# Patient Record
Sex: MALE | Race: WHITE | Employment: OTHER
[De-identification: names, ages, dates, MRNs, and addresses within clinical notes are randomized per-mention and may not be internally consistent; named-entity substitution may affect disease eponyms.]

---

## 2017-01-10 ENCOUNTER — INITIAL CONSULT (OUTPATIENT)
Dept: PAIN MANAGEMENT | Facility: CLINIC | Age: 67
End: 2017-01-10

## 2017-01-10 VITALS
HEART RATE: 61 BPM | HEIGHT: 72 IN | TEMPERATURE: 97.2 F | BODY MASS INDEX: 32.78 KG/M2 | SYSTOLIC BLOOD PRESSURE: 117 MMHG | RESPIRATION RATE: 16 BRPM | OXYGEN SATURATION: 99 % | WEIGHT: 242 LBS | DIASTOLIC BLOOD PRESSURE: 55 MMHG

## 2017-01-10 DIAGNOSIS — G89.29 CHRONIC MIDLINE LOW BACK PAIN WITH RIGHT-SIDED SCIATICA: Primary | ICD-10-CM

## 2017-01-10 DIAGNOSIS — R69 SEVERE COMORBID ILLNESS: ICD-10-CM

## 2017-01-10 DIAGNOSIS — M17.12 PRIMARY OSTEOARTHRITIS OF LEFT KNEE: ICD-10-CM

## 2017-01-10 DIAGNOSIS — M54.41 CHRONIC MIDLINE LOW BACK PAIN WITH RIGHT-SIDED SCIATICA: Primary | ICD-10-CM

## 2017-01-10 DIAGNOSIS — Z98.890 HISTORY OF LUMBAR SURGERY: ICD-10-CM

## 2017-01-10 PROCEDURE — 99244 OFF/OP CNSLTJ NEW/EST MOD 40: CPT | Performed by: ANESTHESIOLOGY

## 2017-01-10 ASSESSMENT — ENCOUNTER SYMPTOMS
ALLERGIC/IMMUNOLOGIC NEGATIVE: 1
RESPIRATORY NEGATIVE: 1
DIARRHEA: 1
EYES NEGATIVE: 1
BACK PAIN: 1

## 2017-01-16 ENCOUNTER — CARE COORDINATION (OUTPATIENT)
Dept: CARE COORDINATION | Facility: CLINIC | Age: 67
End: 2017-01-16

## 2017-01-16 DIAGNOSIS — C61 PROSTATE CANCER (HCC): Primary | ICD-10-CM

## 2017-01-23 DIAGNOSIS — F41.9 ANXIETY: ICD-10-CM

## 2017-01-23 RX ORDER — LORAZEPAM 1 MG/1
1 TABLET ORAL EVERY 8 HOURS PRN
Qty: 30 TABLET | Refills: 0 | Status: SHIPPED | OUTPATIENT
Start: 2017-01-23 | End: 2017-03-06 | Stop reason: SDUPTHER

## 2017-01-26 ENCOUNTER — TELEPHONE (OUTPATIENT)
Dept: PAIN MANAGEMENT | Facility: CLINIC | Age: 67
End: 2017-01-26

## 2017-01-26 ENCOUNTER — OFFICE VISIT (OUTPATIENT)
Dept: GASTROENTEROLOGY | Facility: CLINIC | Age: 67
End: 2017-01-26

## 2017-01-26 VITALS
HEIGHT: 72 IN | DIASTOLIC BLOOD PRESSURE: 69 MMHG | TEMPERATURE: 97.9 F | OXYGEN SATURATION: 95 % | BODY MASS INDEX: 33.35 KG/M2 | RESPIRATION RATE: 14 BRPM | WEIGHT: 246.2 LBS | SYSTOLIC BLOOD PRESSURE: 146 MMHG | HEART RATE: 53 BPM

## 2017-01-26 DIAGNOSIS — E11.8 CONTROLLED TYPE 2 DIABETES MELLITUS WITH COMPLICATION, WITHOUT LONG-TERM CURRENT USE OF INSULIN (HCC): ICD-10-CM

## 2017-01-26 DIAGNOSIS — K57.30 DIVERTICULOSIS OF LARGE INTESTINE WITHOUT HEMORRHAGE: ICD-10-CM

## 2017-01-26 DIAGNOSIS — R63.5 WEIGHT GAIN: ICD-10-CM

## 2017-01-26 DIAGNOSIS — Z87.898 HISTORY OF ALCOHOL USE: ICD-10-CM

## 2017-01-26 DIAGNOSIS — Z86.19 HISTORY OF HEPATITIS C: Primary | ICD-10-CM

## 2017-01-26 DIAGNOSIS — K63.5 COLON POLYPS: ICD-10-CM

## 2017-01-26 DIAGNOSIS — K70.31 ASCITES DUE TO ALCOHOLIC CIRRHOSIS (HCC): ICD-10-CM

## 2017-01-26 PROCEDURE — 99214 OFFICE O/P EST MOD 30 MIN: CPT | Performed by: INTERNAL MEDICINE

## 2017-01-26 ASSESSMENT — ENCOUNTER SYMPTOMS
VOMITING: 0
ANAL BLEEDING: 0
RESPIRATORY NEGATIVE: 1
ABDOMINAL PAIN: 0
BLOOD IN STOOL: 0
EYES NEGATIVE: 1
NAUSEA: 0
DIARRHEA: 1
CONSTIPATION: 0
ALLERGIC/IMMUNOLOGIC NEGATIVE: 1
RECTAL PAIN: 0
ABDOMINAL DISTENTION: 1

## 2017-02-03 ENCOUNTER — TELEPHONE (OUTPATIENT)
Dept: PAIN MANAGEMENT | Facility: CLINIC | Age: 67
End: 2017-02-03

## 2017-02-03 DIAGNOSIS — M96.1 POST LAMINECTOMY SYNDROME: Primary | ICD-10-CM

## 2017-02-03 DIAGNOSIS — M54.16 RIGHT LUMBAR RADICULITIS: ICD-10-CM

## 2017-02-16 RX ORDER — OMEPRAZOLE 20 MG/1
20 CAPSULE, DELAYED RELEASE ORAL DAILY
Qty: 90 CAPSULE | Refills: 2 | Status: SHIPPED | OUTPATIENT
Start: 2017-02-16 | End: 2017-06-27 | Stop reason: SDUPTHER

## 2017-02-16 RX ORDER — FUROSEMIDE 40 MG/1
40 TABLET ORAL 2 TIMES DAILY
Qty: 180 TABLET | Refills: 1 | Status: SHIPPED | OUTPATIENT
Start: 2017-02-16 | End: 2017-10-24 | Stop reason: SDUPTHER

## 2017-02-21 RX ORDER — FUROSEMIDE 40 MG/1
40 TABLET ORAL 2 TIMES DAILY
Qty: 60 TABLET | Refills: 1 | Status: SHIPPED | OUTPATIENT
Start: 2017-02-21 | End: 2017-04-07 | Stop reason: SDUPTHER

## 2017-02-27 ENCOUNTER — CARE COORDINATION (OUTPATIENT)
Dept: CARE COORDINATION | Facility: CLINIC | Age: 67
End: 2017-02-27

## 2017-03-06 DIAGNOSIS — F41.9 ANXIETY: ICD-10-CM

## 2017-03-07 RX ORDER — LORAZEPAM 1 MG/1
1 TABLET ORAL EVERY 8 HOURS PRN
Qty: 30 TABLET | Refills: 0 | Status: SHIPPED | OUTPATIENT
Start: 2017-03-07 | End: 2017-05-02 | Stop reason: SDUPTHER

## 2017-03-07 RX ORDER — SPIRONOLACTONE 100 MG/1
TABLET, FILM COATED ORAL
Qty: 30 TABLET | Refills: 5 | Status: SHIPPED | OUTPATIENT
Start: 2017-03-07 | End: 2017-04-06 | Stop reason: SDUPTHER

## 2017-03-15 DIAGNOSIS — B18.2 CHRONIC HEPATITIS C WITH CIRRHOSIS (HCC): ICD-10-CM

## 2017-03-15 DIAGNOSIS — K74.60 CHRONIC HEPATITIS C WITH CIRRHOSIS (HCC): ICD-10-CM

## 2017-03-16 RX ORDER — LACTULOSE 10 G/15ML
30 SOLUTION ORAL 3 TIMES DAILY
Qty: 5000 ML | Refills: 5 | Status: SHIPPED | OUTPATIENT
Start: 2017-03-16 | End: 2017-11-20 | Stop reason: SDUPTHER

## 2017-03-22 DIAGNOSIS — M51.26 DISC DISPLACEMENT, LUMBAR: ICD-10-CM

## 2017-03-22 DIAGNOSIS — M10.072 ACUTE IDIOPATHIC GOUT INVOLVING TOE OF LEFT FOOT: ICD-10-CM

## 2017-03-22 RX ORDER — ALLOPURINOL 100 MG/1
200 TABLET ORAL DAILY
Qty: 180 TABLET | Refills: 1 | Status: SHIPPED | OUTPATIENT
Start: 2017-03-22 | End: 2017-04-28 | Stop reason: DRUGHIGH

## 2017-03-23 ENCOUNTER — CARE COORDINATION (OUTPATIENT)
Dept: CARE COORDINATION | Age: 67
End: 2017-03-23

## 2017-03-23 RX ORDER — HYDROCODONE BITARTRATE AND ACETAMINOPHEN 5; 325 MG/1; MG/1
1 TABLET ORAL EVERY 4 HOURS PRN
Qty: 40 TABLET | Refills: 0 | Status: SHIPPED | OUTPATIENT
Start: 2017-03-23 | End: 2017-04-07 | Stop reason: SDUPTHER

## 2017-03-23 RX ORDER — METHYLPREDNISOLONE 4 MG/1
TABLET ORAL
Qty: 1 KIT | Refills: 0 | Status: SHIPPED | OUTPATIENT
Start: 2017-03-23 | End: 2017-04-28 | Stop reason: SDUPTHER

## 2017-04-03 ENCOUNTER — CARE COORDINATION (OUTPATIENT)
Dept: CARE COORDINATION | Age: 67
End: 2017-04-03

## 2017-04-04 LAB
ALBUMIN SERPL-MCNC: 4 G/DL
ALP BLD-CCNC: 70 U/L
ALT SERPL-CCNC: 24 U/L
AST SERPL-CCNC: 28 U/L
BILIRUB SERPL-MCNC: 0.3 MG/DL (ref 0.1–1.4)
BUN BLDV-MCNC: 22 MG/DL
CALCIUM SERPL-MCNC: 9.5 MG/DL
CHLORIDE BLD-SCNC: 99 MMOL/L
CO2: 26 MMOL/L
CREAT SERPL-MCNC: 1.53 MG/DL
GFR CALCULATED: 46
GLUCOSE BLD-MCNC: 96 MG/DL
POTASSIUM SERPL-SCNC: 4.9 MMOL/L
SODIUM BLD-SCNC: 138 MMOL/L
TOTAL PROTEIN: 7.4

## 2017-04-05 ENCOUNTER — OFFICE VISIT (OUTPATIENT)
Dept: PAIN MANAGEMENT | Age: 67
End: 2017-04-05
Payer: COMMERCIAL

## 2017-04-05 VITALS
BODY MASS INDEX: 33.62 KG/M2 | HEART RATE: 57 BPM | TEMPERATURE: 97.1 F | WEIGHT: 248.2 LBS | DIASTOLIC BLOOD PRESSURE: 51 MMHG | HEIGHT: 72 IN | OXYGEN SATURATION: 96 % | RESPIRATION RATE: 16 BRPM | SYSTOLIC BLOOD PRESSURE: 123 MMHG

## 2017-04-05 DIAGNOSIS — M54.16 RIGHT LUMBAR RADICULITIS: Primary | ICD-10-CM

## 2017-04-05 DIAGNOSIS — Z98.890 HISTORY OF LUMBAR LAMINECTOMY: ICD-10-CM

## 2017-04-05 DIAGNOSIS — M48.061 LUMBAR FORAMINAL STENOSIS: ICD-10-CM

## 2017-04-05 DIAGNOSIS — R69 SEVERE COMORBID ILLNESS: ICD-10-CM

## 2017-04-05 DIAGNOSIS — M17.0 PRIMARY OSTEOARTHRITIS OF BOTH KNEES: ICD-10-CM

## 2017-04-05 DIAGNOSIS — M54.16 CHRONIC LUMBAR RADICULOPATHY: ICD-10-CM

## 2017-04-05 PROCEDURE — 99214 OFFICE O/P EST MOD 30 MIN: CPT | Performed by: ANESTHESIOLOGY

## 2017-04-05 RX ORDER — ACETAMINOPHEN 500 MG
500 TABLET ORAL EVERY 6 HOURS PRN
COMMUNITY
Start: 2017-04-04 | End: 2019-10-30 | Stop reason: HOSPADM

## 2017-04-05 RX ORDER — GABAPENTIN 300 MG/1
400 CAPSULE ORAL DAILY
Qty: 30 CAPSULE | Refills: 0 | Status: SHIPPED | OUTPATIENT
Start: 2017-04-05 | End: 2017-05-05 | Stop reason: SDUPTHER

## 2017-04-05 ASSESSMENT — ENCOUNTER SYMPTOMS
BACK PAIN: 1
RESPIRATORY NEGATIVE: 1
GASTROINTESTINAL NEGATIVE: 1
EYES NEGATIVE: 1
ALLERGIC/IMMUNOLOGIC NEGATIVE: 1

## 2017-04-07 ENCOUNTER — OFFICE VISIT (OUTPATIENT)
Dept: FAMILY MEDICINE CLINIC | Age: 67
End: 2017-04-07
Payer: COMMERCIAL

## 2017-04-07 VITALS
DIASTOLIC BLOOD PRESSURE: 50 MMHG | RESPIRATION RATE: 20 BRPM | WEIGHT: 238 LBS | HEART RATE: 68 BPM | HEIGHT: 72 IN | SYSTOLIC BLOOD PRESSURE: 108 MMHG | BODY MASS INDEX: 32.23 KG/M2 | TEMPERATURE: 99 F

## 2017-04-07 DIAGNOSIS — M1A.09X0 IDIOPATHIC CHRONIC GOUT OF MULTIPLE SITES WITHOUT TOPHUS: Primary | ICD-10-CM

## 2017-04-07 DIAGNOSIS — T50.905A ADVERSE EFFECTS OF MEDICATION, INITIAL ENCOUNTER: ICD-10-CM

## 2017-04-07 DIAGNOSIS — M51.26 DISC DISPLACEMENT, LUMBAR: ICD-10-CM

## 2017-04-07 PROCEDURE — 99213 OFFICE O/P EST LOW 20 MIN: CPT | Performed by: PEDIATRICS

## 2017-04-07 RX ORDER — SPIRONOLACTONE 100 MG/1
100 TABLET, FILM COATED ORAL DAILY
Qty: 90 TABLET | Refills: 1 | Status: SHIPPED | OUTPATIENT
Start: 2017-04-07 | End: 2017-09-29 | Stop reason: SDUPTHER

## 2017-04-07 RX ORDER — HYDROCODONE BITARTRATE AND ACETAMINOPHEN 5; 325 MG/1; MG/1
1 TABLET ORAL EVERY 4 HOURS PRN
Qty: 40 TABLET | Refills: 0 | Status: SHIPPED | OUTPATIENT
Start: 2017-04-07 | End: 2017-04-14

## 2017-04-07 ASSESSMENT — ENCOUNTER SYMPTOMS
CONSTIPATION: 0
COUGH: 0
DIARRHEA: 0

## 2017-04-11 DIAGNOSIS — M51.26 DISC DISPLACEMENT, LUMBAR: ICD-10-CM

## 2017-04-11 RX ORDER — METHYLPREDNISOLONE 4 MG/1
TABLET ORAL
Qty: 1 KIT | Refills: 0 | Status: CANCELLED | OUTPATIENT
Start: 2017-04-11 | End: 2017-04-17

## 2017-04-12 ENCOUNTER — OFFICE VISIT (OUTPATIENT)
Dept: FAMILY MEDICINE CLINIC | Age: 67
End: 2017-04-12
Payer: COMMERCIAL

## 2017-04-12 VITALS
OXYGEN SATURATION: 98 % | DIASTOLIC BLOOD PRESSURE: 70 MMHG | TEMPERATURE: 98.2 F | SYSTOLIC BLOOD PRESSURE: 112 MMHG | BODY MASS INDEX: 32.25 KG/M2 | HEART RATE: 78 BPM | RESPIRATION RATE: 16 BRPM | WEIGHT: 238.1 LBS | HEIGHT: 72 IN

## 2017-04-12 DIAGNOSIS — J32.9 CHRONIC RECURRENT SINUSITIS: Primary | ICD-10-CM

## 2017-04-12 PROCEDURE — 99212 OFFICE O/P EST SF 10 MIN: CPT | Performed by: INTERNAL MEDICINE

## 2017-04-12 RX ORDER — AZITHROMYCIN 250 MG/1
TABLET, FILM COATED ORAL
Qty: 6 TABLET | Refills: 0 | Status: SHIPPED | OUTPATIENT
Start: 2017-04-12 | End: 2017-04-22

## 2017-04-12 RX ORDER — FEXOFENADINE HCL 180 MG/1
180 TABLET ORAL DAILY
Qty: 30 TABLET | Refills: 0 | Status: ON HOLD | OUTPATIENT
Start: 2017-04-12 | End: 2017-08-30 | Stop reason: ALTCHOICE

## 2017-04-12 ASSESSMENT — ENCOUNTER SYMPTOMS
COUGH: 1
NAUSEA: 0
VOICE CHANGE: 0
ABDOMINAL PAIN: 0
SHORTNESS OF BREATH: 0
TROUBLE SWALLOWING: 0
SORE THROAT: 0
FACIAL SWELLING: 0
DIARRHEA: 0
RHINORRHEA: 0
SINUS PRESSURE: 0

## 2017-04-24 ENCOUNTER — CARE COORDINATION (OUTPATIENT)
Dept: CARE COORDINATION | Age: 67
End: 2017-04-24

## 2017-04-24 DIAGNOSIS — J32.9 RECURRENT SINUSITIS: Primary | ICD-10-CM

## 2017-04-28 ENCOUNTER — HOSPITAL ENCOUNTER (OUTPATIENT)
Age: 67
Setting detail: SPECIMEN
Discharge: HOME | End: 2017-04-28
Payer: COMMERCIAL

## 2017-04-28 ENCOUNTER — CARE COORDINATION (OUTPATIENT)
Dept: CARE COORDINATION | Age: 67
End: 2017-04-28

## 2017-04-28 DIAGNOSIS — K63.5 COLON POLYPS: ICD-10-CM

## 2017-04-28 DIAGNOSIS — K70.31 ASCITES DUE TO ALCOHOLIC CIRRHOSIS (HCC): ICD-10-CM

## 2017-04-28 DIAGNOSIS — R63.5 WEIGHT GAIN: ICD-10-CM

## 2017-04-28 DIAGNOSIS — Z86.19 HISTORY OF HEPATITIS C: ICD-10-CM

## 2017-04-28 DIAGNOSIS — M1A.09X0 IDIOPATHIC CHRONIC GOUT OF MULTIPLE SITES WITHOUT TOPHUS: ICD-10-CM

## 2017-04-28 DIAGNOSIS — E11.8 CONTROLLED TYPE 2 DIABETES MELLITUS WITH COMPLICATION, WITHOUT LONG-TERM CURRENT USE OF INSULIN (HCC): ICD-10-CM

## 2017-04-28 DIAGNOSIS — K57.30 DIVERTICULOSIS OF LARGE INTESTINE WITHOUT HEMORRHAGE: ICD-10-CM

## 2017-04-28 DIAGNOSIS — Z87.898 HISTORY OF ALCOHOL USE: ICD-10-CM

## 2017-04-28 LAB
ALBUMIN SERPL-MCNC: 3.9 G/DL (ref 3.5–5.2)
ALBUMIN/GLOBULIN RATIO: 1.1 (ref 1–2.5)
ALP BLD-CCNC: 66 U/L (ref 40–129)
ALT SERPL-CCNC: 16 U/L (ref 5–41)
ANION GAP SERPL CALCULATED.3IONS-SCNC: 16 MMOL/L (ref 9–17)
AST SERPL-CCNC: 20 U/L
BILIRUB SERPL-MCNC: 0.38 MG/DL (ref 0.3–1.2)
BILIRUBIN DIRECT: 0.11 MG/DL
BILIRUBIN, INDIRECT: 0.27 MG/DL (ref 0–1)
BUN BLDV-MCNC: 15 MG/DL (ref 8–23)
CHLORIDE BLD-SCNC: 103 MMOL/L (ref 98–107)
CO2: 22 MMOL/L (ref 20–31)
CREAT SERPL-MCNC: 1.32 MG/DL (ref 0.7–1.2)
GFR AFRICAN AMERICAN: >60 ML/MIN
GFR NON-AFRICAN AMERICAN: 54 ML/MIN
GFR SERPL CREATININE-BSD FRML MDRD: ABNORMAL ML/MIN/{1.73_M2}
GFR SERPL CREATININE-BSD FRML MDRD: ABNORMAL ML/MIN/{1.73_M2}
GLOBULIN: NORMAL G/DL (ref 1.5–3.8)
POTASSIUM SERPL-SCNC: 4.1 MMOL/L (ref 3.7–5.3)
SODIUM BLD-SCNC: 141 MMOL/L (ref 135–144)
TOTAL PROTEIN: 7.6 G/DL (ref 6.4–8.3)
URIC ACID: 7.2 MG/DL (ref 3.4–7)

## 2017-04-28 RX ORDER — METHYLPREDNISOLONE 4 MG/1
TABLET ORAL
Qty: 1 KIT | Refills: 0 | Status: SHIPPED | OUTPATIENT
Start: 2017-04-28 | End: 2017-05-04

## 2017-04-28 RX ORDER — ALLOPURINOL 300 MG/1
300 TABLET ORAL DAILY
Qty: 90 TABLET | Refills: 1 | Status: SHIPPED | OUTPATIENT
Start: 2017-04-28 | End: 2017-11-22 | Stop reason: SDUPTHER

## 2017-04-29 ENCOUNTER — HOSPITAL ENCOUNTER (EMERGENCY)
Facility: CLINIC | Age: 67
Discharge: HOME | End: 2017-04-29
Attending: EMERGENCY MEDICINE
Payer: COMMERCIAL

## 2017-04-29 VITALS
TEMPERATURE: 98.2 F | RESPIRATION RATE: 12 BRPM | DIASTOLIC BLOOD PRESSURE: 55 MMHG | WEIGHT: 240 LBS | HEART RATE: 66 BPM | HEIGHT: 72 IN | OXYGEN SATURATION: 95 % | SYSTOLIC BLOOD PRESSURE: 140 MMHG | BODY MASS INDEX: 32.51 KG/M2

## 2017-04-29 DIAGNOSIS — M10.9 ACUTE GOUT INVOLVING TOE OF RIGHT FOOT, UNSPECIFIED CAUSE: Primary | ICD-10-CM

## 2017-04-29 PROCEDURE — 6370000000 HC RX 637 (ALT 250 FOR IP): Performed by: EMERGENCY MEDICINE

## 2017-04-29 PROCEDURE — 99283 EMERGENCY DEPT VISIT LOW MDM: CPT

## 2017-04-29 RX ORDER — OXYCODONE HYDROCHLORIDE AND ACETAMINOPHEN 5; 325 MG/1; MG/1
2 TABLET ORAL ONCE
Status: COMPLETED | OUTPATIENT
Start: 2017-04-29 | End: 2017-04-29

## 2017-04-29 RX ORDER — PREDNISONE 20 MG/1
60 TABLET ORAL ONCE
Status: COMPLETED | OUTPATIENT
Start: 2017-04-29 | End: 2017-04-29

## 2017-04-29 RX ORDER — PREDNISONE 50 MG/1
50 TABLET ORAL DAILY
Qty: 4 TABLET | Refills: 0 | Status: SHIPPED | OUTPATIENT
Start: 2017-04-29 | End: 2017-05-03

## 2017-04-29 RX ADMIN — OXYCODONE HYDROCHLORIDE AND ACETAMINOPHEN 2 TABLET: 5; 325 TABLET ORAL at 12:12

## 2017-04-29 RX ADMIN — PREDNISONE 60 MG: 20 TABLET ORAL at 12:12

## 2017-04-29 ASSESSMENT — PAIN DESCRIPTION - ONSET: ONSET: AWAKENED FROM SLEEP

## 2017-04-29 ASSESSMENT — PAIN DESCRIPTION - FREQUENCY: FREQUENCY: CONTINUOUS

## 2017-04-29 ASSESSMENT — PAIN SCALES - GENERAL
PAINLEVEL_OUTOF10: 6

## 2017-04-29 ASSESSMENT — PAIN DESCRIPTION - PAIN TYPE: TYPE: CHRONIC PAIN

## 2017-04-29 ASSESSMENT — PAIN DESCRIPTION - LOCATION: LOCATION: TOE (COMMENT WHICH ONE);FOOT

## 2017-04-29 ASSESSMENT — PAIN DESCRIPTION - ORIENTATION: ORIENTATION: RIGHT

## 2017-05-01 ENCOUNTER — CARE COORDINATION (OUTPATIENT)
Dept: CARE COORDINATION | Age: 67
End: 2017-05-01

## 2017-05-02 DIAGNOSIS — F41.9 ANXIETY: ICD-10-CM

## 2017-05-03 RX ORDER — LORAZEPAM 1 MG/1
1 TABLET ORAL EVERY 8 HOURS PRN
Qty: 30 TABLET | Refills: 0 | Status: SHIPPED | OUTPATIENT
Start: 2017-05-03 | End: 2017-06-15 | Stop reason: SDUPTHER

## 2017-05-05 RX ORDER — GABAPENTIN 300 MG/1
CAPSULE ORAL
Qty: 30 CAPSULE | Refills: 0 | Status: SHIPPED | OUTPATIENT
Start: 2017-05-05 | End: 2017-05-18 | Stop reason: SDUPTHER

## 2017-05-09 ENCOUNTER — TELEPHONE (OUTPATIENT)
Dept: FAMILY MEDICINE CLINIC | Age: 67
End: 2017-05-09

## 2017-05-09 ENCOUNTER — HOSPITAL ENCOUNTER (OUTPATIENT)
Dept: CT IMAGING | Age: 67
Discharge: HOME | End: 2017-05-09
Payer: COMMERCIAL

## 2017-05-09 DIAGNOSIS — J32.9 RECURRENT SINUSITIS: ICD-10-CM

## 2017-05-09 DIAGNOSIS — R93.0 ABNORMAL CT SCAN, SINUS: Primary | ICD-10-CM

## 2017-05-09 DIAGNOSIS — J32.9 RECURRENT SINUSITIS: Primary | ICD-10-CM

## 2017-05-09 PROCEDURE — 70486 CT MAXILLOFACIAL W/O DYE: CPT

## 2017-05-12 ENCOUNTER — CARE COORDINATION (OUTPATIENT)
Dept: CARE COORDINATION | Age: 67
End: 2017-05-12

## 2017-05-16 ENCOUNTER — TELEPHONE (OUTPATIENT)
Dept: PAIN MANAGEMENT | Age: 67
End: 2017-05-16

## 2017-05-16 RX ORDER — OMEPRAZOLE 20 MG/1
20 CAPSULE, DELAYED RELEASE ORAL DAILY
Qty: 90 CAPSULE | Refills: 2 | OUTPATIENT
Start: 2017-05-16 | End: 2018-05-16

## 2017-05-16 RX ORDER — FUROSEMIDE 40 MG/1
40 TABLET ORAL 2 TIMES DAILY
Qty: 180 TABLET | Refills: 0 | OUTPATIENT
Start: 2017-05-16 | End: 2018-05-16

## 2017-05-18 RX ORDER — GABAPENTIN 300 MG/1
CAPSULE ORAL
Qty: 30 CAPSULE | Refills: 0 | Status: CANCELLED | OUTPATIENT
Start: 2017-05-18

## 2017-05-18 RX ORDER — GABAPENTIN 300 MG/1
CAPSULE ORAL
Qty: 30 CAPSULE | Refills: 0 | Status: SHIPPED | OUTPATIENT
Start: 2017-05-18 | End: 2017-06-28 | Stop reason: SDUPTHER

## 2017-05-26 ENCOUNTER — CARE COORDINATION (OUTPATIENT)
Dept: CARE COORDINATION | Age: 67
End: 2017-05-26

## 2017-05-30 ENCOUNTER — OFFICE VISIT (OUTPATIENT)
Dept: FAMILY MEDICINE CLINIC | Age: 67
End: 2017-05-30
Payer: COMMERCIAL

## 2017-05-30 VITALS
WEIGHT: 244 LBS | HEART RATE: 70 BPM | SYSTOLIC BLOOD PRESSURE: 120 MMHG | DIASTOLIC BLOOD PRESSURE: 78 MMHG | RESPIRATION RATE: 18 BRPM | BODY MASS INDEX: 33.05 KG/M2 | TEMPERATURE: 97.9 F | HEIGHT: 72 IN

## 2017-05-30 DIAGNOSIS — L98.8 SKIN MACULE: Primary | ICD-10-CM

## 2017-05-30 PROCEDURE — 99212 OFFICE O/P EST SF 10 MIN: CPT | Performed by: INTERNAL MEDICINE

## 2017-05-30 RX ORDER — PREDNISONE 50 MG/1
TABLET ORAL
COMMUNITY
Start: 2017-04-29 | End: 2017-05-30 | Stop reason: ALTCHOICE

## 2017-05-30 RX ORDER — CEPHALEXIN 500 MG/1
CAPSULE ORAL
COMMUNITY
Start: 2017-05-25 | End: 2017-06-07 | Stop reason: ALTCHOICE

## 2017-05-30 RX ORDER — CHLORHEXIDINE GLUCONATE 0.12 MG/ML
RINSE ORAL
COMMUNITY
Start: 2017-05-25 | End: 2017-10-16

## 2017-05-30 RX ORDER — METHYLPREDNISOLONE 4 MG/1
TABLET ORAL
COMMUNITY
Start: 2017-05-01 | End: 2017-05-30 | Stop reason: ALTCHOICE

## 2017-05-30 RX ORDER — TRAMADOL HYDROCHLORIDE 50 MG/1
TABLET ORAL
COMMUNITY
Start: 2017-05-25 | End: 2017-06-06 | Stop reason: ALTCHOICE

## 2017-05-31 ENCOUNTER — TELEPHONE (OUTPATIENT)
Dept: FAMILY MEDICINE CLINIC | Age: 67
End: 2017-05-31

## 2017-06-01 ENCOUNTER — CARE COORDINATION (OUTPATIENT)
Dept: CARE COORDINATION | Age: 67
End: 2017-06-01

## 2017-06-05 ENCOUNTER — TELEPHONE (OUTPATIENT)
Dept: PAIN MANAGEMENT | Age: 67
End: 2017-06-05

## 2017-06-05 DIAGNOSIS — M48.061 LUMBAR STENOSIS: Primary | ICD-10-CM

## 2017-06-06 ENCOUNTER — OFFICE VISIT (OUTPATIENT)
Dept: GASTROENTEROLOGY | Age: 67
End: 2017-06-06
Payer: COMMERCIAL

## 2017-06-06 ENCOUNTER — CARE COORDINATION (OUTPATIENT)
Dept: CARE COORDINATION | Age: 67
End: 2017-06-06

## 2017-06-06 VITALS
WEIGHT: 246.1 LBS | BODY MASS INDEX: 33.33 KG/M2 | OXYGEN SATURATION: 98 % | HEIGHT: 72 IN | RESPIRATION RATE: 14 BRPM | TEMPERATURE: 98.1 F | SYSTOLIC BLOOD PRESSURE: 143 MMHG | DIASTOLIC BLOOD PRESSURE: 66 MMHG | HEART RATE: 61 BPM

## 2017-06-06 DIAGNOSIS — Z86.19 HISTORY OF HEPATITIS C: ICD-10-CM

## 2017-06-06 DIAGNOSIS — K70.31 ASCITES DUE TO ALCOHOLIC CIRRHOSIS (HCC): Primary | ICD-10-CM

## 2017-06-06 DIAGNOSIS — K63.5 COLON POLYPS: ICD-10-CM

## 2017-06-06 DIAGNOSIS — M10.9 ACUTE GOUT INVOLVING TOE OF RIGHT FOOT, UNSPECIFIED CAUSE: Primary | ICD-10-CM

## 2017-06-06 DIAGNOSIS — K76.89 HEPATIC CYST: ICD-10-CM

## 2017-06-06 PROCEDURE — 99214 OFFICE O/P EST MOD 30 MIN: CPT | Performed by: INTERNAL MEDICINE

## 2017-06-06 RX ORDER — PREDNISONE 20 MG/1
TABLET ORAL
Qty: 14 TABLET | Refills: 0 | Status: SHIPPED | OUTPATIENT
Start: 2017-06-06 | End: 2017-06-16

## 2017-06-06 RX ORDER — TRAMADOL HYDROCHLORIDE 50 MG/1
50 TABLET ORAL 2 TIMES DAILY PRN
Qty: 10 TABLET | Refills: 0 | Status: SHIPPED | OUTPATIENT
Start: 2017-06-06 | End: 2017-06-11

## 2017-06-06 RX ORDER — ALLOPURINOL 100 MG/1
300 TABLET ORAL EVERY EVENING
COMMUNITY
End: 2017-10-16

## 2017-06-06 ASSESSMENT — ENCOUNTER SYMPTOMS
CONSTIPATION: 0
ALLERGIC/IMMUNOLOGIC NEGATIVE: 1
VOMITING: 0
TROUBLE SWALLOWING: 0
ABDOMINAL DISTENTION: 0
NAUSEA: 0
RESPIRATORY NEGATIVE: 1
ANAL BLEEDING: 0
DIARRHEA: 1
BLOOD IN STOOL: 0
RECTAL PAIN: 0
ABDOMINAL PAIN: 0
EYES NEGATIVE: 1

## 2017-06-07 ENCOUNTER — OFFICE VISIT (OUTPATIENT)
Dept: FAMILY MEDICINE CLINIC | Age: 67
End: 2017-06-07
Payer: COMMERCIAL

## 2017-06-07 VITALS
SYSTOLIC BLOOD PRESSURE: 122 MMHG | DIASTOLIC BLOOD PRESSURE: 60 MMHG | HEART RATE: 78 BPM | HEIGHT: 72 IN | RESPIRATION RATE: 16 BRPM | TEMPERATURE: 97.8 F | BODY MASS INDEX: 33.44 KG/M2 | WEIGHT: 246.9 LBS

## 2017-06-07 DIAGNOSIS — M10.9 ACUTE GOUT INVOLVING TOE OF RIGHT FOOT, UNSPECIFIED CAUSE: Primary | ICD-10-CM

## 2017-06-07 PROCEDURE — 99212 OFFICE O/P EST SF 10 MIN: CPT | Performed by: INTERNAL MEDICINE

## 2017-06-08 ENCOUNTER — HOSPITAL ENCOUNTER (OUTPATIENT)
Dept: GENERAL RADIOLOGY | Facility: CLINIC | Age: 67
Discharge: HOME | End: 2017-06-08
Payer: COMMERCIAL

## 2017-06-08 DIAGNOSIS — M10.9 ACUTE GOUT INVOLVING TOE OF RIGHT FOOT, UNSPECIFIED CAUSE: ICD-10-CM

## 2017-06-08 PROCEDURE — 73630 X-RAY EXAM OF FOOT: CPT

## 2017-06-15 ENCOUNTER — CARE COORDINATION (OUTPATIENT)
Dept: CARE COORDINATION | Age: 67
End: 2017-06-15

## 2017-06-15 DIAGNOSIS — F41.9 ANXIETY: ICD-10-CM

## 2017-06-15 DIAGNOSIS — E79.0 HYPERURICEMIA: ICD-10-CM

## 2017-06-15 DIAGNOSIS — M10.9 ACUTE GOUT INVOLVING TOE OF RIGHT FOOT, UNSPECIFIED CAUSE: Primary | ICD-10-CM

## 2017-06-15 RX ORDER — LORAZEPAM 1 MG/1
1 TABLET ORAL EVERY 8 HOURS PRN
Qty: 30 TABLET | Refills: 0 | Status: SHIPPED | OUTPATIENT
Start: 2017-06-15 | End: 2017-07-31 | Stop reason: SDUPTHER

## 2017-06-17 ASSESSMENT — ENCOUNTER SYMPTOMS
NAUSEA: 0
BACK PAIN: 1

## 2017-06-19 ENCOUNTER — TELEPHONE (OUTPATIENT)
Dept: FAMILY MEDICINE CLINIC | Age: 67
End: 2017-06-19

## 2017-06-19 DIAGNOSIS — M1A.09X0 IDIOPATHIC CHRONIC GOUT OF MULTIPLE SITES WITHOUT TOPHUS: Primary | ICD-10-CM

## 2017-06-20 ENCOUNTER — CARE COORDINATION (OUTPATIENT)
Dept: CARE COORDINATION | Age: 67
End: 2017-06-20

## 2017-06-21 ENCOUNTER — HOSPITAL ENCOUNTER (OUTPATIENT)
Age: 67
Setting detail: SPECIMEN
Discharge: HOME | End: 2017-06-21
Payer: COMMERCIAL

## 2017-06-21 DIAGNOSIS — M1A.09X0 IDIOPATHIC CHRONIC GOUT OF MULTIPLE SITES WITHOUT TOPHUS: ICD-10-CM

## 2017-06-21 LAB — URIC ACID: 7.6 MG/DL (ref 3.4–7)

## 2017-06-23 ENCOUNTER — CARE COORDINATION (OUTPATIENT)
Dept: CARE COORDINATION | Age: 67
End: 2017-06-23

## 2017-06-23 DIAGNOSIS — M1A.09X0 IDIOPATHIC CHRONIC GOUT OF MULTIPLE SITES WITHOUT TOPHUS: Primary | ICD-10-CM

## 2017-06-26 ENCOUNTER — HOSPITAL ENCOUNTER (OUTPATIENT)
Age: 67
Setting detail: SPECIMEN
Discharge: HOME | End: 2017-06-26
Payer: COMMERCIAL

## 2017-06-26 DIAGNOSIS — M1A.09X0 IDIOPATHIC CHRONIC GOUT OF MULTIPLE SITES WITHOUT TOPHUS: ICD-10-CM

## 2017-06-26 LAB — URIC ACID, UR: <4 MG/DL

## 2017-06-27 DIAGNOSIS — K21.9 GASTROESOPHAGEAL REFLUX DISEASE WITHOUT ESOPHAGITIS: ICD-10-CM

## 2017-06-27 DIAGNOSIS — M1A.09X0 IDIOPATHIC CHRONIC GOUT OF MULTIPLE SITES WITHOUT TOPHUS: Primary | ICD-10-CM

## 2017-06-27 RX ORDER — OMEPRAZOLE 20 MG/1
20 CAPSULE, DELAYED RELEASE ORAL 2 TIMES DAILY
Qty: 180 CAPSULE | Refills: 2 | Status: SHIPPED | OUTPATIENT
Start: 2017-06-27 | End: 2017-07-05 | Stop reason: SDUPTHER

## 2017-06-27 RX ORDER — PROBENECID 500 MG/1
250 TABLET, FILM COATED ORAL 2 TIMES DAILY
Qty: 30 TABLET | Refills: 2 | Status: SHIPPED | OUTPATIENT
Start: 2017-06-27 | End: 2017-09-29 | Stop reason: SDUPTHER

## 2017-06-28 ENCOUNTER — OFFICE VISIT (OUTPATIENT)
Dept: PAIN MANAGEMENT | Age: 67
End: 2017-06-28
Payer: COMMERCIAL

## 2017-06-28 VITALS
BODY MASS INDEX: 33.43 KG/M2 | DIASTOLIC BLOOD PRESSURE: 54 MMHG | HEART RATE: 56 BPM | HEIGHT: 72 IN | RESPIRATION RATE: 16 BRPM | WEIGHT: 246.8 LBS | OXYGEN SATURATION: 93 % | SYSTOLIC BLOOD PRESSURE: 130 MMHG

## 2017-06-28 DIAGNOSIS — M96.1 POST LAMINECTOMY SYNDROME: Primary | ICD-10-CM

## 2017-06-28 DIAGNOSIS — Z22.322 MRSA CARRIER: ICD-10-CM

## 2017-06-28 DIAGNOSIS — M17.0 PRIMARY OSTEOARTHRITIS OF BOTH KNEES: Chronic | ICD-10-CM

## 2017-06-28 DIAGNOSIS — M54.16 CHRONIC LUMBAR RADICULOPATHY: ICD-10-CM

## 2017-06-28 DIAGNOSIS — R69 SEVERE COMORBID ILLNESS: ICD-10-CM

## 2017-06-28 PROCEDURE — 99213 OFFICE O/P EST LOW 20 MIN: CPT | Performed by: ANESTHESIOLOGY

## 2017-06-28 RX ORDER — LORAZEPAM 1 MG/1
1 TABLET ORAL
COMMUNITY
End: 2017-06-28 | Stop reason: SDUPTHER

## 2017-06-28 RX ORDER — GABAPENTIN 400 MG/1
CAPSULE ORAL
Qty: 90 CAPSULE | Refills: 1 | Status: SHIPPED | OUTPATIENT
Start: 2017-06-28 | End: 2017-06-30 | Stop reason: SDUPTHER

## 2017-06-28 ASSESSMENT — ENCOUNTER SYMPTOMS
CHOKING: 0
STRIDOR: 0
SHORTNESS OF BREATH: 0
COUGH: 0
WHEEZING: 0
APNEA: 0
BACK PAIN: 1
CHEST TIGHTNESS: 0

## 2017-06-30 ENCOUNTER — TELEPHONE (OUTPATIENT)
Dept: PAIN MANAGEMENT | Age: 67
End: 2017-06-30

## 2017-06-30 ENCOUNTER — CARE COORDINATION (OUTPATIENT)
Dept: CARE COORDINATION | Age: 67
End: 2017-06-30

## 2017-06-30 RX ORDER — GABAPENTIN 400 MG/1
400 CAPSULE ORAL 2 TIMES DAILY
Qty: 60 CAPSULE | Refills: 1 | Status: SHIPPED | OUTPATIENT
Start: 2017-06-30 | End: 2017-07-21 | Stop reason: SDUPTHER

## 2017-07-03 ENCOUNTER — TELEPHONE (OUTPATIENT)
Dept: PAIN MANAGEMENT | Age: 67
End: 2017-07-03

## 2017-07-05 ENCOUNTER — TELEPHONE (OUTPATIENT)
Dept: FAMILY MEDICINE CLINIC | Age: 67
End: 2017-07-05

## 2017-07-05 DIAGNOSIS — K21.9 GASTROESOPHAGEAL REFLUX DISEASE WITHOUT ESOPHAGITIS: ICD-10-CM

## 2017-07-05 RX ORDER — OMEPRAZOLE 20 MG/1
60 CAPSULE, DELAYED RELEASE ORAL DAILY
Qty: 270 CAPSULE | Refills: 2 | Status: SHIPPED | OUTPATIENT
Start: 2017-07-05 | End: 2017-09-29 | Stop reason: SDUPTHER

## 2017-07-06 RX ORDER — AMOXICILLIN 500 MG/1
CAPSULE ORAL
Refills: 0 | COMMUNITY
Start: 2017-06-29 | End: 2017-07-10

## 2017-07-18 ENCOUNTER — TELEPHONE (OUTPATIENT)
Dept: GASTROENTEROLOGY | Age: 67
End: 2017-07-18

## 2017-07-20 ENCOUNTER — HOSPITAL ENCOUNTER (OUTPATIENT)
Dept: CT IMAGING | Facility: CLINIC | Age: 67
Discharge: HOME | End: 2017-07-20
Payer: COMMERCIAL

## 2017-07-20 DIAGNOSIS — M96.1 POST LAMINECTOMY SYNDROME: ICD-10-CM

## 2017-07-20 PROCEDURE — 72128 CT CHEST SPINE W/O DYE: CPT

## 2017-07-21 ENCOUNTER — TELEPHONE (OUTPATIENT)
Dept: PAIN MANAGEMENT | Age: 67
End: 2017-07-21

## 2017-07-21 RX ORDER — GABAPENTIN 400 MG/1
400 CAPSULE ORAL 3 TIMES DAILY
Qty: 90 CAPSULE | Refills: 1 | Status: SHIPPED | OUTPATIENT
Start: 2017-07-21 | End: 2017-09-19 | Stop reason: SDUPTHER

## 2017-07-25 ENCOUNTER — OFFICE VISIT (OUTPATIENT)
Dept: ORTHOPEDIC SURGERY | Age: 67
End: 2017-07-25
Payer: COMMERCIAL

## 2017-07-25 VITALS — BODY MASS INDEX: 32.51 KG/M2 | WEIGHT: 240 LBS | HEIGHT: 72 IN

## 2017-07-25 DIAGNOSIS — M22.41 CHONDROMALACIA PATELLA, RIGHT: Primary | ICD-10-CM

## 2017-07-25 PROCEDURE — 99212 OFFICE O/P EST SF 10 MIN: CPT | Performed by: ORTHOPAEDIC SURGERY

## 2017-07-31 ENCOUNTER — CARE COORDINATION (OUTPATIENT)
Dept: CARE COORDINATION | Age: 67
End: 2017-07-31

## 2017-07-31 DIAGNOSIS — F41.9 ANXIETY: ICD-10-CM

## 2017-07-31 RX ORDER — LORAZEPAM 1 MG/1
1 TABLET ORAL EVERY 8 HOURS PRN
Qty: 30 TABLET | Refills: 0 | Status: SHIPPED | OUTPATIENT
Start: 2017-07-31 | End: 2017-09-15 | Stop reason: SDUPTHER

## 2017-08-10 ENCOUNTER — TELEPHONE (OUTPATIENT)
Dept: GASTROENTEROLOGY | Age: 67
End: 2017-08-10

## 2017-08-11 ENCOUNTER — HOSPITAL ENCOUNTER (OUTPATIENT)
Age: 67
Setting detail: SPECIMEN
Discharge: HOME | End: 2017-08-11
Payer: COMMERCIAL

## 2017-08-11 ENCOUNTER — TELEPHONE (OUTPATIENT)
Dept: FAMILY MEDICINE CLINIC | Age: 67
End: 2017-08-11

## 2017-08-11 DIAGNOSIS — K63.5 COLON POLYPS: ICD-10-CM

## 2017-08-11 DIAGNOSIS — M1A.09X0 IDIOPATHIC CHRONIC GOUT OF MULTIPLE SITES WITHOUT TOPHUS: Primary | ICD-10-CM

## 2017-08-11 DIAGNOSIS — Z86.19 HISTORY OF HEPATITIS C: ICD-10-CM

## 2017-08-11 DIAGNOSIS — K70.31 ASCITES DUE TO ALCOHOLIC CIRRHOSIS (HCC): ICD-10-CM

## 2017-08-11 DIAGNOSIS — K76.89 HEPATIC CYST: ICD-10-CM

## 2017-08-11 LAB
ABSOLUTE EOS #: 0 K/UL (ref 0–0.4)
ABSOLUTE LYMPH #: 1.8 K/UL (ref 1–4.8)
ABSOLUTE MONO #: 0.6 K/UL (ref 0.1–1.2)
AFP: 1.8 UG/L
ALBUMIN SERPL-MCNC: 4 G/DL (ref 3.5–5.2)
ALBUMIN/GLOBULIN RATIO: 1.1 (ref 1–2.5)
ALP BLD-CCNC: 67 U/L (ref 40–129)
ALT SERPL-CCNC: 21 U/L (ref 5–41)
ANION GAP SERPL CALCULATED.3IONS-SCNC: 15 MMOL/L (ref 9–17)
AST SERPL-CCNC: 20 U/L
BASOPHILS # BLD: 0 %
BASOPHILS ABSOLUTE: 0 K/UL (ref 0–0.2)
BILIRUB SERPL-MCNC: 0.18 MG/DL (ref 0.3–1.2)
BILIRUBIN DIRECT: <0.08 MG/DL
BILIRUBIN, INDIRECT: ABNORMAL MG/DL (ref 0–1)
BUN BLDV-MCNC: 37 MG/DL (ref 8–23)
CHLORIDE BLD-SCNC: 102 MMOL/L (ref 98–107)
CO2: 23 MMOL/L (ref 20–31)
CREAT SERPL-MCNC: 1.42 MG/DL (ref 0.7–1.2)
DIFFERENTIAL TYPE: ABNORMAL
EOSINOPHILS RELATIVE PERCENT: 0 %
GFR AFRICAN AMERICAN: >60 ML/MIN
GFR NON-AFRICAN AMERICAN: 50 ML/MIN
GFR SERPL CREATININE-BSD FRML MDRD: ABNORMAL ML/MIN/{1.73_M2}
GFR SERPL CREATININE-BSD FRML MDRD: ABNORMAL ML/MIN/{1.73_M2}
GLOBULIN: ABNORMAL G/DL (ref 1.5–3.8)
HCT VFR BLD CALC: 39.7 % (ref 41–53)
HEMOGLOBIN: 13.2 G/DL (ref 13.5–17.5)
LYMPHOCYTES # BLD: 17 %
MCH RBC QN AUTO: 33.3 PG (ref 26–34)
MCHC RBC AUTO-ENTMCNC: 33.4 G/DL (ref 31–37)
MCV RBC AUTO: 99.9 FL (ref 80–100)
MONOCYTES # BLD: 6 %
PDW BLD-RTO: 15.8 % (ref 12.5–15.4)
PLATELET # BLD: 194 K/UL (ref 140–450)
PLATELET ESTIMATE: ABNORMAL
PMV BLD AUTO: 9.6 FL (ref 6–12)
POTASSIUM SERPL-SCNC: 4.1 MMOL/L (ref 3.7–5.3)
RBC # BLD: 3.97 M/UL (ref 4.5–5.9)
RBC # BLD: ABNORMAL 10*6/UL
SEG NEUTROPHILS: 77 %
SEGMENTED NEUTROPHILS ABSOLUTE COUNT: 8.1 K/UL (ref 1.8–7.7)
SODIUM BLD-SCNC: 140 MMOL/L (ref 135–144)
TOTAL PROTEIN: 7.6 G/DL (ref 6.4–8.3)
WBC # BLD: 10.5 K/UL (ref 3.5–11)
WBC # BLD: ABNORMAL 10*3/UL

## 2017-08-14 LAB — URIC ACID: 6.6 MG/DL (ref 3.4–7)

## 2017-08-15 ENCOUNTER — OFFICE VISIT (OUTPATIENT)
Dept: GASTROENTEROLOGY | Age: 67
End: 2017-08-15
Payer: COMMERCIAL

## 2017-08-15 VITALS
SYSTOLIC BLOOD PRESSURE: 133 MMHG | DIASTOLIC BLOOD PRESSURE: 55 MMHG | BODY MASS INDEX: 34.34 KG/M2 | WEIGHT: 253.5 LBS | HEIGHT: 72 IN | TEMPERATURE: 98 F | HEART RATE: 59 BPM | RESPIRATION RATE: 14 BRPM | OXYGEN SATURATION: 99 %

## 2017-08-15 DIAGNOSIS — F10.11 H/O ETOH ABUSE: ICD-10-CM

## 2017-08-15 DIAGNOSIS — R13.19 OTHER DYSPHAGIA: ICD-10-CM

## 2017-08-15 DIAGNOSIS — K22.70 BARRETT'S ESOPHAGUS WITHOUT DYSPLASIA: Primary | ICD-10-CM

## 2017-08-15 DIAGNOSIS — B18.2 CHRONIC HEPATITIS C WITHOUT HEPATIC COMA (HCC): ICD-10-CM

## 2017-08-15 PROCEDURE — 99214 OFFICE O/P EST MOD 30 MIN: CPT | Performed by: INTERNAL MEDICINE

## 2017-08-15 ASSESSMENT — ENCOUNTER SYMPTOMS
TROUBLE SWALLOWING: 1
ABDOMINAL PAIN: 0
CONSTIPATION: 0
RECTAL PAIN: 0
ANAL BLEEDING: 0
RESPIRATORY NEGATIVE: 1
VOMITING: 0
ALLERGIC/IMMUNOLOGIC NEGATIVE: 1
EYES NEGATIVE: 1
NAUSEA: 0
DIARRHEA: 0
BLOOD IN STOOL: 0
ABDOMINAL DISTENTION: 0

## 2017-08-21 ENCOUNTER — TELEPHONE (OUTPATIENT)
Dept: FAMILY MEDICINE CLINIC | Age: 67
End: 2017-08-21

## 2017-08-21 DIAGNOSIS — N28.9 FUNCTION KIDNEY DECREASED: Primary | ICD-10-CM

## 2017-08-24 ENCOUNTER — TELEPHONE (OUTPATIENT)
Dept: FAMILY MEDICINE CLINIC | Age: 67
End: 2017-08-24

## 2017-08-24 DIAGNOSIS — M10.09 ACUTE IDIOPATHIC GOUT OF MULTIPLE SITES: Primary | ICD-10-CM

## 2017-08-24 RX ORDER — METHYLPREDNISOLONE 4 MG/1
TABLET ORAL
Qty: 1 KIT | Refills: 0 | Status: SHIPPED | OUTPATIENT
Start: 2017-08-24 | End: 2017-08-30

## 2017-08-28 ENCOUNTER — TELEPHONE (OUTPATIENT)
Dept: FAMILY MEDICINE CLINIC | Age: 67
End: 2017-08-28

## 2017-08-28 ENCOUNTER — CARE COORDINATION (OUTPATIENT)
Dept: CARE COORDINATION | Age: 67
End: 2017-08-28

## 2017-08-28 DIAGNOSIS — M1A.09X0 IDIOPATHIC CHRONIC GOUT OF MULTIPLE SITES WITHOUT TOPHUS: Primary | ICD-10-CM

## 2017-08-30 ENCOUNTER — HOSPITAL ENCOUNTER (OUTPATIENT)
Age: 67
Setting detail: OUTPATIENT SURGERY
Discharge: HOME | End: 2017-08-30
Attending: INTERNAL MEDICINE | Admitting: INTERNAL MEDICINE
Payer: COMMERCIAL

## 2017-08-30 VITALS
WEIGHT: 240.3 LBS | OXYGEN SATURATION: 94 % | HEART RATE: 45 BPM | HEIGHT: 72 IN | SYSTOLIC BLOOD PRESSURE: 120 MMHG | TEMPERATURE: 97.2 F | BODY MASS INDEX: 32.55 KG/M2 | DIASTOLIC BLOOD PRESSURE: 43 MMHG | RESPIRATION RATE: 15 BRPM

## 2017-08-30 DIAGNOSIS — M17.10 PRIMARY OSTEOARTHRITIS OF KNEE, UNSPECIFIED LATERALITY: ICD-10-CM

## 2017-08-30 PROCEDURE — 6360000002 HC RX W HCPCS: Performed by: INTERNAL MEDICINE

## 2017-08-30 PROCEDURE — 2580000003 HC RX 258: Performed by: INTERNAL MEDICINE

## 2017-08-30 PROCEDURE — 88112 CYTOPATH CELL ENHANCE TECH: CPT

## 2017-08-30 PROCEDURE — 7100000011 HC PHASE II RECOVERY - ADDTL 15 MIN: Performed by: INTERNAL MEDICINE

## 2017-08-30 PROCEDURE — 99152 MOD SED SAME PHYS/QHP 5/>YRS: CPT | Performed by: INTERNAL MEDICINE

## 2017-08-30 PROCEDURE — 88305 TISSUE EXAM BY PATHOLOGIST: CPT

## 2017-08-30 PROCEDURE — 3609012400 HC EGD TRANSORAL BIOPSY SINGLE/MULTIPLE: Performed by: INTERNAL MEDICINE

## 2017-08-30 PROCEDURE — 6370000000 HC RX 637 (ALT 250 FOR IP): Performed by: INTERNAL MEDICINE

## 2017-08-30 PROCEDURE — 7100000010 HC PHASE II RECOVERY - FIRST 15 MIN: Performed by: INTERNAL MEDICINE

## 2017-08-30 RX ORDER — LIDOCAINE 50 MG/G
1 PATCH TOPICAL DAILY
Qty: 30 PATCH | Refills: 0 | Status: SHIPPED | OUTPATIENT
Start: 2017-08-30 | End: 2017-10-16

## 2017-08-30 RX ORDER — FLUCONAZOLE 100 MG/1
100 TABLET ORAL DAILY
Qty: 14 TABLET | Refills: 0 | Status: SHIPPED | OUTPATIENT
Start: 2017-08-30 | End: 2017-08-30 | Stop reason: SDUPTHER

## 2017-08-30 RX ORDER — MIDAZOLAM HYDROCHLORIDE 1 MG/ML
INJECTION INTRAMUSCULAR; INTRAVENOUS PRN
Status: DISCONTINUED | OUTPATIENT
Start: 2017-08-30 | End: 2017-08-30 | Stop reason: HOSPADM

## 2017-08-30 RX ORDER — SODIUM CHLORIDE, SODIUM LACTATE, POTASSIUM CHLORIDE, CALCIUM CHLORIDE 600; 310; 30; 20 MG/100ML; MG/100ML; MG/100ML; MG/100ML
INJECTION, SOLUTION INTRAVENOUS CONTINUOUS
Status: DISCONTINUED | OUTPATIENT
Start: 2017-08-30 | End: 2017-08-30 | Stop reason: HOSPADM

## 2017-08-30 RX ORDER — FLUCONAZOLE 100 MG/1
100 TABLET ORAL DAILY
Qty: 14 TABLET | Refills: 0 | Status: SHIPPED | OUTPATIENT
Start: 2017-08-30 | End: 2017-09-13

## 2017-08-30 RX ORDER — MEPERIDINE HYDROCHLORIDE 50 MG/ML
INJECTION INTRAMUSCULAR; INTRAVENOUS; SUBCUTANEOUS PRN
Status: DISCONTINUED | OUTPATIENT
Start: 2017-08-30 | End: 2017-08-30 | Stop reason: HOSPADM

## 2017-08-30 RX ADMIN — SODIUM CHLORIDE, POTASSIUM CHLORIDE, SODIUM LACTATE AND CALCIUM CHLORIDE: 600; 310; 30; 20 INJECTION, SOLUTION INTRAVENOUS at 10:10

## 2017-08-30 ASSESSMENT — PAIN SCALES - GENERAL: PAINLEVEL_OUTOF10: 0

## 2017-08-30 ASSESSMENT — PAIN - FUNCTIONAL ASSESSMENT: PAIN_FUNCTIONAL_ASSESSMENT: 0-10

## 2017-08-31 LAB
SURGICAL PATHOLOGY REPORT: NORMAL
SURGICAL PATHOLOGY REPORT: NORMAL

## 2017-09-11 ENCOUNTER — CARE COORDINATION (OUTPATIENT)
Dept: CARE COORDINATION | Age: 67
End: 2017-09-11

## 2017-09-15 DIAGNOSIS — F41.9 ANXIETY: ICD-10-CM

## 2017-09-15 RX ORDER — LORAZEPAM 1 MG/1
1 TABLET ORAL 2 TIMES DAILY PRN
Qty: 30 TABLET | Refills: 0 | Status: SHIPPED | OUTPATIENT
Start: 2017-09-15 | End: 2017-11-02 | Stop reason: SDUPTHER

## 2017-09-19 RX ORDER — GABAPENTIN 400 MG/1
400 CAPSULE ORAL 3 TIMES DAILY
Qty: 90 CAPSULE | Refills: 1 | Status: SHIPPED | OUTPATIENT
Start: 2017-09-19 | End: 2018-05-25

## 2017-09-19 RX ORDER — GABAPENTIN 300 MG/1
CAPSULE ORAL
Qty: 30 CAPSULE | Refills: 0 | OUTPATIENT
Start: 2017-09-19

## 2017-09-21 ENCOUNTER — CARE COORDINATION (OUTPATIENT)
Dept: CARE COORDINATION | Age: 67
End: 2017-09-21

## 2017-09-21 DIAGNOSIS — M1A.09X0 IDIOPATHIC CHRONIC GOUT OF MULTIPLE SITES WITHOUT TOPHUS: Primary | ICD-10-CM

## 2017-09-21 RX ORDER — PREDNISONE 10 MG/1
TABLET ORAL
Qty: 30 TABLET | Refills: 0 | Status: SHIPPED | OUTPATIENT
Start: 2017-09-21 | End: 2017-10-16

## 2017-09-29 DIAGNOSIS — M1A.09X0 IDIOPATHIC CHRONIC GOUT OF MULTIPLE SITES WITHOUT TOPHUS: ICD-10-CM

## 2017-09-29 DIAGNOSIS — K21.9 GASTROESOPHAGEAL REFLUX DISEASE WITHOUT ESOPHAGITIS: ICD-10-CM

## 2017-09-29 NOTE — TELEPHONE ENCOUNTER
From: Kaur rCuz  To: Cole Sy MD  Sent: 9/29/2017 9:50 AM EDT  Subject: Medication Renewal Request    Original authorizing provider: MD Marlys Ovalle.  [facility] would like a refill of the following medications:  spironolactone (ALDACTONE) 100 MG tablet [Morgan Mata MD]  probenecid (BENEMID) 500 MG tablet Cole Sy MD]  omeprazole (PRILOSEC) 20 MG delayed release capsule Cole Sy MD]    Preferred pharmacy: [address] [place] [address] [address] [phone] - F [phone]    Comment:

## 2017-09-29 NOTE — TELEPHONE ENCOUNTER
Last OV 6/7, Last refill Aldactone 4/7, Last refill Benemid 6/27, Last refill Prilosec 7/5.  BP  Health Maintenance   Topic Date Due    Diabetic retinal exam  03/21/1962    HIV screen  03/21/1967    Diabetic microalbuminuria test  02/20/2014    Diabetic foot exam  06/26/2014    Lipid screen  07/27/2017    Flu vaccine (1) 09/01/2017    Diabetic hemoglobin A1C test  10/05/2017    Pneumococcal low/med risk (2 of 2 - PPSV23) 04/12/2018 (Originally 3/21/2017)    Colon cancer screen colonoscopy  11/21/2018    DTaP/Tdap/Td vaccine (2 - Td) 12/17/2025    Zostavax vaccine  Completed    AAA screen  Completed             (applicable per patient's age: Cancer Screenings, Depression Screening, Fall Risk Screening, Immunizations)    Hemoglobin A1C (%)   Date Value   10/05/2016 5.4   04/29/2015 5.3   04/23/2014 5.6     LDL Cholesterol (mg/dL)   Date Value   07/27/2016 136 (H)     LDL Calculated (mg/dL)   Date Value   04/23/2014 94     AST (U/L)   Date Value   08/11/2017 20     ALT (U/L)   Date Value   08/11/2017 21     BUN (mg/dL)   Date Value   08/11/2017 37 (H)      (goal A1C is < 7)   (goal LDL is <100) need 30-50% reduction from baseline     BP Readings from Last 3 Encounters:   08/30/17 (!) 120/43   08/15/17 (!) 133/55   06/28/17 (!) 130/54    (goal /80)      All Future Testing planned in [facility]:  Lab Frequency Next Occurrence       Next Visit Date:  Future Appointments  Date Time Provider Phuc Macias   10/16/2017 2:10 PM Robert Hendrickson MD [facility] None   11/8/2017 1:00 PM Wiliam Anguiano MD [facility]            Patient Active Problem List:     Hyperlipidemia     Disc displacement, lumbar     Chronic hepatitis C with cirrhosis ([address]     Dilated cardiomyopathy     Gout     Diabetes type 2, controlled     Prostate cancer ([address]     Aortic insufficiency     Incisional hernia     Aortic atherosclerosis ([address]     Cervicalgia     Sciatica     Diverticulosis of colon     Ascites due to alcoholic cirrhosis ([facility] [address]     Essential hypertension     History of alcohol use     Hypomagnesemia     Chronic hepatitis C without hepatic coma (HCC)     Acquired hypothyroidism     Chondromalacia patella, right     Awaiting organ transplant status     Varicose veins of left lower extremity     Anxiety     Colon polyps     History of hepatitis C     Right lumbar radiculitis     Post laminectomy syndrome     Hepatic cyst     Severe comorbid illness     Primary osteoarthritis of both knees     Chronic lumbar radiculopathy     Sol esophagus

## 2017-10-02 ENCOUNTER — CARE COORDINATION (OUTPATIENT)
Dept: CARE COORDINATION | Age: 67
End: 2017-10-02

## 2017-10-02 RX ORDER — PROBENECID 500 MG/1
250 TABLET, FILM COATED ORAL 2 TIMES DAILY
Qty: 90 TABLET | Refills: 1 | Status: SHIPPED | OUTPATIENT
Start: 2017-10-02 | End: 2018-02-26 | Stop reason: SDUPTHER

## 2017-10-02 RX ORDER — OMEPRAZOLE 20 MG/1
60 CAPSULE, DELAYED RELEASE ORAL DAILY
Qty: 270 CAPSULE | Refills: 1 | Status: SHIPPED | OUTPATIENT
Start: 2017-10-02 | End: 2017-12-04

## 2017-10-02 RX ORDER — SPIRONOLACTONE 100 MG/1
100 TABLET, FILM COATED ORAL DAILY
Qty: 90 TABLET | Refills: 1 | Status: SHIPPED | OUTPATIENT
Start: 2017-10-02 | End: 2018-05-02 | Stop reason: SDUPTHER

## 2017-10-02 NOTE — CARE COORDINATION
20 MG delayed release capsule Take 3 capsules by mouth Daily 10/2/17 10/2/18  Drew Vidales MD   predniSONE (DELTASONE) 10 MG tablet Take 4 by mouth daily for 3 days then 3 by mouth daily for 3 days then 2 by mouth daily for 3 days then 1 by mouth daily for 3 days 9/21/17   Edyta Mcmahon CNP   gabapentin (NEURONTIN) 400 MG capsule Take 1 capsule by mouth 3 times daily Take 1 capsule by mouth daily 9/19/17 10/19/17  Carolin Syed MD   LORazepam (ATIVAN) 1 MG tablet Take 1 tablet by mouth 2 times daily as needed for Anxiety 9/15/17 9/15/18  rDew Vidales MD   lidocaine (LIDODERM) 5 % Place 1 patch onto the skin daily 12 hours on, 12 hours off apply to affected painful knee 8/30/17   Regulo Hussein MD   allopurinol (ZYLOPRIM) 100 MG tablet Take 200 mg by mouth every evening     Historical Provider, MD   chlorhexidine (PERIDEX) 0.12 % solution  5/25/17   Historical Provider, MD   allopurinol (ZYLOPRIM) 300 MG tablet Take 1 tablet by mouth daily 4/28/17 4/28/18  Lee Napoles CNP   acetaminophen (TYLENOL) 500 MG tablet Take 500 mg by mouth every 6 hours as needed for Pain  4/4/17   Historical Provider, MD   lactulose [facility]) 10 GM/15ML solution Take 45 mLs by mouth 3 times daily 3/16/17 3/29/18  Drew Vidales MD   furosemide (LASIX) 40 MG tablet Take 1 tablet by mouth 2 times daily 2/16/17 2/16/18  Drew Vidales MD       Future Appointments  Date Time Provider Phuc Macias   10/16/2017 2:10 PM Corin Mancia MD [facility] None   11/8/2017 1:00 PM Regulo Hussein MD [facility]

## 2017-10-20 ENCOUNTER — HOSPITAL ENCOUNTER (OUTPATIENT)
Age: 67
Setting detail: SPECIMEN
Discharge: HOME | End: 2017-10-20
Payer: COMMERCIAL

## 2017-10-20 DIAGNOSIS — K70.31 ALCOHOLIC CIRRHOSIS OF LIVER WITH ASCITES (HCC): ICD-10-CM

## 2017-10-20 DIAGNOSIS — Z90.79 H/O PROSTATECTOMY: ICD-10-CM

## 2017-10-20 DIAGNOSIS — N18.30 CKD (CHRONIC KIDNEY DISEASE), STAGE III (HCC): ICD-10-CM

## 2017-10-20 DIAGNOSIS — M10.00 IDIOPATHIC GOUT, UNSPECIFIED CHRONICITY, UNSPECIFIED SITE: ICD-10-CM

## 2017-10-20 LAB
ANION GAP SERPL CALCULATED.3IONS-SCNC: 16 MMOL/L (ref 9–17)
BUN BLDV-MCNC: 32 MG/DL (ref 8–23)
BUN/CREAT BLD: ABNORMAL (ref 9–20)
CALCIUM SERPL-MCNC: 9.1 MG/DL (ref 8.6–10.4)
CHLORIDE BLD-SCNC: 101 MMOL/L (ref 98–107)
CO2: 18 MMOL/L (ref 20–31)
CREAT SERPL-MCNC: 1.47 MG/DL (ref 0.7–1.2)
CREATININE URINE: 150.7 MG/DL (ref 39–259)
FREE KAPPA/LAMBDA RATIO: 1.95 (ref 0.26–1.65)
GFR AFRICAN AMERICAN: 58 ML/MIN
GFR NON-AFRICAN AMERICAN: 48 ML/MIN
GFR SERPL CREATININE-BSD FRML MDRD: ABNORMAL ML/MIN/{1.73_M2}
GFR SERPL CREATININE-BSD FRML MDRD: ABNORMAL ML/MIN/{1.73_M2}
GLUCOSE BLD-MCNC: 152 MG/DL (ref 70–99)
KAPPA FREE LIGHT CHAINS QNT: 4.22 MG/DL (ref 0.37–1.94)
LAMBDA FREE LIGHT CHAINS QNT: 2.16 MG/DL (ref 0.57–2.63)
POTASSIUM SERPL-SCNC: 4.4 MMOL/L (ref 3.7–5.3)
PTH INTACT: 62.78 PG/ML (ref 15–65)
SODIUM BLD-SCNC: 135 MMOL/L (ref 135–144)
TOTAL PROTEIN, URINE: 13 MG/DL

## 2017-10-23 LAB
ALBUMIN (CALCULATED): 4.6 G/DL (ref 3.2–5.2)
ALBUMIN PERCENT: 64 % (ref 45–65)
ALPHA 1 PERCENT: 2 % (ref 3–6)
ALPHA 2 PERCENT: 12 % (ref 6–13)
ALPHA-1-GLOBULIN: 0.2 G/DL (ref 0.1–0.4)
ALPHA-2-GLOBULIN: 0.9 G/DL (ref 0.5–0.9)
ANTI DNA DOUBLE STRANDED: 6 IU/ML
BETA GLOBULIN: 0.8 G/DL (ref 0.5–1.1)
BETA PERCENT: 11 % (ref 11–19)
GAMMA GLOBULIN %: 11 % (ref 9–20)
GAMMA GLOBULIN: 0.8 G/DL (ref 0.5–1.5)
PATHOLOGIST: ABNORMAL
PROTEIN ELECTROPHORESIS, SERUM: ABNORMAL
TOTAL PROT. SUM,%: 100 % (ref 98–102)
TOTAL PROT. SUM: 7.3 G/DL (ref 6.3–8.2)
TOTAL PROTEIN: 7.2 G/DL (ref 6.4–8.3)

## 2017-10-24 ENCOUNTER — CARE COORDINATION (OUTPATIENT)
Dept: CARE COORDINATION | Age: 67
End: 2017-10-24

## 2017-10-24 LAB
DIRECT EXAM: NORMAL
Lab: NORMAL
SPECIMEN DESCRIPTION: NORMAL
STATUS: NORMAL

## 2017-10-24 NOTE — CARE COORDINATION
Medication Sig Start Date End Date Taking? Authorizing Provider   colchicine (COLCRYS) 0.6 MG tablet Take 0.6 mg by mouth daily    Historical Provider, MD   predniSONE (DELTASONE) 5 MG tablet Take 5 mg by mouth daily    Historical Provider, MD   HYDROcodone-acetaminophen (NORCO) 5-325 MG per tablet Take 1 tablet by mouth every 6 hours as needed for Pain .     Historical Provider, MD   spironolactone (ALDACTONE) 100 MG tablet Take 1 tablet by mouth daily 10/2/17 10/2/18  Finesse Selby MD   probenecid (BENEMID) 500 MG tablet Take 0.5 tablets by mouth 2 times daily 10/2/17 10/2/18  Finesse Selby MD   omeprazole (PRILOSEC) 20 MG delayed release capsule Take 3 capsules by mouth Daily 10/2/17 10/2/18  Finesse Selby MD   gabapentin (NEURONTIN) 400 MG capsule Take 1 capsule by mouth 3 times daily Take 1 capsule by mouth daily 9/19/17 10/19/17  Juan Fuentes MD   LORazepam (ATIVAN) 1 MG tablet Take 1 tablet by mouth 2 times daily as needed for Anxiety 9/15/17 9/15/18  Finesse Selby MD   allopurinol (ZYLOPRIM) 300 MG tablet Take 1 tablet by mouth daily  Patient taking differently: Take 300 mg by mouth 2 times daily  4/28/17 4/28/18  Tamara Flank, CNP   acetaminophen (TYLENOL) 500 MG tablet Take 500 mg by mouth every 6 hours as needed for Pain  4/4/17   Historical Provider, MD   lactulose (CHRONULAC) 10 GM/15ML solution Take 45 mLs by mouth 3 times daily 3/16/17 3/29/18  Finesse Selby MD   furosemide (LASIX) 40 MG tablet Take 1 tablet by mouth 2 times daily 2/16/17 2/16/18  Finesse Selby MD       Future Appointments  Date Time Provider Phuc Macias   10/31/2017 8:00 AM ST[facility] ULTRASOUND RM 2 STAZ US [facility]   11/8/2017 1:00 PM Milvia Avilez MD [facility] exc [facility]   11/27/2017 1:00 PM Cathryn Lang MD [facility] None

## 2017-10-24 NOTE — TELEPHONE ENCOUNTER
Aortic atherosclerosis (HCC)     Cervicalgia     Sciatica     Diverticulosis of colon     Ascites due to alcoholic cirrhosis (HCC)     Essential hypertension     History of alcohol use     Hypomagnesemia     Chronic hepatitis C without hepatic coma (HCC)     Acquired hypothyroidism     Chondromalacia patella, right     Awaiting organ transplant status     Varicose veins of left lower extremity     Anxiety     Colon polyps     History of hepatitis C     Right lumbar radiculitis     Post laminectomy syndrome     Hepatic cyst     Severe comorbid illness     Primary osteoarthritis of both knees     Chronic lumbar radiculopathy     Sol esophagus

## 2017-10-25 ENCOUNTER — CARE COORDINATION (OUTPATIENT)
Dept: CARE COORDINATION | Age: 67
End: 2017-10-25

## 2017-10-25 RX ORDER — FUROSEMIDE 40 MG/1
40 TABLET ORAL 2 TIMES DAILY
Qty: 60 TABLET | Refills: 0 | Status: SHIPPED | OUTPATIENT
Start: 2017-10-25 | End: 2017-10-25 | Stop reason: SDUPTHER

## 2017-10-25 RX ORDER — FUROSEMIDE 40 MG/1
40 TABLET ORAL 2 TIMES DAILY
Qty: 180 TABLET | Refills: 0 | Status: SHIPPED | OUTPATIENT
Start: 2017-10-25 | End: 2018-05-02 | Stop reason: SDUPTHER

## 2017-10-25 NOTE — CARE COORDINATION
Our records only indicated we have prescribed the 40 mg table. I do not have any GI records from outside of the [facility] GI gout to know what they have prescribed.    All the refills in the EMR show 40 mg daily or twice daily

## 2017-10-31 ENCOUNTER — HOSPITAL ENCOUNTER (OUTPATIENT)
Dept: ULTRASOUND IMAGING | Age: 67
Discharge: HOME | End: 2017-10-31
Payer: COMMERCIAL

## 2017-10-31 ENCOUNTER — HOSPITAL ENCOUNTER (OUTPATIENT)
Age: 67
Setting detail: SPECIMEN
Discharge: HOME | End: 2017-10-31
Payer: COMMERCIAL

## 2017-10-31 DIAGNOSIS — K70.31 ALCOHOLIC CIRRHOSIS OF LIVER WITH ASCITES (HCC): ICD-10-CM

## 2017-10-31 DIAGNOSIS — M10.00 IDIOPATHIC GOUT, UNSPECIFIED CHRONICITY, UNSPECIFIED SITE: ICD-10-CM

## 2017-10-31 DIAGNOSIS — Z90.79 H/O PROSTATECTOMY: ICD-10-CM

## 2017-10-31 DIAGNOSIS — N18.30 CKD (CHRONIC KIDNEY DISEASE), STAGE III (HCC): ICD-10-CM

## 2017-10-31 LAB
ALBUMIN SERPL-MCNC: 4.2 G/DL (ref 3.5–5.2)
ALBUMIN/GLOBULIN RATIO: 1.2 (ref 1–2.5)
ALP BLD-CCNC: 61 U/L (ref 40–129)
ALT SERPL-CCNC: 23 U/L (ref 5–41)
ANION GAP SERPL CALCULATED.3IONS-SCNC: 15 MMOL/L (ref 9–17)
AST SERPL-CCNC: 26 U/L
BILIRUB SERPL-MCNC: 0.34 MG/DL (ref 0.3–1.2)
BUN BLDV-MCNC: 21 MG/DL (ref 8–23)
BUN/CREAT BLD: ABNORMAL (ref 9–20)
CALCIUM SERPL-MCNC: 9.1 MG/DL (ref 8.6–10.4)
CHLORIDE BLD-SCNC: 99 MMOL/L (ref 98–107)
CO2: 22 MMOL/L (ref 20–31)
CREAT SERPL-MCNC: 1.37 MG/DL (ref 0.7–1.2)
GFR AFRICAN AMERICAN: >60 ML/MIN
GFR NON-AFRICAN AMERICAN: 52 ML/MIN
GFR SERPL CREATININE-BSD FRML MDRD: ABNORMAL ML/MIN/{1.73_M2}
GFR SERPL CREATININE-BSD FRML MDRD: ABNORMAL ML/MIN/{1.73_M2}
GLUCOSE BLD-MCNC: 142 MG/DL (ref 70–99)
POTASSIUM SERPL-SCNC: 4.7 MMOL/L (ref 3.7–5.3)
SODIUM BLD-SCNC: 136 MMOL/L (ref 135–144)
TOTAL PROTEIN: 7.7 G/DL (ref 6.4–8.3)
URIC ACID: 4.4 MG/DL (ref 3.4–7)

## 2017-10-31 PROCEDURE — 76770 US EXAM ABDO BACK WALL COMP: CPT

## 2017-11-02 DIAGNOSIS — F41.9 ANXIETY: ICD-10-CM

## 2017-11-02 RX ORDER — LORAZEPAM 1 MG/1
1 TABLET ORAL DAILY PRN
Qty: 30 TABLET | Refills: 0 | Status: SHIPPED | OUTPATIENT
Start: 2017-11-02 | End: 2017-12-14 | Stop reason: SDUPTHER

## 2017-11-20 DIAGNOSIS — K74.60 CHRONIC HEPATITIS C WITH CIRRHOSIS (HCC): ICD-10-CM

## 2017-11-20 DIAGNOSIS — B18.2 CHRONIC HEPATITIS C WITH CIRRHOSIS (HCC): ICD-10-CM

## 2017-11-20 RX ORDER — LACTULOSE 10 G/15ML
30 SOLUTION ORAL 3 TIMES DAILY
Qty: 5000 ML | Refills: 3 | Status: SHIPPED | OUTPATIENT
Start: 2017-11-20 | End: 2018-04-06 | Stop reason: SDUPTHER

## 2017-11-20 RX ORDER — ALLOPURINOL 300 MG/1
300 TABLET ORAL DAILY
Qty: 90 TABLET | Refills: 1 | Status: CANCELLED | OUTPATIENT
Start: 2017-11-20 | End: 2018-11-20

## 2017-11-20 RX ORDER — LACTULOSE 10 G/15ML
30 SOLUTION ORAL 3 TIMES DAILY
Qty: 5000 ML | Refills: 3 | Status: CANCELLED | OUTPATIENT
Start: 2017-11-20 | End: 2018-12-03

## 2017-11-22 RX ORDER — ALLOPURINOL 300 MG/1
300 TABLET ORAL 2 TIMES DAILY
Qty: 180 TABLET | Refills: 0 | Status: SHIPPED | OUTPATIENT
Start: 2017-11-22 | End: 2018-02-26 | Stop reason: SDUPTHER

## 2017-11-22 NOTE — TELEPHONE ENCOUNTER
LM for patient to return phone call to verify dosing
Please verify current dose frequency for allopurinol
Verified with patient he is taking Allopurinol BID. Patient states he is taking it BID and was advised to do this by a physician at [facility]. Patient just wants one doctor ordering his medication.
Gout     Diabetes type 2, controlled     Prostate cancer ([facility] [address]     Aortic insufficiency     Incisional hernia     Aortic atherosclerosis (HCC)     Cervicalgia     Sciatica     Diverticulosis of colon     Ascites due to alcoholic cirrhosis (HCC)     Essential hypertension     History of alcohol use     Hypomagnesemia     Chronic hepatitis C without hepatic coma (HCC)     Acquired hypothyroidism     Chondromalacia patella, right     Awaiting organ transplant status     Varicose veins of left lower extremity     Anxiety     Colon polyps     History of hepatitis C     Right lumbar radiculitis     Post laminectomy syndrome     Hepatic cyst     Severe comorbid illness     Primary osteoarthritis of both knees     Chronic lumbar radiculopathy     Sol esophagus

## 2017-11-28 ENCOUNTER — CARE COORDINATION (OUTPATIENT)
Dept: CARE COORDINATION | Age: 67
End: 2017-11-28

## 2017-11-28 NOTE — CARE COORDINATION
Ambulatory Care Coordination Note  11/28/2017  CM Risk Score: 3  Evelyn Mortality Risk Score: 6.55    ACC: Fanta Montiel RN    Summary Note: Talked with Sunshine Dela Cruz today he said went to see \"kidney\" MD, He said no changes and my kidneys were fine. He told me his plan for his dental work, stated he had many cavities \"caused by steroid use\" He looked up info on cheaper dental accesses and said [facility] had a program with their students he was going to make appt after Jan 1.  .ccplan  1. Ck to see about office visit reminder  2. How cavities are at present if he has pain  Diabetes Assessment    Medic Alert ID:  No  Meal Planning:  None   Do you have barriers with adherence to non-pharmacologic self-management interventions?  (Nutrition/Exercise/Self-Monitoring):  No   Have you ever had to go to the ED for symptoms of low blood sugar?:  No       No patient-reported symptoms   Do you have hyperglycemia symptoms?:  No   Do you have hypoglycemia symptoms?:  No   Blood Sugar Monitoring Regimen:  Not Testing                Care Coordination Interventions    Program Enrollment:  Complex Care  Referral from Primary Care Provider:  No  Suggested Interventions and Community Resources  Medi Set or Pill Pack:  Completed         Goals Addressed             Most Recent     Care Coordination Self Management   On track (11/28/2017)             CC Self Management Goal  Patient Goal (What steps will patient take to achieve goal?): pain under control   Patient is able to discuss self-management of condition(s): hepatitis and pain control   Pt demonstrates adherence to medications  Pt demonstrates understanding of self-monitoring  Patient is able to identify Red Flags:  Alert to potential adverse drug reactions(s) or side effects and actions to take should they arise  Discuss target symptoms and actions to take should they arise  Identify problems that require immediate PCP or specialist visit  Patient demonstrates understanding of access to PCP/Specialist:  Understands about scheduling routine Follow Up appointments   Understands about sick day appointment options for worsening of symptoms/progression (Same Day, E Visits)            Prior to Admission medications    Medication Sig Start Date End Date Taking? Authorizing Provider   allopurinol (ZYLOPRIM) 300 MG tablet Take 1 tablet by mouth 2 times daily 11/22/17 11/22/18  Coco Alvarado MD   lactulose [facility]) 10 GM/15ML solution Take 45 mLs by mouth 3 times daily 11/20/17 12/3/18  Coco Alvarado MD   LORazepam (ATIVAN) 1 MG tablet Take 1 tablet by mouth daily as needed for Anxiety 11/2/17 11/2/18  Coco Alvarado MD   furosemide (LASIX) 40 MG tablet Take 1 tablet by mouth 2 times daily 10/25/17 10/25/18  Coco Alvarado MD   colchicine (COLCRYS) 0.6 MG tablet Take 0.6 mg by mouth daily as needed     Historical Provider, MD   predniSONE (DELTASONE) 5 MG tablet Take 5 mg by mouth daily    Historical Provider, MD   HYDROcodone-acetaminophen (NORCO) 5-325 MG per tablet Take 1 tablet by mouth every 6 hours as needed for Pain .     Historical Provider, MD   spironolactone (ALDACTONE) 100 MG tablet Take 1 tablet by mouth daily 10/2/17 10/2/18  Coco Alvarado MD   probenecid (BENEMID) 500 MG tablet Take 0.5 tablets by mouth 2 times daily 10/2/17 10/2/18  Coco Alvarado MD   omeprazole (PRILOSEC) 20 MG delayed release capsule Take 3 capsules by mouth Daily 10/2/17 10/2/18  Coco Alvarado MD   gabapentin (NEURONTIN) 400 MG capsule Take 1 capsule by mouth 3 times daily Take 1 capsule by mouth daily  Patient taking differently: Take 400 mg by mouth 3 times daily  9/19/17 10/19/17  Lisandro Chairez MD   acetaminophen (TYLENOL) 500 MG tablet Take 500 mg by mouth every 6 hours as needed for Pain  4/4/17   Historical Provider, MD       Future Appointments  Date Time Provider Phuc Macias   12/4/2017 9:40 AM MD Dominick Gomez  [facility]   12/4/2017 12:45 PM MioMD LINH Pagan [facility]   5/28/2018 10:10

## 2017-12-01 ENCOUNTER — HOSPITAL ENCOUNTER (OUTPATIENT)
Age: 67
Setting detail: SPECIMEN
Discharge: HOME | End: 2017-12-01
Payer: COMMERCIAL

## 2017-12-01 LAB
ALBUMIN SERPL-MCNC: 4.1 G/DL (ref 3.5–5.2)
ALBUMIN/GLOBULIN RATIO: 1.1 (ref 1–2.5)
ALP BLD-CCNC: 59 U/L (ref 40–129)
ALT SERPL-CCNC: 14 U/L (ref 5–41)
ANION GAP SERPL CALCULATED.3IONS-SCNC: 15 MMOL/L (ref 9–17)
AST SERPL-CCNC: 20 U/L
BILIRUB SERPL-MCNC: 0.28 MG/DL (ref 0.3–1.2)
BUN BLDV-MCNC: 25 MG/DL (ref 8–23)
BUN/CREAT BLD: ABNORMAL (ref 9–20)
CALCIUM SERPL-MCNC: 9.2 MG/DL (ref 8.6–10.4)
CHLORIDE BLD-SCNC: 98 MMOL/L (ref 98–107)
CO2: 23 MMOL/L (ref 20–31)
CREAT SERPL-MCNC: 1.4 MG/DL (ref 0.7–1.2)
GFR AFRICAN AMERICAN: >60 ML/MIN
GFR NON-AFRICAN AMERICAN: 51 ML/MIN
GFR SERPL CREATININE-BSD FRML MDRD: ABNORMAL ML/MIN/{1.73_M2}
GFR SERPL CREATININE-BSD FRML MDRD: ABNORMAL ML/MIN/{1.73_M2}
GLUCOSE BLD-MCNC: 176 MG/DL (ref 70–99)
POTASSIUM SERPL-SCNC: 5.5 MMOL/L (ref 3.7–5.3)
SODIUM BLD-SCNC: 136 MMOL/L (ref 135–144)
TOTAL PROTEIN: 7.7 G/DL (ref 6.4–8.3)
URIC ACID: 4.8 MG/DL (ref 3.4–7)

## 2017-12-04 ENCOUNTER — OFFICE VISIT (OUTPATIENT)
Dept: GASTROENTEROLOGY | Age: 67
End: 2017-12-04
Payer: COMMERCIAL

## 2017-12-04 ENCOUNTER — OFFICE VISIT (OUTPATIENT)
Dept: FAMILY MEDICINE CLINIC | Age: 67
End: 2017-12-04
Payer: COMMERCIAL

## 2017-12-04 ENCOUNTER — HOSPITAL ENCOUNTER (OUTPATIENT)
Age: 67
Setting detail: SPECIMEN
Discharge: HOME | End: 2017-12-04
Payer: COMMERCIAL

## 2017-12-04 VITALS
WEIGHT: 246 LBS | DIASTOLIC BLOOD PRESSURE: 60 MMHG | TEMPERATURE: 98.7 F | HEART RATE: 70 BPM | RESPIRATION RATE: 16 BRPM | BODY MASS INDEX: 33.36 KG/M2 | SYSTOLIC BLOOD PRESSURE: 110 MMHG

## 2017-12-04 VITALS
HEIGHT: 72 IN | SYSTOLIC BLOOD PRESSURE: 141 MMHG | WEIGHT: 247.2 LBS | DIASTOLIC BLOOD PRESSURE: 55 MMHG | HEART RATE: 76 BPM | OXYGEN SATURATION: 99 % | RESPIRATION RATE: 14 BRPM | BODY MASS INDEX: 33.48 KG/M2

## 2017-12-04 DIAGNOSIS — Z23 NEEDS FLU SHOT: ICD-10-CM

## 2017-12-04 DIAGNOSIS — E11.8 CONTROLLED TYPE 2 DIABETES MELLITUS WITH COMPLICATION, WITHOUT LONG-TERM CURRENT USE OF INSULIN (HCC): ICD-10-CM

## 2017-12-04 DIAGNOSIS — M51.26 DISC DISPLACEMENT, LUMBAR: ICD-10-CM

## 2017-12-04 DIAGNOSIS — E83.42 HYPOMAGNESEMIA: ICD-10-CM

## 2017-12-04 DIAGNOSIS — E03.9 ACQUIRED HYPOTHYROIDISM: ICD-10-CM

## 2017-12-04 DIAGNOSIS — B18.2 CHRONIC HEPATITIS C WITH CIRRHOSIS (HCC): ICD-10-CM

## 2017-12-04 DIAGNOSIS — K22.70 BARRETT'S ESOPHAGUS WITHOUT DYSPLASIA: Primary | ICD-10-CM

## 2017-12-04 DIAGNOSIS — K74.60 CHRONIC HEPATITIS C WITH CIRRHOSIS (HCC): ICD-10-CM

## 2017-12-04 DIAGNOSIS — Z86.19 HISTORY OF HEPATITIS C: ICD-10-CM

## 2017-12-04 DIAGNOSIS — B37.81 CANDIDA ESOPHAGITIS (HCC): ICD-10-CM

## 2017-12-04 DIAGNOSIS — Z23 NEED FOR STREPTOCOCCUS PNEUMONIAE AND INFLUENZA VACCINATION: ICD-10-CM

## 2017-12-04 DIAGNOSIS — E78.2 MIXED HYPERLIPIDEMIA: Primary | ICD-10-CM

## 2017-12-04 LAB
CREATININE URINE: 100.3 MG/DL (ref 39–259)
MICROALBUMIN/CREAT 24H UR: 15 MG/L
MICROALBUMIN/CREAT UR-RTO: 15 MCG/MG CREAT

## 2017-12-04 PROCEDURE — 90732 PPSV23 VACC 2 YRS+ SUBQ/IM: CPT | Performed by: PEDIATRICS

## 2017-12-04 PROCEDURE — 99214 OFFICE O/P EST MOD 30 MIN: CPT | Performed by: INTERNAL MEDICINE

## 2017-12-04 PROCEDURE — 90688 IIV4 VACCINE SPLT 0.5 ML IM: CPT | Performed by: PEDIATRICS

## 2017-12-04 PROCEDURE — 99214 OFFICE O/P EST MOD 30 MIN: CPT | Performed by: PEDIATRICS

## 2017-12-04 PROCEDURE — 90471 IMMUNIZATION ADMIN: CPT | Performed by: PEDIATRICS

## 2017-12-04 PROCEDURE — 90472 IMMUNIZATION ADMIN EACH ADD: CPT | Performed by: PEDIATRICS

## 2017-12-04 RX ORDER — OMEPRAZOLE 20 MG/1
40 CAPSULE, DELAYED RELEASE ORAL DAILY
COMMUNITY
End: 2018-07-03 | Stop reason: SDUPTHER

## 2017-12-04 RX ORDER — HYDROCODONE BITARTRATE AND ACETAMINOPHEN 5; 325 MG/1; MG/1
1 TABLET ORAL EVERY 6 HOURS PRN
Status: CANCELLED | OUTPATIENT
Start: 2017-12-04

## 2017-12-04 ASSESSMENT — ENCOUNTER SYMPTOMS
EYES NEGATIVE: 1
VOMITING: 0
BLOOD IN STOOL: 0
SHORTNESS OF BREATH: 0
CONSTIPATION: 0
CONSTIPATION: 0
COUGH: 0
NAUSEA: 0
ABDOMINAL DISTENTION: 1
RECTAL PAIN: 0
ABDOMINAL PAIN: 1
ANAL BLEEDING: 0
DIARRHEA: 0
DIARRHEA: 0
BACK PAIN: 1
TROUBLE SWALLOWING: 1
RESPIRATORY NEGATIVE: 1
ALLERGIC/IMMUNOLOGIC NEGATIVE: 1

## 2017-12-04 ASSESSMENT — PATIENT HEALTH QUESTIONNAIRE - PHQ9
1. LITTLE INTEREST OR PLEASURE IN DOING THINGS: 0
SUM OF ALL RESPONSES TO PHQ QUESTIONS 1-9: 0
2. FEELING DOWN, DEPRESSED OR HOPELESS: 0
SUM OF ALL RESPONSES TO PHQ9 QUESTIONS 1 & 2: 0

## 2017-12-04 NOTE — PROGRESS NOTES
Subjective:      Patient ID: Sarah Ramirez is a 74 y.o. male. Visit Information    Have you changed or started any medications since your last visit including any over-the-counter medicines, vitamins, or herbal medicines? no   Have you stopped taking any of your medications? Is so, why? -  no  Are you having any side effects from any of your medications? - no    Have you seen any other physician or provider since your last visit? Yes, Maranda Ada, Pain, Arabella Early, GI, [facility],   Have you had any other diagnostic tests since your last visit? Yes, Lab  Have you been seen in the emergency room and/or had an admission in a hospital since we last saw you? Yes, [address]  Have you had your routine dental cleaning in the past 6 months?  yes      Do you have an active MyChart account? If no, what is the barrier?   Yes    Patient Care Team:  Darryl Santoyo MD as PCP - General (Internal Medicine/Pediatrics)  Josette Shah MD as PCP - S Attributed Provider  Bernice Serna MD as Consulting Physician (Gastroenterology)  Natalee Stacy MD as Surgeon (Vascular Surgery)  Mari Heredia as Certified Registered Nurse (Gastroenterology)  Vicenta Simon RN as Care Coordinator  Jose Strauss MD as Consulting Physician (Pain Management)    Medical History Review  Past Medical, Family, and Social History reviewed and does contribute to the patient presenting condition    Health Maintenance   Topic Date Due    Diabetic retinal exam  03/21/1962    HIV screen  03/21/1967    Diabetic microalbuminuria test  02/20/2014    Diabetic foot exam  06/26/2014    Smoker: low dose lung CT screening  10/10/2016    Lipid screen  07/27/2017    Flu vaccine (1) 09/01/2017    Diabetic hemoglobin A1C test  10/05/2017    Pneumococcal low/med risk (2 of 2 - PPSV23) 04/12/2018 (Originally 3/21/2017)    Colon cancer screen colonoscopy  11/21/2018    DTaP/Tdap/Td vaccine (2 - Td) 12/17/2025    Zostavax vaccine  Completed    AAA screen numbness. Psychiatric/Behavioral:        Nerves are ok. Still needs ativan at times to help with nerves. Stressors at home with kids. Objective:   Physical Exam   Constitutional: He is oriented to person, place, and time. He appears well-developed. No distress. Overweight. HENT:   Head: Normocephalic and atraumatic. Eyes: Conjunctivae are normal.   Cardiovascular: Normal rate, regular rhythm and normal heart sounds. No murmur heard. Pulses:       Radial pulses are 2+ on the right side, and 2+ on the left side. Pulmonary/Chest: Effort normal. No respiratory distress. He has decreased breath sounds (mild diffuse decrease). He has no wheezes. He has no rhonchi. Abdominal: Soft. He exhibits no distension. There is no tenderness. Protuberant    Musculoskeletal: He exhibits no edema. Diffuse DJD. No redness or swelling of joints. Lymphadenopathy:     He has no cervical adenopathy. Neurological: He is alert and oriented to person, place, and time. Skin: Skin is warm. Psychiatric: He has a normal mood and affect. His behavior is normal.       Assessment:      1. Mixed hyperlipidemia  Stable   Continue meds and diet. - Lipid Panel; Future  - Comprehensive Metabolic Panel; Future    2. Disc displacement, lumbar  Stable. As needed pain meds. Long discussion on minimizing use of opioid meds. Will rx if no concerns with drug screen. - DRUG SCREEN, PAIN; Future    3. Controlled type 2 diabetes mellitus with complication, without long-term current use of insulin (HCC)  Stable. Check labs. Continue diet. - Hemoglobin A1C; Future  -  DIABETES FOOT EXAM  - Microalbumin, Ur; Future    4. Chronic hepatitis C with cirrhosis (HCC)  Stable. Follow up with specialist.   - Comprehensive Metabolic Panel; Future    5. Hypomagnesemia  No symptoms. Stable   Check labs  - Magnesium; Future    6. Acquired hypothyroidism  Stable.   Check labs  Continue to monitor  - TSH without Reflex;

## 2017-12-04 NOTE — PATIENT INSTRUCTIONS
satisfaction survey either by [employer] or email. We would appreciate you taking the time to complete and return the survey. We value your opinion and will use your comments to help improve our care and  service to our patients    Health Maintenance  Please also note the list of Health maintenance items for you and their due dates. Help us continue to provide excellent health care by keeping these items up to date. We will be happy to assist you in getting this completed in a timely fashion. Health Maintenance Due   Topic Date Due    Diabetic retinal exam  03/21/1962    HIV screen  03/21/1967    Diabetic microalbuminuria test  02/20/2014    Diabetic foot exam  06/26/2014    Smoker: low dose lung CT screening  10/10/2016    Lipid screen  07/27/2017    Flu vaccine (1) 09/01/2017    Diabetic hemoglobin A1C test  10/05/2017         After-Hours Urgent [address] -  24 hours emergency services daily  [address]      Patient Education        Learning About Diabetes Food Guidelines  Your Care Instructions  Meal planning is important to manage diabetes. It helps keep your blood sugar at a target level (which you set with your doctor). You don't have to eat special foods. You can eat what your family eats, including sweets once in a while. But you do have to pay attention to how often you eat and how much you eat of certain foods. You may want to work with a dietitian or a certified diabetes educator (CDE) to help you plan meals and snacks. A dietitian or CDE can also help you lose weight if that is one of your goals. What should you know about eating carbs? Managing the amount of carbohydrate (carbs) you eat is an important part of healthy meals when you have diabetes. Carbohydrate is found in many foods. · Learn which foods have carbs. And learn the amounts of carbs in different foods.   ¨ Bread, cereal, pasta, and rice have about 15 grams of peanut butter, and ½ cup of tofu. How can you eat out and still eat healthy? · Learn to estimate the serving sizes of foods that have carbohydrate. If you measure food at home, it will be easier to estimate the amount in a serving of restaurant food. · If the meal you order has too much carbohydrate (such as potatoes, corn, or baked beans), ask to have a low-carbohydrate food instead. Ask for a salad or green vegetables. · If you use insulin, check your blood sugar before and after eating out to help you plan how much to eat in the future. · If you eat more carbohydrate at a meal than you had planned, take a walk or do other exercise. This will help lower your blood sugar. What else should you know? · Limit saturated fat, such as the fat from meat and dairy products. This is a healthy choice because people who have diabetes are at higher risk of heart disease. So choose lean cuts of meat and nonfat or low-fat dairy products. Use olive or canola oil instead of butter or shortening when cooking. · Don't skip meals. Your blood sugar may drop too low if you skip meals and take insulin or certain medicines for diabetes. · Check with your doctor before you drink alcohol. Alcohol can cause your blood sugar to drop too low. Alcohol can also cause a bad reaction if you take certain diabetes medicines. Follow-up care is a key part of your treatment and safety. Be sure to make and go to all appointments, and call your doctor if you are having problems. It's also a good idea to know your test results and keep a list of the medicines you take. Where can you learn more? Go to https://ko.AppMyDay. org and sign in to your [employer] account. Enter K559 in the [facility] box to learn more about \"Learning About Diabetes Food Guidelines. \"     If you do not have an account, please click on the \"Sign Up Now\" link.   Current as of: March 13, 2017  Content Version: 11.3  © 6679-0541 Healthwise,

## 2017-12-04 NOTE — PROGRESS NOTES
Subjective:      Patient ID: Sarah Ramirez is a 74 y.o. male. HPI   Dr. Darryl Santoyo MD our mutual patient Sarah Ramirez was seen  for   1. Carrero's esophagus without dysplasia    2. History of hepatitis C    3. Candida esophagitis ([facility] [address]     . The patient is here as a follow up of his recent GI procedure. The results have been sent to you separately   The findings were explained to the patient in detail and biopsies were also discussed   with him  Had candida esophagitis and was treated for that  He has been doing well now with his swallowing he says  He has carrero's  Has been on PPI  Has Cirrhosis and has been on diuretics and has been seen recently by the renal MD  Overall doing well  Has no bleeding  Has no melena    Past Medical, Family, and Social History reviewed and does contribute to the patient presenting condition. patient\"s PMH/PSH,SH,PSYCH hx, MEDs, ALLERGIES, and ROS was all reviewed and updated ion the appropriate sections      Review of Systems   Constitutional: Negative. Negative for fatigue and unexpected weight change (weight gain). HENT: Positive for trouble swallowing. Eyes: Negative. Respiratory: Negative. Cardiovascular: Negative. Gastrointestinal: Positive for abdominal distention and abdominal pain (off and on). Negative for anal bleeding, blood in stool, constipation, diarrhea, nausea, rectal pain and vomiting. Endocrine: Negative. Gout     Genitourinary: Negative. Musculoskeletal: Positive for arthralgias. Skin: Negative. Allergic/Immunologic: Negative. Neurological: Negative. Negative for dizziness, weakness and light-headedness. Hematological: Negative. Psychiatric/Behavioral: Negative for agitation and sleep disturbance. The patient is nervous/anxious. Reviewed and agree  Objective:   Physical Exam   Constitutional: He is oriented to person, place, and time. He appears well-developed and well-nourished.    Anxious HENT:   Head: Normocephalic and atraumatic. Eyes: Conjunctivae and EOM are normal. Pupils are equal, round, and reactive to light. Neck: Normal range of motion. Neck supple. Cardiovascular: Normal rate and regular rhythm. Pulmonary/Chest: Effort normal and breath sounds normal.   Abdominal: Soft. Bowel sounds are normal. He exhibits distension. There is tenderness. Genitourinary: Rectum normal.   Musculoskeletal: Normal range of motion. Neurological: He is alert and oriented to person, place, and time. He has normal reflexes. Skin: Skin is warm. Assessment:      As above      Plan:      Blood w/u     US    RTC in 3 months    Pt was discussed in detail about the possible side effects of proton pump inhibiter therapy. He was explained about the possibility of calcium and magnesium malabsorption and was advised to start taking calcium supplements with Vit D. Some over the counter regimens were explained to patient. Some dietary advices were also given. He has verbalized understanding and agreement to this. Low Na diet    Pt was advised in detail about some life style and dietary modifications. He was advised about avoidance of caffeine, nicotine and chocolate. Pt was also told to stay away from any kind of fast foods, soda pops. He was also advised to avoid lots of spices, grease and fried food etc.     Instructions were also given about trying to arrange the timing, quality and quantity of food. Instructions were given about using ample amount of fiber including dietary and supplemental fiber either metamucil, bennafiber or citrucell etc.  Pt was advised about drinking ample amount of water without any colors or chemicals. Stress was given about regular exercise. Pt has verbalized understanding and agreement to these modifications.       No Red meat

## 2017-12-09 LAB
6-ACETYLMORPHINE, UR: NOT DETECTED
7-AMINOCLONAZEPAM, URINE: NOT DETECTED
ALPHA-OH-ALPRAZ, URINE: NOT DETECTED
ALPRAZOLAM, URINE: NOT DETECTED
AMPHETAMINES, URINE: NOT DETECTED
BARBITURATES, URINE: NOT DETECTED
BENZOYLECGONINE, UR: NOT DETECTED
BUPRENORPHINE URINE: NOT DETECTED
CARISOPRODOL, UR: NOT DETECTED
CLONAZEPAM, URINE: NOT DETECTED
CODEINE, URINE: NOT DETECTED
CREATININE URINE: 94.1 MG/DL (ref 20–400)
DIAZEPAM, URINE: NOT DETECTED
DRUGS EXPECTED, UR: NORMAL
EER HI RES INTERP UR: NORMAL
ETHYL GLUCURONIDE UR: NOT DETECTED
FENTANYL URINE: NOT DETECTED
HYDROCODONE, URINE: NOT DETECTED
HYDROMORPHONE, URINE: NOT DETECTED
LORAZEPAM, URINE: NOT DETECTED
MARIJUANA METAB, UR: PRESENT
MDA, UR: NOT DETECTED
MDEA, EVE, UR: NOT DETECTED
MDMA URINE: NOT DETECTED
MEPERIDINE METAB, UR: NOT DETECTED
METHADONE, URINE: NOT DETECTED
METHAMPHETAMINE, URINE: NOT DETECTED
METHYLPHENIDATE: NOT DETECTED
MIDAZOLAM, URINE: NOT DETECTED
MORPHINE URINE: NOT DETECTED
NORBUPRENORPHINE, URINE: NOT DETECTED
NORDIAZEPAM, URINE: NOT DETECTED
NORFENTANYL, URINE: NOT DETECTED
NORHYDROCODONE, URINE: NOT DETECTED
NOROXYCODONE, URINE: NOT DETECTED
NOROXYMORPHONE, URINE: NOT DETECTED
OXAZEPAM, URINE: NOT DETECTED
OXYCODONE URINE: NOT DETECTED
OXYMORPHONE, URINE: NOT DETECTED
PAIN MANAGEMENT DRUG PANEL INTERP, URINE: NORMAL
PAIN MGT DRUG PANEL, HI RES, UR: NORMAL
PCP,URINE: NOT DETECTED
PHENTERMINE, UR: NOT DETECTED
PROPOXYPHENE, URINE: NOT DETECTED
TAPENTADOL, URINE: NOT DETECTED
TAPENTADOL-O-SULFATE, URINE: NOT DETECTED
TEMAZEPAM, URINE: NOT DETECTED
TRAMADOL, URINE: NOT DETECTED
ZOLPIDEM, URINE: NOT DETECTED

## 2017-12-11 RX ORDER — OXYCODONE AND ACETAMINOPHEN 10; 325 MG/1; MG/1
1 TABLET ORAL DAILY PRN
Qty: 30 TABLET | Refills: 0 | Status: SHIPPED | OUTPATIENT
Start: 2017-12-11 | End: 2018-05-25 | Stop reason: ALTCHOICE

## 2017-12-11 NOTE — TELEPHONE ENCOUNTER
03/06/2018    Liver Once 69/02/7184   Basic Metabolic Panel Every 12 Weeks 2/16/2018, 5/11/2018       Next Visit Date:  Future Appointments  Date Time Provider Phuc Macias   3/14/2018 2:00 PM MD denver [facility] exc [facility]   5/28/2018 10:10 AM Awilda Gonzalez MD [facility] None            Patient Active Problem List:     Hyperlipidemia     Disc displacement, lumbar     Chronic hepatitis C with cirrhosis ([address]     Dilated cardiomyopathy     Gout     Diabetes type 2, controlled     Prostate cancer ([address]     Aortic insufficiency     Incisional hernia     Aortic atherosclerosis ([address]     Cervicalgia     Sciatica     Diverticulosis of colon     Ascites due to alcoholic cirrhosis ([address]     Essential hypertension     History of alcohol use     Hypomagnesemia     Chronic hepatitis C without hepatic coma (HCC)     Acquired hypothyroidism     Chondromalacia patella, right     Awaiting organ transplant status     Varicose veins of left lower extremity     Anxiety     Colon polyps     History of hepatitis C     Right lumbar radiculitis     Post laminectomy syndrome     Hepatic cyst     Severe comorbid illness     Primary osteoarthritis of both knees     Chronic lumbar radiculopathy     Sol esophagus

## 2017-12-13 ENCOUNTER — HOSPITAL ENCOUNTER (OUTPATIENT)
Age: 67
Setting detail: SPECIMEN
Discharge: HOME | End: 2017-12-13
Payer: COMMERCIAL

## 2017-12-13 DIAGNOSIS — K74.60 CHRONIC HEPATITIS C WITH CIRRHOSIS (HCC): ICD-10-CM

## 2017-12-13 DIAGNOSIS — B18.2 CHRONIC HEPATITIS C WITH CIRRHOSIS (HCC): ICD-10-CM

## 2017-12-13 DIAGNOSIS — E83.42 HYPOMAGNESEMIA: ICD-10-CM

## 2017-12-13 DIAGNOSIS — E03.9 ACQUIRED HYPOTHYROIDISM: ICD-10-CM

## 2017-12-13 DIAGNOSIS — E11.8 CONTROLLED TYPE 2 DIABETES MELLITUS WITH COMPLICATION, WITHOUT LONG-TERM CURRENT USE OF INSULIN (HCC): ICD-10-CM

## 2017-12-13 DIAGNOSIS — E78.2 MIXED HYPERLIPIDEMIA: ICD-10-CM

## 2017-12-13 LAB
ALBUMIN SERPL-MCNC: 4.2 G/DL (ref 3.5–5.2)
ALBUMIN/GLOBULIN RATIO: 1.6 (ref 1–2.5)
ALP BLD-CCNC: 58 U/L (ref 40–129)
ALT SERPL-CCNC: 14 U/L (ref 5–41)
ANION GAP SERPL CALCULATED.3IONS-SCNC: 17 MMOL/L (ref 9–17)
AST SERPL-CCNC: 15 U/L
BILIRUB SERPL-MCNC: 0.26 MG/DL (ref 0.3–1.2)
BUN BLDV-MCNC: 25 MG/DL (ref 8–23)
BUN/CREAT BLD: ABNORMAL (ref 9–20)
CALCIUM SERPL-MCNC: 9 MG/DL (ref 8.6–10.4)
CHLORIDE BLD-SCNC: 97 MMOL/L (ref 98–107)
CHOLESTEROL/HDL RATIO: 5.6
CHOLESTEROL: 192 MG/DL
CO2: 25 MMOL/L (ref 20–31)
CREAT SERPL-MCNC: 1.33 MG/DL (ref 0.7–1.2)
ESTIMATED AVERAGE GLUCOSE: 123 MG/DL
GFR AFRICAN AMERICAN: >60 ML/MIN
GFR NON-AFRICAN AMERICAN: 54 ML/MIN
GFR SERPL CREATININE-BSD FRML MDRD: ABNORMAL ML/MIN/{1.73_M2}
GFR SERPL CREATININE-BSD FRML MDRD: ABNORMAL ML/MIN/{1.73_M2}
GLUCOSE BLD-MCNC: 118 MG/DL (ref 70–99)
HBA1C MFR BLD: 5.9 % (ref 4–6)
HDLC SERPL-MCNC: 34 MG/DL
LDL CHOLESTEROL: 128 MG/DL (ref 0–130)
MAGNESIUM: 2.1 MG/DL (ref 1.6–2.6)
POTASSIUM SERPL-SCNC: 4.8 MMOL/L (ref 3.7–5.3)
SODIUM BLD-SCNC: 139 MMOL/L (ref 135–144)
TOTAL PROTEIN: 6.9 G/DL (ref 6.4–8.3)
TRIGL SERPL-MCNC: 148 MG/DL
TSH SERPL DL<=0.05 MIU/L-ACNC: 4.5 MIU/L (ref 0.3–5)
VLDLC SERPL CALC-MCNC: ABNORMAL MG/DL (ref 1–30)

## 2017-12-14 DIAGNOSIS — F41.9 ANXIETY: ICD-10-CM

## 2017-12-14 DIAGNOSIS — E78.2 MIXED HYPERLIPIDEMIA: Primary | ICD-10-CM

## 2017-12-14 RX ORDER — LORAZEPAM 1 MG/1
1 TABLET ORAL DAILY PRN
Qty: 30 TABLET | Refills: 0 | Status: SHIPPED | OUTPATIENT
Start: 2017-12-14 | End: 2018-02-01 | Stop reason: SDUPTHER

## 2017-12-14 RX ORDER — ATORVASTATIN CALCIUM 20 MG/1
20 TABLET, FILM COATED ORAL DAILY
Qty: 30 TABLET | Refills: 3 | Status: SHIPPED | OUTPATIENT
Start: 2017-12-14 | End: 2018-02-19 | Stop reason: SDUPTHER

## 2017-12-14 NOTE — TELEPHONE ENCOUNTER
Per results notes, Atorvastatin and f/u labs pending provider review and approval. Patient is aware.

## 2017-12-14 NOTE — TELEPHONE ENCOUNTER
LOV: 12/04/17  RTO: 3 months  LR: 11/03/17  Last CSM: 12/11/17 by [address] Maintenance   Topic Date Due    Diabetic retinal exam  03/21/1962    HIV screen  03/21/1967    Smoker: low dose lung CT screening  10/10/2016    Colon cancer screen colonoscopy  11/21/2018    Diabetic foot exam  12/04/2018    Diabetic microalbuminuria test  12/04/2018    Diabetic hemoglobin A1C test  12/13/2018    Lipid screen  12/13/2018    DTaP/Tdap/Td vaccine (2 - Td) 12/17/2025    Zostavax vaccine  Completed    Flu vaccine  Completed    Pneumococcal low/med risk  Completed    AAA screen  Completed             (applicable per patient's age: Cancer Screenings, Depression Screening, Fall Risk Screening, Immunizations)    Hemoglobin A1C (%)   Date Value   12/13/2017 5.9   10/05/2016 5.4   04/29/2015 5.3     Microalb/Crt.  Ratio (mcg/mg creat)   Date Value   12/04/2017 15     LDL Cholesterol (mg/dL)   Date Value   12/13/2017 128     LDL Calculated (mg/dL)   Date Value   04/23/2014 94     AST (U/L)   Date Value   12/13/2017 15     ALT (U/L)   Date Value   12/13/2017 14     BUN (mg/dL)   Date Value   12/13/2017 25 (H)      (goal A1C is < 7)   (goal LDL is <100) need 30-50% reduction from baseline     BP Readings from Last 3 Encounters:   12/04/17 (!) 141/55   12/04/17 110/60   11/27/17 116/70    (goal /80)      All Future Testing planned in [facility]:  Lab Frequency Next Occurrence   Bladder scan Once 10/16/2017   CBC Once 05/27/2018   Protein, urine, random Once 05/27/2018   Creatinine, Random Urine Once 05/27/2018   CBC with Differential Once 12/04/2017   BUN & Creatinine Once 03/06/2018   Electrolyte Panel Once 03/06/2018   Hepatic Function Panel Once 03/06/2018   US Liver Once 89/29/8309   Basic Metabolic Panel Every 12 Weeks 2/16/2018, 5/11/2018       Next Visit Date:  Future Appointments  Date Time Provider Phuc Oneilli   3/14/2018 2:00 PM Hari Paul MD [facility]   5/28/2018 10:10 AM Chris Simpson MD [facility] None            Patient Active Problem List:     Hyperlipidemia     Disc displacement, lumbar     Chronic hepatitis C with cirrhosis ([address]     Dilated cardiomyopathy     Gout     Diabetes type 2, controlled     Prostate cancer ([address]     Aortic insufficiency     Incisional hernia     Aortic atherosclerosis ([facility] [address]     Cervicalgia     Sciatica     Diverticulosis of colon     Ascites due to alcoholic cirrhosis ([facility] [address]     Essential hypertension     History of alcohol use     Hypomagnesemia     Chronic hepatitis C without hepatic coma (HCC)     Acquired hypothyroidism     Chondromalacia patella, right     Awaiting organ transplant status     Varicose veins of left lower extremity     Anxiety     Colon polyps     History of hepatitis C     Right lumbar radiculitis     Post laminectomy syndrome     Hepatic cyst     Severe comorbid illness     Primary osteoarthritis of both knees     Chronic lumbar radiculopathy     Sol esophagus

## 2017-12-28 ENCOUNTER — CARE COORDINATION (OUTPATIENT)
Dept: CARE COORDINATION | Age: 67
End: 2017-12-28

## 2017-12-28 NOTE — CARE COORDINATION
Ambulatory Care Coordination Note  12/28/2017  CM Risk Score: 3  Evelyn Mortality Risk Score: 6.55    ACC: Tiki Blank RN    Summary Note: spoke with pt who said his gout symptoms are better. He feels this is in control. He will go back up to [facility] in a few months. He did follow up with GI earlier this month. He feels things are going ok at the moment and denies any needs at this time. Care Coordination Interventions    Program Enrollment:  Complex Care  Referral from Primary Care Provider:  No  Suggested Interventions and Community Resources  Medi Set or Pill Pack:  Completed         Goals Addressed             Most Recent     Care Coordination Self Management   On track (12/28/2017)             CC Self Management Goal  Patient Goal (What steps will patient take to achieve goal?): pain under control   Patient is able to discuss self-management of condition(s): hepatitis and pain control   Pt demonstrates adherence to medications  Pt demonstrates understanding of self-monitoring  Patient is able to identify Red Flags:  Alert to potential adverse drug reactions(s) or side effects and actions to take should they arise  Discuss target symptoms and actions to take should they arise  Identify problems that require immediate PCP or specialist visit  Patient demonstrates understanding of access to PCP/Specialist:  Understands about scheduling routine Follow Up appointments   Understands about sick day appointment options for worsening of symptoms/progression (Same Day, E Visits)            Prior to Admission medications    Medication Sig Start Date End Date Taking? Authorizing Provider   LORazepam (ATIVAN) 1 MG tablet Take 1 tablet by mouth daily as needed for Anxiety . 12/14/17 12/14/18  Mary Carballo MD   atorvastatin (LIPITOR) 20 MG tablet Take 1 tablet by mouth daily 12/14/17 12/14/18  Mary Carballo MD   oxyCODONE-acetaminophen (PERCOCET)  MG per tablet Take 1 tablet by mouth daily as needed for Pain . 12/11/17 12/11/18  Brandan Barker MD   omeprazole (PRILOSEC) 20 MG delayed release capsule Take 40 mg by mouth daily    Historical Provider, MD   allopurinol (ZYLOPRIM) 300 MG tablet Take 1 tablet by mouth 2 times daily 11/22/17 11/22/18  Brandan Barker MD   lactulose [facility]) 10 GM/15ML solution Take 45 mLs by mouth 3 times daily 11/20/17 12/3/18  Brandan Barker MD   furosemide (LASIX) 40 MG tablet Take 1 tablet by mouth 2 times daily 10/25/17 10/25/18  Brandan Barker MD   colchicine (COLCRYS) 0.6 MG tablet Take 0.6 mg by mouth daily as needed     Historical Provider, MD   predniSONE (DELTASONE) 5 MG tablet Take 5 mg by mouth daily    Historical Provider, MD   HYDROcodone-acetaminophen (NORCO) 5-325 MG per tablet Take 1 tablet by mouth every 6 hours as needed for Pain .     Historical Provider, MD   spironolactone (ALDACTONE) 100 MG tablet Take 1 tablet by mouth daily 10/2/17 10/2/18  Brandan Barker MD   probenecid (BENEMID) 500 MG tablet Take 0.5 tablets by mouth 2 times daily 10/2/17 10/2/18  Brandan Barker MD   gabapentin (NEURONTIN) 400 MG capsule Take 1 capsule by mouth 3 times daily Take 1 capsule by mouth daily  Patient taking differently: Take 400 mg by mouth 3 times daily  9/19/17 12/4/18  Brooklynn Zheng MD   acetaminophen (TYLENOL) 500 MG tablet Take 500 mg by mouth every 6 hours as needed for Pain  4/4/17   Historical Provider, MD       Future Appointments  Date Time Provider Phuc Macias   6/11/2018 10:50 AM Gisele Jones MD [facility]   6/18/2018 11:10 AM Keo Xiong MD [facility] None

## 2018-01-16 ENCOUNTER — HOSPITAL ENCOUNTER (OUTPATIENT)
Age: 68
Setting detail: SPECIMEN
Discharge: HOME | End: 2018-01-16
Payer: COMMERCIAL

## 2018-01-16 LAB
ALBUMIN SERPL-MCNC: 4.3 G/DL (ref 3.5–5.2)
ALBUMIN/GLOBULIN RATIO: 1.3 (ref 1–2.5)
ALP BLD-CCNC: 62 U/L (ref 40–129)
ALT SERPL-CCNC: 14 U/L (ref 5–41)
ANION GAP SERPL CALCULATED.3IONS-SCNC: 16 MMOL/L (ref 9–17)
AST SERPL-CCNC: 16 U/L
BILIRUB SERPL-MCNC: 0.33 MG/DL (ref 0.3–1.2)
BUN BLDV-MCNC: 18 MG/DL (ref 8–23)
BUN/CREAT BLD: ABNORMAL (ref 9–20)
CALCIUM SERPL-MCNC: 8.9 MG/DL (ref 8.6–10.4)
CHLORIDE BLD-SCNC: 98 MMOL/L (ref 98–107)
CO2: 24 MMOL/L (ref 20–31)
CREAT SERPL-MCNC: 1.41 MG/DL (ref 0.7–1.2)
GFR AFRICAN AMERICAN: >60 ML/MIN
GFR NON-AFRICAN AMERICAN: 50 ML/MIN
GFR SERPL CREATININE-BSD FRML MDRD: ABNORMAL ML/MIN/{1.73_M2}
GFR SERPL CREATININE-BSD FRML MDRD: ABNORMAL ML/MIN/{1.73_M2}
GLUCOSE BLD-MCNC: 125 MG/DL (ref 70–99)
POTASSIUM SERPL-SCNC: 3.9 MMOL/L (ref 3.7–5.3)
SODIUM BLD-SCNC: 138 MMOL/L (ref 135–144)
TOTAL PROTEIN: 7.5 G/DL (ref 6.4–8.3)
URIC ACID: 4.2 MG/DL (ref 3.4–7)

## 2018-01-18 ENCOUNTER — CARE COORDINATION (OUTPATIENT)
Dept: CARE COORDINATION | Age: 68
End: 2018-01-18

## 2018-02-01 DIAGNOSIS — F41.9 ANXIETY: ICD-10-CM

## 2018-02-02 NOTE — TELEPHONE ENCOUNTER
2/16/2018, 5/11/2018       Next Visit Date:  Future Appointments  Date Time Provider Phuc Macias   6/11/2018 10:50 AM MD [place] exc [facility]   6/18/2018 11:10 AM Mario Gottlieb MD [facility] None            Patient Active Problem List:     Hyperlipidemia     Disc displacement, lumbar     Chronic hepatitis C with cirrhosis ([address]     Dilated cardiomyopathy     Gout     Diabetes type 2, controlled     Prostate cancer ([address]     Aortic insufficiency     Incisional hernia     Aortic atherosclerosis ([address]     Cervicalgia     Sciatica     Diverticulosis of colon     Ascites due to alcoholic cirrhosis ([address]     Essential hypertension     History of alcohol use     Hypomagnesemia     Chronic hepatitis C without hepatic coma (HCC)     Acquired hypothyroidism     Chondromalacia patella, right     Awaiting organ transplant status     Varicose veins of left lower extremity     Anxiety     Colon polyps     History of hepatitis C     Right lumbar radiculitis     Post laminectomy syndrome     Hepatic cyst     Severe comorbid illness     Primary osteoarthritis of both knees     Chronic lumbar radiculopathy     Sol esophagus

## 2018-02-05 RX ORDER — LORAZEPAM 1 MG/1
1 TABLET ORAL DAILY PRN
Qty: 20 TABLET | Refills: 0 | Status: SHIPPED | OUTPATIENT
Start: 2018-02-05 | End: 2018-03-20 | Stop reason: SDUPTHER

## 2018-02-16 ENCOUNTER — CARE COORDINATION (OUTPATIENT)
Dept: CARE COORDINATION | Age: 68
End: 2018-02-16

## 2018-02-16 NOTE — CARE COORDINATION
Ambulatory Care Coordination Note  2/16/2018  CM Risk Score: 3  Evelyn Mortality Risk Score: 6.55    ACC: Noah Dietz, RN    Summary Note: spoke with pt who said he is doing ok at this time. He said he is following up with the gout clinic at [facility]. He denies any needs at this time. Did make him an apt to see his pcp in April. Diabetes Assessment    Medic Alert ID:  No  Meal Planning:  None   Do you have barriers with adherence to non-pharmacologic self-management interventions? (Nutrition/Exercise/Self-Monitoring):  No   Have you ever had to go to the ED for symptoms of low blood sugar?:  No       No patient-reported symptoms                Care Coordination Interventions    Program Enrollment:  Complex Care  Referral from Primary Care Provider:  No  Suggested Interventions and Community Resources  Medi Set or Pill Pack:  Completed         Goals Addressed             Most Recent     Care Coordination Self Management   On track (2/16/2018)             CC Self Management Goal  Patient Goal (What steps will patient take to achieve goal?): pain under control   Patient is able to discuss self-management of condition(s): hepatitis and pain control   Pt demonstrates adherence to medications  Pt demonstrates understanding of self-monitoring  Patient is able to identify Red Flags:  Alert to potential adverse drug reactions(s) or side effects and actions to take should they arise  Discuss target symptoms and actions to take should they arise  Identify problems that require immediate PCP or specialist visit  Patient demonstrates understanding of access to PCP/Specialist:  Understands about scheduling routine Follow Up appointments   Understands about sick day appointment options for worsening of symptoms/progression (Same Day, E Visits)            Prior to Admission medications    Medication Sig Start Date End Date Taking?  Authorizing Provider   LORazepam (ATIVAN) 1 MG tablet Take 1 tablet by mouth daily as needed for

## 2018-02-19 DIAGNOSIS — E78.2 MIXED HYPERLIPIDEMIA: ICD-10-CM

## 2018-02-19 RX ORDER — ATORVASTATIN CALCIUM 20 MG/1
20 TABLET, FILM COATED ORAL DAILY
Qty: 30 TABLET | Refills: 5 | Status: SHIPPED | OUTPATIENT
Start: 2018-02-19 | End: 2018-06-13

## 2018-02-19 NOTE — TELEPHONE ENCOUNTER
LOV was 12-4-17, LR was 12-14-17. cm  Next Visit Date:  Future Appointments  Date Time Provider Phuc Oneilli   4/6/2018 10:00 AM MD Dominick Gasca [facility] [place]   6/11/2018 10:50 AM Sherry Bonilla MD [facility]   6/18/2018 11:10 AM Abiola Kim MD [facility] None       Health Maintenance   Topic Date Due    Diabetic retinal exam  03/21/1962    HIV screen  03/21/1967    Smoker: low dose lung CT screening  10/10/2016    Colon cancer screen colonoscopy  11/21/2018    Diabetic foot exam  12/04/2018    Diabetic microalbuminuria test  12/04/2018    A1C test (Diabetic or Prediabetic)  12/13/2018    Lipid screen  12/13/2018    TSH testing  12/13/2018    Potassium monitoring  01/16/2019    Creatinine monitoring  01/16/2019    DTaP/Tdap/Td vaccine (2 - Td) 12/17/2025    Zostavax vaccine  Completed    Flu vaccine  Completed    Pneumococcal low/med risk  Completed    AAA screen  Completed       Hemoglobin A1C (%)   Date Value   12/13/2017 5.9   10/05/2016 5.4   04/29/2015 5.3             ( goal A1C is < 7)   Microalb/Crt.  Ratio (mcg/mg creat)   Date Value   12/04/2017 15     LDL Cholesterol (mg/dL)   Date Value   12/13/2017 128     LDL Calculated (mg/dL)   Date Value   04/23/2014 94       (goal LDL is <100)   AST (U/L)   Date Value   01/16/2018 16     ALT (U/L)   Date Value   01/16/2018 14     BUN (mg/dL)   Date Value   01/16/2018 18     BP Readings from Last 3 Encounters:   12/04/17 (!) 141/55   12/04/17 110/60   11/27/17 116/70          (goal 120/80)    All Future Testing planned in [facility]  Lab Frequency Next Occurrence   Bladder scan Once 10/16/2017   CBC Once 05/27/2018   Protein, urine, random Once 05/27/2018   Creatinine, Random Urine Once 05/27/2018   CBC with Differential Once 12/04/2017   BUN & Creatinine Once 03/06/2018   Electrolyte Panel Once 03/06/2018   Hepatic Function Panel Once 03/06/2018   US Liver Once 03/07/2018   Comprehensive Metabolic Panel Once 89/23/6635   Lipid Panel

## 2018-02-20 ENCOUNTER — HOSPITAL ENCOUNTER (OUTPATIENT)
Age: 68
Setting detail: SPECIMEN
Discharge: HOME | End: 2018-02-20
Payer: COMMERCIAL

## 2018-02-20 LAB
ALBUMIN SERPL-MCNC: 4.1 G/DL (ref 3.5–5.2)
ALBUMIN/GLOBULIN RATIO: 1.4 (ref 1–2.5)
ALP BLD-CCNC: 59 U/L (ref 40–129)
ALT SERPL-CCNC: 26 U/L (ref 5–41)
ANION GAP SERPL CALCULATED.3IONS-SCNC: 16 MMOL/L (ref 9–17)
AST SERPL-CCNC: 21 U/L
BILIRUB SERPL-MCNC: 0.16 MG/DL (ref 0.3–1.2)
BUN BLDV-MCNC: 24 MG/DL (ref 8–23)
BUN/CREAT BLD: ABNORMAL (ref 9–20)
CALCIUM SERPL-MCNC: 8.5 MG/DL (ref 8.6–10.4)
CHLORIDE BLD-SCNC: 97 MMOL/L (ref 98–107)
CO2: 23 MMOL/L (ref 20–31)
CREAT SERPL-MCNC: 1.39 MG/DL (ref 0.7–1.2)
GFR AFRICAN AMERICAN: >60 ML/MIN
GFR NON-AFRICAN AMERICAN: 51 ML/MIN
GFR SERPL CREATININE-BSD FRML MDRD: ABNORMAL ML/MIN/{1.73_M2}
GFR SERPL CREATININE-BSD FRML MDRD: ABNORMAL ML/MIN/{1.73_M2}
GLUCOSE BLD-MCNC: 171 MG/DL (ref 70–99)
POTASSIUM SERPL-SCNC: 4.3 MMOL/L (ref 3.7–5.3)
SODIUM BLD-SCNC: 136 MMOL/L (ref 135–144)
TOTAL PROTEIN: 7.1 G/DL (ref 6.4–8.3)
URIC ACID: 4.2 MG/DL (ref 3.4–7)

## 2018-02-26 DIAGNOSIS — M1A.09X0 IDIOPATHIC CHRONIC GOUT OF MULTIPLE SITES WITHOUT TOPHUS: ICD-10-CM

## 2018-02-26 RX ORDER — PROBENECID 500 MG/1
250 TABLET, FILM COATED ORAL 2 TIMES DAILY
Qty: 90 TABLET | Refills: 1 | Status: SHIPPED | OUTPATIENT
Start: 2018-02-26 | End: 2018-03-02 | Stop reason: SDUPTHER

## 2018-02-26 RX ORDER — ALLOPURINOL 300 MG/1
300 TABLET ORAL 2 TIMES DAILY
Qty: 180 TABLET | Refills: 1 | Status: SHIPPED | OUTPATIENT
Start: 2018-02-26 | End: 2019-03-27

## 2018-02-26 NOTE — TELEPHONE ENCOUNTER
From: Loy Ross  Sent: 2/26/2018 7:15 AM EST  Subject: Medication Renewal Request    Diane .  Christiano De La Cruz would like a refill of the following medications:     probenecid (BENEMID) 500 MG tablet Jb Voss MD]     allopurinol (ZYLOPRIM) 300 MG tablet Jb Voss MD]    Preferred pharmacy: [address] [place] [address] [address] [phone] - F [phone]    Comment:

## 2018-02-26 NOTE — TELEPHONE ENCOUNTER
LOV was 12-4-17, LR on the Zyloprim was 11-22-17, LR on the Benemid was 10-2-17. cm  Next Visit Date:  Future Appointments  Date Time Provider Phuc Macias   4/6/2018 10:00 AM Severiano Greening, MD [facility]   6/11/2018 10:50 AM Monae Caldera MD [facility]   6/18/2018 11:10 AM Azael Tolbert MD [facility] None       Health Maintenance   Topic Date Due    Diabetic retinal exam  03/21/1962    HIV screen  03/21/1967    Shingles Vaccine (1 of 2 - 2 Dose Series) 01/06/2010    Smoker: low dose lung CT screening  10/10/2016    Colon cancer screen colonoscopy  11/21/2018    Diabetic foot exam  12/04/2018    Diabetic microalbuminuria test  12/04/2018    A1C test (Diabetic or Prediabetic)  12/13/2018    Lipid screen  12/13/2018    TSH testing  12/13/2018    Potassium monitoring  02/20/2019    Creatinine monitoring  02/20/2019    DTaP/Tdap/Td vaccine (2 - Td) 12/17/2025    Flu vaccine  Completed    Pneumococcal low/med risk  Completed    AAA screen  Completed       Hemoglobin A1C (%)   Date Value   12/13/2017 5.9   10/05/2016 5.4   04/29/2015 5.3             ( goal A1C is < 7)   Microalb/Crt.  Ratio (mcg/mg creat)   Date Value   12/04/2017 15     LDL Cholesterol (mg/dL)   Date Value   12/13/2017 128     LDL Calculated (mg/dL)   Date Value   04/23/2014 94       (goal LDL is <100)   AST (U/L)   Date Value   02/20/2018 21     ALT (U/L)   Date Value   02/20/2018 26     BUN (mg/dL)   Date Value   02/20/2018 24 (H)     BP Readings from Last 3 Encounters:   12/04/17 (!) 141/55   12/04/17 110/60   11/27/17 116/70          (goal 120/80)    All Future Testing planned in [facility]  Lab Frequency Next Occurrence   Bladder scan Once 10/16/2017   CBC Once 05/27/2018   Protein, urine, random Once 05/27/2018   Creatinine, Random Urine Once 05/27/2018   CBC with Differential Once 12/04/2017   BUN & Creatinine Once 03/06/2018   Electrolyte Panel Once 03/06/2018   Hepatic Function Panel Once 03/06/2018   US Liver

## 2018-03-02 ENCOUNTER — TELEPHONE (OUTPATIENT)
Dept: FAMILY MEDICINE CLINIC | Age: 68
End: 2018-03-02

## 2018-03-02 DIAGNOSIS — M1A.09X0 IDIOPATHIC CHRONIC GOUT OF MULTIPLE SITES WITHOUT TOPHUS: ICD-10-CM

## 2018-03-02 RX ORDER — PROBENECID 500 MG/1
500 TABLET, FILM COATED ORAL 2 TIMES DAILY
Qty: 180 TABLET | Refills: 1 | Status: SHIPPED | OUTPATIENT
Start: 2018-03-02 | End: 2019-03-27 | Stop reason: SDUPTHER

## 2018-03-16 ENCOUNTER — CARE COORDINATION (OUTPATIENT)
Dept: CARE COORDINATION | Age: 68
End: 2018-03-16

## 2018-03-16 NOTE — CARE COORDINATION
Ambulatory Care Coordination Note  3/16/2018  CM Risk Score: 3  Evelyn Mortality Risk Score: 6.55    ACC: Castillo Rodney, RN    Summary Note: spoke with pt who said he is doing well. His sciatic nerve has been bothering him but not enough that he feels he needs to do anything about it. Reviewed his last CMP that shows his glucose was 171. Talked about carbs and watching portion control. He is going to have his labs repeated today as he has standing orders for blood with with his Rheumatologist. He denies any needs at this time and will follow up with his pcp on April 6     Diabetes Assessment    Medic Alert ID:  No  Meal Planning:  None   Do you have barriers with adherence to non-pharmacologic self-management interventions?  (Nutrition/Exercise/Self-Monitoring):  No   Have you ever had to go to the ED for symptoms of low blood sugar?:  No       No patient-reported symptoms                Care Coordination Interventions    Program Enrollment:  Complex Care  Referral from Primary Care Provider:  No  Suggested Interventions and Community Resources  Medi Set or Pill Pack:  Completed         Goals Addressed             Most Recent     Care Coordination Self Management   On track (3/16/2018)             CC Self Management Goal  Patient Goal (What steps will patient take to achieve goal?): pain under control   Patient is able to discuss self-management of condition(s): hepatitis and pain control   Pt demonstrates adherence to medications  Pt demonstrates understanding of self-monitoring  Patient is able to identify Red Flags:  Alert to potential adverse drug reactions(s) or side effects and actions to take should they arise  Discuss target symptoms and actions to take should they arise  Identify problems that require immediate PCP or specialist visit  Patient demonstrates understanding of access to PCP/Specialist:  Understands about scheduling routine Follow Up appointments   Understands about sick day appointment options for worsening of symptoms/progression (Same Day, E Visits)            Prior to Admission medications    Medication Sig Start Date End Date Taking? Authorizing Provider   probenecid (BENEMID) 500 MG tablet Take 1 tablet by mouth 2 times daily 3/2/18 3/2/19  Elizabeth Ellis MD   allopurinol (ZYLOPRIM) 300 MG tablet Take 1 tablet by mouth 2 times daily 2/26/18 2/26/19  Elizabeth Ellis MD   atorvastatin (LIPITOR) 20 MG tablet Take 1 tablet by mouth daily 2/19/18 2/19/19  Elizabeth Ellis MD   LORazepam (ATIVAN) 1 MG tablet Take 1 tablet by mouth daily as needed for Anxiety for up to 30 days. 2/5/18 3/7/18  Elizabeth Ellis MD   oxyCODONE-acetaminophen (PERCOCET)  MG per tablet Take 1 tablet by mouth daily as needed for Pain .  12/11/17 12/11/18  Elizabeth Ellis MD   omeprazole (PRILOSEC) 20 MG delayed release capsule Take 40 mg by mouth daily    Historical Provider, MD   lactulose (CHRONULAC) 10 GM/15ML solution Take 45 mLs by mouth 3 times daily 11/20/17 12/3/18  Elizabeth Ellis MD   furosemide (LASIX) 40 MG tablet Take 1 tablet by mouth 2 times daily 10/25/17 10/25/18  Elizabeth Ellis MD   colchicine (COLCRYS) 0.6 MG tablet Take 0.6 mg by mouth daily as needed     Historical Provider, MD   predniSONE (DELTASONE) 5 MG tablet Take 5 mg by mouth daily    Historical Provider, MD   spironolactone (ALDACTONE) 100 MG tablet Take 1 tablet by mouth daily 10/2/17 10/2/18  Elizabeth Ellis MD   gabapentin (NEURONTIN) 400 MG capsule Take 1 capsule by mouth 3 times daily Take 1 capsule by mouth daily  Patient taking differently: Take 400 mg by mouth 3 times daily  9/19/17 12/4/18  Ginny Ngo MD   acetaminophen (TYLENOL) 500 MG tablet Take 500 mg by mouth every 6 hours as needed for Pain  4/4/17   Historical Provider, MD       Future Appointments  Date Time Provider Phuc Macias   4/6/2018 10:00 AM MD Domiinck Leary  [facility]   6/11/2018 10:50 AM Treva Heimlich, MD [facility]   6/18/2018 11:10 AM Mary Reyes MD [facility]

## 2018-03-20 ENCOUNTER — HOSPITAL ENCOUNTER (OUTPATIENT)
Age: 68
Setting detail: SPECIMEN
Discharge: HOME | End: 2018-03-20
Payer: COMMERCIAL

## 2018-03-20 DIAGNOSIS — E78.2 MIXED HYPERLIPIDEMIA: ICD-10-CM

## 2018-03-20 DIAGNOSIS — F41.9 ANXIETY: ICD-10-CM

## 2018-03-20 LAB
ALBUMIN SERPL-MCNC: 3.9 G/DL (ref 3.5–5.2)
ALBUMIN/GLOBULIN RATIO: 1.2 (ref 1–2.5)
ALP BLD-CCNC: 70 U/L (ref 40–129)
ALT SERPL-CCNC: 17 U/L (ref 5–41)
ANION GAP SERPL CALCULATED.3IONS-SCNC: 14 MMOL/L (ref 9–17)
AST SERPL-CCNC: 19 U/L
BILIRUB SERPL-MCNC: 0.43 MG/DL (ref 0.3–1.2)
BUN BLDV-MCNC: 18 MG/DL (ref 8–23)
BUN/CREAT BLD: ABNORMAL (ref 9–20)
CALCIUM SERPL-MCNC: 9.1 MG/DL (ref 8.6–10.4)
CHLORIDE BLD-SCNC: 103 MMOL/L (ref 98–107)
CHOLESTEROL/HDL RATIO: 4.1
CHOLESTEROL: 110 MG/DL
CO2: 22 MMOL/L (ref 20–31)
CREAT SERPL-MCNC: 1.33 MG/DL (ref 0.7–1.2)
GFR AFRICAN AMERICAN: >60 ML/MIN
GFR NON-AFRICAN AMERICAN: 54 ML/MIN
GFR SERPL CREATININE-BSD FRML MDRD: ABNORMAL ML/MIN/{1.73_M2}
GFR SERPL CREATININE-BSD FRML MDRD: ABNORMAL ML/MIN/{1.73_M2}
GLUCOSE BLD-MCNC: 121 MG/DL (ref 70–99)
HDLC SERPL-MCNC: 27 MG/DL
LDL CHOLESTEROL: 64 MG/DL (ref 0–130)
POTASSIUM SERPL-SCNC: 4 MMOL/L (ref 3.7–5.3)
SODIUM BLD-SCNC: 139 MMOL/L (ref 135–144)
TOTAL PROTEIN: 7.2 G/DL (ref 6.4–8.3)
TRIGL SERPL-MCNC: 93 MG/DL
URIC ACID: 5.7 MG/DL (ref 3.4–7)
VLDLC SERPL CALC-MCNC: ABNORMAL MG/DL (ref 1–30)

## 2018-03-20 NOTE — TELEPHONE ENCOUNTER
From: Eric Mackenzie  Sent: 3/20/2018 1:23 PM EDT  Subject: Medication Renewal Request    Inder Singer.  [facility]:     LORazepam (ATIVAN) 1 MG tablet Elma Sauer MD]    Preferred pharmacy: [address] [place] [address] [address] [phone] - F [phone]    Comment:
Liver Once 62/53/9553   Basic Metabolic Panel Every 12 Weeks 5/11/2018               Patient Active Problem List:     Hyperlipidemia     Disc displacement, lumbar     Chronic hepatitis C with cirrhosis ([address]     Dilated cardiomyopathy     Gout     Diabetes type 2, controlled     Prostate cancer ([facility] [address]     Aortic insufficiency     Incisional hernia     Aortic atherosclerosis ([facility] [address]     Cervicalgia     Sciatica     Diverticulosis of colon     Ascites due to alcoholic cirrhosis ([facility] [address]     Essential hypertension     History of alcohol use     Hypomagnesemia     Chronic hepatitis C without hepatic coma (HCC)     Acquired hypothyroidism     Chondromalacia patella, right     Awaiting organ transplant status     Varicose veins of left lower extremity     Anxiety     Colon polyps     History of hepatitis C     Right lumbar radiculitis     Post laminectomy syndrome     Hepatic cyst     Severe comorbid illness     Primary osteoarthritis of both knees     Chronic lumbar radiculopathy     Sol esophagus

## 2018-03-21 RX ORDER — LORAZEPAM 1 MG/1
1 TABLET ORAL DAILY PRN
Qty: 20 TABLET | Refills: 0 | Status: SHIPPED | OUTPATIENT
Start: 2018-03-21 | End: 2018-05-02 | Stop reason: SDUPTHER

## 2018-04-06 DIAGNOSIS — K74.60 CHRONIC HEPATITIS C WITH CIRRHOSIS (HCC): ICD-10-CM

## 2018-04-06 DIAGNOSIS — B18.2 CHRONIC HEPATITIS C WITH CIRRHOSIS (HCC): ICD-10-CM

## 2018-04-06 RX ORDER — LACTULOSE 10 G/15ML
10 SOLUTION ORAL; RECTAL 3 TIMES DAILY
Qty: 5000 ML | Refills: 5 | Status: SHIPPED | OUTPATIENT
Start: 2018-04-06 | End: 2018-09-10 | Stop reason: SDUPTHER

## 2018-04-13 ENCOUNTER — CARE COORDINATION (OUTPATIENT)
Dept: CARE COORDINATION | Age: 68
End: 2018-04-13

## 2018-04-24 ENCOUNTER — CARE COORDINATION (OUTPATIENT)
Dept: CARE COORDINATION | Age: 68
End: 2018-04-24

## 2018-04-26 ENCOUNTER — HOSPITAL ENCOUNTER (OUTPATIENT)
Age: 68
Setting detail: SPECIMEN
Discharge: HOME | End: 2018-04-26
Payer: COMMERCIAL

## 2018-04-26 LAB
ALBUMIN SERPL-MCNC: 4.7 G/DL (ref 3.5–5.2)
ALBUMIN/GLOBULIN RATIO: 1.6 (ref 1–2.5)
ALP BLD-CCNC: 87 U/L (ref 40–129)
ALT SERPL-CCNC: 22 U/L (ref 5–41)
ANION GAP SERPL CALCULATED.3IONS-SCNC: 14 MMOL/L (ref 9–17)
AST SERPL-CCNC: 18 U/L
BILIRUB SERPL-MCNC: 0.29 MG/DL (ref 0.3–1.2)
BUN BLDV-MCNC: 10 MG/DL (ref 8–23)
BUN/CREAT BLD: ABNORMAL (ref 9–20)
CALCIUM SERPL-MCNC: 9.6 MG/DL (ref 8.6–10.4)
CHLORIDE BLD-SCNC: 103 MMOL/L (ref 98–107)
CO2: 27 MMOL/L (ref 20–31)
CREAT SERPL-MCNC: 0.65 MG/DL (ref 0.7–1.2)
GFR AFRICAN AMERICAN: >60 ML/MIN
GFR NON-AFRICAN AMERICAN: >60 ML/MIN
GFR SERPL CREATININE-BSD FRML MDRD: ABNORMAL ML/MIN/{1.73_M2}
GFR SERPL CREATININE-BSD FRML MDRD: ABNORMAL ML/MIN/{1.73_M2}
GLUCOSE BLD-MCNC: 81 MG/DL (ref 70–99)
POTASSIUM SERPL-SCNC: 5.2 MMOL/L (ref 3.7–5.3)
SODIUM BLD-SCNC: 144 MMOL/L (ref 135–144)
TOTAL PROTEIN: 7.7 G/DL (ref 6.4–8.3)
URIC ACID: 5.7 MG/DL (ref 3.4–7)

## 2018-05-02 DIAGNOSIS — F41.9 ANXIETY: ICD-10-CM

## 2018-05-02 RX ORDER — SPIRONOLACTONE 100 MG/1
100 TABLET, FILM COATED ORAL DAILY
Qty: 90 TABLET | Refills: 1 | Status: SHIPPED | OUTPATIENT
Start: 2018-05-02 | End: 2018-11-07 | Stop reason: SDUPTHER

## 2018-05-02 RX ORDER — FUROSEMIDE 40 MG/1
40 TABLET ORAL 2 TIMES DAILY
Qty: 180 TABLET | Refills: 1 | Status: SHIPPED | OUTPATIENT
Start: 2018-05-02 | End: 2018-11-05 | Stop reason: SDUPTHER

## 2018-05-02 RX ORDER — LORAZEPAM 1 MG/1
1 TABLET ORAL DAILY PRN
Qty: 20 TABLET | Refills: 0 | Status: SHIPPED | OUTPATIENT
Start: 2018-05-02 | End: 2018-06-08 | Stop reason: SDUPTHER

## 2018-05-21 ENCOUNTER — HOSPITAL ENCOUNTER (OUTPATIENT)
Age: 68
Setting detail: SPECIMEN
Discharge: HOME | End: 2018-05-21
Payer: COMMERCIAL

## 2018-05-21 LAB
ALBUMIN SERPL-MCNC: 4.3 G/DL (ref 3.5–5.2)
ALBUMIN/GLOBULIN RATIO: 1.2 (ref 1–2.5)
ALP BLD-CCNC: 57 U/L (ref 40–129)
ALT SERPL-CCNC: 19 U/L (ref 5–41)
ANION GAP SERPL CALCULATED.3IONS-SCNC: 14 MMOL/L (ref 9–17)
AST SERPL-CCNC: 19 U/L
BILIRUB SERPL-MCNC: 0.39 MG/DL (ref 0.3–1.2)
BUN BLDV-MCNC: 28 MG/DL (ref 8–23)
BUN/CREAT BLD: ABNORMAL (ref 9–20)
CALCIUM SERPL-MCNC: 9.4 MG/DL (ref 8.6–10.4)
CHLORIDE BLD-SCNC: 99 MMOL/L (ref 98–107)
CO2: 25 MMOL/L (ref 20–31)
CREAT SERPL-MCNC: 1.26 MG/DL (ref 0.7–1.2)
GFR AFRICAN AMERICAN: >60 ML/MIN
GFR NON-AFRICAN AMERICAN: 57 ML/MIN
GFR SERPL CREATININE-BSD FRML MDRD: ABNORMAL ML/MIN/{1.73_M2}
GFR SERPL CREATININE-BSD FRML MDRD: ABNORMAL ML/MIN/{1.73_M2}
GLUCOSE BLD-MCNC: 125 MG/DL (ref 70–99)
POTASSIUM SERPL-SCNC: 4.8 MMOL/L (ref 3.7–5.3)
SODIUM BLD-SCNC: 138 MMOL/L (ref 135–144)
TOTAL PROTEIN: 7.9 G/DL (ref 6.4–8.3)
URIC ACID: 4.6 MG/DL (ref 3.4–7)

## 2018-05-24 ENCOUNTER — CARE COORDINATION (OUTPATIENT)
Dept: CARE COORDINATION | Age: 68
End: 2018-05-24

## 2018-05-24 DIAGNOSIS — M51.26 DISC DISPLACEMENT, LUMBAR: ICD-10-CM

## 2018-05-25 DIAGNOSIS — M51.26 DISC DISPLACEMENT, LUMBAR: Primary | ICD-10-CM

## 2018-05-25 RX ORDER — HYDROCODONE BITARTRATE AND ACETAMINOPHEN 5; 325 MG/1; MG/1
1 TABLET ORAL 2 TIMES DAILY PRN
Qty: 14 TABLET | Refills: 0 | Status: SHIPPED | OUTPATIENT
Start: 2018-05-25 | End: 2018-09-12 | Stop reason: SDUPTHER

## 2018-05-31 ENCOUNTER — CARE COORDINATION (OUTPATIENT)
Dept: CARE COORDINATION | Age: 68
End: 2018-05-31

## 2018-06-01 ENCOUNTER — HOSPITAL ENCOUNTER (OUTPATIENT)
Dept: ULTRASOUND IMAGING | Age: 68
Discharge: HOME | End: 2018-06-03
Payer: COMMERCIAL

## 2018-06-01 DIAGNOSIS — B37.81 CANDIDA ESOPHAGITIS (HCC): ICD-10-CM

## 2018-06-01 DIAGNOSIS — Z86.19 HISTORY OF HEPATITIS C: ICD-10-CM

## 2018-06-01 DIAGNOSIS — K22.70 BARRETT'S ESOPHAGUS WITHOUT DYSPLASIA: ICD-10-CM

## 2018-06-01 PROCEDURE — 76705 ECHO EXAM OF ABDOMEN: CPT

## 2018-06-07 ENCOUNTER — HOSPITAL ENCOUNTER (OUTPATIENT)
Age: 68
Setting detail: SPECIMEN
Discharge: HOME | End: 2018-06-07
Payer: COMMERCIAL

## 2018-06-07 DIAGNOSIS — B37.81 CANDIDA ESOPHAGITIS (HCC): ICD-10-CM

## 2018-06-07 DIAGNOSIS — K22.70 BARRETT'S ESOPHAGUS WITHOUT DYSPLASIA: ICD-10-CM

## 2018-06-07 DIAGNOSIS — Z86.19 HISTORY OF HEPATITIS C: ICD-10-CM

## 2018-06-07 LAB
ABSOLUTE EOS #: 0.05 K/UL (ref 0–0.44)
ABSOLUTE IMMATURE GRANULOCYTE: 0.09 K/UL (ref 0–0.3)
ABSOLUTE LYMPH #: 1.8 K/UL (ref 1.1–3.7)
ABSOLUTE MONO #: 0.59 K/UL (ref 0.1–1.2)
ALBUMIN SERPL-MCNC: 4.1 G/DL (ref 3.5–5.2)
ALBUMIN/GLOBULIN RATIO: 1.1 (ref 1–2.5)
ALP BLD-CCNC: 61 U/L (ref 40–129)
ALT SERPL-CCNC: 18 U/L (ref 5–41)
ANION GAP SERPL CALCULATED.3IONS-SCNC: 16 MMOL/L (ref 9–17)
AST SERPL-CCNC: 19 U/L
BASOPHILS # BLD: 0 % (ref 0–2)
BASOPHILS ABSOLUTE: 0.05 K/UL (ref 0–0.2)
BILIRUB SERPL-MCNC: 0.2 MG/DL (ref 0.3–1.2)
BILIRUBIN DIRECT: 0.08 MG/DL
BILIRUBIN, INDIRECT: 0.12 MG/DL (ref 0–1)
BUN BLDV-MCNC: 31 MG/DL (ref 8–23)
CHLORIDE BLD-SCNC: 104 MMOL/L (ref 98–107)
CO2: 19 MMOL/L (ref 20–31)
CREAT SERPL-MCNC: 1.47 MG/DL (ref 0.7–1.2)
DIFFERENTIAL TYPE: ABNORMAL
EOSINOPHILS RELATIVE PERCENT: 0 % (ref 1–4)
GFR AFRICAN AMERICAN: 58 ML/MIN
GFR NON-AFRICAN AMERICAN: 48 ML/MIN
GFR SERPL CREATININE-BSD FRML MDRD: ABNORMAL ML/MIN/{1.73_M2}
GFR SERPL CREATININE-BSD FRML MDRD: ABNORMAL ML/MIN/{1.73_M2}
GLOBULIN: ABNORMAL G/DL (ref 1.5–3.8)
HCT VFR BLD CALC: 43.5 % (ref 40.7–50.3)
HEMOGLOBIN: 13.8 G/DL (ref 13–17)
IMMATURE GRANULOCYTES: 1 %
LYMPHOCYTES # BLD: 15 % (ref 24–43)
MCH RBC QN AUTO: 32.9 PG (ref 25.2–33.5)
MCHC RBC AUTO-ENTMCNC: 31.7 G/DL (ref 28.4–34.8)
MCV RBC AUTO: 103.8 FL (ref 82.6–102.9)
MONOCYTES # BLD: 5 % (ref 3–12)
NRBC AUTOMATED: 0 PER 100 WBC
PDW BLD-RTO: 14 % (ref 11.8–14.4)
PLATELET # BLD: 208 K/UL (ref 138–453)
PLATELET ESTIMATE: ABNORMAL
PMV BLD AUTO: 11.4 FL (ref 8.1–13.5)
POTASSIUM SERPL-SCNC: 5.1 MMOL/L (ref 3.7–5.3)
RBC # BLD: 4.19 M/UL (ref 4.21–5.77)
RBC # BLD: ABNORMAL 10*6/UL
SEG NEUTROPHILS: 79 % (ref 36–65)
SEGMENTED NEUTROPHILS ABSOLUTE COUNT: 9.26 K/UL (ref 1.5–8.1)
SODIUM BLD-SCNC: 139 MMOL/L (ref 135–144)
TOTAL PROTEIN: 7.7 G/DL (ref 6.4–8.3)
WBC # BLD: 11.8 K/UL (ref 3.5–11.3)
WBC # BLD: ABNORMAL 10*3/UL

## 2018-06-08 DIAGNOSIS — F41.9 ANXIETY: ICD-10-CM

## 2018-06-10 RX ORDER — LORAZEPAM 1 MG/1
1 TABLET ORAL DAILY PRN
Qty: 20 TABLET | Refills: 0 | Status: SHIPPED | OUTPATIENT
Start: 2018-06-10 | End: 2018-07-09 | Stop reason: SDUPTHER

## 2018-06-13 ENCOUNTER — OFFICE VISIT (OUTPATIENT)
Dept: GASTROENTEROLOGY | Age: 68
End: 2018-06-13
Payer: COMMERCIAL

## 2018-06-13 VITALS — DIASTOLIC BLOOD PRESSURE: 60 MMHG | WEIGHT: 244.8 LBS | SYSTOLIC BLOOD PRESSURE: 116 MMHG | BODY MASS INDEX: 33.2 KG/M2

## 2018-06-13 DIAGNOSIS — Z86.19 HISTORY OF HEPATITIS C: ICD-10-CM

## 2018-06-13 DIAGNOSIS — R53.83 OTHER FATIGUE: ICD-10-CM

## 2018-06-13 DIAGNOSIS — K22.70 BARRETT'S ESOPHAGUS WITHOUT DYSPLASIA: Primary | ICD-10-CM

## 2018-06-13 PROCEDURE — 99214 OFFICE O/P EST MOD 30 MIN: CPT | Performed by: INTERNAL MEDICINE

## 2018-06-13 ASSESSMENT — ENCOUNTER SYMPTOMS
ABDOMINAL PAIN: 1
ANAL BLEEDING: 0
EYES NEGATIVE: 1
CONSTIPATION: 0
ABDOMINAL DISTENTION: 1
DIARRHEA: 1
TROUBLE SWALLOWING: 0
RESPIRATORY NEGATIVE: 1
NAUSEA: 0
VOMITING: 0
BLOOD IN STOOL: 0
RECTAL PAIN: 0
ALLERGIC/IMMUNOLOGIC NEGATIVE: 1

## 2018-06-25 ENCOUNTER — CARE COORDINATION (OUTPATIENT)
Dept: CARE COORDINATION | Age: 68
End: 2018-06-25

## 2018-07-03 ENCOUNTER — OFFICE VISIT (OUTPATIENT)
Dept: FAMILY MEDICINE CLINIC | Age: 68
End: 2018-07-03
Payer: COMMERCIAL

## 2018-07-03 VITALS
TEMPERATURE: 96.9 F | HEART RATE: 68 BPM | SYSTOLIC BLOOD PRESSURE: 122 MMHG | WEIGHT: 245.8 LBS | DIASTOLIC BLOOD PRESSURE: 60 MMHG | RESPIRATION RATE: 16 BRPM | BODY MASS INDEX: 33.34 KG/M2

## 2018-07-03 DIAGNOSIS — M51.26 DISC DISPLACEMENT, LUMBAR: ICD-10-CM

## 2018-07-03 DIAGNOSIS — L98.9 SKIN LESION: ICD-10-CM

## 2018-07-03 DIAGNOSIS — E03.9 ACQUIRED HYPOTHYROIDISM: ICD-10-CM

## 2018-07-03 DIAGNOSIS — E11.8 CONTROLLED TYPE 2 DIABETES MELLITUS WITH COMPLICATION, WITHOUT LONG-TERM CURRENT USE OF INSULIN (HCC): Primary | ICD-10-CM

## 2018-07-03 DIAGNOSIS — E78.2 MIXED HYPERLIPIDEMIA: ICD-10-CM

## 2018-07-03 DIAGNOSIS — C61 PROSTATE CANCER (HCC): ICD-10-CM

## 2018-07-03 DIAGNOSIS — K74.60 CHRONIC HEPATITIS C WITH CIRRHOSIS (HCC): ICD-10-CM

## 2018-07-03 DIAGNOSIS — M54.31 SCIATICA OF RIGHT SIDE: ICD-10-CM

## 2018-07-03 DIAGNOSIS — B18.2 CHRONIC HEPATITIS C WITH CIRRHOSIS (HCC): ICD-10-CM

## 2018-07-03 DIAGNOSIS — I10 ESSENTIAL HYPERTENSION: ICD-10-CM

## 2018-07-03 PROCEDURE — 99214 OFFICE O/P EST MOD 30 MIN: CPT | Performed by: NURSE PRACTITIONER

## 2018-07-03 RX ORDER — OMEPRAZOLE 20 MG/1
40 CAPSULE, DELAYED RELEASE ORAL DAILY
Qty: 60 CAPSULE | Refills: 5 | Status: SHIPPED | OUTPATIENT
Start: 2018-07-03 | End: 2019-01-08 | Stop reason: SDUPTHER

## 2018-07-03 ASSESSMENT — ENCOUNTER SYMPTOMS
EYE DISCHARGE: 0
COUGH: 0
VOMITING: 0
RHINORRHEA: 0
EYE PAIN: 0
BACK PAIN: 1
NAUSEA: 0
DIARRHEA: 0
WHEEZING: 0
ABDOMINAL PAIN: 0
SORE THROAT: 0
SHORTNESS OF BREATH: 0
CONSTIPATION: 0
TROUBLE SWALLOWING: 0

## 2018-07-03 NOTE — PROGRESS NOTES
 AAA screen  Completed       Current Outpatient Prescriptions   Medication Sig Dispense Refill    omeprazole (PRILOSEC) 20 MG delayed release capsule Take 2 capsules by mouth daily 60 capsule 5    LORazepam (ATIVAN) 1 MG tablet Take 1 tablet by mouth daily as needed for Anxiety. . 20 tablet 0    furosemide (LASIX) 40 MG tablet TAKE 1 TABLET BY MOUTH 2 TIMES DAILY 180 tablet 1    spironolactone (ALDACTONE) 100 MG tablet Take 1 tablet by mouth daily 90 tablet 1    lactulose encephalopathy 10 GM/15ML SOLN solution Take 15 mLs by mouth 3 times daily 5000 mL 5    probenecid (BENEMID) 500 MG tablet Take 1 tablet by mouth 2 times daily 180 tablet 1    allopurinol (ZYLOPRIM) 300 MG tablet Take 1 tablet by mouth 2 times daily 180 tablet 1    colchicine (COLCRYS) 0.6 MG tablet Take 0.6 mg by mouth daily as needed       acetaminophen (TYLENOL) 500 MG tablet Take 500 mg by mouth every 6 hours as needed for Pain        No current facility-administered medications for this visit. Patient ID: Alley Martinez is a 79 y.o. male. Patient presents in office today for routine follow up on chronic conditions. Doing just fine. Taking medications as prescribed. Lasix makes him urinate a lot. Hard to release at times. Does not monitor his blood sugars. No gout flare ups. Sees Rheumatologist at  of  for gout. Continues allopurinol and probenacid twice daily. Just saw Dr. José Miguel Brothers. Had lesion on right thigh. Disappeared but now coming back. Also has spot on his left abdomen. Does not hurt. Hypertension   This is a chronic problem. The current episode started more than 1 year ago. Pertinent negatives include no chest pain or shortness of breath. Hyperlipidemia   This is a chronic problem. The current episode started more than 1 year ago. Pertinent negatives include no chest pain or shortness of breath. Diabetes   He presents for his follow-up diabetic visit. He has type 2 diabetes mellitus.  Hypoglycemia symptoms include nervousness/anxiousness (Uses Ativan as needed.  [facility] cut back to #20 couple months ago and wants to go back to #30). Pertinent negatives for hypoglycemia include no dizziness. Pertinent negatives for diabetes include no chest pain. Review of Systems   Constitutional: Negative for activity change, appetite change and fever. HENT: Negative for congestion, ear pain, rhinorrhea, sore throat and trouble swallowing. Eyes: Negative for pain and discharge. Respiratory: Negative for cough, shortness of breath and wheezing. Cardiovascular: Negative for chest pain. Gastrointestinal: Negative for abdominal pain, constipation, diarrhea, nausea and vomiting. Genitourinary: Negative for dysuria. Musculoskeletal: Positive for back pain (chronic). Negative for gait problem and joint swelling. No gout attacks. Skin: Negative for rash. Lesion on right thigh and abdomen   Neurological: Negative for dizziness and light-headedness. Psychiatric/Behavioral: Positive for sleep disturbance. The patient is nervous/anxious (Uses Ativan as needed.  [facility] cut back to #20 couple months ago and wants to go back to #30). Will use Melatonin about 3 times per week which helps. Has anxiety attacks- at least once per week. PHQ Scores 12/4/2017 8/22/2016   PHQ2 Score 0 0   PHQ9 Score 0 0     Interpretation of Total Score Depression Severity: 1-4 = Minimal depression, 5-9 = Mild depression, 10-14 = Moderate depression, 15-19 = Moderately severe depression, 20-27 = Severe depression      Objective:     /60 (Site: Right Arm, Position: Sitting, Cuff Size: Large Adult)   Pulse 68   Temp 96.9 °F (36.1 °C) (Tympanic)   Resp 16   Wt 245 lb 12.8 oz (111.5 kg)   BMI 33.34 kg/m²      Physical Exam   Constitutional: He is oriented to person, place, and time. He appears well-developed and well-nourished. No distress. HENT:   Head: Normocephalic and atraumatic.    Right Ear:

## 2018-07-09 ENCOUNTER — TELEPHONE (OUTPATIENT)
Dept: FAMILY MEDICINE CLINIC | Age: 68
End: 2018-07-09

## 2018-07-09 DIAGNOSIS — F41.9 ANXIETY: ICD-10-CM

## 2018-07-09 RX ORDER — LORAZEPAM 1 MG/1
1 TABLET ORAL DAILY PRN
Qty: 20 TABLET | Refills: 0 | Status: SHIPPED | OUTPATIENT
Start: 2018-07-09 | End: 2018-08-13 | Stop reason: SDUPTHER

## 2018-07-09 NOTE — TELEPHONE ENCOUNTER
Patient was seen in office last week for routine follow up by myself. His PCP is  [facility]. Patient had asked about increasing his Ativan back to #30 tabs per month.  [facility] had lowered dose to #20 per month. I told patient I would need to discuss with  [facility] as that is who he typically sees. I spoke with  [facility] and he does not want to increase his Ativan use. It is a controlled medication and should only be used as needed. He recommended either trying a longer acting medication like Buspirone or Effexor. He also said if patient feels like he needs Ativan daily, he could also be referred to psychiatrist. Would he like referral or to try medication?

## 2018-07-27 ENCOUNTER — TELEPHONE (OUTPATIENT)
Dept: PAIN MANAGEMENT | Age: 68
End: 2018-07-27

## 2018-07-27 ENCOUNTER — CARE COORDINATION (OUTPATIENT)
Dept: CARE COORDINATION | Age: 68
End: 2018-07-27

## 2018-07-27 DIAGNOSIS — M54.5 ACUTE LOW BACK PAIN, UNSPECIFIED BACK PAIN LATERALITY, WITH SCIATICA PRESENCE UNSPECIFIED: Primary | ICD-10-CM

## 2018-07-27 DIAGNOSIS — M54.31 SCIATICA OF RIGHT SIDE: Primary | ICD-10-CM

## 2018-07-27 RX ORDER — METHYLPREDNISOLONE 4 MG/1
TABLET ORAL
Qty: 1 KIT | Refills: 0 | Status: SHIPPED | OUTPATIENT
Start: 2018-07-27 | End: 2018-08-02

## 2018-07-27 NOTE — CARE COORDINATION
He would [facility] his [facility] is going to [facility] will put in referral if you are ok with this

## 2018-08-02 ENCOUNTER — HOSPITAL ENCOUNTER (OUTPATIENT)
Age: 68
Setting detail: SPECIMEN
Discharge: HOME | End: 2018-08-02
Payer: COMMERCIAL

## 2018-08-02 DIAGNOSIS — E11.8 CONTROLLED TYPE 2 DIABETES MELLITUS WITH COMPLICATION, WITHOUT LONG-TERM CURRENT USE OF INSULIN (HCC): ICD-10-CM

## 2018-08-02 DIAGNOSIS — C61 PROSTATE CANCER (HCC): ICD-10-CM

## 2018-08-02 LAB
ALBUMIN SERPL-MCNC: 4 G/DL (ref 3.5–5.2)
ALBUMIN SERPL-MCNC: 4.1 G/DL (ref 3.5–5.2)
ALBUMIN/GLOBULIN RATIO: 1.3 (ref 1–2.5)
ALBUMIN/GLOBULIN RATIO: 1.3 (ref 1–2.5)
ALP BLD-CCNC: 55 U/L (ref 40–129)
ALP BLD-CCNC: 57 U/L (ref 40–129)
ALT SERPL-CCNC: 15 U/L (ref 5–41)
ALT SERPL-CCNC: 15 U/L (ref 5–41)
ANION GAP SERPL CALCULATED.3IONS-SCNC: 14 MMOL/L (ref 9–17)
ANION GAP SERPL CALCULATED.3IONS-SCNC: 14 MMOL/L (ref 9–17)
AST SERPL-CCNC: 15 U/L
AST SERPL-CCNC: 15 U/L
BILIRUB SERPL-MCNC: 0.25 MG/DL (ref 0.3–1.2)
BILIRUB SERPL-MCNC: 0.25 MG/DL (ref 0.3–1.2)
BUN BLDV-MCNC: 28 MG/DL (ref 8–23)
BUN BLDV-MCNC: 28 MG/DL (ref 8–23)
BUN/CREAT BLD: ABNORMAL (ref 9–20)
BUN/CREAT BLD: ABNORMAL (ref 9–20)
CALCIUM SERPL-MCNC: 9.3 MG/DL (ref 8.6–10.4)
CALCIUM SERPL-MCNC: 9.3 MG/DL (ref 8.6–10.4)
CHLORIDE BLD-SCNC: 104 MMOL/L (ref 98–107)
CHLORIDE BLD-SCNC: 105 MMOL/L (ref 98–107)
CHOLESTEROL/HDL RATIO: 6.3
CHOLESTEROL: 201 MG/DL
CO2: 22 MMOL/L (ref 20–31)
CO2: 23 MMOL/L (ref 20–31)
CREAT SERPL-MCNC: 1.27 MG/DL (ref 0.7–1.2)
CREAT SERPL-MCNC: 1.31 MG/DL (ref 0.7–1.2)
GFR AFRICAN AMERICAN: >60 ML/MIN
GFR AFRICAN AMERICAN: >60 ML/MIN
GFR NON-AFRICAN AMERICAN: 55 ML/MIN
GFR NON-AFRICAN AMERICAN: 57 ML/MIN
GFR SERPL CREATININE-BSD FRML MDRD: ABNORMAL ML/MIN/{1.73_M2}
GLUCOSE BLD-MCNC: 114 MG/DL (ref 70–99)
GLUCOSE BLD-MCNC: 114 MG/DL (ref 70–99)
HCT VFR BLD CALC: 43 % (ref 40.7–50.3)
HDLC SERPL-MCNC: 32 MG/DL
HEMOGLOBIN: 14 G/DL (ref 13–17)
LDL CHOLESTEROL: 133 MG/DL (ref 0–130)
MCH RBC QN AUTO: 33.5 PG (ref 25.2–33.5)
MCHC RBC AUTO-ENTMCNC: 32.6 G/DL (ref 28.4–34.8)
MCV RBC AUTO: 102.9 FL (ref 82.6–102.9)
NRBC AUTOMATED: 0 PER 100 WBC
PDW BLD-RTO: 13.5 % (ref 11.8–14.4)
PLATELET # BLD: 210 K/UL (ref 138–453)
PMV BLD AUTO: 11.4 FL (ref 8.1–13.5)
POTASSIUM SERPL-SCNC: 4.4 MMOL/L (ref 3.7–5.3)
POTASSIUM SERPL-SCNC: 4.4 MMOL/L (ref 3.7–5.3)
PROSTATE SPECIFIC ANTIGEN: <0.01 UG/L
RBC # BLD: 4.18 M/UL (ref 4.21–5.77)
SODIUM BLD-SCNC: 141 MMOL/L (ref 135–144)
SODIUM BLD-SCNC: 141 MMOL/L (ref 135–144)
TOTAL PROTEIN: 7.2 G/DL (ref 6.4–8.3)
TOTAL PROTEIN: 7.2 G/DL (ref 6.4–8.3)
TRIGL SERPL-MCNC: 178 MG/DL
URIC ACID: 4.1 MG/DL (ref 3.4–7)
VLDLC SERPL CALC-MCNC: ABNORMAL MG/DL (ref 1–30)
WBC # BLD: 11.4 K/UL (ref 3.5–11.3)

## 2018-08-03 PROBLEM — K74.60 CIRRHOSIS OF LIVER WITH ASCITES (HCC): Status: ACTIVE | Noted: 2017-09-21

## 2018-08-03 PROBLEM — M1A.9XX1 CHRONIC TOPHACEOUS GOUT: Status: ACTIVE | Noted: 2017-09-27

## 2018-08-03 PROBLEM — N18.30 CKD (CHRONIC KIDNEY DISEASE) STAGE 3, GFR 30-59 ML/MIN (HCC): Status: ACTIVE | Noted: 2018-08-03

## 2018-08-03 PROBLEM — R18.8 CIRRHOSIS OF LIVER WITH ASCITES (HCC): Status: ACTIVE | Noted: 2017-09-21

## 2018-08-03 LAB
ESTIMATED AVERAGE GLUCOSE: 131 MG/DL
HBA1C MFR BLD: 6.2 % (ref 4–6)

## 2018-08-13 DIAGNOSIS — F41.9 ANXIETY: ICD-10-CM

## 2018-08-13 RX ORDER — LORAZEPAM 1 MG/1
1 TABLET ORAL DAILY PRN
Qty: 20 TABLET | Refills: 0 | Status: SHIPPED | OUTPATIENT
Start: 2018-08-13 | End: 2018-09-10 | Stop reason: SDUPTHER

## 2018-08-14 ENCOUNTER — TELEPHONE (OUTPATIENT)
Dept: GASTROENTEROLOGY | Age: 68
End: 2018-08-14

## 2018-08-16 ENCOUNTER — CARE COORDINATION (OUTPATIENT)
Dept: CARE COORDINATION | Age: 68
End: 2018-08-16

## 2018-08-16 DIAGNOSIS — E78.2 MIXED HYPERLIPIDEMIA: Primary | ICD-10-CM

## 2018-08-16 NOTE — CARE COORDINATION
For some reason the computer is not allowing me to E-scribe Lipitor. Please call in prescription to pharmacy.

## 2018-08-17 RX ORDER — ATORVASTATIN CALCIUM 20 MG/1
20 TABLET, FILM COATED ORAL DAILY
Qty: 30 TABLET | Refills: 3 | OUTPATIENT
Start: 2018-08-17 | End: 2018-11-13 | Stop reason: SDUPTHER

## 2018-09-10 DIAGNOSIS — B18.2 CHRONIC HEPATITIS C WITH CIRRHOSIS (HCC): ICD-10-CM

## 2018-09-10 DIAGNOSIS — K74.60 CHRONIC HEPATITIS C WITH CIRRHOSIS (HCC): ICD-10-CM

## 2018-09-10 DIAGNOSIS — F41.9 ANXIETY: ICD-10-CM

## 2018-09-10 NOTE — TELEPHONE ENCOUNTER
LOV 7/3/18  LRF 4/6/18  RTO 6 months    Health Maintenance   Topic Date Due    Diabetic retinal exam  03/21/1960    Shingles Vaccine (1 of 2 - 2 Dose Series) 02/17/2016    Low dose CT lung screening  10/10/2016    Flu vaccine (1) 09/01/2018    Colon cancer screen colonoscopy  11/21/2018    Diabetic foot exam  12/04/2018    Diabetic microalbuminuria test  12/04/2018    TSH testing  12/13/2018    A1C test (Diabetic or Prediabetic)  08/02/2019    Lipid screen  08/02/2019    Potassium monitoring  08/02/2019    Creatinine monitoring  08/02/2019    DTaP/Tdap/Td vaccine (2 - Td) 12/17/2025    Pneumococcal low/med risk  Completed    AAA screen  Completed             (applicable per patient's age: Cancer Screenings, Depression Screening, Fall Risk Screening, Immunizations)    Hemoglobin A1C (%)   Date Value   08/02/2018 6.2 (H)   12/13/2017 5.9   10/05/2016 5.4     Microalb/Crt.  Ratio (mcg/mg creat)   Date Value   12/04/2017 15     LDL Cholesterol (mg/dL)   Date Value   08/02/2018 133 (H)     LDL Calculated (mg/dL)   Date Value   04/23/2014 94     AST (U/L)   Date Value   08/02/2018 15   08/02/2018 15     ALT (U/L)   Date Value   08/02/2018 15   08/02/2018 15     BUN (mg/dL)   Date Value   08/02/2018 28 (H)   08/02/2018 28 (H)      (goal A1C is < 7)   (goal LDL is <100) need 30-50% reduction from baseline     BP Readings from Last 3 Encounters:   07/03/18 122/60   06/18/18 (!) 109/52   06/13/18 116/60    (goal /80)      All Future Testing planned in [facility]:  Lab Frequency Next Occurrence   Bladder scan Once 10/16/2017   CBC Once 05/27/2018   Protein, urine, random Once 05/27/2018   Creatinine, Random Urine Once 05/27/2018   BUN & Creatinine Once 09/13/2018   Electrolyte Panel Once 09/13/2018   Hepatic Function Panel Once 09/13/2018   CBC Auto Differential Once 12/18/2018   Protein / Creatinine Ratio, Urine Once 70/31/7756   Basic Metabolic Panel     Basic Metabolic Panel Every 12 Weeks 11/30/2018,

## 2018-09-10 NOTE — TELEPHONE ENCOUNTER
LOV 7/3/18  LRF 8/13/18  RTO 6 months    Health Maintenance   Topic Date Due    Diabetic retinal exam  03[birth year]21/1960    Shingles Vaccine (1 of 2 - 2 Dose Series) 02/17/2016    Low dose CT lung screening  10/10/2016    Flu vaccine (1) 09/01/2018    Colon cancer screen colonoscopy  11/21/2018    Diabetic foot exam  12/04/2018    Diabetic microalbuminuria test  12/04/2018    TSH testing  12/13/2018    A1C test (Diabetic or Prediabetic)  08/02/2019    Lipid screen  08/02/2019    Potassium monitoring  08/02/2019    Creatinine monitoring  08/02/2019    DTaP/Tdap/Td vaccine (2 - Td) 12/17/2025    Pneumococcal low/med risk  Completed    AAA screen  Completed             (applicable per patient's age: Cancer Screenings, Depression Screening, Fall Risk Screening, Immunizations)    Hemoglobin A1C (%)   Date Value   08/02/2018 6.2 (H)   12/13/2017 5.9   10/05/2016 5.4     Microalb/Crt.  Ratio (mcg/mg creat)   Date Value   12/04/2017 15     LDL Cholesterol (mg/dL)   Date Value   08/02/2018 133 (H)     LDL Calculated (mg/dL)   Date Value   04/23/2014 94     AST (U/L)   Date Value   08/02/2018 15   08/02/2018 15     ALT (U/L)   Date Value   08/02/2018 15   08/02/2018 15     BUN (mg/dL)   Date Value   08/02/2018 28 (H)   08/02/2018 28 (H)      (goal A1C is < 7)   (goal LDL is <100) need 30-50% reduction from baseline     BP Readings from Last 3 Encounters:   07/03/18 122/60   06/18/18 (!) 109/52   06/13/18 116/60    (goal /80)      All Future Testing planned in [facility]:  Lab Frequency Next Occurrence   Bladder scan Once 10/16/2017   CBC Once 05/27/2018   Protein, urine, random Once 05/27/2018   Creatinine, Random Urine Once 05/27/2018   BUN & Creatinine Once 09/13/2018   Electrolyte Panel Once 09/13/2018   Hepatic Function Panel Once 09/13/2018   CBC Auto Differential Once 12/18/2018   Protein / Creatinine Ratio, Urine Once 11/80/3606   Basic Metabolic Panel     Basic Metabolic Panel Every 12 Weeks 11/30/2018,

## 2018-09-11 ENCOUNTER — HOSPITAL ENCOUNTER (OUTPATIENT)
Age: 68
Discharge: HOME | End: 2018-09-13
Payer: COMMERCIAL

## 2018-09-11 ENCOUNTER — HOSPITAL ENCOUNTER (OUTPATIENT)
Dept: GENERAL RADIOLOGY | Age: 68
Discharge: HOME | End: 2018-09-13
Payer: COMMERCIAL

## 2018-09-11 ENCOUNTER — OFFICE VISIT (OUTPATIENT)
Dept: ORTHOPEDIC SURGERY | Age: 68
End: 2018-09-11
Payer: COMMERCIAL

## 2018-09-11 ENCOUNTER — TELEPHONE (OUTPATIENT)
Dept: ORTHOPEDIC SURGERY | Age: 68
End: 2018-09-11

## 2018-09-11 ENCOUNTER — CARE COORDINATION (OUTPATIENT)
Dept: CARE COORDINATION | Age: 68
End: 2018-09-11

## 2018-09-11 VITALS — BODY MASS INDEX: 32.51 KG/M2 | HEIGHT: 72 IN | WEIGHT: 240 LBS

## 2018-09-11 DIAGNOSIS — M16.11 PRIMARY OSTEOARTHRITIS OF RIGHT HIP: Primary | ICD-10-CM

## 2018-09-11 DIAGNOSIS — M96.1 POST LAMINECTOMY SYNDROME: ICD-10-CM

## 2018-09-11 DIAGNOSIS — M16.11 PRIMARY OSTEOARTHRITIS OF RIGHT HIP: ICD-10-CM

## 2018-09-11 DIAGNOSIS — M22.41 CHONDROMALACIA OF RIGHT PATELLA: ICD-10-CM

## 2018-09-11 DIAGNOSIS — M51.26 DISC DISPLACEMENT, LUMBAR: ICD-10-CM

## 2018-09-11 PROCEDURE — 72170 X-RAY EXAM OF PELVIS: CPT

## 2018-09-11 PROCEDURE — 99213 OFFICE O/P EST LOW 20 MIN: CPT | Performed by: ORTHOPAEDIC SURGERY

## 2018-09-11 PROCEDURE — 20610 DRAIN/INJ JOINT/BURSA W/O US: CPT | Performed by: ORTHOPAEDIC SURGERY

## 2018-09-11 PROCEDURE — 73502 X-RAY EXAM HIP UNI 2-3 VIEWS: CPT

## 2018-09-11 PROCEDURE — 72100 X-RAY EXAM L-S SPINE 2/3 VWS: CPT

## 2018-09-11 RX ORDER — METHYLPREDNISOLONE ACETATE 40 MG/ML
40 INJECTION, SUSPENSION INTRA-ARTICULAR; INTRALESIONAL; INTRAMUSCULAR; SOFT TISSUE ONCE
Status: COMPLETED | OUTPATIENT
Start: 2018-09-11 | End: 2018-09-11

## 2018-09-11 RX ORDER — LORAZEPAM 1 MG/1
1 TABLET ORAL DAILY PRN
Qty: 20 TABLET | Refills: 0 | Status: SHIPPED | OUTPATIENT
Start: 2018-09-11 | End: 2018-10-11 | Stop reason: SDUPTHER

## 2018-09-11 RX ORDER — LACTULOSE 10 G/15ML
45 SOLUTION ORAL; RECTAL 3 TIMES DAILY
Qty: 5000 ML | Refills: 5 | Status: SHIPPED | OUTPATIENT
Start: 2018-09-11 | End: 2019-07-23 | Stop reason: SDUPTHER

## 2018-09-11 RX ADMIN — METHYLPREDNISOLONE ACETATE 40 MG: 40 INJECTION, SUSPENSION INTRA-ARTICULAR; INTRALESIONAL; INTRAMUSCULAR; SOFT TISSUE at 06:47

## 2018-09-11 NOTE — CARE COORDINATION
Pt called to say he would like a rx for Norco. He is having hip pain. [facility] had xrays done of his hips today for a work up. He is going [place] and would like to have pain meds to take with him. His last norco rx was in May 25 2018.  rx needs to go to [facility]

## 2018-09-12 DIAGNOSIS — M51.26 DISC DISPLACEMENT, LUMBAR: ICD-10-CM

## 2018-09-12 RX ORDER — HYDROCODONE BITARTRATE AND ACETAMINOPHEN 5; 325 MG/1; MG/1
1 TABLET ORAL 2 TIMES DAILY PRN
Qty: 14 TABLET | Refills: 0 | Status: SHIPPED | OUTPATIENT
Start: 2018-09-12 | End: 2018-09-19

## 2018-09-13 DIAGNOSIS — M16.11 PRIMARY OSTEOARTHRITIS OF RIGHT HIP: Primary | ICD-10-CM

## 2018-09-13 NOTE — TELEPHONE ENCOUNTER
Patient would like injection to be at [facility] Wednesday, September 26. Message sent to Elaine John in [facility]. Order submitted.  Done

## 2018-09-26 ENCOUNTER — HOSPITAL ENCOUNTER (OUTPATIENT)
Dept: GENERAL RADIOLOGY | Age: 68
Discharge: HOME | End: 2018-09-28
Payer: COMMERCIAL

## 2018-09-26 ENCOUNTER — TELEPHONE (OUTPATIENT)
Dept: ORTHOPEDIC SURGERY | Age: 68
End: 2018-09-26

## 2018-09-26 DIAGNOSIS — M16.11 PRIMARY OSTEOARTHRITIS OF RIGHT HIP: ICD-10-CM

## 2018-09-26 PROCEDURE — 27093 INJECTION FOR HIP X-RAY: CPT

## 2018-09-27 NOTE — TELEPHONE ENCOUNTER
Patient called back this afternoon he is feeling much better. He thinks the injection has worked for now.

## 2018-10-01 ENCOUNTER — HOSPITAL ENCOUNTER (OUTPATIENT)
Age: 68
Setting detail: SPECIMEN
Discharge: HOME | End: 2018-10-01
Payer: COMMERCIAL

## 2018-10-01 DIAGNOSIS — Z86.19 HISTORY OF HEPATITIS C: ICD-10-CM

## 2018-10-01 DIAGNOSIS — R53.83 OTHER FATIGUE: ICD-10-CM

## 2018-10-01 DIAGNOSIS — K22.70 BARRETT'S ESOPHAGUS WITHOUT DYSPLASIA: ICD-10-CM

## 2018-10-01 LAB
ALBUMIN SERPL-MCNC: 3.9 G/DL (ref 3.5–5.2)
ALBUMIN/GLOBULIN RATIO: 1.3 (ref 1–2.5)
ALP BLD-CCNC: 57 U/L (ref 40–129)
ALT SERPL-CCNC: 21 U/L (ref 5–41)
ANION GAP SERPL CALCULATED.3IONS-SCNC: 14 MMOL/L (ref 9–17)
AST SERPL-CCNC: 19 U/L
BILIRUB SERPL-MCNC: 0.24 MG/DL (ref 0.3–1.2)
BILIRUBIN DIRECT: 0.09 MG/DL
BILIRUBIN, INDIRECT: 0.15 MG/DL (ref 0–1)
BUN BLDV-MCNC: 18 MG/DL (ref 8–23)
CHLORIDE BLD-SCNC: 101 MMOL/L (ref 98–107)
CO2: 25 MMOL/L (ref 20–31)
CREAT SERPL-MCNC: 1.23 MG/DL (ref 0.7–1.2)
GFR AFRICAN AMERICAN: >60 ML/MIN
GFR NON-AFRICAN AMERICAN: 59 ML/MIN
GFR SERPL CREATININE-BSD FRML MDRD: ABNORMAL ML/MIN/{1.73_M2}
GFR SERPL CREATININE-BSD FRML MDRD: ABNORMAL ML/MIN/{1.73_M2}
GLOBULIN: ABNORMAL G/DL (ref 1.5–3.8)
POTASSIUM SERPL-SCNC: 4.1 MMOL/L (ref 3.7–5.3)
SODIUM BLD-SCNC: 140 MMOL/L (ref 135–144)
TOTAL PROTEIN: 6.9 G/DL (ref 6.4–8.3)

## 2018-10-01 NOTE — PROCEDURES
[id number] [facility]                 [address] [place] [address]                                  PROCEDURE NOTE    PATIENT NAME: Alfredo Fernández                  :        1950  MED REC NO:   [id number]                             ROOM:  ACCOUNT NO:   [de-identified]                           ADMIT DATE: 2018  PROVIDER:     Sergo Diaz    DATE OF PROCEDURE:  2018    NONOPERATING ROOM PROCEDURE    PROCEDURES PERFORMED:  1. Injection of right hip. 2.  Fluoroscopy of the right hip. PROCEDURE:  The patient was placed on the x-ray table. The femoral neck  was then identified over the skin using the fluoroscopy, and then this area  was prepped with Betadine and alcohol. The skin was infiltrated with 1%  plain lidocaine using a 27-gauge needle. This was changed to 21-gauge  needle and further injection of lidocaine was carried out subcutaneously,  intramuscularly, and up to the capsule. Then, the needle was switched over to gauge 20 spinal needle and was  introduced into the hip joint. Fluoroscopy was carried out again to  ascertain its position right over the femoral neck. Then about 7 mL of 1% plain lidocaine and 40 mg of Depo-Medrol was injected  into the hip joint. The needle was kept in place for about 2 minutes and  then withdrawn, and manual pressure was again maintained for another 2  minutes, and the patient was asked to exercise the hip joint while supine. A pressure dressing was applied. The patient was then stood up and then  asked to walk in the x-ray room and he was completely asymptomatic. The patient was asked to call the office within an hour after stressing his  hip with normal regular activities and he was discharged without having any  complication.         Marcus Brewster    D: 10/01/2018 7:30:04       T: 10/01/2018 8:24:03     [email or web address]  Job#: [id number]     Doc#: [id number]    CC:

## 2018-10-03 ENCOUNTER — OFFICE VISIT (OUTPATIENT)
Dept: GASTROENTEROLOGY | Age: 68
End: 2018-10-03
Payer: COMMERCIAL

## 2018-10-03 VITALS
DIASTOLIC BLOOD PRESSURE: 57 MMHG | WEIGHT: 242.8 LBS | BODY MASS INDEX: 32.93 KG/M2 | SYSTOLIC BLOOD PRESSURE: 128 MMHG | HEART RATE: 61 BPM

## 2018-10-03 DIAGNOSIS — B18.2 CHRONIC HEPATITIS C WITH CIRRHOSIS (HCC): Primary | ICD-10-CM

## 2018-10-03 DIAGNOSIS — K74.60 CHRONIC HEPATITIS C WITH CIRRHOSIS (HCC): Primary | ICD-10-CM

## 2018-10-03 DIAGNOSIS — R10.11 RIGHT UPPER QUADRANT ABDOMINAL PAIN: ICD-10-CM

## 2018-10-03 DIAGNOSIS — K74.69 OTHER CIRRHOSIS OF LIVER (HCC): ICD-10-CM

## 2018-10-03 PROBLEM — R10.9 ABDOMINAL PAIN: Status: ACTIVE | Noted: 2018-10-03

## 2018-10-03 PROCEDURE — 99214 OFFICE O/P EST MOD 30 MIN: CPT | Performed by: INTERNAL MEDICINE

## 2018-10-03 ASSESSMENT — ENCOUNTER SYMPTOMS
ALLERGIC/IMMUNOLOGIC NEGATIVE: 1
RESPIRATORY NEGATIVE: 1
BLOOD IN STOOL: 0
ABDOMINAL PAIN: 1
DIARRHEA: 1
CONSTIPATION: 0
TROUBLE SWALLOWING: 0
EYES NEGATIVE: 1
ANAL BLEEDING: 0
RECTAL PAIN: 0
NAUSEA: 0
ABDOMINAL DISTENTION: 1
VOMITING: 0

## 2018-10-03 NOTE — PROGRESS NOTES
Abdominal: Soft. Bowel sounds are normal.   Mild distended  Non tender   Genitourinary: Rectum normal.   Musculoskeletal: Normal range of motion. He exhibits edema. Neurological: He is alert and oriented to person, place, and time. He has normal reflexes. Skin: Skin is warm. Assessment:      As above      Plan:      Cont current Rx    Blood tests and RTC in 3-4 months    Low Na diet    No red meat    US of liver    Pt was advised in detail about some life style and dietary modifications. He was advised about avoidance of caffeine, nicotine and chocolate. Pt was also told to stay away from any kind of fast foods, soda pops. He was also advised to avoid lots of spices, grease and fried food etc.     Instructions were also given about trying to arrange the timing, quality and quantity of food. Instructions were given about using ample amount of fiber including dietary and supplemental fiber either metamucil, bennafiber or citrucell etc.  Pt was advised about drinking ample amount of water without any colors or chemicals. Stress was given about regular exercise. Pt has verbalized understanding and agreement to these modifications.

## 2018-10-11 DIAGNOSIS — F41.9 ANXIETY: ICD-10-CM

## 2018-10-12 RX ORDER — LORAZEPAM 1 MG/1
1 TABLET ORAL DAILY PRN
Qty: 20 TABLET | Refills: 0 | Status: SHIPPED | OUTPATIENT
Start: 2018-10-12 | End: 2018-11-13 | Stop reason: SDUPTHER

## 2018-11-06 RX ORDER — FUROSEMIDE 40 MG/1
40 TABLET ORAL 2 TIMES DAILY
Qty: 180 TABLET | Refills: 1 | Status: SHIPPED | OUTPATIENT
Start: 2018-11-06 | End: 2019-05-15 | Stop reason: SDUPTHER

## 2018-11-07 DIAGNOSIS — B18.2 CHRONIC HEPATITIS C WITH CIRRHOSIS (HCC): Primary | ICD-10-CM

## 2018-11-07 DIAGNOSIS — K74.60 CHRONIC HEPATITIS C WITH CIRRHOSIS (HCC): Primary | ICD-10-CM

## 2018-11-09 RX ORDER — SPIRONOLACTONE 100 MG/1
100 TABLET, FILM COATED ORAL DAILY
Qty: 90 TABLET | Refills: 1 | Status: SHIPPED | OUTPATIENT
Start: 2018-11-09 | End: 2019-03-27 | Stop reason: SDUPTHER

## 2018-11-09 NOTE — TELEPHONE ENCOUNTER
LOV 7-3-18  LR 5-2-18  RTO  Health Maintenance   Topic Date Due    Diabetic retinal exam  03/21/1960    Shingles Vaccine (1 of 2 - 2 Dose Series) 02/17/2016    Low dose CT lung screening  10/10/2016    Flu vaccine (1) 09/01/2018    Colon cancer screen colonoscopy  11/21/2018    Diabetic foot exam  12/04/2018    Diabetic microalbuminuria test  12/04/2018    TSH testing  12/13/2018    A1C test (Diabetic or Prediabetic)  08/02/2019    Lipid screen  08/02/2019    Potassium monitoring  10/01/2019    Creatinine monitoring  10/01/2019    DTaP/Tdap/Td vaccine (2 - Td) 12/17/2025    Pneumococcal low/med risk  Completed    AAA screen  Completed             (applicable per patient's age: Cancer Screenings, Depression Screening, Fall Risk Screening, Immunizations)    Hemoglobin A1C (%)   Date Value   08/02/2018 6.2 (H)   12/13/2017 5.9   10/05/2016 5.4     Microalb/Crt.  Ratio (mcg/mg creat)   Date Value   12/04/2017 15     LDL Cholesterol (mg/dL)   Date Value   08/02/2018 133 (H)     LDL Calculated (mg/dL)   Date Value   04/23/2014 94     AST (U/L)   Date Value   10/01/2018 19     ALT (U/L)   Date Value   10/01/2018 21     BUN (mg/dL)   Date Value   10/01/2018 18      (goal A1C is < 7)   (goal LDL is <100) need 30-50% reduction from baseline     BP Readings from Last 3 Encounters:   10/03/18 (!) 128/57   07/03/18 122/60   06/18/18 (!) 109/52    (goal /80)      All Future Testing planned in [facility]:  Lab Frequency Next Occurrence   CBC Once 05/27/2018   Protein, urine, random Once 05/27/2018   Creatinine, Random Urine Once 05/27/2018   CBC Auto Differential Once 12/18/2018   Protein / Creatinine Ratio, Urine Once 12/18/2018   CBC with Differential Once 10/03/2018   US Liver Once 01/04/2019   BUN & Creatinine Once 01/03/2019   Electrolyte Panel Once 01/03/2019   Hepatic Function Panel Once 86/79/0816   Basic Metabolic Panel     Basic Metabolic Panel Every 12 Weeks 11/30/2018, 2/22/2019       Next Visit

## 2018-11-13 DIAGNOSIS — F41.9 ANXIETY: ICD-10-CM

## 2018-11-13 DIAGNOSIS — E78.2 MIXED HYPERLIPIDEMIA: ICD-10-CM

## 2018-11-13 RX ORDER — LORAZEPAM 1 MG/1
1 TABLET ORAL DAILY PRN
Qty: 20 TABLET | Refills: 0 | Status: SHIPPED | OUTPATIENT
Start: 2018-11-13 | End: 2018-12-10 | Stop reason: SDUPTHER

## 2018-11-13 RX ORDER — ATORVASTATIN CALCIUM 20 MG/1
20 TABLET, FILM COATED ORAL DAILY
Qty: 30 TABLET | Refills: 5 | Status: SHIPPED | OUTPATIENT
Start: 2018-11-13 | End: 2019-03-27 | Stop reason: SDUPTHER

## 2018-11-13 NOTE — TELEPHONE ENCOUNTER
LOV was 7-3-18, LR was 8-17-18 on the Lipitor and 10-12-18 on the [address] was 9-12-18. RTO: 1-3-19. cm    Next Visit Date:  Future Appointments  Date Time Provider Phuc Macias   12/17/2018 10:30 AM Aroldo Rojas MD [facility] None   3/13/2019 1:30 PM Tomasz Moraes MD [facility] [address] Maintenance   Topic Date Due    Diabetic retinal exam  03[birth year]21/1962    Shingles Vaccine (1 of 2 - 2 Dose Series) 02/17/2016    Low dose CT lung screening  10/10/2016    Flu vaccine (1) 09/01/2018    Colon cancer screen colonoscopy  11/21/2018    Diabetic foot exam  12/04/2018    Diabetic microalbuminuria test  12/04/2018    TSH testing  12/13/2018    A1C test (Diabetic or Prediabetic)  08/02/2019    Lipid screen  08/02/2019    Potassium monitoring  10/01/2019    Creatinine monitoring  10/01/2019    DTaP/Tdap/Td vaccine (2 - Td) 12/17/2025    Pneumococcal low/med risk  Completed    AAA screen  Completed       Hemoglobin A1C (%)   Date Value   08/02/2018 6.2 (H)   12/13/2017 5.9   10/05/2016 5.4             ( goal A1C is < 7)   Microalb/Crt.  Ratio (mcg/mg creat)   Date Value   12/04/2017 15     LDL Cholesterol (mg/dL)   Date Value   08/02/2018 133 (H)   03/20/2018 64     LDL Calculated (mg/dL)   Date Value   04/23/2014 94   08/01/2013 73       (goal LDL is <100)   AST (U/L)   Date Value   10/01/2018 19     ALT (U/L)   Date Value   10/01/2018 21     BUN (mg/dL)   Date Value   10/01/2018 18     BP Readings from Last 3 Encounters:   10/03/18 (!) 128/57   07/03/18 122/60   06/18/18 (!) 109/52          (goal 120/80)    All Future Testing planned in [facility]  Lab Frequency Next Occurrence   CBC Once 05/27/2018   Protein, urine, random Once 05/27/2018   Creatinine, Random Urine Once 05/27/2018   CBC Auto Differential Once 12/18/2018   Protein / Creatinine Ratio, Urine Once 12/18/2018   CBC with Differential Once 10/03/2018   US Liver Once 01/04/2019   BUN & Creatinine Once 01/03/2019   Electrolyte Panel Once 01/03/2019   Hepatic Function Panel Once 65/41/2519   Basic Metabolic Panel     Basic Metabolic Panel Every 12 Weeks 11/30/2018, 2/22/2019               Patient Active Problem List:     Hyperlipidemia     Disc displacement, lumbar     Chronic hepatitis C with cirrhosis ([facility]     Dilated cardiomyopathy     Gout     Diabetes type 2, controlled     Prostate cancer ([facility] [address]     Aortic insufficiency     Incisional hernia     Aortic atherosclerosis ([facility] [address]     Cervicalgia     Sciatica     Diverticulosis of colon     Ascites due to alcoholic cirrhosis (HCC)     Essential hypertension     History of alcohol use     Hypomagnesemia     Chronic hepatitis C virus infection ([facility] [address]     Acquired hypothyroidism     Chondromalacia patellae     Awaiting organ transplant status     Varicose veins of left lower extremity     Anxiety     Colon polyps     History of hepatitis C     Right lumbar radiculitis     Post laminectomy syndrome     Hepatic cyst     Severe comorbid illness     Primary osteoarthritis of both knees     Chronic lumbar radiculopathy     Sol esophagus     CKD (chronic kidney disease) stage 3, GFR 30-59 ml/min (HCC)     Chronic tophaceous gout     Cirrhosis of liver with ascites (HCC)     Abdominal pain

## 2018-12-10 DIAGNOSIS — F41.9 ANXIETY: ICD-10-CM

## 2018-12-11 RX ORDER — LORAZEPAM 1 MG/1
1 TABLET ORAL DAILY
Qty: 20 TABLET | Refills: 0 | Status: SHIPPED | OUTPATIENT
Start: 2018-12-11 | End: 2019-01-16 | Stop reason: SDUPTHER

## 2018-12-11 NOTE — TELEPHONE ENCOUNTER
LRF 11-13-18 # 20 RF 0  LOV 7-3-18  RTO 12-17-18    Health Maintenance   Topic Date Due    Diabetic retinal exam  03/21/1962    Shingles Vaccine (1 of 2 - 2 Dose Series) 02/17/2016    Low dose CT lung screening  10/10/2016    Flu vaccine (1) 09/01/2018    Diabetic foot exam  12/04/2018    Diabetic microalbuminuria test  12/04/2018    Colon cancer screen colonoscopy  11/21/2018    TSH testing  12/13/2018    A1C test (Diabetic or Prediabetic)  08/02/2019    Lipid screen  08/02/2019    Potassium monitoring  10/01/2019    Creatinine monitoring  10/01/2019    DTaP/Tdap/Td vaccine (2 - Td) 12/17/2025    Pneumococcal low/med risk  Completed    AAA screen  Completed             (applicable per patient's age: Cancer Screenings, Depression Screening, Fall Risk Screening, Immunizations)    Hemoglobin A1C (%)   Date Value   08/02/2018 6.2 (H)   12/13/2017 5.9   10/05/2016 5.4     Microalb/Crt.  Ratio (mcg/mg creat)   Date Value   12/04/2017 15     LDL Cholesterol (mg/dL)   Date Value   08/02/2018 133 (H)     LDL Calculated (mg/dL)   Date Value   04/23/2014 94     AST (U/L)   Date Value   10/01/2018 19     ALT (U/L)   Date Value   10/01/2018 21     BUN (mg/dL)   Date Value   10/01/2018 18      (goal A1C is < 7)   (goal LDL is <100) need 30-50% reduction from baseline     BP Readings from Last 3 Encounters:   10/03/18 (!) 128/57   07/03/18 122/60   06/18/18 (!) 109/52    (goal /80)      All Future Testing planned in [facility]:  Lab Frequency Next Occurrence   CBC Auto Differential Once 12/18/2018   Protein / Creatinine Ratio, Urine Once 12/18/2018   CBC with Differential Once 10/03/2018   US Liver Once 01/04/2019   BUN & Creatinine Once 01/03/2019   Electrolyte Panel Once 01/03/2019   Hepatic Function Panel Once 46/02/3704   Basic Metabolic Panel Every 12 Weeks 2/22/2019       Next Visit Date:  Future Appointments  Date Time Provider Phuc Macias   12/17/2018 10:30 AM Eduard Pinto MD [facility] None

## 2018-12-21 ENCOUNTER — HOSPITAL ENCOUNTER (OUTPATIENT)
Age: 68
Setting detail: SPECIMEN
Discharge: HOME | End: 2018-12-21
Payer: COMMERCIAL

## 2018-12-21 DIAGNOSIS — N18.30 CKD (CHRONIC KIDNEY DISEASE), STAGE III (HCC): ICD-10-CM

## 2018-12-21 LAB
ANION GAP SERPL CALCULATED.3IONS-SCNC: 13 MMOL/L (ref 9–17)
BUN BLDV-MCNC: 20 MG/DL (ref 8–23)
BUN/CREAT BLD: ABNORMAL (ref 9–20)
CALCIUM SERPL-MCNC: 9.3 MG/DL (ref 8.6–10.4)
CHLORIDE BLD-SCNC: 106 MMOL/L (ref 98–107)
CO2: 24 MMOL/L (ref 20–31)
CREAT SERPL-MCNC: 1.59 MG/DL (ref 0.7–1.2)
GFR AFRICAN AMERICAN: 53 ML/MIN
GFR NON-AFRICAN AMERICAN: 44 ML/MIN
GFR SERPL CREATININE-BSD FRML MDRD: ABNORMAL ML/MIN/{1.73_M2}
GFR SERPL CREATININE-BSD FRML MDRD: ABNORMAL ML/MIN/{1.73_M2}
GLUCOSE BLD-MCNC: 102 MG/DL (ref 70–99)
POTASSIUM SERPL-SCNC: 4.6 MMOL/L (ref 3.7–5.3)
SODIUM BLD-SCNC: 143 MMOL/L (ref 135–144)

## 2018-12-27 ENCOUNTER — OFFICE VISIT (OUTPATIENT)
Dept: ORTHOPEDIC SURGERY | Age: 68
End: 2018-12-27
Payer: COMMERCIAL

## 2018-12-27 VITALS — BODY MASS INDEX: 33.86 KG/M2 | HEIGHT: 72 IN | WEIGHT: 250 LBS

## 2018-12-27 DIAGNOSIS — M17.11 PRIMARY OSTEOARTHRITIS OF RIGHT KNEE: Primary | ICD-10-CM

## 2018-12-27 DIAGNOSIS — S73.191A TEAR OF RIGHT ACETABULAR LABRUM, INITIAL ENCOUNTER: ICD-10-CM

## 2018-12-27 DIAGNOSIS — M65.351 TRIGGER LITTLE FINGER OF RIGHT HAND: ICD-10-CM

## 2018-12-27 PROCEDURE — 99214 OFFICE O/P EST MOD 30 MIN: CPT | Performed by: ORTHOPAEDIC SURGERY

## 2018-12-27 PROCEDURE — 20550 NJX 1 TENDON SHEATH/LIGAMENT: CPT | Performed by: ORTHOPAEDIC SURGERY

## 2018-12-27 PROCEDURE — 20610 DRAIN/INJ JOINT/BURSA W/O US: CPT | Performed by: ORTHOPAEDIC SURGERY

## 2018-12-27 RX ORDER — METHYLPREDNISOLONE ACETATE 40 MG/ML
40 INJECTION, SUSPENSION INTRA-ARTICULAR; INTRALESIONAL; INTRAMUSCULAR; SOFT TISSUE ONCE
Status: COMPLETED | OUTPATIENT
Start: 2018-12-27 | End: 2018-12-27

## 2018-12-27 RX ADMIN — METHYLPREDNISOLONE ACETATE 40 MG: 40 INJECTION, SUSPENSION INTRA-ARTICULAR; INTRALESIONAL; INTRAMUSCULAR; SOFT TISSUE at 13:57

## 2018-12-27 RX ADMIN — METHYLPREDNISOLONE ACETATE 40 MG: 40 INJECTION, SUSPENSION INTRA-ARTICULAR; INTRALESIONAL; INTRAMUSCULAR; SOFT TISSUE at 13:59

## 2019-01-04 DIAGNOSIS — E78.2 MIXED HYPERLIPIDEMIA: ICD-10-CM

## 2019-01-04 DIAGNOSIS — K21.9 GASTROESOPHAGEAL REFLUX DISEASE WITHOUT ESOPHAGITIS: Primary | ICD-10-CM

## 2019-01-08 RX ORDER — ATORVASTATIN CALCIUM 20 MG/1
TABLET, FILM COATED ORAL
Qty: 30 TABLET | Refills: 5 | OUTPATIENT
Start: 2019-01-08

## 2019-01-08 RX ORDER — OMEPRAZOLE 20 MG/1
CAPSULE, DELAYED RELEASE ORAL
Qty: 60 CAPSULE | Refills: 5 | OUTPATIENT
Start: 2019-01-08

## 2019-01-08 RX ORDER — OMEPRAZOLE 20 MG/1
40 CAPSULE, DELAYED RELEASE ORAL DAILY
Qty: 60 CAPSULE | Refills: 5 | Status: SHIPPED | OUTPATIENT
Start: 2019-01-08 | End: 2019-07-16 | Stop reason: SDUPTHER

## 2019-01-16 DIAGNOSIS — F41.9 ANXIETY: ICD-10-CM

## 2019-01-17 ENCOUNTER — HOSPITAL ENCOUNTER (OUTPATIENT)
Age: 69
Setting detail: SPECIMEN
Discharge: HOME | End: 2019-01-17
Payer: COMMERCIAL

## 2019-01-17 LAB
ALBUMIN SERPL-MCNC: 4.4 G/DL (ref 3.5–5.2)
ALBUMIN/GLOBULIN RATIO: 1.5 (ref 1–2.5)
ALP BLD-CCNC: 56 U/L (ref 40–129)
ALT SERPL-CCNC: 17 U/L (ref 5–41)
ANION GAP SERPL CALCULATED.3IONS-SCNC: 15 MMOL/L (ref 9–17)
AST SERPL-CCNC: 16 U/L
BILIRUB SERPL-MCNC: 0.26 MG/DL (ref 0.3–1.2)
BUN BLDV-MCNC: 25 MG/DL (ref 8–23)
BUN/CREAT BLD: ABNORMAL (ref 9–20)
CALCIUM SERPL-MCNC: 9.7 MG/DL (ref 8.6–10.4)
CHLORIDE BLD-SCNC: 104 MMOL/L (ref 98–107)
CO2: 23 MMOL/L (ref 20–31)
CREAT SERPL-MCNC: 1.56 MG/DL (ref 0.7–1.2)
GFR AFRICAN AMERICAN: 54 ML/MIN
GFR NON-AFRICAN AMERICAN: 45 ML/MIN
GFR SERPL CREATININE-BSD FRML MDRD: ABNORMAL ML/MIN/{1.73_M2}
GFR SERPL CREATININE-BSD FRML MDRD: ABNORMAL ML/MIN/{1.73_M2}
GLUCOSE BLD-MCNC: 108 MG/DL (ref 70–99)
POTASSIUM SERPL-SCNC: 5.1 MMOL/L (ref 3.7–5.3)
SODIUM BLD-SCNC: 142 MMOL/L (ref 135–144)
TOTAL PROTEIN: 7.4 G/DL (ref 6.4–8.3)
URIC ACID: 3.6 MG/DL (ref 3.4–7)

## 2019-01-23 DIAGNOSIS — S73.191A TEAR OF RIGHT ACETABULAR LABRUM, INITIAL ENCOUNTER: Primary | ICD-10-CM

## 2019-01-25 ENCOUNTER — HOSPITAL ENCOUNTER (OUTPATIENT)
Dept: MRI IMAGING | Age: 69
Discharge: HOME | End: 2019-01-27
Payer: COMMERCIAL

## 2019-01-25 ENCOUNTER — HOSPITAL ENCOUNTER (OUTPATIENT)
Dept: INTERVENTIONAL RADIOLOGY/VASCULAR | Age: 69
Discharge: HOME | End: 2019-01-27
Payer: COMMERCIAL

## 2019-01-25 VITALS — HEART RATE: 51 BPM | RESPIRATION RATE: 15 BRPM

## 2019-01-25 DIAGNOSIS — S73.191A TEAR OF RIGHT ACETABULAR LABRUM, INITIAL ENCOUNTER: ICD-10-CM

## 2019-01-25 PROCEDURE — 2500000003 HC RX 250 WO HCPCS: Performed by: RADIOLOGY

## 2019-01-25 PROCEDURE — 73722 MRI JOINT OF LWR EXTR W/DYE: CPT

## 2019-01-25 PROCEDURE — 77002 NEEDLE LOCALIZATION BY XRAY: CPT

## 2019-01-25 PROCEDURE — 6360000004 HC RX CONTRAST MEDICATION: Performed by: RADIOLOGY

## 2019-01-25 PROCEDURE — A9579 GAD-BASE MR CONTRAST NOS,1ML: HCPCS | Performed by: RADIOLOGY

## 2019-01-25 PROCEDURE — 27093 INJECTION FOR HIP X-RAY: CPT

## 2019-01-25 RX ORDER — LIDOCAINE HYDROCHLORIDE 10 MG/ML
INJECTION, SOLUTION EPIDURAL; INFILTRATION; INTRACAUDAL; PERINEURAL
Status: COMPLETED | OUTPATIENT
Start: 2019-01-25 | End: 2019-01-25

## 2019-01-25 RX ADMIN — GADOTERIDOL 0.2 ML: 279.3 INJECTION, SOLUTION INTRAVENOUS at 08:56

## 2019-01-25 RX ADMIN — IOPAMIDOL 12 ML: 612 INJECTION, SOLUTION INTRAVENOUS at 08:56

## 2019-01-25 RX ADMIN — LIDOCAINE HYDROCHLORIDE 5 ML: 10 INJECTION, SOLUTION EPIDURAL; INFILTRATION; INTRACAUDAL; PERINEURAL at 08:56

## 2019-01-28 RX ORDER — LORAZEPAM 1 MG/1
1 TABLET ORAL DAILY
Qty: 20 TABLET | Refills: 0 | Status: SHIPPED | OUTPATIENT
Start: 2019-01-28 | End: 2019-02-14 | Stop reason: ALTCHOICE

## 2019-02-07 DIAGNOSIS — F41.9 ANXIETY: ICD-10-CM

## 2019-02-12 RX ORDER — LORAZEPAM 1 MG/1
1 TABLET ORAL DAILY
Qty: 20 TABLET | Refills: 0 | OUTPATIENT
Start: 2019-02-12 | End: 2019-03-12

## 2019-02-14 ENCOUNTER — OFFICE VISIT (OUTPATIENT)
Dept: ORTHOPEDIC SURGERY | Age: 69
End: 2019-02-14
Payer: COMMERCIAL

## 2019-02-14 VITALS — BODY MASS INDEX: 35 KG/M2 | WEIGHT: 250 LBS | HEIGHT: 71 IN

## 2019-02-14 DIAGNOSIS — S73.191D TEAR OF RIGHT ACETABULAR LABRUM, SUBSEQUENT ENCOUNTER: Primary | ICD-10-CM

## 2019-02-14 PROBLEM — S73.191A TEAR OF RIGHT ACETABULAR LABRUM: Status: ACTIVE | Noted: 2019-02-14

## 2019-02-14 PROCEDURE — 99213 OFFICE O/P EST LOW 20 MIN: CPT | Performed by: ORTHOPAEDIC SURGERY

## 2019-02-15 DIAGNOSIS — S73.191A TEAR OF RIGHT ACETABULAR LABRUM, INITIAL ENCOUNTER: ICD-10-CM

## 2019-02-15 DIAGNOSIS — M51.26 DISC DISPLACEMENT, LUMBAR: ICD-10-CM

## 2019-02-15 DIAGNOSIS — M17.0 PRIMARY OSTEOARTHRITIS OF BOTH KNEES: Primary | Chronic | ICD-10-CM

## 2019-02-18 ENCOUNTER — TELEPHONE (OUTPATIENT)
Dept: ORTHOPEDIC SURGERY | Age: 69
End: 2019-02-18

## 2019-02-21 ENCOUNTER — OFFICE VISIT (OUTPATIENT)
Dept: NEUROLOGY | Age: 69
End: 2019-02-21
Payer: COMMERCIAL

## 2019-02-21 VITALS
HEIGHT: 72 IN | HEART RATE: 53 BPM | DIASTOLIC BLOOD PRESSURE: 59 MMHG | WEIGHT: 252 LBS | BODY MASS INDEX: 34.13 KG/M2 | SYSTOLIC BLOOD PRESSURE: 129 MMHG

## 2019-02-21 DIAGNOSIS — R20.2 TINGLING: Primary | ICD-10-CM

## 2019-02-21 PROCEDURE — 99204 OFFICE O/P NEW MOD 45 MIN: CPT | Performed by: PSYCHIATRY & NEUROLOGY

## 2019-02-21 RX ORDER — GABAPENTIN 300 MG/1
CAPSULE ORAL
Qty: 90 CAPSULE | Refills: 3 | Status: SHIPPED | OUTPATIENT
Start: 2019-02-21 | End: 2019-03-27 | Stop reason: ALTCHOICE

## 2019-02-21 RX ORDER — LIDOCAINE 50 MG/G
OINTMENT TOPICAL
Qty: 1 TUBE | Refills: 2 | Status: SHIPPED | OUTPATIENT
Start: 2019-02-21 | End: 2019-03-27

## 2019-02-25 ENCOUNTER — HOSPITAL ENCOUNTER (OUTPATIENT)
Age: 69
Setting detail: SPECIMEN
Discharge: HOME | End: 2019-02-25
Payer: COMMERCIAL

## 2019-02-25 DIAGNOSIS — N18.30 CKD (CHRONIC KIDNEY DISEASE), STAGE III (HCC): ICD-10-CM

## 2019-02-25 DIAGNOSIS — K74.69 OTHER CIRRHOSIS OF LIVER (HCC): ICD-10-CM

## 2019-02-25 DIAGNOSIS — R10.11 RIGHT UPPER QUADRANT ABDOMINAL PAIN: ICD-10-CM

## 2019-02-25 DIAGNOSIS — R20.2 TINGLING: ICD-10-CM

## 2019-02-25 DIAGNOSIS — K74.60 CHRONIC HEPATITIS C WITH CIRRHOSIS (HCC): ICD-10-CM

## 2019-02-25 DIAGNOSIS — B18.2 CHRONIC HEPATITIS C WITH CIRRHOSIS (HCC): ICD-10-CM

## 2019-02-25 LAB
ABSOLUTE EOS #: 0.06 K/UL (ref 0–0.44)
ABSOLUTE EOS #: 0.11 K/UL (ref 0–0.4)
ABSOLUTE IMMATURE GRANULOCYTE: 0 K/UL (ref 0–0.3)
ABSOLUTE IMMATURE GRANULOCYTE: 0.06 K/UL (ref 0–0.3)
ABSOLUTE LYMPH #: 2.01 K/UL (ref 1.1–3.7)
ABSOLUTE LYMPH #: 2.09 K/UL (ref 1–4.8)
ABSOLUTE MONO #: 0.66 K/UL (ref 0.1–0.8)
ABSOLUTE MONO #: 0.78 K/UL (ref 0.1–1.2)
ALBUMIN SERPL-MCNC: 4.3 G/DL (ref 3.5–5.2)
ALBUMIN/GLOBULIN RATIO: 1.5 (ref 1–2.5)
ALP BLD-CCNC: 54 U/L (ref 40–129)
ALT SERPL-CCNC: 22 U/L (ref 5–41)
ANION GAP SERPL CALCULATED.3IONS-SCNC: 13 MMOL/L (ref 9–17)
ANION GAP SERPL CALCULATED.3IONS-SCNC: 13 MMOL/L (ref 9–17)
AST SERPL-CCNC: 22 U/L
BASOPHILS # BLD: 1 % (ref 0–2)
BASOPHILS # BLD: 1 % (ref 0–2)
BASOPHILS ABSOLUTE: 0.06 K/UL (ref 0–0.2)
BASOPHILS ABSOLUTE: 0.11 K/UL (ref 0–0.2)
BILIRUB SERPL-MCNC: 0.3 MG/DL (ref 0.3–1.2)
BILIRUBIN DIRECT: <0.08 MG/DL
BILIRUBIN, INDIRECT: NORMAL MG/DL (ref 0–1)
BUN BLDV-MCNC: 25 MG/DL (ref 8–23)
BUN BLDV-MCNC: 25 MG/DL (ref 8–23)
BUN/CREAT BLD: ABNORMAL (ref 9–20)
CALCIUM SERPL-MCNC: 9.7 MG/DL (ref 8.6–10.4)
CHLORIDE BLD-SCNC: 101 MMOL/L (ref 98–107)
CHLORIDE BLD-SCNC: 99 MMOL/L (ref 98–107)
CO2: 24 MMOL/L (ref 20–31)
CO2: 24 MMOL/L (ref 20–31)
CREAT SERPL-MCNC: 1.36 MG/DL (ref 0.7–1.2)
CREAT SERPL-MCNC: 1.37 MG/DL (ref 0.7–1.2)
DIFFERENTIAL TYPE: ABNORMAL
DIFFERENTIAL TYPE: ABNORMAL
EOSINOPHILS RELATIVE PERCENT: 1 % (ref 1–4)
EOSINOPHILS RELATIVE PERCENT: 1 % (ref 1–4)
GFR AFRICAN AMERICAN: >60 ML/MIN
GFR AFRICAN AMERICAN: >60 ML/MIN
GFR NON-AFRICAN AMERICAN: 52 ML/MIN
GFR NON-AFRICAN AMERICAN: 52 ML/MIN
GFR SERPL CREATININE-BSD FRML MDRD: ABNORMAL ML/MIN/{1.73_M2}
GLOBULIN: NORMAL G/DL (ref 1.5–3.8)
GLUCOSE BLD-MCNC: 129 MG/DL (ref 70–99)
HCT VFR BLD CALC: 45 % (ref 40.7–50.3)
HCT VFR BLD CALC: 45.1 % (ref 40.7–50.3)
HEMOGLOBIN: 14.8 G/DL (ref 13–17)
HEMOGLOBIN: 14.9 G/DL (ref 13–17)
IGA: 210 MG/DL (ref 70–400)
IGG: 934 MG/DL (ref 700–1600)
IGM: 168 MG/DL (ref 40–230)
IMMATURE GRANULOCYTES: 0 %
IMMATURE GRANULOCYTES: 1 %
LYMPHOCYTES # BLD: 18 % (ref 24–43)
LYMPHOCYTES # BLD: 19 % (ref 24–44)
MCH RBC QN AUTO: 34.3 PG (ref 25.2–33.5)
MCH RBC QN AUTO: 34.7 PG (ref 25.2–33.5)
MCHC RBC AUTO-ENTMCNC: 32.8 G/DL (ref 28.4–34.8)
MCHC RBC AUTO-ENTMCNC: 33.1 G/DL (ref 28.4–34.8)
MCV RBC AUTO: 104.6 FL (ref 82.6–102.9)
MCV RBC AUTO: 104.7 FL (ref 82.6–102.9)
MONOCYTES # BLD: 6 % (ref 1–7)
MONOCYTES # BLD: 7 % (ref 3–12)
MORPHOLOGY: ABNORMAL
NRBC AUTOMATED: 0 PER 100 WBC
NRBC AUTOMATED: 0 PER 100 WBC
PDW BLD-RTO: 13.4 % (ref 11.8–14.4)
PDW BLD-RTO: 13.6 % (ref 11.8–14.4)
PLATELET # BLD: 169 K/UL (ref 138–453)
PLATELET # BLD: 169 K/UL (ref 138–453)
PLATELET ESTIMATE: ABNORMAL
PLATELET ESTIMATE: ABNORMAL
PMV BLD AUTO: 11.6 FL (ref 8.1–13.5)
PMV BLD AUTO: 11.8 FL (ref 8.1–13.5)
POTASSIUM SERPL-SCNC: 4.6 MMOL/L (ref 3.7–5.3)
POTASSIUM SERPL-SCNC: 4.7 MMOL/L (ref 3.7–5.3)
RBC # BLD: 4.3 M/UL (ref 4.21–5.77)
RBC # BLD: 4.31 M/UL (ref 4.21–5.77)
RBC # BLD: ABNORMAL 10*6/UL
RBC # BLD: ABNORMAL 10*6/UL
SEG NEUTROPHILS: 72 % (ref 36–65)
SEG NEUTROPHILS: 73 % (ref 36–66)
SEGMENTED NEUTROPHILS ABSOLUTE COUNT: 8.03 K/UL (ref 1.5–8.1)
SEGMENTED NEUTROPHILS ABSOLUTE COUNT: 8.03 K/UL (ref 1.8–7.7)
SODIUM BLD-SCNC: 136 MMOL/L (ref 135–144)
SODIUM BLD-SCNC: 138 MMOL/L (ref 135–144)
TOTAL PROTEIN: 7.2 G/DL (ref 6.4–8.3)
WBC # BLD: 11 K/UL (ref 3.5–11.3)
WBC # BLD: 11 K/UL (ref 3.5–11.3)
WBC # BLD: ABNORMAL 10*3/UL
WBC # BLD: ABNORMAL 10*3/UL

## 2019-02-26 LAB
ALBUMIN (CALCULATED): 4.5 G/DL (ref 3.2–5.2)
ALBUMIN PERCENT: 64 % (ref 45–65)
ALPHA 1 PERCENT: 2 % (ref 3–6)
ALPHA 2 PERCENT: 12 % (ref 6–13)
ALPHA-1-GLOBULIN: 0.2 G/DL (ref 0.1–0.4)
ALPHA-2-GLOBULIN: 0.9 G/DL (ref 0.5–0.9)
ANION GAP SERPL CALCULATED.3IONS-SCNC: 17 MMOL/L (ref 9–17)
ANTI-NUCLEAR ANTIBODY (ANA): NEGATIVE
BETA GLOBULIN: 0.7 G/DL (ref 0.5–1.1)
BETA PERCENT: 9 % (ref 11–19)
BUN BLDV-MCNC: 26 MG/DL (ref 8–23)
BUN/CREAT BLD: ABNORMAL (ref 9–20)
CALCIUM SERPL-MCNC: 9.7 MG/DL (ref 8.6–10.4)
CHLORIDE BLD-SCNC: 102 MMOL/L (ref 98–107)
CO2: 20 MMOL/L (ref 20–31)
CREAT SERPL-MCNC: 1.36 MG/DL (ref 0.7–1.2)
CREATININE URINE: 30.9 MG/DL (ref 39–259)
ESTIMATED AVERAGE GLUCOSE: 114 MG/DL
FOLATE: 6.1 NG/ML
GAMMA GLOBULIN %: 12 % (ref 9–20)
GAMMA GLOBULIN: 0.8 G/DL (ref 0.5–1.5)
GFR AFRICAN AMERICAN: >60 ML/MIN
GFR NON-AFRICAN AMERICAN: 52 ML/MIN
GFR SERPL CREATININE-BSD FRML MDRD: ABNORMAL ML/MIN/{1.73_M2}
GFR SERPL CREATININE-BSD FRML MDRD: ABNORMAL ML/MIN/{1.73_M2}
GLUCOSE BLD-MCNC: 126 MG/DL (ref 70–99)
HBA1C MFR BLD: 5.6 % (ref 4–6)
PATHOLOGIST: ABNORMAL
PATHOLOGIST: NORMAL
POTASSIUM SERPL-SCNC: 4.6 MMOL/L (ref 3.7–5.3)
PROTEIN ELECTROPHORESIS, SERUM: ABNORMAL
SERUM IFX INTERP: NORMAL
SODIUM BLD-SCNC: 139 MMOL/L (ref 135–144)
TOTAL PROT. SUM,%: 99 % (ref 98–102)
TOTAL PROT. SUM: 7.1 G/DL (ref 6.3–8.2)
TOTAL PROTEIN, URINE: 5 MG/DL
TOTAL PROTEIN: 7 G/DL (ref 6.4–8.3)
URINE TOTAL PROTEIN CREATININE RATIO: 0.16 (ref 0–0.2)
VITAMIN B-12: 432 PG/ML (ref 232–1245)
VITAMIN D 25-HYDROXY: 26.5 NG/ML (ref 30–100)

## 2019-02-27 LAB
P E INTERPRETATION, U: NORMAL
PATHOLOGIST: NORMAL
SPECIMEN TYPE: NORMAL
URINE TOTAL PROTEIN: 5 MG/DL

## 2019-03-12 DIAGNOSIS — F41.9 ANXIETY: ICD-10-CM

## 2019-03-14 RX ORDER — LORAZEPAM 1 MG/1
1 TABLET ORAL DAILY PRN
Qty: 10 TABLET | Refills: 0 | Status: SHIPPED | OUTPATIENT
Start: 2019-03-14 | End: 2019-03-27

## 2019-03-18 ENCOUNTER — HOSPITAL ENCOUNTER (OUTPATIENT)
Dept: ULTRASOUND IMAGING | Age: 69
Discharge: HOME | End: 2019-03-20
Payer: COMMERCIAL

## 2019-03-18 DIAGNOSIS — B18.2 CHRONIC HEPATITIS C WITH CIRRHOSIS (HCC): ICD-10-CM

## 2019-03-18 DIAGNOSIS — K74.60 CHRONIC HEPATITIS C WITH CIRRHOSIS (HCC): ICD-10-CM

## 2019-03-18 DIAGNOSIS — R10.11 RIGHT UPPER QUADRANT ABDOMINAL PAIN: ICD-10-CM

## 2019-03-18 DIAGNOSIS — K74.69 OTHER CIRRHOSIS OF LIVER (HCC): ICD-10-CM

## 2019-03-18 PROCEDURE — 76705 ECHO EXAM OF ABDOMEN: CPT

## 2019-03-27 ENCOUNTER — OFFICE VISIT (OUTPATIENT)
Dept: GASTROENTEROLOGY | Age: 69
End: 2019-03-27
Payer: COMMERCIAL

## 2019-03-27 ENCOUNTER — OFFICE VISIT (OUTPATIENT)
Dept: FAMILY MEDICINE CLINIC | Age: 69
End: 2019-03-27
Payer: COMMERCIAL

## 2019-03-27 ENCOUNTER — HOSPITAL ENCOUNTER (OUTPATIENT)
Age: 69
Setting detail: SPECIMEN
Discharge: HOME | End: 2019-03-27
Payer: COMMERCIAL

## 2019-03-27 VITALS — BODY MASS INDEX: 34.37 KG/M2 | DIASTOLIC BLOOD PRESSURE: 69 MMHG | SYSTOLIC BLOOD PRESSURE: 133 MMHG | WEIGHT: 253.4 LBS

## 2019-03-27 VITALS
BODY MASS INDEX: 34.48 KG/M2 | TEMPERATURE: 97.1 F | DIASTOLIC BLOOD PRESSURE: 56 MMHG | HEART RATE: 60 BPM | SYSTOLIC BLOOD PRESSURE: 120 MMHG | WEIGHT: 254.2 LBS

## 2019-03-27 DIAGNOSIS — Z23 NEED FOR HEPATITIS A AND B VACCINATION: ICD-10-CM

## 2019-03-27 DIAGNOSIS — M1A.09X0 IDIOPATHIC CHRONIC GOUT OF MULTIPLE SITES WITHOUT TOPHUS: ICD-10-CM

## 2019-03-27 DIAGNOSIS — K21.9 GASTROESOPHAGEAL REFLUX DISEASE WITHOUT ESOPHAGITIS: ICD-10-CM

## 2019-03-27 DIAGNOSIS — E11.8 CONTROLLED TYPE 2 DIABETES MELLITUS WITH COMPLICATION, WITHOUT LONG-TERM CURRENT USE OF INSULIN (HCC): ICD-10-CM

## 2019-03-27 DIAGNOSIS — B18.2 CHRONIC HEPATITIS C WITH CIRRHOSIS (HCC): ICD-10-CM

## 2019-03-27 DIAGNOSIS — E11.8 CONTROLLED TYPE 2 DIABETES MELLITUS WITH COMPLICATION, WITHOUT LONG-TERM CURRENT USE OF INSULIN (HCC): Primary | ICD-10-CM

## 2019-03-27 DIAGNOSIS — K74.60 CIRRHOSIS OF LIVER WITHOUT ASCITES, UNSPECIFIED HEPATIC CIRRHOSIS TYPE (HCC): Primary | ICD-10-CM

## 2019-03-27 DIAGNOSIS — Z87.891 PERSONAL HISTORY OF TOBACCO USE: ICD-10-CM

## 2019-03-27 DIAGNOSIS — K74.60 CHRONIC HEPATITIS C WITH CIRRHOSIS (HCC): ICD-10-CM

## 2019-03-27 DIAGNOSIS — G47.09 OTHER INSOMNIA: ICD-10-CM

## 2019-03-27 DIAGNOSIS — M54.31 SCIATICA OF RIGHT SIDE: ICD-10-CM

## 2019-03-27 DIAGNOSIS — E78.2 MIXED HYPERLIPIDEMIA: ICD-10-CM

## 2019-03-27 DIAGNOSIS — I10 ESSENTIAL HYPERTENSION: ICD-10-CM

## 2019-03-27 DIAGNOSIS — H93.13 TINNITUS OF BOTH EARS: ICD-10-CM

## 2019-03-27 DIAGNOSIS — Z86.010 HISTORY OF COLON POLYPS: ICD-10-CM

## 2019-03-27 DIAGNOSIS — F41.9 ANXIETY: ICD-10-CM

## 2019-03-27 DIAGNOSIS — Z13.31 POSITIVE DEPRESSION SCREENING: ICD-10-CM

## 2019-03-27 DIAGNOSIS — E03.9 ACQUIRED HYPOTHYROIDISM: ICD-10-CM

## 2019-03-27 PROBLEM — H93.19 TINNITUS: Status: ACTIVE | Noted: 2019-03-27

## 2019-03-27 LAB
CREATININE URINE: 64.6 MG/DL (ref 39–259)
MICROALBUMIN/CREAT 24H UR: 37 MG/L
MICROALBUMIN/CREAT UR-RTO: 57 MCG/MG CREAT

## 2019-03-27 PROCEDURE — 99214 OFFICE O/P EST MOD 30 MIN: CPT | Performed by: NURSE PRACTITIONER

## 2019-03-27 PROCEDURE — G8431 POS CLIN DEPRES SCRN F/U DOC: HCPCS | Performed by: NURSE PRACTITIONER

## 2019-03-27 PROCEDURE — G0296 VISIT TO DETERM LDCT ELIG: HCPCS | Performed by: NURSE PRACTITIONER

## 2019-03-27 PROCEDURE — G0444 DEPRESSION SCREEN ANNUAL: HCPCS | Performed by: NURSE PRACTITIONER

## 2019-03-27 PROCEDURE — 99214 OFFICE O/P EST MOD 30 MIN: CPT | Performed by: INTERNAL MEDICINE

## 2019-03-27 RX ORDER — PROBENECID 500 MG/1
500 TABLET, FILM COATED ORAL 2 TIMES DAILY
Qty: 180 TABLET | Refills: 1 | Status: SHIPPED | OUTPATIENT
Start: 2019-03-27 | End: 2019-08-28

## 2019-03-27 RX ORDER — ALLOPURINOL 100 MG/1
100 TABLET ORAL 2 TIMES DAILY
Qty: 180 TABLET | Refills: 1 | Status: SHIPPED | COMMUNITY
Start: 2019-03-27 | End: 2019-03-27

## 2019-03-27 RX ORDER — SPIRONOLACTONE 100 MG/1
100 TABLET, FILM COATED ORAL DAILY
Qty: 90 TABLET | Refills: 1 | Status: SHIPPED | OUTPATIENT
Start: 2019-03-27 | End: 2019-11-20 | Stop reason: SDUPTHER

## 2019-03-27 RX ORDER — ATORVASTATIN CALCIUM 20 MG/1
20 TABLET, FILM COATED ORAL DAILY
Qty: 30 TABLET | Refills: 5 | Status: SHIPPED | OUTPATIENT
Start: 2019-03-27 | End: 2019-06-19 | Stop reason: ALTCHOICE

## 2019-03-27 RX ORDER — ALLOPURINOL 100 MG/1
100 TABLET ORAL NIGHTLY
Qty: 90 TABLET | Refills: 1 | Status: SHIPPED | COMMUNITY
Start: 2019-03-27 | End: 2019-03-27 | Stop reason: DRUGHIGH

## 2019-03-27 RX ORDER — ALLOPURINOL 300 MG/1
600 TABLET ORAL DAILY
Qty: 90 TABLET | Refills: 1 | COMMUNITY
Start: 2019-03-27

## 2019-03-27 RX ORDER — ALLOPURINOL 100 MG/1
100 TABLET ORAL DAILY
COMMUNITY
Start: 2017-04-04 | End: 2019-03-27 | Stop reason: DRUGHIGH

## 2019-03-27 RX ORDER — BUSPIRONE HYDROCHLORIDE 15 MG/1
15 TABLET ORAL 2 TIMES DAILY PRN
Qty: 60 TABLET | Refills: 0 | Status: SHIPPED | OUTPATIENT
Start: 2019-03-27 | End: 2019-04-26

## 2019-03-27 ASSESSMENT — ENCOUNTER SYMPTOMS
ABDOMINAL PAIN: 0
RECTAL PAIN: 0
COUGH: 0
DIARRHEA: 1
TROUBLE SWALLOWING: 0
SORE THROAT: 0
VOMITING: 0
NAUSEA: 0
EYE ITCHING: 0
ABDOMINAL PAIN: 0
SHORTNESS OF BREATH: 0
BURN: 1
NAUSEA: 0
RHINORRHEA: 0
RESPIRATORY NEGATIVE: 1
ANAL BLEEDING: 0
BLOOD IN STOOL: 0
EYES NEGATIVE: 1
EYE DISCHARGE: 0
ALLERGIC/IMMUNOLOGIC NEGATIVE: 1
ABDOMINAL DISTENTION: 0
EYE REDNESS: 0
DIARRHEA: 0
CONSTIPATION: 0
CONSTIPATION: 0

## 2019-03-27 ASSESSMENT — PATIENT HEALTH QUESTIONNAIRE - PHQ9
2. FEELING DOWN, DEPRESSED OR HOPELESS: 3
9. THOUGHTS THAT YOU WOULD BE BETTER OFF DEAD, OR OF HURTING YOURSELF: 0
SUM OF ALL RESPONSES TO PHQ QUESTIONS 1-9: 12
5. POOR APPETITE OR OVEREATING: 0
7. TROUBLE CONCENTRATING ON THINGS, SUCH AS READING THE NEWSPAPER OR WATCHING TELEVISION: 3
8. MOVING OR SPEAKING SO SLOWLY THAT OTHER PEOPLE COULD HAVE NOTICED. OR THE OPPOSITE, BEING SO FIGETY OR RESTLESS THAT YOU HAVE BEEN MOVING AROUND A LOT MORE THAN USUAL: 0
6. FEELING BAD ABOUT YOURSELF - OR THAT YOU ARE A FAILURE OR HAVE LET YOURSELF OR YOUR FAMILY DOWN: 0
3. TROUBLE FALLING OR STAYING ASLEEP: 3
1. LITTLE INTEREST OR PLEASURE IN DOING THINGS: 3
SUM OF ALL RESPONSES TO PHQ QUESTIONS 1-9: 12
4. FEELING TIRED OR HAVING LITTLE ENERGY: 0
10. IF YOU CHECKED OFF ANY PROBLEMS, HOW DIFFICULT HAVE THESE PROBLEMS MADE IT FOR YOU TO DO YOUR WORK, TAKE CARE OF THINGS AT HOME, OR GET ALONG WITH OTHER PEOPLE: 0
SUM OF ALL RESPONSES TO PHQ9 QUESTIONS 1 & 2: 6

## 2019-03-28 DIAGNOSIS — E11.8 CONTROLLED TYPE 2 DIABETES MELLITUS WITH COMPLICATION, WITHOUT LONG-TERM CURRENT USE OF INSULIN (HCC): Primary | ICD-10-CM

## 2019-04-04 DIAGNOSIS — F41.9 ANXIETY: Primary | ICD-10-CM

## 2019-04-04 DIAGNOSIS — H93.13 TINNITUS OF BOTH EARS: ICD-10-CM

## 2019-04-04 RX ORDER — LORAZEPAM 0.5 MG/1
0.5 TABLET ORAL NIGHTLY
Qty: 30 TABLET | Refills: 0 | Status: SHIPPED | OUTPATIENT
Start: 2019-04-04 | End: 2019-05-02 | Stop reason: SDUPTHER

## 2019-05-14 DIAGNOSIS — K74.60 CHRONIC HEPATITIS C WITH CIRRHOSIS (HCC): ICD-10-CM

## 2019-05-14 DIAGNOSIS — B18.2 CHRONIC HEPATITIS C WITH CIRRHOSIS (HCC): ICD-10-CM

## 2019-05-15 RX ORDER — FUROSEMIDE 40 MG/1
40 TABLET ORAL 2 TIMES DAILY
Qty: 180 TABLET | Refills: 1 | Status: SHIPPED | OUTPATIENT
Start: 2019-05-15 | End: 2019-08-20 | Stop reason: SDUPTHER

## 2019-05-15 RX ORDER — LACTULOSE 10 G/15ML
15 SOLUTION ORAL 3 TIMES DAILY
Qty: 5000 ML | Refills: 5 | Status: SHIPPED | OUTPATIENT
Start: 2019-05-15 | End: 2020-05-14

## 2019-05-15 NOTE — TELEPHONE ENCOUNTER
LRF # 5000 ml RF 5  LOV 3-27-19  RTO 6 mo    Health Maintenance   Topic Date Due    Diabetic retinal exam  03/21/1962    Hepatitis A vaccine (4 of 4 - Hep A Twinrix risk 4-dose series) 12/09/2010    Hepatitis B Vaccine (4 of 4 - Hep B Twinrix risk 4-dose series) 12/09/2010    Shingles Vaccine (2 of 3) 02/11/2016    Low dose CT lung screening  10/10/2016    Colon cancer screen colonoscopy  11/21/2018    TSH testing  12/13/2018    Lipid screen  08/02/2019    A1C test (Diabetic or Prediabetic)  02/25/2020    Potassium monitoring  02/25/2020    Creatinine monitoring  02/25/2020    Diabetic foot exam  03/27/2020    Diabetic microalbuminuria test  03/27/2020    DTaP/Tdap/Td vaccine (2 - Td) 12/17/2025    Flu vaccine  Completed    Pneumococcal 65+ years Vaccine  Completed    AAA screen  Completed             (applicable per patient's age: Cancer Screenings, Depression Screening, Fall Risk Screening, Immunizations)    Hemoglobin A1C (%)   Date Value   02/25/2019 5.6   08/02/2018 6.2 (H)   12/13/2017 5.9     Microalb/Crt.  Ratio (mcg/mg creat)   Date Value   03/27/2019 57 (H)     LDL Cholesterol (mg/dL)   Date Value   08/02/2018 133 (H)     LDL Calculated (mg/dL)   Date Value   04/23/2014 94     AST (U/L)   Date Value   02/25/2019 22     ALT (U/L)   Date Value   02/25/2019 22     BUN (mg/dL)   Date Value   02/25/2019 25 (H)   02/25/2019 25 (H)   02/25/2019 26 (H)      (goal A1C is < 7)   (goal LDL is <100) need 30-50% reduction from baseline     BP Readings from Last 3 Encounters:   03/27/19 133/69   03/27/19 (!) 120/56   02/21/19 (!) 129/59    (goal /80)      All Future Testing planned in [facility]:  Lab Frequency Next Occurrence   Protein / Creatinine Ratio, Urine Once 12/18/2018   EMG Once 04/07/2019   TSH With Reflex Ft4 Once 03/27/2019   CT Lung Screening (Annual) Once 03/27/2019   CBC with Differential Once 03/27/2019   AFP Tumor Marker Once 06/27/2019   BUN & Creatinine Once 06/27/2019 Electrolyte Panel Once 06/27/2019   Hepatic Function Panel Once 06/27/2019   US ABDOMEN COMPLETE Once 03/27/2019   Microalbumin, Ur Once 31/07/5856   Basic Metabolic Panel     Basic Metabolic Panel Every 12 Weeks 5/31/2019, 8/23/2019       Next Visit Date:  Future Appointments   Date Time Provider Phuc Macias   6/13/2019  9:00 AM STC EMG  STCZ EMG None   6/13/2019  9:00 AM Janeth Vee MD [facility]   6/13/2019 10:30 AM STC CT RM 2 FAST SCANNER STCZ CT SCAN STC Radiolog   6/17/2019 10:30 AM Roshan Roldan MD [facility] Fred None   7/29/2019  9:00 AM STA ULTRASOUND RM 3 STAZ US STA Radiolog   8/28/2019  1:30 PM Stefan Contreras MD grtlk exc [facility]            Patient Active Problem List:     Hyperlipidemia     Disc displacement, lumbar     Chronic hepatitis C with cirrhosis ([address]     Dilated cardiomyopathy     Gout     Diabetes type 2, controlled     Prostate cancer ([address]     Aortic insufficiency     Incisional hernia     Aortic atherosclerosis ([address]     Cervicalgia     Sciatica     Diverticulosis of colon     Ascites due to alcoholic cirrhosis (HCC)     Essential hypertension     History of alcohol use     Hypomagnesemia     Chronic hepatitis C virus infection (HCC)     Acquired hypothyroidism     Chondromalacia patellae     Awaiting organ transplant status     Varicose veins of left lower extremity     Anxiety     Colon polyps     History of hepatitis C     Right lumbar radiculitis     Post laminectomy syndrome     Hepatic cyst     Severe comorbid illness     Primary osteoarthritis of both knees     Chronic lumbar radiculopathy     Sol esophagus     CKD (chronic kidney disease) stage 3, GFR 30-59 ml/min (HCC)     Chronic tophaceous gout     Cirrhosis of liver with ascites (HCC)     Abdominal pain     Tear of right acetabular labrum     Hepatic cirrhosis (HCC)     Tinnitus

## 2019-05-15 NOTE — TELEPHONE ENCOUNTER
LOV 3-27-19  LRF 11-6-19  RTO in 6 mos     Health Maintenance   Topic Date Due    Diabetic retinal exam  03/21/1962    Hepatitis A vaccine (4 of 4 - Hep A Twinrix risk 4-dose series) 12/09/2010    Hepatitis B Vaccine (4 of 4 - Hep B Twinrix risk 4-dose series) 12/09/2010    Shingles Vaccine (2 of 3) 02/11/2016    Low dose CT lung screening  10/10/2016    Colon cancer screen colonoscopy  11/21/2018    TSH testing  12/13/2018    Lipid screen  08/02/2019    A1C test (Diabetic or Prediabetic)  02/25/2020    Potassium monitoring  02/25/2020    Creatinine monitoring  02/25/2020    Diabetic foot exam  03/27/2020    Diabetic microalbuminuria test  03/27/2020    DTaP/Tdap/Td vaccine (2 - Td) 12/17/2025    Flu vaccine  Completed    Pneumococcal 65+ years Vaccine  Completed    AAA screen  Completed             (applicable per patient's age: Cancer Screenings, Depression Screening, Fall Risk Screening, Immunizations)    Hemoglobin A1C (%)   Date Value   02/25/2019 5.6   08/02/2018 6.2 (H)   12/13/2017 5.9     Microalb/Crt.  Ratio (mcg/mg creat)   Date Value   03/27/2019 57 (H)     LDL Cholesterol (mg/dL)   Date Value   08/02/2018 133 (H)     LDL Calculated (mg/dL)   Date Value   04/23/2014 94     AST (U/L)   Date Value   02/25/2019 22     ALT (U/L)   Date Value   02/25/2019 22     BUN (mg/dL)   Date Value   02/25/2019 25 (H)   02/25/2019 25 (H)   02/25/2019 26 (H)      (goal A1C is < 7)   (goal LDL is <100) need 30-50% reduction from baseline     BP Readings from Last 3 Encounters:   03/27/19 133/69   03/27/19 (!) 120/56   02/21/19 (!) 129/59    (goal /80)      All Future Testing planned in [facility]:  Lab Frequency Next Occurrence   Protein / Creatinine Ratio, Urine Once 12/18/2018   EMG Once 04/07/2019   TSH With Reflex Ft4 Once 03/27/2019   CT Lung Screening (Annual) Once 03/27/2019   CBC with Differential Once 03/27/2019   AFP Tumor Marker Once 06/27/2019   BUN & Creatinine Once 06/27/2019   Electrolyte Panel Once 06/27/2019   Hepatic Function Panel Once 06/27/2019   US ABDOMEN COMPLETE Once 03/27/2019   Microalbumin, Ur Once 85/61/7139   Basic Metabolic Panel     Basic Metabolic Panel Every 12 Weeks 5/31/2019, 8/23/2019       Next Visit Date:  Future Appointments   Date Time Provider Phuc Macias   6/13/2019  9:00 AM STC EMG  STCZ EMG None   6/13/2019  9:00 AM Connie Jackson MD [facility]   6/13/2019 10:30 AM STC CT RM 2 FAST SCANNER STCZ CT SCAN STC Radiolog   6/17/2019 10:30 AM Cinthya Crowe MD [facility] Fred None   7/29/2019  9:00 AM STA ULTRASOUND RM 3 STAZ US STA Radiolog   8/28/2019  1:30 PM Maritza Pantoja MD grtlk exc [facility]            Patient Active Problem List:     Hyperlipidemia     Disc displacement, lumbar     Chronic hepatitis C with cirrhosis ([address]     Dilated cardiomyopathy     Gout     Diabetes type 2, controlled     Prostate cancer ([address]     Aortic insufficiency     Incisional hernia     Aortic atherosclerosis ([address]     Cervicalgia     Sciatica     Diverticulosis of colon     Ascites due to alcoholic cirrhosis (HCC)     Essential hypertension     History of alcohol use     Hypomagnesemia     Chronic hepatitis C virus infection (HCC)     Acquired hypothyroidism     Chondromalacia patellae     Awaiting organ transplant status     Varicose veins of left lower extremity     Anxiety     Colon polyps     History of hepatitis C     Right lumbar radiculitis     Post laminectomy syndrome     Hepatic cyst     Severe comorbid illness     Primary osteoarthritis of both knees     Chronic lumbar radiculopathy     Sol esophagus     CKD (chronic kidney disease) stage 3, GFR 30-59 ml/min (HCC)     Chronic tophaceous gout     Cirrhosis of liver with ascites (HCC)     Abdominal pain     Tear of right acetabular labrum     Hepatic cirrhosis (HCC)     Tinnitus

## 2019-06-06 ENCOUNTER — TELEPHONE (OUTPATIENT)
Dept: ONCOLOGY | Age: 69
End: 2019-06-06

## 2019-06-13 ENCOUNTER — HOSPITAL ENCOUNTER (OUTPATIENT)
Dept: NEUROLOGY | Age: 69
Discharge: HOME | End: 2019-06-13
Payer: COMMERCIAL

## 2019-06-13 ENCOUNTER — HOSPITAL ENCOUNTER (OUTPATIENT)
Dept: CT IMAGING | Age: 69
Discharge: HOME | End: 2019-06-15
Payer: COMMERCIAL

## 2019-06-13 DIAGNOSIS — Z87.891 PERSONAL HISTORY OF TOBACCO USE: ICD-10-CM

## 2019-06-13 DIAGNOSIS — R20.2 TINGLING: ICD-10-CM

## 2019-06-13 PROCEDURE — 95886 MUSC TEST DONE W/N TEST COMP: CPT

## 2019-06-13 PROCEDURE — 95910 NRV CNDJ TEST 7-8 STUDIES: CPT

## 2019-06-13 PROCEDURE — G0297 LDCT FOR LUNG CA SCREEN: HCPCS

## 2019-06-13 NOTE — PROCEDURES
[address] [facility]                 [address] [facility], [address]                             ELECTROMYOGRAM REPORT    PATIENT NAME: Sherly Newman                  :        1950  MED REC NO:   [id number]                              ROOM:  ACCOUNT NO:   [de-identified]                           ADMIT DATE: 2019  PROVIDER:     Chaparro Donato    DATE OF EM2019    The patient of Dr. Estelita Aguiar for study of bilateral lower extremities  and nerve conduction studies. Right peroneal motor at the ankle, amplitude 3.3, distal latency 6.2,  distance 90. Right peroneal motor below the knee, right TMBK, amplitude  2.1, latency 13.2, distance at 284, conduction velocity at 40 meters per  second. Right TMAK, below peroneal motor above the knee, amplitude 1.8,  distal latency 14.4, distance of 102. Conduction velocity 46 meters per  second. Right TFR is 55.2. Right TMA is 8. Tibial motor at the ankle,  amplitude 8.5, latency 4.8, distance of 80. Right tibial motor above  the knee, TMAK, amplitude is 6.9, latency 15.2, distance of 424. Right  TFR is 60.2. Right SFA, sural sensory, amplitude 10.6, peak 4.4, onset  3.6, distance at 140. Right MPSF, medial plantar sensory of the foot  has no response. Left tibial motor at the ankle, amplitude 9.4, latency  of 4.9, distance of 80. Left tibial motor above the knee, amplitude  6.0, latency of 15.0, distance of 412. Conduction velocity of 41 meters  per second. Left TFR 61.2. Left sural sensory is amplitude 12.9, peak  4.2, onset 3.7, distance of 140. Left MPSF has no response. Right  tibial peroneal motor to the tibialis anterior below the knee with  amplitude 4.2, latency 2.8, distance of 102. Right peroneal motor to  the tibialis anterior above the knee with amplitude 3.6, latency of 5.2,  distance of 108. Conduction velocity of 44 meters per second.     EMG study of the right FDI of the foot was normal.  Right tibialis  anterior show 1+ decrease in improvement, 1+ increase in amplitude in  duration and polys. Right medialis gastroc and vastus medialis are  normal.  Right gluteus medius shows 1+ improvement, 1+ increase in  duration and polys, right gluteus derek is normal.  Left FDI of the  foot, tibialis anterior, medialis gastroc, vastus medialis, gluteus  medius, and gluteus derek were normal.  Bilateral lumbosacral  paraspinous were normal.    HISTORY:  The patient is a 72-year-old male with history of liver  cirrhosis, chronic kidney disease, and gout, also notes history of low  back surgery in 1997 at L4-L5. He is not on any blood thinners. Please  see list.  He notes symptom in his feet beginning in his right and then  now on his left of burning. This began two years ago with numbness,  tingling distally in the foot, ankle, heel, and ball of the foot. It is  worse at nighttime. He notes in the same areas where he had his gout. Symptoms have been progressing with the right more affected on the left. He does note history of low back pain since high school. As noted in  1997, he had a back surgery at L4-5, at which time, he had symptoms in  his right foot and thigh and symptoms have improved. PHYSICAL EXAMINATION:  Reveals sensation is intact to light touch and  pinprick includes the soles of the feet. Deep tendon reflexes are  equal, but diminished. Strength appeared to be in normal limits. Straight leg raising test was negative. Toes, heels, and squat without  difficulty. He ambulate with a normal gait patter. Objective  evaluation of bilateral lower extremities were possible with neuropathic  process. SUMMARY:  Nerve conduction studies revealed that the bilateral medial,  plantar, and sensory nerves were unobtainable, however, this is most  likely intact due to the patient's trauma.   The right peroneal motor did  reveal a moderate decreased amplitude, but no significant decrease in  amplitude or conduction velocity across the fibular head and the  peroneal motor to the tibialis anterior was within normal limits with  normal amplitude and conduction velocity. F responses were borderline  to mildly prolonged. EMG studies of the sample muscle revealed chronic denervation changes  right tibialis anterior and gluteus medius muscle. Otherwise, all of  the muscles tested including the lumbosacral paraspinous appeared within  normal limits. ASSESSMENT:  Abnormal study. The electrophysiologic testing appears  more suggestive of:  1. A chronic right L4-5 radiculopathy. 2.  As noted the F responses were borderline to prolonged of  questionable etiology, possibly related to subclinical neuropathy. The  medial plantar sensory nerves were unobtainable, most likely technical  in nature, please see above for further details. 3.  The evidence will tend to argue against significant myopathy,  plexopathy, or left lower extremity radiculopathy. RECOMMENDATIONS:  Clinical correlation is recommended. If symptoms were  continue or progress, would recommend repeat test in the future.         Griselda Barboza    D: 06/13/2019 9:44:47       T: 06/13/2019 10:25:09     [email or web address]  Job#: [id number]     Doc#: [id number]    CC:

## 2019-06-19 ENCOUNTER — OFFICE VISIT (OUTPATIENT)
Dept: NEUROLOGY | Age: 69
End: 2019-06-19
Payer: COMMERCIAL

## 2019-06-19 VITALS
BODY MASS INDEX: 34.58 KG/M2 | HEART RATE: 52 BPM | DIASTOLIC BLOOD PRESSURE: 63 MMHG | SYSTOLIC BLOOD PRESSURE: 138 MMHG | HEIGHT: 72 IN | WEIGHT: 255.3 LBS

## 2019-06-19 DIAGNOSIS — G62.9 NEUROPATHY: Primary | ICD-10-CM

## 2019-06-19 PROCEDURE — 99214 OFFICE O/P EST MOD 30 MIN: CPT | Performed by: PSYCHIATRY & NEUROLOGY

## 2019-06-19 RX ORDER — HYDROCODONE BITARTRATE AND ACETAMINOPHEN 5; 325 MG/1; MG/1
1 TABLET ORAL DAILY PRN
Qty: 30 TABLET | Refills: 0 | Status: SHIPPED | OUTPATIENT
Start: 2019-06-19 | End: 2019-07-19

## 2019-06-19 NOTE — PROGRESS NOTES
[facility], [address], [address] [facility]  Ph: [phone] or [phone]  FAX: [phone]            Dear Dr. Pamela Badillo MD      I had the pleasure of seeing your patient today in neurology consultation for his symptoms. As you would recall, Ilia Smith is a 81 y.o.  male.      Neurological work up:    [employer] Value Date    LDLCALC 94 04/23/2014    LDLCHOLESTEROL 133 (H) 08/02/2018     No components found for: CHLPL  Lab Results   Component Value Date    TRIG 178 (H) 08/02/2018    TRIG 93 03/20/2018    TRIG 148 12/13/2017     Lab Results   Component Value Date    HDL 32 (L) 08/02/2018    HDL 27 (L) 03/20/2018    HDL 34 (L) 12/13/2017     Lab Results   Component Value Date    LDLCALC 94 04/23/2014    LDLCALC 73 08/01/2013    LDLCALC 108 02/20/2013     No results found for: LABVLDL  Lab Results   Component Value Date    LABA1C 5.6 02/25/2019     Lab Results   Component Value Date     02/25/2019     Lab Results   Component Value Date    [id number] 820 02/25/2019      Neurological work up:  CT head  CTA head and neck  MRI brain   2 D echo                                REVIEW OF SYSTEMS    Constitutional Weight: absent, Appetite: absent, Fatigue: absent   HEENT Ears: diminished, tinitus Visual disturbance: absent   Reespiratory Shortness of breath: absent, Cough: absent   Cardivascular Chest pain: absent, Leg swelling :absent   GI Constipation: absent, Diarrhea: absent, Swallowing change: absent    Urinary frequency: absent, Urinary urgency: absent,    Musculoskeletal Neck pain: present, Back pain: present, Stiffness: present, Muscle pain: absent, Joint pain: present   Dermatological Hair loss: absent, Skin changes: absent   Neurological Memory loss: absent, Confusion: absent, Seizures: absent, Trouble walking or imbalance: present, Dizziness: absent, Weakness: absent, Numbness: present Tremor: absent, Spasm: absent, Speech difficulty: absent, Headache: absent, Light sensitivity: absent   Psychiatric Anxiety: absent, Hallucination: absent, Mood disorder: Depression, absent   Hematologic Abnormal bleeding: absent, Anemia: absent, Clotting disorder: absent, Lymph gland changes: absent                                  Assessment and recommendations:        Norah Coyne MD , I would like to thank you for the consult. Please feel free to contact me if there remains any further question about the patient care. Kaur Taylor M.D. Diplomate, American Board of Psychiatry and Neurology  Diplomate, American Board of Clinical Neurophysiology  Diplomate, American Board of Epilepsy           This note is created with the assistance of a speech-recognition program. While intending to generate a document that actually reflects the content of the visit, the document can still have some errors including those of syntax and sound a- like substitutions which may escape proofreading. In such instances, actual meaning can be extrapolated by contextual derivation.

## 2019-06-19 NOTE — PROGRESS NOTES
[facility], [address], [address] [facility]  Ph: [phone] or [phone]  FAX: [phone]            Dear Dr. Andrew Isabel MD   Comes in today as a follow-up visit. He has a past medical history of possible small fiber neuropathy in the lower extremities with intractable dysesthesia pain numbness and tingling in lower extremities. On the previous visit, he was started on gabapentin which he stopped due to lack of improvement and side effects of having flulike symptoms. Previously patient's EMG nerve conduction study was negative. He continues to have intractable pain. The symptoms are worse kind of the day. Previous EMG was consistent with possible chronic L4-L5 right radiculopathy but no evidence of peripheral neuropathy. Other comorbid conditions include history of hepatitis C status post therapy and history of gout for which he has been taking allopurinol.     Neurological work up:    [employer] Value Date    LDLCALC 94 04/23/2014    LDLCHOLESTEROL 133 (H) 08/02/2018     No components found for: CHLPL  Lab Results   Component Value Date    TRIG 178 (H) 08/02/2018    TRIG 93 03/20/2018    TRIG 148 12/13/2017     Lab Results   Component Value Date    HDL 32 (L) 08/02/2018    HDL 27 (L) 03/20/2018    HDL 34 (L) 12/13/2017     Lab Results   Component Value Date    LDLCALC 94 04/23/2014    LDLCALC 73 08/01/2013    LDLCALC 108 02/20/2013     No results found for: LABVLDL  Lab Results   Component Value Date    LABA1C 5.6 02/25/2019     Lab Results   Component Value Date     02/25/2019     Lab Results   Component Value Date    [id number] 228 02/25/2019      Neurological work up:  CT head  CTA head and neck  MRI brain   2 D echo     MRI   EEG  CT head  CTA head Neck      Neurological examination:    Mental status   Alert and oriented; intact memory with no confusion, speech or language problems; no hallucinations or delusions Cranial nerves   II - visual fields intact to confrontation                                                III, IV, VI - extra-ocular muscles full: no pupillary defect; no DEAN, no nystagmus, no ptosis   V - normal facial sensation                                                               VII - normal facial symmetry                                                             VIII - intact hearing                                                                             IX, X - symmetrical palate                                                                  XI - symmetrical shoulder shrug                                                       XII - midline tongue without atrophy or fasciculation     Motor function  Normal muscle bulk and tone; normal power 5/5, including fine motor movements     Sensory function Dysesthesia in both feet. Decreased sensation in distal one third of both lower extremities     Cerebellar Intact fine motor movement. No involuntary movements or tremors     Reflex function Intact 2+ DTR and symmetric. 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 Risk factor for neuropathy include medication-induced (possibly allopurinol), hepatitis C, borderline diabetes. Patient's lab work-up for reversible causes for neuropathy are negative including negative SPEP, UPEP, serum and fixation, normal vitamin B12 level and ESR. His EMG nerve conduction study shows chronic right L4 radicular apathy but no evidence of peripheral neuropathy. I would obtain skin punch biopsy to rule out small fiber neuropathy. I would also contact patient's rheumatologist at [address] [place] [address],[address] to discuss if his neuropathy is not secondary to allopurinol and if there is any option of medication change    The patient is severely impaired due to his ongoing pain. He has failed conservative treatment. I have given him a limited prescription of Norco to be taken as needed for next 2 weeks. The patient understands that long-term, I intend to treat his symptoms without opioids. Pregabalin/Lyrica remains an option for neuropathic pain. I also start prior authorization process for neuropathic compound cream.  Patient previously has failed lidocaine    He will follow-up with me next 6 to 8 weeks. Laurence Esparza MD , I would like to thank you for the consult. Please feel free to contact me if there remains any further question about the patient care. Kaur Avalos M.D. Diplomate, American Board of Psychiatry and Neurology      Diplomate, American Board of Clinical Neurophysiology      Diplomate, American Board of Epilepsy           This note is created with the assistance of a speech-recognition program. While intending to generate a document that actually reflects the content of the visit, the document can still have some errors including those of syntax and sound a- like substitutions which may escape proofreading. In such instances, actual meaning can be extrapolated by contextual derivation.

## 2019-07-02 ENCOUNTER — TELEPHONE (OUTPATIENT)
Dept: NEUROLOGY | Age: 69
End: 2019-07-02

## 2019-07-08 ENCOUNTER — TELEPHONE (OUTPATIENT)
Dept: FAMILY MEDICINE CLINIC | Age: 69
End: 2019-07-08

## 2019-07-08 DIAGNOSIS — M79.673 PAIN OF FOOT, UNSPECIFIED LATERALITY: Primary | ICD-10-CM

## 2019-07-08 NOTE — TELEPHONE ENCOUNTER
Patient requesting a referral to Dr. [facility] for foot pain. Can we do this referral.  Health Maintenance   Topic Date Due    Diabetic retinal exam  03/21/1962    Hepatitis A vaccine (4 of 4 - Hep A Twinrix risk 4-dose series) 12/09/2010    Hepatitis B Vaccine (4 of 4 - Hep B Twinrix risk 4-dose series) 12/09/2010    Annual Wellness Visit (AWV)  03/21/2015    Shingles Vaccine (2 of 3) 02/11/2016    Colon cancer screen colonoscopy  11/21/2018    TSH testing  12/13/2018    Lipid screen  08/02/2019    Flu vaccine (1) 09/01/2019    A1C test (Diabetic or Prediabetic)  02/25/2020    Potassium monitoring  02/25/2020    Creatinine monitoring  02/25/2020    Diabetic foot exam  03/27/2020    Diabetic microalbuminuria test  03/27/2020    DTaP/Tdap/Td vaccine (2 - Td) 12/17/2025    Pneumococcal 65+ years Vaccine  Completed    AAA screen  Completed             (applicable per patient's age: Cancer Screenings, Depression Screening, Fall Risk Screening, Immunizations)    Hemoglobin A1C (%)   Date Value   02/25/2019 5.6   08/02/2018 6.2 (H)   12/13/2017 5.9     Microalb/Crt.  Ratio (mcg/mg creat)   Date Value   03/27/2019 57 (H)     LDL Cholesterol (mg/dL)   Date Value   08/02/2018 133 (H)     LDL Calculated (mg/dL)   Date Value   04/23/2014 94     AST (U/L)   Date Value   02/25/2019 22     ALT (U/L)   Date Value   02/25/2019 22     BUN (mg/dL)   Date Value   02/25/2019 25 (H)   02/25/2019 25 (H)   02/25/2019 26 (H)      (goal A1C is < 7)   (goal LDL is <100) need 30-50% reduction from baseline     BP Readings from Last 3 Encounters:   06/19/19 138/63   03/27/19 133/69   03/27/19 (!) 120/56    (goal /80)      All Future Testing planned in [facility]:  Lab Frequency Next Occurrence   TSH With Reflex Ft4 Once 03/27/2019   CBC with Differential Once 03/27/2019   AFP Tumor Marker Once 06/27/2019   BUN & Creatinine Once 06/27/2019   Electrolyte Panel Once 06/27/2019   Hepatic Function Panel Once

## 2019-07-10 DIAGNOSIS — M79.673 PAIN OF FOOT, UNSPECIFIED LATERALITY: Primary | ICD-10-CM

## 2019-07-10 DIAGNOSIS — G62.9 NEUROPATHY: Primary | ICD-10-CM

## 2019-07-10 RX ORDER — PREGABALIN 75 MG/1
75 CAPSULE ORAL 2 TIMES DAILY
Qty: 60 CAPSULE | Refills: 0 | Status: SHIPPED | OUTPATIENT
Start: 2019-07-10 | End: 2019-08-07 | Stop reason: SDUPTHER

## 2019-07-10 NOTE — TELEPHONE ENCOUNTER
I spoke with Ernesto Newman this morning. He would like to try the Lyrica.  Dr. Mora Pedroza is on vacation so I sent the prescription to [facility] for approval.

## 2019-07-12 NOTE — TELEPHONE ENCOUNTER
Last visit: 3/27/19  Last Med refill: 4/11/19  Does patient have enough medication for 72 hours: Yes    Next Visit Date:  Future Appointments   Date Time Provider Phuc Macias   7/20/2019 10:00 AM KELSY Reina - CNP [place] [facility]   7/29/2019  9:00 AM STA ULTRASOUND RM 3 STAZ US [facility] Radiolog   8/28/2019  1:30 PM Corine Anguiano MD grtlk exc [facility]   8/29/2019 11:20 AM Agustin Castro MD Neuro Spec [facility]   9/11/2019 10:30 AM Dandy Smith MD [facility] Neph Fred None       Health Maintenance   Topic Date Due    Diabetic retinal exam  03/21/1960    Hepatitis A vaccine (4 of 4 - Hep A Twinrix risk 4-dose series) 12/09/2010    Hepatitis B Vaccine (4 of 4 - Hep B Twinrix risk 4-dose series) 12/09/2010    Annual Wellness Visit (AWV)  03/21/2015    Shingles Vaccine (2 of 3) 02/11/2016    Colon cancer screen colonoscopy  11/21/2018    TSH testing  12/13/2018    Lipid screen  08/02/2019    Flu vaccine (1) 09/01/2019    A1C test (Diabetic or Prediabetic)  02/25/2020    Potassium monitoring  02/25/2020    Creatinine monitoring  02/25/2020    Diabetic foot exam  03/27/2020    Diabetic microalbuminuria test  03/27/2020    DTaP/Tdap/Td vaccine (2 - Td) 12/17/2025    Pneumococcal 65+ years Vaccine  Completed    AAA screen  Completed       Hemoglobin A1C (%)   Date Value   02/25/2019 5.6   08/02/2018 6.2 (H)   12/13/2017 5.9             ( goal A1C is < 7)   Microalb/Crt.  Ratio (mcg/mg creat)   Date Value   03/27/2019 57 (H)     LDL Cholesterol (mg/dL)   Date Value   08/02/2018 133 (H)   03/20/2018 64     LDL Calculated (mg/dL)   Date Value   04/23/2014 94   08/01/2013 73       (goal LDL is <100)   AST (U/L)   Date Value   02/25/2019 22     ALT (U/L)   Date Value   02/25/2019 22     BUN (mg/dL)   Date Value   02/25/2019 25 (H)   02/25/2019 25 (H)   02/25/2019 26 (H)     BP Readings from Last 3 Encounters:   06/19/19 138/63   03/27/19 133/69   03/27/19 (!) 120/56          (goal 120/80)    All Future Testing

## 2019-07-15 ENCOUNTER — HOSPITAL ENCOUNTER (OUTPATIENT)
Age: 69
Setting detail: SPECIMEN
Discharge: HOME | End: 2019-07-15
Payer: MEDICARE

## 2019-07-15 DIAGNOSIS — K74.60 CIRRHOSIS OF LIVER WITHOUT ASCITES, UNSPECIFIED HEPATIC CIRRHOSIS TYPE (HCC): ICD-10-CM

## 2019-07-15 DIAGNOSIS — Z86.010 HISTORY OF COLON POLYPS: ICD-10-CM

## 2019-07-15 LAB
ABSOLUTE EOS #: 0.15 K/UL (ref 0–0.44)
ABSOLUTE IMMATURE GRANULOCYTE: 0.03 K/UL (ref 0–0.3)
ABSOLUTE LYMPH #: 2.45 K/UL (ref 1.1–3.7)
ABSOLUTE MONO #: 0.74 K/UL (ref 0.1–1.2)
AFP: 1.4 UG/L
ALBUMIN SERPL-MCNC: 4 G/DL (ref 3.5–5.2)
ALBUMIN/GLOBULIN RATIO: 1.3 (ref 1–2.5)
ALP BLD-CCNC: 56 U/L (ref 40–129)
ALT SERPL-CCNC: 15 U/L (ref 5–41)
ANION GAP SERPL CALCULATED.3IONS-SCNC: 14 MMOL/L (ref 9–17)
AST SERPL-CCNC: 21 U/L
BASOPHILS # BLD: 1 % (ref 0–2)
BASOPHILS ABSOLUTE: 0.05 K/UL (ref 0–0.2)
BILIRUB SERPL-MCNC: 0.16 MG/DL (ref 0.3–1.2)
BILIRUBIN DIRECT: <0.08 MG/DL
BILIRUBIN, INDIRECT: ABNORMAL MG/DL (ref 0–1)
BUN BLDV-MCNC: 31 MG/DL (ref 8–23)
CHLORIDE BLD-SCNC: 104 MMOL/L (ref 98–107)
CO2: 22 MMOL/L (ref 20–31)
CREAT SERPL-MCNC: 1.49 MG/DL (ref 0.7–1.2)
DIFFERENTIAL TYPE: ABNORMAL
EOSINOPHILS RELATIVE PERCENT: 2 % (ref 1–4)
GFR AFRICAN AMERICAN: 57 ML/MIN
GFR NON-AFRICAN AMERICAN: 47 ML/MIN
GFR SERPL CREATININE-BSD FRML MDRD: ABNORMAL ML/MIN/{1.73_M2}
GFR SERPL CREATININE-BSD FRML MDRD: ABNORMAL ML/MIN/{1.73_M2}
GLOBULIN: ABNORMAL G/DL (ref 1.5–3.8)
HCT VFR BLD CALC: 42.4 % (ref 40.7–50.3)
HEMOGLOBIN: 13.7 G/DL (ref 13–17)
IMMATURE GRANULOCYTES: 0 %
LYMPHOCYTES # BLD: 28 % (ref 24–43)
MCH RBC QN AUTO: 33.1 PG (ref 25.2–33.5)
MCHC RBC AUTO-ENTMCNC: 32.3 G/DL (ref 28.4–34.8)
MCV RBC AUTO: 102.4 FL (ref 82.6–102.9)
MONOCYTES # BLD: 9 % (ref 3–12)
NRBC AUTOMATED: 0 PER 100 WBC
PDW BLD-RTO: 13.6 % (ref 11.8–14.4)
PLATELET # BLD: 211 K/UL (ref 138–453)
PLATELET ESTIMATE: ABNORMAL
PMV BLD AUTO: 11.4 FL (ref 8.1–13.5)
POTASSIUM SERPL-SCNC: 4.7 MMOL/L (ref 3.7–5.3)
RBC # BLD: 4.14 M/UL (ref 4.21–5.77)
RBC # BLD: ABNORMAL 10*6/UL
SEG NEUTROPHILS: 60 % (ref 36–65)
SEGMENTED NEUTROPHILS ABSOLUTE COUNT: 5.2 K/UL (ref 1.5–8.1)
SODIUM BLD-SCNC: 140 MMOL/L (ref 135–144)
TOTAL PROTEIN: 7.1 G/DL (ref 6.4–8.3)
WBC # BLD: 8.6 K/UL (ref 3.5–11.3)
WBC # BLD: ABNORMAL 10*3/UL

## 2019-07-16 RX ORDER — OMEPRAZOLE 20 MG/1
40 CAPSULE, DELAYED RELEASE ORAL DAILY
Qty: 60 CAPSULE | Refills: 5 | Status: SHIPPED | OUTPATIENT
Start: 2019-07-16 | End: 2020-02-24 | Stop reason: SDUPTHER

## 2019-07-19 ENCOUNTER — HOSPITAL ENCOUNTER (OUTPATIENT)
Age: 69
Setting detail: SPECIMEN
Discharge: HOME | End: 2019-07-19
Payer: MEDICARE

## 2019-07-19 LAB
ALBUMIN SERPL-MCNC: 4 G/DL (ref 3.5–5.2)
ALBUMIN/GLOBULIN RATIO: 1.2 (ref 1–2.5)
ALP BLD-CCNC: 63 U/L (ref 40–129)
ALT SERPL-CCNC: 16 U/L (ref 5–41)
ANION GAP SERPL CALCULATED.3IONS-SCNC: 15 MMOL/L (ref 9–17)
AST SERPL-CCNC: 26 U/L
BILIRUB SERPL-MCNC: 0.22 MG/DL (ref 0.3–1.2)
BUN BLDV-MCNC: 21 MG/DL (ref 8–23)
BUN/CREAT BLD: ABNORMAL (ref 9–20)
CALCIUM SERPL-MCNC: 9.3 MG/DL (ref 8.6–10.4)
CHLORIDE BLD-SCNC: 103 MMOL/L (ref 98–107)
CO2: 19 MMOL/L (ref 20–31)
CREAT SERPL-MCNC: 1.8 MG/DL (ref 0.7–1.2)
GFR AFRICAN AMERICAN: 46 ML/MIN
GFR NON-AFRICAN AMERICAN: 38 ML/MIN
GFR SERPL CREATININE-BSD FRML MDRD: ABNORMAL ML/MIN/{1.73_M2}
GFR SERPL CREATININE-BSD FRML MDRD: ABNORMAL ML/MIN/{1.73_M2}
GLUCOSE BLD-MCNC: 125 MG/DL (ref 70–99)
POTASSIUM SERPL-SCNC: 4.5 MMOL/L (ref 3.7–5.3)
SODIUM BLD-SCNC: 137 MMOL/L (ref 135–144)
TOTAL PROTEIN: 7.3 G/DL (ref 6.4–8.3)
URIC ACID: 6.7 MG/DL (ref 3.4–7)

## 2019-07-23 ENCOUNTER — OFFICE VISIT (OUTPATIENT)
Dept: FAMILY MEDICINE CLINIC | Age: 69
End: 2019-07-23
Payer: COMMERCIAL

## 2019-07-23 VITALS
WEIGHT: 255.2 LBS | SYSTOLIC BLOOD PRESSURE: 133 MMHG | HEART RATE: 48 BPM | OXYGEN SATURATION: 94 % | HEIGHT: 71 IN | DIASTOLIC BLOOD PRESSURE: 61 MMHG | BODY MASS INDEX: 35.73 KG/M2

## 2019-07-23 DIAGNOSIS — I70.0 AORTIC ATHEROSCLEROSIS (HCC): ICD-10-CM

## 2019-07-23 DIAGNOSIS — Z13.1 SCREENING FOR DIABETES MELLITUS: ICD-10-CM

## 2019-07-23 DIAGNOSIS — Z90.79 HISTORY OF PROSTATECTOMY: ICD-10-CM

## 2019-07-23 DIAGNOSIS — E03.9 ACQUIRED HYPOTHYROIDISM: ICD-10-CM

## 2019-07-23 DIAGNOSIS — M54.16 CHRONIC LUMBAR RADICULOPATHY: ICD-10-CM

## 2019-07-23 DIAGNOSIS — I35.1 AORTIC VALVE INSUFFICIENCY, ETIOLOGY OF CARDIAC VALVE DISEASE UNSPECIFIED: ICD-10-CM

## 2019-07-23 DIAGNOSIS — E55.9 VITAMIN D DEFICIENCY: ICD-10-CM

## 2019-07-23 DIAGNOSIS — Z87.898 HISTORY OF ALCOHOL USE: ICD-10-CM

## 2019-07-23 DIAGNOSIS — Z12.11 SCREEN FOR COLON CANCER: Primary | ICD-10-CM

## 2019-07-23 DIAGNOSIS — N28.9 NEPHROPATHY: ICD-10-CM

## 2019-07-23 DIAGNOSIS — Z86.19 HISTORY OF HEPATITIS C: ICD-10-CM

## 2019-07-23 DIAGNOSIS — K74.60 CIRRHOSIS OF LIVER WITH ASCITES, UNSPECIFIED HEPATIC CIRRHOSIS TYPE (HCC): ICD-10-CM

## 2019-07-23 DIAGNOSIS — N18.30 CHRONIC RENAL FAILURE SYNDROME, STAGE 3 (MODERATE) (HCC): ICD-10-CM

## 2019-07-23 DIAGNOSIS — E78.5 DYSLIPIDEMIA: ICD-10-CM

## 2019-07-23 DIAGNOSIS — E78.2 MIXED HYPERLIPIDEMIA: ICD-10-CM

## 2019-07-23 DIAGNOSIS — Z86.010 HISTORY OF COLONIC POLYPS: ICD-10-CM

## 2019-07-23 DIAGNOSIS — E53.9 VITAMIN B DEFICIENCY: ICD-10-CM

## 2019-07-23 DIAGNOSIS — I42.0 DILATED CARDIOMYOPATHY (HCC): ICD-10-CM

## 2019-07-23 DIAGNOSIS — Z13.29 SCREENING FOR THYROID DISORDER: ICD-10-CM

## 2019-07-23 DIAGNOSIS — R18.8 CIRRHOSIS OF LIVER WITH ASCITES, UNSPECIFIED HEPATIC CIRRHOSIS TYPE (HCC): ICD-10-CM

## 2019-07-23 DIAGNOSIS — B18.2 CHRONIC HEPATITIS C WITHOUT HEPATIC COMA (HCC): ICD-10-CM

## 2019-07-23 DIAGNOSIS — M1A.09X0 IDIOPATHIC CHRONIC GOUT OF MULTIPLE SITES WITHOUT TOPHUS: ICD-10-CM

## 2019-07-23 PROCEDURE — 99204 OFFICE O/P NEW MOD 45 MIN: CPT | Performed by: FAMILY MEDICINE

## 2019-07-23 RX ORDER — NIFEDIPINE 30 MG/1
TABLET, EXTENDED RELEASE ORAL
COMMUNITY
Start: 2019-07-18 | End: 2019-08-28

## 2019-07-23 RX ORDER — MULTIVIT-MIN/IRON/FOLIC ACID/K 18-600-40
1 CAPSULE ORAL DAILY
Qty: 90 CAPSULE | Refills: 5 | Status: SHIPPED | OUTPATIENT
Start: 2019-07-23 | End: 2020-02-24 | Stop reason: SDUPTHER

## 2019-07-23 ASSESSMENT — PATIENT HEALTH QUESTIONNAIRE - PHQ9
SUM OF ALL RESPONSES TO PHQ9 QUESTIONS 1 & 2: 0
SUM OF ALL RESPONSES TO PHQ QUESTIONS 1-9: 0
SUM OF ALL RESPONSES TO PHQ9 QUESTIONS 1 & 2: 0
SUM OF ALL RESPONSES TO PHQ QUESTIONS 1-9: 0
1. LITTLE INTEREST OR PLEASURE IN DOING THINGS: 0
1. LITTLE INTEREST OR PLEASURE IN DOING THINGS: 0
2. FEELING DOWN, DEPRESSED OR HOPELESS: 0
2. FEELING DOWN, DEPRESSED OR HOPELESS: 0

## 2019-07-23 ASSESSMENT — ENCOUNTER SYMPTOMS
VOICE CHANGE: 0
COLOR CHANGE: 0
SINUS PRESSURE: 0
SORE THROAT: 0
EYE PAIN: 0
CONSTIPATION: 0
RHINORRHEA: 0
ABDOMINAL DISTENTION: 0
CHEST TIGHTNESS: 0
NAUSEA: 0
DIARRHEA: 0
EYE DISCHARGE: 0
SHORTNESS OF BREATH: 0
ABDOMINAL PAIN: 0
BACK PAIN: 0
VOMITING: 0

## 2019-07-23 NOTE — PROGRESS NOTES
Negative for adenopathy. Does not bruise/bleed easily. Psychiatric/Behavioral: Negative for agitation, behavioral problems and sleep disturbance. The patient is not nervous/anxious. Objective:   Physical Exam   Constitutional: He is oriented to person, place, and time. No distress. HENT:   Head: Normocephalic and atraumatic. Right Ear: External ear normal.   Left Ear: External ear normal.   Eyes: Pupils are equal, round, and reactive to light. Conjunctivae and EOM are normal.   Neck: Normal range of motion. No tracheal deviation present. No thyromegaly present. Cardiovascular: Normal rate, regular rhythm and intact distal pulses. Exam reveals no gallop and no friction rub. No murmur heard. Pulmonary/Chest: No stridor. No respiratory distress. He has no wheezes. He has no rales. He exhibits no tenderness. Abdominal: Soft. Bowel sounds are normal. He exhibits no distension. There is no tenderness. There is no rebound. Musculoskeletal: Normal range of motion. Lymphadenopathy:     He has no cervical adenopathy. Neurological: He is alert and oriented to person, place, and time. He displays normal reflexes. No cranial nerve deficit. He exhibits normal muscle tone. Skin: Skin is warm. No rash noted. No erythema. No pallor. Assessment:       Diagnosis Orders   1. Screen for colon cancer     2. Chronic renal failure syndrome, stage 3 (moderate) (HCC)     3. Nephropathy     4. Cirrhosis of liver with ascites, unspecified hepatic cirrhosis type ([facility] [address]     5. History of hepatitis C     6. Chronic lumbar radiculopathy     7. Acquired hypothyroidism     8. Chronic hepatitis C without hepatic coma (HCC)     9. History of alcohol use     10. Aortic valve insufficiency, etiology of cardiac valve disease unspecified     11. Idiopathic chronic gout of multiple sites without tophus     12. Dilated cardiomyopathy     13. Mixed hyperlipidemia     14. History of colonic polyps     15.  Screening for thyroid

## 2019-07-24 ENCOUNTER — HOSPITAL ENCOUNTER (OUTPATIENT)
Age: 69
Setting detail: SPECIMEN
Discharge: HOME | End: 2019-07-24
Payer: MEDICARE

## 2019-07-24 DIAGNOSIS — Z90.79 HISTORY OF PROSTATECTOMY: ICD-10-CM

## 2019-07-24 DIAGNOSIS — E53.9 VITAMIN B DEFICIENCY: ICD-10-CM

## 2019-07-24 DIAGNOSIS — Z13.29 SCREENING FOR THYROID DISORDER: ICD-10-CM

## 2019-07-24 DIAGNOSIS — E78.5 DYSLIPIDEMIA: ICD-10-CM

## 2019-07-24 DIAGNOSIS — Z13.1 SCREENING FOR DIABETES MELLITUS: ICD-10-CM

## 2019-07-24 LAB
CHOLESTEROL/HDL RATIO: 7.6
CHOLESTEROL: 212 MG/DL
ESTIMATED AVERAGE GLUCOSE: 131 MG/DL
HBA1C MFR BLD: 6.2 % (ref 4–6)
HDLC SERPL-MCNC: 28 MG/DL
LDL CHOLESTEROL: 146 MG/DL (ref 0–130)
TRIGL SERPL-MCNC: 189 MG/DL
TSH SERPL DL<=0.05 MIU/L-ACNC: 4.9 MIU/L (ref 0.3–5)
VLDLC SERPL CALC-MCNC: ABNORMAL MG/DL (ref 1–30)

## 2019-07-25 LAB
FOLATE: 6.1 NG/ML
PROSTATE SPECIFIC ANTIGEN: <0.01 UG/L
VITAMIN B-12: 360 PG/ML (ref 232–1245)

## 2019-08-06 ENCOUNTER — TELEPHONE (OUTPATIENT)
Dept: GASTROENTEROLOGY | Age: 69
End: 2019-08-06

## 2019-08-06 DIAGNOSIS — K74.60 CIRRHOSIS OF LIVER WITHOUT ASCITES, UNSPECIFIED HEPATIC CIRRHOSIS TYPE (HCC): ICD-10-CM

## 2019-08-06 DIAGNOSIS — R10.11 RIGHT UPPER QUADRANT ABDOMINAL PAIN: Primary | ICD-10-CM

## 2019-08-07 DIAGNOSIS — G62.9 NEUROPATHY: ICD-10-CM

## 2019-08-13 ENCOUNTER — TELEPHONE (OUTPATIENT)
Dept: GASTROENTEROLOGY | Age: 69
End: 2019-08-13

## 2019-08-19 ENCOUNTER — TELEPHONE (OUTPATIENT)
Dept: GASTROENTEROLOGY | Age: 69
End: 2019-08-19

## 2019-08-22 ENCOUNTER — HOSPITAL ENCOUNTER (OUTPATIENT)
Dept: ULTRASOUND IMAGING | Age: 69
Discharge: HOME | End: 2019-08-24
Payer: COMMERCIAL

## 2019-08-22 DIAGNOSIS — K74.60 CIRRHOSIS OF LIVER WITHOUT ASCITES, UNSPECIFIED HEPATIC CIRRHOSIS TYPE (HCC): ICD-10-CM

## 2019-08-22 PROCEDURE — 76705 ECHO EXAM OF ABDOMEN: CPT

## 2019-08-28 ENCOUNTER — OFFICE VISIT (OUTPATIENT)
Dept: GASTROENTEROLOGY | Age: 69
End: 2019-08-28
Payer: COMMERCIAL

## 2019-08-28 VITALS
DIASTOLIC BLOOD PRESSURE: 60 MMHG | BODY MASS INDEX: 35.48 KG/M2 | WEIGHT: 254.4 LBS | SYSTOLIC BLOOD PRESSURE: 126 MMHG | HEART RATE: 59 BPM

## 2019-08-28 DIAGNOSIS — D12.2 ADENOMATOUS POLYP OF ASCENDING COLON: ICD-10-CM

## 2019-08-28 DIAGNOSIS — K74.60 CIRRHOSIS OF LIVER WITH ASCITES, UNSPECIFIED HEPATIC CIRRHOSIS TYPE (HCC): ICD-10-CM

## 2019-08-28 DIAGNOSIS — K74.60 CHRONIC HEPATITIS C WITH CIRRHOSIS (HCC): Primary | ICD-10-CM

## 2019-08-28 DIAGNOSIS — R18.8 CIRRHOSIS OF LIVER WITH ASCITES, UNSPECIFIED HEPATIC CIRRHOSIS TYPE (HCC): ICD-10-CM

## 2019-08-28 DIAGNOSIS — B18.2 CHRONIC HEPATITIS C WITH CIRRHOSIS (HCC): Primary | ICD-10-CM

## 2019-08-28 PROCEDURE — 99214 OFFICE O/P EST MOD 30 MIN: CPT | Performed by: INTERNAL MEDICINE

## 2019-08-28 RX ORDER — NIFEDIPINE 60 MG/1
60 TABLET, EXTENDED RELEASE ORAL DAILY
COMMUNITY
Start: 2019-06-12

## 2019-08-28 RX ORDER — PREDNISONE 1 MG/1
10 TABLET ORAL DAILY
COMMUNITY
Start: 2019-08-28 | End: 2020-07-01

## 2019-08-28 ASSESSMENT — ENCOUNTER SYMPTOMS
ABDOMINAL PAIN: 1
ANAL BLEEDING: 0
EYES NEGATIVE: 1
VOMITING: 0
TROUBLE SWALLOWING: 0
RECTAL PAIN: 0
RESPIRATORY NEGATIVE: 1
ABDOMINAL DISTENTION: 1
CONSTIPATION: 0
NAUSEA: 0
BLOOD IN STOOL: 0
ALLERGIC/IMMUNOLOGIC NEGATIVE: 1
DIARRHEA: 0

## 2019-08-29 ENCOUNTER — OFFICE VISIT (OUTPATIENT)
Dept: NEUROLOGY | Age: 69
End: 2019-08-29
Payer: COMMERCIAL

## 2019-08-29 VITALS
DIASTOLIC BLOOD PRESSURE: 53 MMHG | HEART RATE: 59 BPM | SYSTOLIC BLOOD PRESSURE: 136 MMHG | WEIGHT: 255.2 LBS | HEIGHT: 72 IN | BODY MASS INDEX: 34.57 KG/M2

## 2019-08-29 DIAGNOSIS — G62.9 NEUROPATHY: ICD-10-CM

## 2019-08-29 PROCEDURE — 99214 OFFICE O/P EST MOD 30 MIN: CPT | Performed by: PSYCHIATRY & NEUROLOGY

## 2019-08-29 RX ORDER — PREGABALIN 100 MG/1
100 CAPSULE ORAL 2 TIMES DAILY
Qty: 180 CAPSULE | Refills: 0 | Status: SHIPPED | OUTPATIENT
Start: 2019-08-29 | End: 2019-10-30

## 2019-08-29 NOTE — PROGRESS NOTES
[facility]  [facility], [address], [address]  Ph: [phone] or [phone]  FAX: [phone]            Dear Dr. Destiny Sepulveda MD     The patient comes in today as a follow-up visit. He has a past medical history of possible small fiber neuropathy in the lower extremities and history of chronic low back pain. On the last visit, he was continued on Lyrica. The patient reports only minimal improvement of the dysesthesia in the feet but reports that back pain has improved. He reports being compliant with medications and otherwise does not report having any side effects. In the meantime, he has been seen by his rheumatologist at [facility]. He has a previous history of gout and is currently on allopurinol. Since the last visit, there are no falls. No numbness or weakness in the upper extremities. No difficulty with the bowel or bladder. No neck pain. Previous EMG was consistent with possible chronic L4-L5 right radiculopathy but no evidence of peripheral neuropathy. Other comorbid conditions include history of hepatitis C status post therapy and history of gout for which he has been taking allopurinol.     Neurological work up:    [employer] Value Date    LDLCALC 94 04/23/2014    LDLCHOLESTEROL 146 (H) 07/24/2019     No components found for: CHLPL  Lab Results   Component Value Date    TRIG 189 (H) 07/24/2019    TRIG 178 (H) 08/02/2018    TRIG 93 03/20/2018     Lab Results   Component Value Date    HDL 28 (L) 07/24/2019    HDL 32 (L) 08/02/2018    HDL 27 (L) 03/20/2018     Lab Results   Component Value Date    LDLCALC 94 04/23/2014    LDLCALC 73 08/01/2013    LDLCALC 108 02/20/2013     No results found for: LABVLDL  Lab Results   Component Value Date    LABA1C 6.2 (H) 07/24/2019     Lab Results   Component Value Date     07/24/2019     Lab Results   Component Value Date    GJOHVUBQ22 360 07/24/2019      Neurological work

## 2019-09-09 ENCOUNTER — HOSPITAL ENCOUNTER (OUTPATIENT)
Age: 69
Setting detail: SPECIMEN
Discharge: HOME | End: 2019-09-09
Payer: COMMERCIAL

## 2019-09-09 LAB
ALBUMIN SERPL-MCNC: 4 G/DL (ref 3.5–5.2)
ALBUMIN/GLOBULIN RATIO: 1.3 (ref 1–2.5)
ALP BLD-CCNC: 51 U/L (ref 40–129)
ALT SERPL-CCNC: 23 U/L (ref 5–41)
ANION GAP SERPL CALCULATED.3IONS-SCNC: 12 MMOL/L (ref 9–17)
AST SERPL-CCNC: 25 U/L
BILIRUB SERPL-MCNC: 0.22 MG/DL (ref 0.3–1.2)
BUN BLDV-MCNC: 21 MG/DL (ref 8–23)
BUN/CREAT BLD: ABNORMAL (ref 9–20)
CALCIUM SERPL-MCNC: 9.1 MG/DL (ref 8.6–10.4)
CHLORIDE BLD-SCNC: 105 MMOL/L (ref 98–107)
CO2: 22 MMOL/L (ref 20–31)
CREAT SERPL-MCNC: 1.13 MG/DL (ref 0.7–1.2)
GFR AFRICAN AMERICAN: >60 ML/MIN
GFR NON-AFRICAN AMERICAN: >60 ML/MIN
GFR SERPL CREATININE-BSD FRML MDRD: ABNORMAL ML/MIN/{1.73_M2}
GFR SERPL CREATININE-BSD FRML MDRD: ABNORMAL ML/MIN/{1.73_M2}
GLUCOSE BLD-MCNC: 100 MG/DL (ref 70–99)
POTASSIUM SERPL-SCNC: 4.1 MMOL/L (ref 3.7–5.3)
SODIUM BLD-SCNC: 139 MMOL/L (ref 135–144)
TOTAL PROTEIN: 7.2 G/DL (ref 6.4–8.3)
URIC ACID: 3.7 MG/DL (ref 3.4–7)

## 2019-09-27 ENCOUNTER — TELEPHONE (OUTPATIENT)
Dept: FAMILY MEDICINE CLINIC | Age: 69
End: 2019-09-27

## 2019-09-30 ENCOUNTER — HOSPITAL ENCOUNTER (OUTPATIENT)
Age: 69
Setting detail: SPECIMEN
Discharge: HOME | End: 2019-09-30
Payer: COMMERCIAL

## 2019-09-30 ENCOUNTER — OFFICE VISIT (OUTPATIENT)
Dept: FAMILY MEDICINE CLINIC | Age: 69
End: 2019-09-30
Payer: COMMERCIAL

## 2019-09-30 VITALS
HEART RATE: 64 BPM | BODY MASS INDEX: 34.57 KG/M2 | SYSTOLIC BLOOD PRESSURE: 138 MMHG | WEIGHT: 255.2 LBS | OXYGEN SATURATION: 93 % | DIASTOLIC BLOOD PRESSURE: 62 MMHG | HEIGHT: 72 IN

## 2019-09-30 DIAGNOSIS — I42.0 DILATED CARDIOMYOPATHY (HCC): ICD-10-CM

## 2019-09-30 DIAGNOSIS — M1A.9XX0 CHRONIC GOUT WITHOUT TOPHUS, UNSPECIFIED CAUSE, UNSPECIFIED SITE: ICD-10-CM

## 2019-09-30 DIAGNOSIS — B18.2 CHRONIC HEPATITIS C WITHOUT HEPATIC COMA (HCC): ICD-10-CM

## 2019-09-30 DIAGNOSIS — M1A.9XX1 CHRONIC TOPHACEOUS GOUT: ICD-10-CM

## 2019-09-30 DIAGNOSIS — R01.1 CARDIAC MURMUR, UNSPECIFIED: ICD-10-CM

## 2019-09-30 DIAGNOSIS — E11.8 CONTROLLED TYPE 2 DIABETES MELLITUS WITH COMPLICATION, WITHOUT LONG-TERM CURRENT USE OF INSULIN (HCC): ICD-10-CM

## 2019-09-30 DIAGNOSIS — F41.1 GAD (GENERALIZED ANXIETY DISORDER): ICD-10-CM

## 2019-09-30 DIAGNOSIS — N18.2 CKD (CHRONIC KIDNEY DISEASE), STAGE II: ICD-10-CM

## 2019-09-30 DIAGNOSIS — E11.8 TYPE 2 DIABETES MELLITUS WITH COMPLICATION, WITHOUT LONG-TERM CURRENT USE OF INSULIN (HCC): ICD-10-CM

## 2019-09-30 DIAGNOSIS — N28.9 NEPHROPATHY: ICD-10-CM

## 2019-09-30 DIAGNOSIS — N18.30 CKD (CHRONIC KIDNEY DISEASE) STAGE 3, GFR 30-59 ML/MIN (HCC): ICD-10-CM

## 2019-09-30 DIAGNOSIS — G89.4 CHRONIC PAIN SYNDROME: ICD-10-CM

## 2019-09-30 DIAGNOSIS — I10 ESSENTIAL HYPERTENSION: ICD-10-CM

## 2019-09-30 DIAGNOSIS — Z23 NEED FOR INFLUENZA VACCINATION: Primary | ICD-10-CM

## 2019-09-30 DIAGNOSIS — Z87.19 HISTORY OF LIVER DISEASE: ICD-10-CM

## 2019-09-30 DIAGNOSIS — K70.31 ASCITES DUE TO ALCOHOLIC CIRRHOSIS (HCC): ICD-10-CM

## 2019-09-30 DIAGNOSIS — E03.9 ACQUIRED HYPOTHYROIDISM: ICD-10-CM

## 2019-09-30 PROBLEM — E11.9 TYPE 2 DIABETES MELLITUS, WITHOUT LONG-TERM CURRENT USE OF INSULIN (HCC): Status: ACTIVE | Noted: 2019-09-30

## 2019-09-30 LAB
HCT VFR BLD CALC: 44.2 % (ref 40.7–50.3)
HEMOGLOBIN: 14.7 G/DL (ref 13–17)
MCH RBC QN AUTO: 34.2 PG (ref 25.2–33.5)
MCHC RBC AUTO-ENTMCNC: 33.3 G/DL (ref 28.4–34.8)
MCV RBC AUTO: 102.8 FL (ref 82.6–102.9)
NRBC AUTOMATED: 0 PER 100 WBC
PDW BLD-RTO: 13.8 % (ref 11.8–14.4)
PLATELET # BLD: 229 K/UL (ref 138–453)
PMV BLD AUTO: 10.5 FL (ref 8.1–13.5)
RBC # BLD: 4.3 M/UL (ref 4.21–5.77)
WBC # BLD: 13.8 K/UL (ref 3.5–11.3)

## 2019-09-30 PROCEDURE — 90472 IMMUNIZATION ADMIN EACH ADD: CPT | Performed by: FAMILY MEDICINE

## 2019-09-30 PROCEDURE — 99214 OFFICE O/P EST MOD 30 MIN: CPT | Performed by: FAMILY MEDICINE

## 2019-09-30 PROCEDURE — 90471 IMMUNIZATION ADMIN: CPT | Performed by: FAMILY MEDICINE

## 2019-09-30 PROCEDURE — 90653 IIV ADJUVANT VACCINE IM: CPT | Performed by: FAMILY MEDICINE

## 2019-09-30 RX ORDER — DULOXETIN HYDROCHLORIDE 20 MG/1
20 CAPSULE, DELAYED RELEASE ORAL DAILY
Qty: 30 CAPSULE | Refills: 0 | Status: SHIPPED | OUTPATIENT
Start: 2019-09-30 | End: 2019-10-30

## 2019-09-30 ASSESSMENT — ENCOUNTER SYMPTOMS
RHINORRHEA: 0
COLOR CHANGE: 0
ABDOMINAL PAIN: 0
EYE PAIN: 0
CONSTIPATION: 0
SINUS PRESSURE: 0
BACK PAIN: 0
VOICE CHANGE: 0
NAUSEA: 0
DIARRHEA: 0
SHORTNESS OF BREATH: 0
CHEST TIGHTNESS: 0
VOMITING: 0
EYE DISCHARGE: 0
ABDOMINAL DISTENTION: 0
SORE THROAT: 0

## 2019-09-30 ASSESSMENT — PATIENT HEALTH QUESTIONNAIRE - PHQ9
SUM OF ALL RESPONSES TO PHQ QUESTIONS 1-9: 0
SUM OF ALL RESPONSES TO PHQ9 QUESTIONS 1 & 2: 0
SUM OF ALL RESPONSES TO PHQ9 QUESTIONS 1 & 2: 0
2. FEELING DOWN, DEPRESSED OR HOPELESS: 0
SUM OF ALL RESPONSES TO PHQ QUESTIONS 1-9: 0
1. LITTLE INTEREST OR PLEASURE IN DOING THINGS: 0
2. FEELING DOWN, DEPRESSED OR HOPELESS: 0
1. LITTLE INTEREST OR PLEASURE IN DOING THINGS: 0
SUM OF ALL RESPONSES TO PHQ QUESTIONS 1-9: 0
SUM OF ALL RESPONSES TO PHQ QUESTIONS 1-9: 0

## 2019-10-02 ENCOUNTER — TELEPHONE (OUTPATIENT)
Dept: GASTROENTEROLOGY | Age: 69
End: 2019-10-02

## 2019-10-07 ENCOUNTER — ANESTHESIA EVENT (OUTPATIENT)
Dept: OPERATING ROOM | Age: 69
End: 2019-10-07
Payer: COMMERCIAL

## 2019-10-07 ENCOUNTER — HOSPITAL ENCOUNTER (OUTPATIENT)
Age: 69
Setting detail: OUTPATIENT SURGERY
Discharge: HOME | End: 2019-10-07
Attending: INTERNAL MEDICINE | Admitting: INTERNAL MEDICINE
Payer: COMMERCIAL

## 2019-10-07 ENCOUNTER — ANESTHESIA (OUTPATIENT)
Dept: OPERATING ROOM | Age: 69
End: 2019-10-07
Payer: COMMERCIAL

## 2019-10-07 VITALS
SYSTOLIC BLOOD PRESSURE: 130 MMHG | OXYGEN SATURATION: 94 % | RESPIRATION RATE: 13 BRPM | DIASTOLIC BLOOD PRESSURE: 64 MMHG

## 2019-10-07 VITALS
DIASTOLIC BLOOD PRESSURE: 89 MMHG | WEIGHT: 250.6 LBS | BODY MASS INDEX: 33.94 KG/M2 | TEMPERATURE: 97.3 F | OXYGEN SATURATION: 98 % | HEIGHT: 72 IN | HEART RATE: 52 BPM | SYSTOLIC BLOOD PRESSURE: 126 MMHG | RESPIRATION RATE: 12 BRPM

## 2019-10-07 LAB
GLUCOSE BLD-MCNC: 96 MG/DL (ref 75–110)
GLUCOSE BLD-MCNC: 97 MG/DL (ref 75–110)

## 2019-10-07 PROCEDURE — 3700000000 HC ANESTHESIA ATTENDED CARE: Performed by: INTERNAL MEDICINE

## 2019-10-07 PROCEDURE — 7100000011 HC PHASE II RECOVERY - ADDTL 15 MIN: Performed by: INTERNAL MEDICINE

## 2019-10-07 PROCEDURE — 2580000003 HC RX 258: Performed by: ANESTHESIOLOGY

## 2019-10-07 PROCEDURE — 3609010600 HC COLONOSCOPY POLYPECTOMY SNARE/COLD BIOPSY: Performed by: INTERNAL MEDICINE

## 2019-10-07 PROCEDURE — 45380 COLONOSCOPY AND BIOPSY: CPT | Performed by: INTERNAL MEDICINE

## 2019-10-07 PROCEDURE — 2709999900 HC NON-CHARGEABLE SUPPLY: Performed by: INTERNAL MEDICINE

## 2019-10-07 PROCEDURE — 82947 ASSAY GLUCOSE BLOOD QUANT: CPT

## 2019-10-07 PROCEDURE — 7100000010 HC PHASE II RECOVERY - FIRST 15 MIN: Performed by: INTERNAL MEDICINE

## 2019-10-07 PROCEDURE — 2580000003 HC RX 258: Performed by: NURSE ANESTHETIST, CERTIFIED REGISTERED

## 2019-10-07 PROCEDURE — 6360000002 HC RX W HCPCS: Performed by: NURSE ANESTHETIST, CERTIFIED REGISTERED

## 2019-10-07 PROCEDURE — 88305 TISSUE EXAM BY PATHOLOGIST: CPT

## 2019-10-07 PROCEDURE — 2500000003 HC RX 250 WO HCPCS: Performed by: NURSE ANESTHETIST, CERTIFIED REGISTERED

## 2019-10-07 PROCEDURE — 3700000001 HC ADD 15 MINUTES (ANESTHESIA): Performed by: INTERNAL MEDICINE

## 2019-10-07 RX ORDER — SODIUM CHLORIDE, SODIUM LACTATE, POTASSIUM CHLORIDE, CALCIUM CHLORIDE 600; 310; 30; 20 MG/100ML; MG/100ML; MG/100ML; MG/100ML
INJECTION, SOLUTION INTRAVENOUS CONTINUOUS PRN
Status: DISCONTINUED | OUTPATIENT
Start: 2019-10-07 | End: 2019-10-07 | Stop reason: SDUPTHER

## 2019-10-07 RX ORDER — SODIUM CHLORIDE, SODIUM LACTATE, POTASSIUM CHLORIDE, CALCIUM CHLORIDE 600; 310; 30; 20 MG/100ML; MG/100ML; MG/100ML; MG/100ML
INJECTION, SOLUTION INTRAVENOUS CONTINUOUS
Status: DISCONTINUED | OUTPATIENT
Start: 2019-10-07 | End: 2019-10-07 | Stop reason: HOSPADM

## 2019-10-07 RX ORDER — LIDOCAINE HYDROCHLORIDE 20 MG/ML
INJECTION, SOLUTION INFILTRATION; PERINEURAL PRN
Status: DISCONTINUED | OUTPATIENT
Start: 2019-10-07 | End: 2019-10-07 | Stop reason: SDUPTHER

## 2019-10-07 RX ORDER — PROPOFOL 10 MG/ML
INJECTION, EMULSION INTRAVENOUS PRN
Status: DISCONTINUED | OUTPATIENT
Start: 2019-10-07 | End: 2019-10-07 | Stop reason: SDUPTHER

## 2019-10-07 RX ADMIN — PROPOFOL 50 MG: 10 INJECTION, EMULSION INTRAVENOUS at 11:30

## 2019-10-07 RX ADMIN — PROPOFOL 50 MG: 10 INJECTION, EMULSION INTRAVENOUS at 11:24

## 2019-10-07 RX ADMIN — PROPOFOL 50 MG: 10 INJECTION, EMULSION INTRAVENOUS at 11:18

## 2019-10-07 RX ADMIN — LIDOCAINE HYDROCHLORIDE 80 MG: 20 INJECTION, SOLUTION INFILTRATION; PERINEURAL at 11:16

## 2019-10-07 RX ADMIN — SODIUM CHLORIDE, POTASSIUM CHLORIDE, SODIUM LACTATE AND CALCIUM CHLORIDE: 600; 310; 30; 20 INJECTION, SOLUTION INTRAVENOUS at 11:12

## 2019-10-07 RX ADMIN — PROPOFOL 50 MG: 10 INJECTION, EMULSION INTRAVENOUS at 11:26

## 2019-10-07 RX ADMIN — SODIUM CHLORIDE, POTASSIUM CHLORIDE, SODIUM LACTATE AND CALCIUM CHLORIDE: 600; 310; 30; 20 INJECTION, SOLUTION INTRAVENOUS at 10:19

## 2019-10-07 RX ADMIN — PROPOFOL 50 MG: 10 INJECTION, EMULSION INTRAVENOUS at 11:21

## 2019-10-07 RX ADMIN — PROPOFOL 70 MG: 10 INJECTION, EMULSION INTRAVENOUS at 11:16

## 2019-10-07 ASSESSMENT — PAIN SCALES - GENERAL
PAINLEVEL_OUTOF10: 0

## 2019-10-07 ASSESSMENT — PULMONARY FUNCTION TESTS
PIF_VALUE: 1

## 2019-10-07 ASSESSMENT — PAIN - FUNCTIONAL ASSESSMENT: PAIN_FUNCTIONAL_ASSESSMENT: 0-10

## 2019-10-08 LAB — SURGICAL PATHOLOGY REPORT: NORMAL

## 2019-10-15 ENCOUNTER — HOSPITAL ENCOUNTER (OUTPATIENT)
Dept: CT IMAGING | Age: 69
Discharge: HOME | End: 2019-10-17
Payer: COMMERCIAL

## 2019-10-15 DIAGNOSIS — M25.851 OTHER SPECIFIED JOINT DISORDERS, RIGHT HIP: ICD-10-CM

## 2019-10-15 PROCEDURE — 73700 CT LOWER EXTREMITY W/O DYE: CPT

## 2019-10-15 PROCEDURE — 76376 3D RENDER W/INTRP POSTPROCES: CPT

## 2019-10-21 ENCOUNTER — HOSPITAL ENCOUNTER (OUTPATIENT)
Dept: VASCULAR LAB | Age: 69
Discharge: HOME | End: 2019-10-21
Payer: COMMERCIAL

## 2019-10-21 ENCOUNTER — HOSPITAL ENCOUNTER (OUTPATIENT)
Age: 69
Discharge: HOME | End: 2019-10-21
Payer: COMMERCIAL

## 2019-10-21 DIAGNOSIS — B18.2 CHRONIC HEPATITIS C WITH CIRRHOSIS (HCC): ICD-10-CM

## 2019-10-21 DIAGNOSIS — D12.2 ADENOMATOUS POLYP OF ASCENDING COLON: ICD-10-CM

## 2019-10-21 DIAGNOSIS — K74.60 CHRONIC HEPATITIS C WITH CIRRHOSIS (HCC): ICD-10-CM

## 2019-10-21 DIAGNOSIS — R18.8 CIRRHOSIS OF LIVER WITH ASCITES, UNSPECIFIED HEPATIC CIRRHOSIS TYPE (HCC): ICD-10-CM

## 2019-10-21 DIAGNOSIS — K74.60 CIRRHOSIS OF LIVER WITH ASCITES, UNSPECIFIED HEPATIC CIRRHOSIS TYPE (HCC): ICD-10-CM

## 2019-10-21 LAB — AMMONIA: 39 UMOL/L (ref 16–60)

## 2019-10-21 PROCEDURE — 36415 COLL VENOUS BLD VENIPUNCTURE: CPT

## 2019-10-21 PROCEDURE — 93971 EXTREMITY STUDY: CPT

## 2019-10-21 PROCEDURE — 82140 ASSAY OF AMMONIA: CPT

## 2019-10-28 ENCOUNTER — OFFICE VISIT (OUTPATIENT)
Dept: FAMILY MEDICINE CLINIC | Age: 69
End: 2019-10-28
Payer: COMMERCIAL

## 2019-10-28 VITALS
DIASTOLIC BLOOD PRESSURE: 63 MMHG | HEIGHT: 72 IN | OXYGEN SATURATION: 95 % | BODY MASS INDEX: 34.59 KG/M2 | SYSTOLIC BLOOD PRESSURE: 135 MMHG | WEIGHT: 255.4 LBS | HEART RATE: 57 BPM

## 2019-10-28 DIAGNOSIS — E11.69 TYPE 2 DIABETES MELLITUS WITH OTHER SPECIFIED COMPLICATION, WITHOUT LONG-TERM CURRENT USE OF INSULIN (HCC): ICD-10-CM

## 2019-10-28 DIAGNOSIS — I10 ESSENTIAL HYPERTENSION: ICD-10-CM

## 2019-10-28 DIAGNOSIS — B18.2 CHRONIC HEPATITIS C WITH CIRRHOSIS (HCC): ICD-10-CM

## 2019-10-28 DIAGNOSIS — M1A.09X0 IDIOPATHIC CHRONIC GOUT OF MULTIPLE SITES WITHOUT TOPHUS: ICD-10-CM

## 2019-10-28 DIAGNOSIS — M22.41 CHONDROMALACIA OF RIGHT PATELLA: ICD-10-CM

## 2019-10-28 DIAGNOSIS — I42.0 DILATED CARDIOMYOPATHY (HCC): ICD-10-CM

## 2019-10-28 DIAGNOSIS — K74.69 OTHER CIRRHOSIS OF LIVER (HCC): ICD-10-CM

## 2019-10-28 DIAGNOSIS — E78.2 MIXED HYPERLIPIDEMIA: ICD-10-CM

## 2019-10-28 DIAGNOSIS — E78.5 DYSLIPIDEMIA: Primary | ICD-10-CM

## 2019-10-28 DIAGNOSIS — I35.1 AORTIC VALVE INSUFFICIENCY, ETIOLOGY OF CARDIAC VALVE DISEASE UNSPECIFIED: ICD-10-CM

## 2019-10-28 DIAGNOSIS — E03.9 ACQUIRED HYPOTHYROIDISM: ICD-10-CM

## 2019-10-28 DIAGNOSIS — K74.60 CHRONIC HEPATITIS C WITH CIRRHOSIS (HCC): ICD-10-CM

## 2019-10-28 PROCEDURE — 99214 OFFICE O/P EST MOD 30 MIN: CPT | Performed by: FAMILY MEDICINE

## 2019-10-28 RX ORDER — ATORVASTATIN CALCIUM 10 MG/1
10 TABLET, FILM COATED ORAL DAILY
Qty: 90 TABLET | Refills: 0 | Status: SHIPPED | OUTPATIENT
Start: 2019-10-28 | End: 2020-07-01

## 2019-10-28 ASSESSMENT — ENCOUNTER SYMPTOMS
CHEST TIGHTNESS: 0
ABDOMINAL DISTENTION: 0
RHINORRHEA: 0
EYE PAIN: 0
SINUS PRESSURE: 0
SHORTNESS OF BREATH: 0
BACK PAIN: 0
SORE THROAT: 0
ABDOMINAL PAIN: 0
NAUSEA: 0
COLOR CHANGE: 0
CONSTIPATION: 0
VOICE CHANGE: 0
EYE DISCHARGE: 0
DIARRHEA: 0
VOMITING: 0

## 2019-10-28 ASSESSMENT — PATIENT HEALTH QUESTIONNAIRE - PHQ9
SUM OF ALL RESPONSES TO PHQ9 QUESTIONS 1 & 2: 0
1. LITTLE INTEREST OR PLEASURE IN DOING THINGS: 0
SUM OF ALL RESPONSES TO PHQ QUESTIONS 1-9: 0
SUM OF ALL RESPONSES TO PHQ QUESTIONS 1-9: 0
2. FEELING DOWN, DEPRESSED OR HOPELESS: 0

## 2019-10-29 ENCOUNTER — TELEPHONE (OUTPATIENT)
Dept: GASTROENTEROLOGY | Age: 69
End: 2019-10-29

## 2019-10-30 ENCOUNTER — HOSPITAL ENCOUNTER (OUTPATIENT)
Dept: CARDIAC CATH/INVASIVE PROCEDURES | Age: 69
Discharge: HOME | End: 2019-10-30
Payer: COMMERCIAL

## 2019-10-30 VITALS
HEIGHT: 72 IN | RESPIRATION RATE: 10 BRPM | WEIGHT: 244 LBS | OXYGEN SATURATION: 93 % | HEART RATE: 58 BPM | TEMPERATURE: 97.4 F | SYSTOLIC BLOOD PRESSURE: 136 MMHG | DIASTOLIC BLOOD PRESSURE: 57 MMHG | BODY MASS INDEX: 33.05 KG/M2

## 2019-10-30 LAB
GFR NON-AFRICAN AMERICAN: 49 ML/MIN
GFR SERPL CREATININE-BSD FRML MDRD: 59 ML/MIN
GFR SERPL CREATININE-BSD FRML MDRD: ABNORMAL ML/MIN/{1.73_M2}
GLUCOSE BLD-MCNC: 131 MG/DL (ref 74–100)
LV EF: 55 %
LVEF MODALITY: NORMAL
PLATELET # BLD: 220 K/UL (ref 138–453)
POC CHLORIDE: 106 MMOL/L (ref 98–107)
POC CREATININE: 1.44 MG/DL (ref 0.51–1.19)
POC HEMATOCRIT: 48 % (ref 41–53)
POC HEMOGLOBIN: 16.3 G/DL (ref 13.5–17.5)
POC POTASSIUM: 4.8 MMOL/L (ref 3.5–4.5)
POC SODIUM: 142 MMOL/L (ref 138–146)

## 2019-10-30 PROCEDURE — 84132 ASSAY OF SERUM POTASSIUM: CPT

## 2019-10-30 PROCEDURE — 93456 R HRT CORONARY ARTERY ANGIO: CPT | Performed by: INTERNAL MEDICINE

## 2019-10-30 PROCEDURE — 93325 DOPPLER ECHO COLOR FLOW MAPG: CPT | Performed by: INTERNAL MEDICINE

## 2019-10-30 PROCEDURE — 82947 ASSAY GLUCOSE BLOOD QUANT: CPT

## 2019-10-30 PROCEDURE — 7100000001 HC PACU RECOVERY - ADDTL 15 MIN

## 2019-10-30 PROCEDURE — C1894 INTRO/SHEATH, NON-LASER: HCPCS

## 2019-10-30 PROCEDURE — 93325 DOPPLER ECHO COLOR FLOW MAPG: CPT

## 2019-10-30 PROCEDURE — 2709999900 HC NON-CHARGEABLE SUPPLY

## 2019-10-30 PROCEDURE — 82435 ASSAY OF BLOOD CHLORIDE: CPT

## 2019-10-30 PROCEDURE — 84295 ASSAY OF SERUM SODIUM: CPT

## 2019-10-30 PROCEDURE — 7100000000 HC PACU RECOVERY - FIRST 15 MIN

## 2019-10-30 PROCEDURE — 93320 DOPPLER ECHO COMPLETE: CPT | Performed by: INTERNAL MEDICINE

## 2019-10-30 PROCEDURE — 82565 ASSAY OF CREATININE: CPT

## 2019-10-30 PROCEDURE — C1769 GUIDE WIRE: HCPCS

## 2019-10-30 PROCEDURE — 93312 ECHO TRANSESOPHAGEAL: CPT | Performed by: INTERNAL MEDICINE

## 2019-10-30 PROCEDURE — C1751 CATH, INF, PER/CENT/MIDLINE: HCPCS

## 2019-10-30 PROCEDURE — 93312 ECHO TRANSESOPHAGEAL: CPT

## 2019-10-30 PROCEDURE — 85049 AUTOMATED PLATELET COUNT: CPT

## 2019-10-30 PROCEDURE — 6360000002 HC RX W HCPCS

## 2019-10-30 PROCEDURE — 85014 HEMATOCRIT: CPT

## 2019-10-30 RX ORDER — SODIUM CHLORIDE 0.9 % (FLUSH) 0.9 %
10 SYRINGE (ML) INJECTION PRN
Status: CANCELLED | OUTPATIENT
Start: 2019-10-30

## 2019-10-30 RX ORDER — ACETAMINOPHEN 325 MG/1
650 TABLET ORAL EVERY 4 HOURS PRN
Status: CANCELLED | OUTPATIENT
Start: 2019-10-30

## 2019-10-30 RX ORDER — ONDANSETRON 2 MG/ML
4 INJECTION INTRAMUSCULAR; INTRAVENOUS EVERY 6 HOURS PRN
Status: CANCELLED | OUTPATIENT
Start: 2019-10-30

## 2019-10-30 RX ORDER — NICOTINE 21 MG/24HR
1 PATCH, TRANSDERMAL 24 HOURS TRANSDERMAL EVERY 24 HOURS
COMMUNITY
End: 2020-02-12

## 2019-10-30 RX ORDER — SODIUM CHLORIDE 9 MG/ML
INJECTION, SOLUTION INTRAVENOUS CONTINUOUS
Status: DISCONTINUED | OUTPATIENT
Start: 2019-10-30 | End: 2019-10-31 | Stop reason: HOSPADM

## 2019-10-30 RX ORDER — SODIUM CHLORIDE 0.9 % (FLUSH) 0.9 %
10 SYRINGE (ML) INJECTION EVERY 12 HOURS SCHEDULED
Status: CANCELLED | OUTPATIENT
Start: 2019-10-30

## 2019-10-30 RX ADMIN — SODIUM CHLORIDE: 9 INJECTION, SOLUTION INTRAVENOUS at 12:56

## 2019-10-30 ASSESSMENT — PAIN DESCRIPTION - DESCRIPTORS: DESCRIPTORS: BURNING

## 2019-10-30 ASSESSMENT — PAIN DESCRIPTION - ORIENTATION: ORIENTATION: RIGHT;LEFT

## 2019-10-30 ASSESSMENT — PAIN DESCRIPTION - ONSET: ONSET: ON-GOING

## 2019-10-30 ASSESSMENT — PAIN DESCRIPTION - LOCATION: LOCATION: FOOT

## 2019-10-30 ASSESSMENT — PAIN DESCRIPTION - FREQUENCY: FREQUENCY: INTERMITTENT

## 2019-10-30 ASSESSMENT — PAIN DESCRIPTION - PROGRESSION: CLINICAL_PROGRESSION: NOT CHANGED

## 2019-10-30 ASSESSMENT — PAIN SCALES - GENERAL: PAINLEVEL_OUTOF10: 5

## 2019-10-31 ENCOUNTER — TELEPHONE (OUTPATIENT)
Dept: CARDIOTHORACIC SURGERY | Age: 69
End: 2019-10-31

## 2019-11-05 DIAGNOSIS — B18.2 CHRONIC HEPATITIS C WITH CIRRHOSIS (HCC): ICD-10-CM

## 2019-11-05 DIAGNOSIS — K74.60 CHRONIC HEPATITIS C WITH CIRRHOSIS (HCC): ICD-10-CM

## 2019-11-05 RX ORDER — SPIRONOLACTONE 100 MG/1
TABLET, FILM COATED ORAL
Qty: 90 TABLET | Refills: 1 | OUTPATIENT
Start: 2019-11-05

## 2019-11-06 ENCOUNTER — INITIAL CONSULT (OUTPATIENT)
Dept: CARDIOTHORACIC SURGERY | Age: 69
End: 2019-11-06
Payer: COMMERCIAL

## 2019-11-06 VITALS
OXYGEN SATURATION: 95 % | HEIGHT: 72 IN | HEART RATE: 57 BPM | SYSTOLIC BLOOD PRESSURE: 138 MMHG | TEMPERATURE: 98 F | DIASTOLIC BLOOD PRESSURE: 62 MMHG | WEIGHT: 250 LBS | BODY MASS INDEX: 33.86 KG/M2

## 2019-11-06 DIAGNOSIS — I35.1 AORTIC VALVE INSUFFICIENCY, ETIOLOGY OF CARDIAC VALVE DISEASE UNSPECIFIED: ICD-10-CM

## 2019-11-06 DIAGNOSIS — B18.2 CHRONIC HEPATITIS C WITH CIRRHOSIS (HCC): Primary | ICD-10-CM

## 2019-11-06 DIAGNOSIS — K74.60 CHRONIC HEPATITIS C WITH CIRRHOSIS (HCC): Primary | ICD-10-CM

## 2019-11-06 DIAGNOSIS — I70.0 AORTIC ATHEROSCLEROSIS (HCC): ICD-10-CM

## 2019-11-06 PROCEDURE — 99203 OFFICE O/P NEW LOW 30 MIN: CPT | Performed by: THORACIC SURGERY (CARDIOTHORACIC VASCULAR SURGERY)

## 2019-11-14 DIAGNOSIS — K74.60 CHRONIC HEPATITIS C WITH CIRRHOSIS (HCC): ICD-10-CM

## 2019-11-14 DIAGNOSIS — B18.2 CHRONIC HEPATITIS C WITH CIRRHOSIS (HCC): ICD-10-CM

## 2019-11-14 RX ORDER — SPIRONOLACTONE 100 MG/1
TABLET, FILM COATED ORAL
Qty: 90 TABLET | Refills: 1 | OUTPATIENT
Start: 2019-11-14

## 2019-11-20 ENCOUNTER — TELEPHONE (OUTPATIENT)
Dept: GASTROENTEROLOGY | Age: 69
End: 2019-11-20

## 2019-11-20 DIAGNOSIS — B18.2 CHRONIC HEPATITIS C WITH CIRRHOSIS (HCC): ICD-10-CM

## 2019-11-20 DIAGNOSIS — K74.60 CHRONIC HEPATITIS C WITH CIRRHOSIS (HCC): ICD-10-CM

## 2019-11-20 RX ORDER — SPIRONOLACTONE 100 MG/1
TABLET, FILM COATED ORAL
Qty: 90 TABLET | Refills: 1 | OUTPATIENT
Start: 2019-11-20

## 2019-12-04 DIAGNOSIS — G62.9 NEUROPATHY: ICD-10-CM

## 2019-12-05 ENCOUNTER — HOSPITAL ENCOUNTER (OUTPATIENT)
Age: 69
Setting detail: SPECIMEN
Discharge: HOME | End: 2019-12-05
Payer: COMMERCIAL

## 2019-12-05 DIAGNOSIS — N18.30 CKD (CHRONIC KIDNEY DISEASE), STAGE III (HCC): ICD-10-CM

## 2019-12-05 LAB
ANION GAP SERPL CALCULATED.3IONS-SCNC: 17 MMOL/L (ref 9–17)
BUN BLDV-MCNC: 21 MG/DL (ref 8–23)
BUN/CREAT BLD: ABNORMAL (ref 9–20)
CALCIUM SERPL-MCNC: 9.2 MG/DL (ref 8.6–10.4)
CHLORIDE BLD-SCNC: 105 MMOL/L (ref 98–107)
CO2: 21 MMOL/L (ref 20–31)
CREAT SERPL-MCNC: 1.19 MG/DL (ref 0.7–1.2)
GFR AFRICAN AMERICAN: >60 ML/MIN
GFR NON-AFRICAN AMERICAN: >60 ML/MIN
GFR SERPL CREATININE-BSD FRML MDRD: ABNORMAL ML/MIN/{1.73_M2}
GFR SERPL CREATININE-BSD FRML MDRD: ABNORMAL ML/MIN/{1.73_M2}
GLUCOSE BLD-MCNC: 142 MG/DL (ref 70–99)
POTASSIUM SERPL-SCNC: 4.7 MMOL/L (ref 3.7–5.3)
SODIUM BLD-SCNC: 143 MMOL/L (ref 135–144)

## 2019-12-05 RX ORDER — PREGABALIN 100 MG/1
CAPSULE ORAL
Qty: 180 CAPSULE | Refills: 1 | Status: SHIPPED | OUTPATIENT
Start: 2019-12-05 | End: 2020-07-01 | Stop reason: SDUPTHER

## 2019-12-11 RX ORDER — SPIRONOLACTONE 50 MG/1
50 TABLET, FILM COATED ORAL DAILY
Qty: 90 TABLET | Refills: 0 | Status: SHIPPED | OUTPATIENT
Start: 2019-12-11 | End: 2020-03-27

## 2019-12-15 ENCOUNTER — APPOINTMENT (OUTPATIENT)
Dept: GENERAL RADIOLOGY | Facility: CLINIC | Age: 69
End: 2019-12-15
Payer: COMMERCIAL

## 2019-12-15 ENCOUNTER — HOSPITAL ENCOUNTER (EMERGENCY)
Facility: CLINIC | Age: 69
Discharge: HOME | End: 2019-12-15
Attending: EMERGENCY MEDICINE
Payer: COMMERCIAL

## 2019-12-15 VITALS
WEIGHT: 250 LBS | BODY MASS INDEX: 33.86 KG/M2 | TEMPERATURE: 98.3 F | DIASTOLIC BLOOD PRESSURE: 59 MMHG | SYSTOLIC BLOOD PRESSURE: 158 MMHG | OXYGEN SATURATION: 96 % | HEART RATE: 66 BPM | HEIGHT: 72 IN | RESPIRATION RATE: 18 BRPM

## 2019-12-15 DIAGNOSIS — J40 BRONCHITIS: Primary | ICD-10-CM

## 2019-12-15 LAB
DIRECT EXAM: NORMAL
Lab: NORMAL
SPECIMEN DESCRIPTION: NORMAL

## 2019-12-15 PROCEDURE — 71046 X-RAY EXAM CHEST 2 VIEWS: CPT

## 2019-12-15 PROCEDURE — 87804 INFLUENZA ASSAY W/OPTIC: CPT

## 2019-12-15 PROCEDURE — 99283 EMERGENCY DEPT VISIT LOW MDM: CPT

## 2019-12-15 RX ORDER — ALBUTEROL SULFATE 90 UG/1
2 AEROSOL, METERED RESPIRATORY (INHALATION) EVERY 6 HOURS PRN
Qty: 1 INHALER | Refills: 3 | Status: SHIPPED | OUTPATIENT
Start: 2019-12-15 | End: 2020-02-12

## 2019-12-15 RX ORDER — PREDNISONE 20 MG/1
20 TABLET ORAL DAILY
Qty: 10 TABLET | Refills: 0 | Status: SHIPPED | OUTPATIENT
Start: 2019-12-15 | End: 2019-12-25

## 2019-12-15 RX ORDER — AMOXICILLIN 500 MG/1
500 CAPSULE ORAL 3 TIMES DAILY
Qty: 30 CAPSULE | Refills: 0 | Status: SHIPPED | OUTPATIENT
Start: 2019-12-15 | End: 2020-08-26 | Stop reason: SDUPTHER

## 2019-12-15 RX ORDER — BENZONATATE 100 MG/1
100 CAPSULE ORAL 3 TIMES DAILY PRN
Qty: 30 CAPSULE | Refills: 0 | Status: SHIPPED | OUTPATIENT
Start: 2019-12-15 | End: 2020-08-26 | Stop reason: SDUPTHER

## 2019-12-15 ASSESSMENT — ENCOUNTER SYMPTOMS
SHORTNESS OF BREATH: 0
COUGH: 1
DIARRHEA: 0
SORE THROAT: 0
VOMITING: 0

## 2019-12-16 ENCOUNTER — CARE COORDINATION (OUTPATIENT)
Dept: OTHER | Facility: CLINIC | Age: 69
End: 2019-12-16

## 2019-12-23 ENCOUNTER — CARE COORDINATION (OUTPATIENT)
Dept: OTHER | Facility: CLINIC | Age: 69
End: 2019-12-23

## 2020-01-02 ENCOUNTER — OFFICE VISIT (OUTPATIENT)
Dept: NEUROLOGY | Age: 70
End: 2020-01-02
Payer: COMMERCIAL

## 2020-01-02 VITALS
DIASTOLIC BLOOD PRESSURE: 71 MMHG | BODY MASS INDEX: 34.27 KG/M2 | WEIGHT: 253 LBS | SYSTOLIC BLOOD PRESSURE: 149 MMHG | HEIGHT: 72 IN | HEART RATE: 58 BPM

## 2020-01-02 PROCEDURE — 99214 OFFICE O/P EST MOD 30 MIN: CPT | Performed by: PSYCHIATRY & NEUROLOGY

## 2020-01-02 NOTE — PROGRESS NOTES
Seeing Dr. Argelia Patton MD at [facility] for Liver Transplant    History of alcohol use 9/18/2015    Hyperlipidemia     Hypertension     Lumbago     Malignant neoplasm of prostate ([address]     Otalgia of right ear     radiates down the jaw in the distribution of the third branch of the trigemminal nerve.     Sciatica      Past Surgical History:   Procedure Laterality Date    BACK SURGERY      L4-5    CARDIAC CATHETERIZATION  10.30/2019    Dr. Chiara Burt COLONOSCOPY  12/05/2016    [facility], states due for another in 2  years    COLONOSCOPY N/A 10/7/2019    tubular adenoma x2; hyperplastic polyp    HERNIA REPAIR      KNEE ARTHROSCOPY Right     OTHER SURGICAL HISTORY Right 01/25/2016    10 liters removed peracentesis    PARACENTESIS  11-4-15    PARACENTESIS  12/28/15    [place] EGD TRANSORAL BIOPSY SINGLE/MULTIPLE N/A 8/30/2017    EGD BIOPSY performed by Gena Gallo MD at [facility]  6/2011    TONSILLECTOMY      UPPER GASTROINTESTINAL ENDOSCOPY  10/24/2013    small segment barretts    UPPER GASTROINTESTINAL ENDOSCOPY  08/30/2017    VEIN SURGERY Left 12/07/2016    leg vein stripping    VENTRAL HERNIA REPAIR  05/26/15     Social History     Socioeconomic History    Marital status:      Spouse name: Not on file    Number of children: Not on file    Years of education: Not on file    Highest education level: Not on file   Occupational History    Not on file   Social Needs    Financial resource strain: Not on file    Food insecurity:     Worry: Not on file     Inability: Not on file    Transportation needs:     Medical: Not on file     Non-medical: Not on file   Tobacco Use    Smoking status: Former Smoker     Packs/day: 0.25     Years: 49.00     Pack years: 12.25     Types: Cigarettes     Start date: 8/18/1966    Smokeless tobacco: Never Used    Tobacco comment: maybe 1 cigarette per day   Substance and Sexual Activity    Alcohol use: No     Alcohol/week: 0.0 echo     MRI   EEG  CT head  CTA head Neck      Neurological examination:    Mental status   Alert and oriented; intact memory with no confusion, speech or language problems; no hallucinations or delusions     Cranial nerves   II - visual fields intact to confrontation                                                III, IV, VI - extra-ocular muscles full: no pupillary defect; no DEAN, no nystagmus, no ptosis   V - normal facial sensation                                                               VII - normal facial symmetry                                                             VIII - intact hearing                                                                             IX, X - symmetrical palate                                                                  XI - symmetrical shoulder shrug                                                       XII - midline tongue without atrophy or fasciculation     Motor function  Normal muscle bulk and tone; normal power 5/5, including fine motor movements     Sensory function Dysesthesia in both feet. Decreased sensation in distal one third of both lower extremities     Cerebellar Intact fine motor movement. No involuntary movements or tremors     Reflex function Intact 2+ DTR and symmetric. 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 Risk factor for neuropathy include medication-induced (possibly allopurinol), hepatitis C, borderline diabetes. Overall the patient reports his symptoms to be unchanged. He reports Lyrica to be helping mildly. At this time, I recommend continue Lyrica 100 mg twice daily. Down the road, the dose of Lyrica can be increased to 150 mg twice daily. Medication compliance, side effects, fall precautions were discussed and questions were answered.   At some stage, if symptoms persist, I would obtain a repeat EMG nerve conduction study  Patient to follow-up with me next 4 to 5 months. Jonathan David MD , I would like to thank you for the consult. Please feel free to contact me if there remains any further question about the patient care. Kaur Darling M.D. Diplomate, American Board of Psychiatry and Neurology  Diplomate, American Board of Clinical Neurophysiology  Diplomate, American Board of Epilepsy           This note is created with the assistance of a speech-recognition program. While intending to generate a document that actually reflects the content of the visit, the document can still have some errors including those of syntax and sound a- like substitutions which may escape proofreading. In such instances, actual meaning can be extrapolated by contextual derivation.

## 2020-01-02 NOTE — PROGRESS NOTES
REVIEW OF SYSTEMS    Constitutional Weight: absent, Appetite: absent, Fatigue: present   HEENT Visual disturbance: absent, Ears: ringing   Respiratory Shortness of breath: absent, Cough: present   Cardiovascular Chest pain: absent, Leg swelling :absent   GI Constipation: absent, Diarrhea: absent, Swallowing change: absent    Urinary frequency: absent, Urinary urgency: absent,    Musculoskeletal Neck pain: present, Back pain: present, Stiffness: absent, Muscle pain: absent, Joint pain: present   Dermatological Hair loss: absent, Skin changes: absent   Neurological Memory loss: absent, Confusion: absent, Seizures: absent, Trouble walking or imbalance: absent, Dizziness: absent, Weakness: absent, Numbness: absent Tremor: absent, Spasm: absent, Speech difficulty: absent, Headache: absent, Light sensitivity: absent   Psychiatric Anxiety: present, Hallucination: absent, Depression, absent   Hematologic Abnormal bleeding/Bruising: absent, Anemia: absent

## 2020-01-14 RX ORDER — OMEPRAZOLE 20 MG/1
40 CAPSULE, DELAYED RELEASE ORAL DAILY
Qty: 60 CAPSULE | Refills: 5 | OUTPATIENT
Start: 2020-01-14

## 2020-01-17 ENCOUNTER — HOSPITAL ENCOUNTER (OUTPATIENT)
Age: 70
Setting detail: SPECIMEN
Discharge: HOME | End: 2020-01-17
Payer: COMMERCIAL

## 2020-01-17 LAB
ALBUMIN SERPL-MCNC: 4.2 G/DL (ref 3.5–5.2)
ALBUMIN/GLOBULIN RATIO: 1.2 (ref 1–2.5)
ALP BLD-CCNC: 63 U/L (ref 40–129)
ALT SERPL-CCNC: 18 U/L (ref 5–41)
ANION GAP SERPL CALCULATED.3IONS-SCNC: 15 MMOL/L (ref 9–17)
AST SERPL-CCNC: 22 U/L
BILIRUB SERPL-MCNC: 0.27 MG/DL (ref 0.3–1.2)
BUN BLDV-MCNC: 23 MG/DL (ref 8–23)
BUN/CREAT BLD: ABNORMAL (ref 9–20)
CALCIUM SERPL-MCNC: 9.9 MG/DL (ref 8.6–10.4)
CHLORIDE BLD-SCNC: 100 MMOL/L (ref 98–107)
CO2: 23 MMOL/L (ref 20–31)
CREAT SERPL-MCNC: 1.14 MG/DL (ref 0.7–1.2)
GFR AFRICAN AMERICAN: >60 ML/MIN
GFR NON-AFRICAN AMERICAN: >60 ML/MIN
GFR SERPL CREATININE-BSD FRML MDRD: ABNORMAL ML/MIN/{1.73_M2}
GFR SERPL CREATININE-BSD FRML MDRD: ABNORMAL ML/MIN/{1.73_M2}
GLUCOSE BLD-MCNC: 138 MG/DL (ref 70–99)
POTASSIUM SERPL-SCNC: 4.6 MMOL/L (ref 3.7–5.3)
SODIUM BLD-SCNC: 138 MMOL/L (ref 135–144)
TOTAL PROTEIN: 7.6 G/DL (ref 6.4–8.3)
URIC ACID: 4.1 MG/DL (ref 3.4–7)

## 2020-02-12 ENCOUNTER — OFFICE VISIT (OUTPATIENT)
Dept: GASTROENTEROLOGY | Age: 70
End: 2020-02-12
Payer: COMMERCIAL

## 2020-02-12 VITALS
HEART RATE: 71 BPM | SYSTOLIC BLOOD PRESSURE: 139 MMHG | DIASTOLIC BLOOD PRESSURE: 61 MMHG | BODY MASS INDEX: 35.67 KG/M2 | WEIGHT: 263 LBS

## 2020-02-12 PROCEDURE — 99214 OFFICE O/P EST MOD 30 MIN: CPT | Performed by: INTERNAL MEDICINE

## 2020-02-12 ASSESSMENT — ENCOUNTER SYMPTOMS
VOMITING: 0
SHORTNESS OF BREATH: 1
TROUBLE SWALLOWING: 0
DIARRHEA: 0
ANAL BLEEDING: 0
BACK PAIN: 1
ALLERGIC/IMMUNOLOGIC NEGATIVE: 1
EYES NEGATIVE: 1
ABDOMINAL DISTENTION: 1
NAUSEA: 0
ABDOMINAL PAIN: 0
BLOOD IN STOOL: 0
CONSTIPATION: 0
RECTAL PAIN: 0

## 2020-02-12 NOTE — PROGRESS NOTES
Subjective:      Patient ID: Gustavo Sloan is a 81 y.o. male. HPI    Dr. Isa Trujillo, DO our mutual patient Gustavo Sloan was seen  for   1. Cirrhosis of liver without ascites, unspecified hepatic cirrhosis type ([address]    2. Other ascites    3. Leg edema    4. Adenomatous polyps     . The patient is here as a follow up of his recent GI procedure. The results have been sent to you separately   The findings were explained to the patient in detail and biopsies were also discussed   with him    He had colonoscopy and multiple polyps were removed showing adenomas  He has been having some bilateral edema and has hx of gout  He has been on diuretics and is followed by Nephrology  Has no overt bleeding  LFTs are grossly OK  Has normal HGB  Has been seen by neurology and has been diagnosed with DM as well  Has no overt bleeding  Has some abdominal bloating and gas  Has some cardiac issues  No sig dysphagia   No nausea or any vomiting  No current ETOH     Past Medical, Family, and Social History reviewed and does contribute to the patient presenting condition. patient\"s PMH/PSH,SH,PSYCH hx, MEDs, ALLERGIES, and ROS was all reviewed and updated ion the appropriate sections    Review of Systems   Constitutional: Positive for fatigue. Negative for unexpected weight change (weight gain). HENT: Negative for trouble swallowing. Eyes: Negative. Respiratory: Positive for shortness of breath. Cardiovascular: Positive for leg swelling (severe). Gastrointestinal: Positive for abdominal distention (some fluid). Negative for abdominal pain (off and on), anal bleeding, blood in stool, constipation, diarrhea (off and on), nausea, rectal pain and vomiting. Endocrine: Negative. Gout     Genitourinary: Negative. Musculoskeletal: Positive for arthralgias and back pain. Skin: Negative. Allergic/Immunologic: Negative. Neurological: Negative.   Negative for dizziness, weakness and light-headedness. Hematological: Negative. Psychiatric/Behavioral: Positive for sleep disturbance. Negative for agitation. The patient is nervous/anxious. Reviewed and agree  Objective:   Physical Exam  Nursing note reviewed. Constitutional:       Appearance: He is well-developed. Comments: Anxious  Mod obesity    HENT:      Head: Normocephalic and atraumatic. Eyes:      Conjunctiva/sclera: Conjunctivae normal.      Pupils: Pupils are equal, round, and reactive to light. Neck:      Musculoskeletal: Normal range of motion and neck supple. Cardiovascular:      Rate and Rhythm: Normal rate and regular rhythm. Pulmonary:      Effort: Pulmonary effort is normal.      Breath sounds: Rales present. Abdominal:      General: Bowel sounds are normal. There is distension. Palpations: Abdomen is soft. Tenderness: There is abdominal tenderness. Genitourinary:     Rectum: Normal.   Musculoskeletal: Normal range of motion. Right lower leg: Edema present. Left lower leg: Edema present. Skin:     General: Skin is warm. Findings: Bruising present. Neurological:      Mental Status: He is alert and oriented to person, place, and time. Deep Tendon Reflexes: Reflexes are normal and symmetric.          Assessment:      Patient Active Problem List   Diagnosis    Hyperlipidemia    Disc displacement, lumbar    Chronic hepatitis C with cirrhosis ([address]    Dilated cardiomyopathy    Gout    Diabetes type 2, controlled    Prostate cancer ([address]    Severe aortic insufficiency    Incisional hernia    Aortic atherosclerosis ([address]    Cervicalgia    Sciatica    Diverticulosis of colon    Ascites due to alcoholic cirrhosis ([address]    Essential hypertension    History of alcohol use    Hypomagnesemia    Chronic hepatitis C virus infection ([address]    Acquired hypothyroidism    Chondromalacia patellae    Awaiting organ transplant status    Varicose veins of left lower extremity  Anxiety    Colon polyps    History of hepatitis C    Right lumbar radiculitis    Post laminectomy syndrome    Hepatic cyst    Severe comorbid illness    Primary osteoarthritis of both knees    Chronic lumbar radiculopathy    Sol esophagus    CKD (chronic kidney disease) stage 3, GFR 30-59 ml/min (HCC)    Chronic tophaceous gout    Abdominal pain    Tear of right acetabular labrum    Hepatic cirrhosis (HCC)    Tinnitus    Chronic renal failure syndrome, stage 3 (moderate) (HCC)    Nephropathy    Type 2 diabetes mellitus, without long-term current use of insulin (HCC)    Dyslipidemia           Plan:      Cont Diuretic RX    Watch Renal function and is followed by Nephrology    Low Na diet    No Red meat    F/u LFTs    Will get Liver US before next visit    Need to be treated for his gout and seems to be causing some issues in his feet    Pt was advised in detail about some life style and dietary modifications. He was advised about avoidance of caffeine, nicotine and chocolate. Pt was also told to stay away from any kind of fast foods, soda pops. He was also advised to avoid lots of spices, grease and fried food etc.     Instructions were also given about trying to arrange the timing, quality and quantity of food. Instructions were given about using ample amount of fiber including dietary and supplemental fiber either metamucil, bennafiber or citrucell etc.  Pt was advised about drinking ample amount of water without any colors or chemicals. Stress was given about regular exercise. Pt has verbalized understanding and agreement to these modifications.

## 2020-02-24 ENCOUNTER — OFFICE VISIT (OUTPATIENT)
Dept: FAMILY MEDICINE CLINIC | Age: 70
End: 2020-02-24
Payer: COMMERCIAL

## 2020-02-24 VITALS
OXYGEN SATURATION: 91 % | TEMPERATURE: 97.8 F | RESPIRATION RATE: 16 BRPM | DIASTOLIC BLOOD PRESSURE: 72 MMHG | HEART RATE: 61 BPM | SYSTOLIC BLOOD PRESSURE: 134 MMHG | BODY MASS INDEX: 36.57 KG/M2 | HEIGHT: 72 IN | WEIGHT: 270 LBS

## 2020-02-24 PROCEDURE — 99215 OFFICE O/P EST HI 40 MIN: CPT | Performed by: INTERNAL MEDICINE

## 2020-02-24 RX ORDER — OMEPRAZOLE 20 MG/1
40 CAPSULE, DELAYED RELEASE ORAL DAILY
Qty: 180 CAPSULE | Refills: 5 | Status: SHIPPED | OUTPATIENT
Start: 2020-02-24 | End: 2021-06-03

## 2020-02-24 RX ORDER — MULTIVIT-MIN/IRON/FOLIC ACID/K 18-600-40
1 CAPSULE ORAL DAILY
Qty: 90 CAPSULE | Refills: 5 | Status: ON HOLD | OUTPATIENT
Start: 2020-02-24 | End: 2022-04-23

## 2020-02-24 ASSESSMENT — PATIENT HEALTH QUESTIONNAIRE - PHQ9
SUM OF ALL RESPONSES TO PHQ QUESTIONS 1-9: 2
1. LITTLE INTEREST OR PLEASURE IN DOING THINGS: 1
SUM OF ALL RESPONSES TO PHQ9 QUESTIONS 1 & 2: 2
2. FEELING DOWN, DEPRESSED OR HOPELESS: 1
SUM OF ALL RESPONSES TO PHQ QUESTIONS 1-9: 2

## 2020-02-25 ENCOUNTER — HOSPITAL ENCOUNTER (OUTPATIENT)
Age: 70
Setting detail: SPECIMEN
Discharge: HOME | End: 2020-02-25
Payer: COMMERCIAL

## 2020-02-25 LAB
ALBUMIN SERPL-MCNC: 4 G/DL (ref 3.5–5.2)
ALBUMIN/GLOBULIN RATIO: 1.3 (ref 1–2.5)
ALP BLD-CCNC: 58 U/L (ref 40–129)
ALT SERPL-CCNC: 23 U/L (ref 5–41)
ANION GAP SERPL CALCULATED.3IONS-SCNC: 14 MMOL/L (ref 9–17)
AST SERPL-CCNC: 18 U/L
BILIRUB SERPL-MCNC: 0.24 MG/DL (ref 0.3–1.2)
BUN BLDV-MCNC: 32 MG/DL (ref 8–23)
BUN/CREAT BLD: ABNORMAL (ref 9–20)
C-PEPTIDE: 7.3 NG/ML (ref 1.1–4.4)
CALCIUM SERPL-MCNC: 9.5 MG/DL (ref 8.6–10.4)
CHLORIDE BLD-SCNC: 106 MMOL/L (ref 98–107)
CHOLESTEROL/HDL RATIO: 6
CHOLESTEROL: 239 MG/DL
CO2: 25 MMOL/L (ref 20–31)
CREAT SERPL-MCNC: 1.25 MG/DL (ref 0.7–1.2)
CREATININE URINE: 111.2 MG/DL (ref 39–259)
ESTIMATED AVERAGE GLUCOSE: 146 MG/DL
GFR AFRICAN AMERICAN: >60 ML/MIN
GFR NON-AFRICAN AMERICAN: 58 ML/MIN
GFR SERPL CREATININE-BSD FRML MDRD: ABNORMAL ML/MIN/{1.73_M2}
GFR SERPL CREATININE-BSD FRML MDRD: ABNORMAL ML/MIN/{1.73_M2}
GLUCOSE BLD-MCNC: 105 MG/DL (ref 70–99)
HBA1C MFR BLD: 6.7 % (ref 4–6)
HDLC SERPL-MCNC: 40 MG/DL
LDL CHOLESTEROL: 161 MG/DL (ref 0–130)
MICROALBUMIN/CREAT 24H UR: 201 MG/L
MICROALBUMIN/CREAT UR-RTO: 181 MCG/MG CREAT
POTASSIUM SERPL-SCNC: 4.6 MMOL/L (ref 3.7–5.3)
SODIUM BLD-SCNC: 145 MMOL/L (ref 135–144)
TOTAL PROTEIN: 7.1 G/DL (ref 6.4–8.3)
TRIGL SERPL-MCNC: 188 MG/DL
VLDLC SERPL CALC-MCNC: ABNORMAL MG/DL (ref 1–30)

## 2020-03-03 ENCOUNTER — TELEPHONE (OUTPATIENT)
Dept: FAMILY MEDICINE CLINIC | Age: 70
End: 2020-03-03

## 2020-03-03 NOTE — TELEPHONE ENCOUNTER
Pt is stating that he needs an order for [facility]'s  lymphedema clinic for problems he is having with both feet.  Fax # [phone]

## 2020-03-08 ASSESSMENT — ENCOUNTER SYMPTOMS
DIARRHEA: 0
RECTAL PAIN: 0
SHORTNESS OF BREATH: 0
WHEEZING: 0
CHEST TIGHTNESS: 0
CONSTIPATION: 0
NAUSEA: 0
COUGH: 0
ABDOMINAL PAIN: 1
ABDOMINAL DISTENTION: 1
CHOKING: 0
STRIDOR: 0

## 2020-03-08 NOTE — PROGRESS NOTES
[address] [facility]  [address] [address]  Dept: [phone]  Dept Fax: [phone]    Marshall Gandhi a 81 y.o. male who presents today for his medical conditions/complaints as notedbelcelsa Octavio Fay is c/o of   Chief Complaint   Patient presents with    New Patient    Leg Swelling     lower leg swelling     Medication Refill     pt needs a refill of vitamin d, omeprazole. sitagliptin          HPI:     Here stablish care  Multiple chronic medical conditions  Patient sees many specialists   Has lists he brought in for us to keep   Coming from dr Chung Tijerina as primary before he switched jobs     Diabetes   He presents for his follow-up diabetic visit. He has type 2 diabetes mellitus. No MedicAlert identification noted. His disease course has been worsening. There are no hypoglycemic associated symptoms. Pertinent negatives for hypoglycemia include no dizziness or headaches. Associated symptoms include fatigue and foot paresthesias. Pertinent negatives for diabetes include no chest pain. There are no hypoglycemic complications. Symptoms are stable. There are no diabetic complications. Risk factors for coronary artery disease include diabetes mellitus, sedentary lifestyle, dyslipidemia, family history, obesity, male sex and hypertension. Current diabetic treatment includes oral agent (monotherapy). He is compliant with treatment most of the time. His weight is stable. He is following a generally healthy diet. Meal planning includes avoidance of concentrated sweets. He rarely participates in exercise. He does not see a podiatrist.  Leg Pain    The incident occurred more than 1 week ago. The incident occurred at home. There was no injury mechanism. The pain is present in the left leg and right leg. The quality of the pain is described as aching, cramping and stabbing. The pain is at a severity of 8/10. The pain is moderate.  The pain has been worsening since onset. Associated symptoms include a loss of motion, a loss of sensation and muscle weakness. Pertinent negatives include no inability to bear weight, numbness or tingling. He reports no foreign bodies present. The symptoms are aggravated by movement, palpation and weight bearing. He has tried elevation and rest for the symptoms. The treatment provided mild relief. Hemoglobin A1C (%)   Date Value   02/25/2020 6.7 (H)   07/24/2019 6.2 (H)   02/25/2019 5.6         ( goal A1C is < 7)   Microalb/Crt. Ratio (mcg/mg creat)   Date Value   02/25/2020 181 (H)     LDL Cholesterol (mg/dL)   Date Value   02/25/2020 161 (H)   07/24/2019 146 (H)   08/02/2018 133 (H)     LDL Calculated (mg/dL)   Date Value   04/23/2014 94   08/01/2013 73   02/20/2013 108       (goal LDL is <100)   AST (U/L)   Date Value   02/25/2020 18     ALT (U/L)   Date Value   02/25/2020 23     BUN (mg/dL)   Date Value   02/25/2020 32 (H)     BP Readings from Last 3 Encounters:   02/24/20 134/72   02/17/20 130/60   02/12/20 139/61          (goal 120/80)    Past Medical History:   Diagnosis Date    Anxiety state NEC     Arthritis     DJD    Sol esophagus 10/24/2013    small segment    Chronic kidney disease     Cirrhosis ([address]     had paracentesis Sept 2015    Colon polyps 10/07/2019    tubular adenoma x2; hyperplastic polyp    Diverticulosis of colon     Enlarged heart 12/28/2015    HISTORY OF, is resolved at this time    Gout 12/28/2015    is resolved at this time    Hepatic cirrhosis ([address]     Hepatic cyst     Hepatitis C, chronic ([address]     Seeing [facility]. Rocky Ortiz [facility] at [facility]    History of alcohol use 9/18/2015    Hyperlipidemia     Hypertension     Lumbago     Lymphedema     Malignant neoplasm of prostate ([address]     Otalgia of right ear     radiates down the jaw in the distribution of the third branch of the trigemminal nerve.     Sciatica       Past Surgical History: mouth daily (Patient taking differently: Take 40 mg by mouth 2 times daily ) 60 tablet 3    predniSONE (DELTASONE) 5 MG tablet 10 mg daily       NIFEdipine (PROCARDIA XL) 30 MG extended release tablet Take 30 mg by mouth daily       lactulose (CHRONULAC) 10 GM/15ML solution Take 15 mLs by mouth 3 times daily 5000 mL 5    allopurinol (ZYLOPRIM) 300 MG tablet Take 600 mg by mouth daily  90 tablet 1     No current facility-administered medications for this visit. Allergies   Allergen Reactions    Nsaids      Because of Kidney issues           Health Maintenance   Topic Date Due    Shingles Vaccine (2 of 3) 07/23/2020 (Originally 2/11/2016)    Diabetic retinal exam  07/31/2020 (Originally 3/21/1960)    Hepatitis A vaccine (4 of 4 - Hep A Twinrix risk 4-dose series) 07/31/2020 (Originally 12/9/2010)    Hepatitis B vaccine (4 of 4 - Hep B Twinrix risk 4-dose series) 07/31/2020 (Originally 12/9/2010)    Diabetic foot exam  03/27/2020    TSH testing  07/24/2020    PSA counseling  07/24/2020    A1C test (Diabetic or Prediabetic)  02/25/2021    Diabetic microalbuminuria test  02/25/2021    Lipid screen  02/25/2021    Potassium monitoring  02/25/2021    Creatinine monitoring  02/25/2021    Colon cancer screen colonoscopy  10/07/2021    DTaP/Tdap/Td vaccine (2 - Td) 12/17/2025    Flu vaccine  Completed    Pneumococcal 65+ years Vaccine  Completed    AAA screen  Completed    Hib vaccine  Aged Out    Meningococcal (ACWY) vaccine  Aged Out       Subjective:     Review of Systems   Constitutional: Positive for activity change and fatigue. Negative for fever and unexpected weight change. Eyes: Negative for visual disturbance. Respiratory: Negative for cough, choking, chest tightness, shortness of breath, wheezing and stridor. Cardiovascular: Positive for leg swelling. Negative for chest pain and palpitations. Gastrointestinal: Positive for abdominal distention and abdominal pain.  Negative for Findings: No rash. Neurological:      General: No focal deficit present. Mental Status: He is alert and oriented to person, place, and time. Cranial Nerves: Cranial nerves are intact. No cranial nerve deficit. Sensory: Sensation is intact. Deep Tendon Reflexes: Reflexes are normal and symmetric. Psychiatric:         Attention and Perception: Attention normal.         Speech: Speech is rapid and pressured. Cognition and Memory: Cognition and memory normal.       /72 (Site: Right Upper Arm, Position: Sitting, Cuff Size: Medium Adult)   Pulse 61   Temp 97.8 °F (36.6 °C) (Tympanic)   Resp 16   Ht 6' (1.829 m)   Wt 270 lb (122.5 kg)   SpO2 91%   BMI 36.62 kg/m²     Assessment:       Diagnosis Orders   1. Aortic valve insufficiency, etiology of cardiac valve disease unspecified     2. Chronic hepatitis C with cirrhosis ([facility]     3. CKD (chronic kidney disease) stage 3, GFR 30-59 ml/min ([facility])     4. Lymphedema     5. Chronic pain syndrome     6. Type 2 diabetes mellitus with complication, without long-term current use of insulin ([facility])  SITagliptin (JANUVIA) 25 MG tablet    Hemoglobin A1C    Lipid Panel    Microalbumin, Ur    Comprehensive Metabolic Panel    C-Peptide   7. Vitamin D deficiency  Cholecalciferol (VITAMIN D) 50 MCG (2000 UT) CAPS capsule             Plan:       Return in about 4 months (around 6/24/2020), or if symptoms worsen or fail to improve, for results, routine DM.     Orders Placed This Encounter   Procedures    Hemoglobin A1C     Standing Status:   Future     Number of Occurrences:   1     Standing Expiration Date:   2/24/2021    Lipid Panel     Standing Status:   Future     Number of Occurrences:   1     Standing Expiration Date:   2/24/2021     Order Specific Question:   Is Patient Fasting?/# of Hours     Answer:   yes    Microalbumin, Ur     Standing Status:   Future     Number of Occurrences:   1     Standing Expiration Date:   2/24/2021   Freddie Jensen Comprehensive Metabolic Panel     Standing Status:   Future     Number of Occurrences:   1     Standing Expiration Date:   2/24/2021    C-Peptide     Standing Status:   Future     Number of Occurrences:   1     Standing Expiration Date:   2/24/2021     Orders Placed This Encounter   Medications    SITagliptin (JANUVIA) 25 MG tablet     Sig: Take 1 tablet by mouth daily     Dispense:  90 tablet     Refill:  5    Cholecalciferol (VITAMIN D) 50 MCG (2000 UT) CAPS capsule     Sig: Take 1 capsule by mouth daily     Dispense:  90 capsule     Refill:  5    omeprazole (PRILOSEC) 20 MG delayed release capsule     Sig: Take 2 capsules by mouth daily     Dispense:  180 capsule     Refill:  5    continue dm medication   If you need to be on prednisone daily  Need to continue this     Refilled requested medications  Follow up with specilalists   Will refer to lymphedema clinic r legs     Check labs/up dlabs ie a1c to see where this is at   Follow up in 3 months      Patientgiven educational materials - see patient instructions. Discussed use, benefit,and side effects of prescribed medications. All patient questions answered. Ptvoiced understanding. Reviewed health maintenance. Instructed to continue currentmedications, diet and exercise. Patient agreed with treatment plan. Follow up asdirected.      Electronically signed by Manuelito Peña DO on 3/8/2020 at 12:36 PM

## 2020-03-17 ENCOUNTER — TELEPHONE (OUTPATIENT)
Dept: CARDIOTHORACIC SURGERY | Age: 70
End: 2020-03-17

## 2020-03-17 NOTE — TELEPHONE ENCOUNTER
Referral started in [place] for [facility] - Dr. Tahira Ray (NPI [id number])  Continued through the referral and it would not accept it. [facility] spoke with Chris Beal he walked through the referral with me and continued to get the same error message - missing or invalid CPT code. He started a case so I could call and speak with [facility]    Case # [id number] and I was to call [phone] (O number he gave me to facilitate referral)     I attempted to call that number x3 unable to speak with a live representative or a prompt to get me to a live representative. Called and spoke with [address] [facility] nurse [phone] explained to her what was going on and she researched it and called me back - told me that patient health comes first. Send [facility] to [facility]  for what is needed and we can worry about referral down the line. She documented the call and told me if I have any issues to contact her.

## 2020-03-17 NOTE — TELEPHONE ENCOUNTER
Effective 1/1/20  [employer] requires out of network referrals be entered through [employer].  of    Cardiothoracic Surgery. Seen by Dr. Johan Patel  In 11/2019 and he indicated  Reason for out of network referral    Nacho Beauchamp  Seen by Dr. Roberto Aguilar at [facility] in March and has include fluid retention and SOB indicating  Procedure needs to be done ASAP. Submission of  \"Urgent Out of Network Referral \"    Once  Referral entered please list in this  email the reference number once entered.

## 2020-03-20 NOTE — FLOWSHEET NOTE
[x] [facility]) [facility] &  Therapy  [address]    [phone]  F: [phone]   [] [address] [facility]  [facility] [address]   [address]  P: [phone]  F: [phone]  [] [place]  [address] [facility]  P: [phone]  F: [phone]  [] [address]  [address] [facility] [address]   [address]   [place]: [phone]  F: [phone]   [] [place] [address] [facility] [address]  [place]: [phone]   F: [phone]     Physical Therapy Cancel/No Show note    Date: 3/20/2020  Patient: Yulia Aguirre  : 1950  MRN: [id number]    Cancels/No Shows to date: [facility] CX - COVID 23    For today's appointment patient:    []  Cancelled    [] Rescheduled appointment    [] No-show     Reason given by patient:    []  Patient ill    []  Conflicting appointment    [] No transportation      [] Conflict with work    [] No reason given    [] Weather related    [x] Other:      Comments:  Sp[gin to Pt. Regarding 2020 CX appt. Gave Covid details.         [] Next appointment was confirmed    Electronically signed by: Nicholas Francisco

## 2020-03-23 ENCOUNTER — HOSPITAL ENCOUNTER (OUTPATIENT)
Dept: OCCUPATIONAL THERAPY | Age: 70
Setting detail: THERAPIES SERIES
Discharge: HOME | End: 2020-03-23
Payer: COMMERCIAL

## 2020-03-25 PROBLEM — E78.5 HYPERLIPIDEMIA: Status: RESOLVED | Noted: 2020-03-25 | Resolved: 2020-03-24

## 2020-03-27 RX ORDER — SPIRONOLACTONE 50 MG/1
TABLET, FILM COATED ORAL
Qty: 90 TABLET | Refills: 0 | Status: SHIPPED | OUTPATIENT
Start: 2020-03-27 | End: 2020-06-22 | Stop reason: SDUPTHER

## 2020-03-27 NOTE — TELEPHONE ENCOUNTER
Hoang Del Cid is calling to request a refill on the following medication(s):    Last Visit Date (If Applicable):  25/89/9133    Next Visit Date:    Visit date not found  Last filled 12/11/19  Medication Request:  Requested Prescriptions     Pending Prescriptions Disp Refills    spironolactone (ALDACTONE) 50 MG tablet [Pharmacy Med Name: SPIRONOLACTONE 50MG TABS] 90 tablet 0     Sig: TAKE 1 TABLET BY MOUTH ONE TIME A DAY

## 2020-04-03 ENCOUNTER — TELEPHONE (OUTPATIENT)
Dept: NEUROLOGY | Age: 70
End: 2020-04-03

## 2020-04-07 NOTE — TELEPHONE ENCOUNTER
Jack Edwards called to ask if Dr. Ny Walker had responded to his request made on 4/3/2020 for an increase in Lyrica. He expressed frustration at the delay. Writer reiterated that  was out of the office and that he had been told that on 4/3/20. He asked that we text or call to which I answered we cannot. I explained that once we have Dr. Tino Quinones reply we will call him to notify.

## 2020-05-20 ENCOUNTER — HOSPITAL ENCOUNTER (OUTPATIENT)
Dept: OCCUPATIONAL THERAPY | Age: 70
Setting detail: THERAPIES SERIES
Discharge: HOME | End: 2020-05-20
Payer: COMMERCIAL

## 2020-05-20 PROCEDURE — 97167 OT EVAL HIGH COMPLEX 60 MIN: CPT

## 2020-05-20 PROCEDURE — 97535 SELF CARE MNGMENT TRAINING: CPT

## 2020-05-20 NOTE — CONSULTS
 Hypertension      Lumbago      Lymphedema      Malignant neoplasm of prostate (HCC)      Otalgia of right ear       radiates down the jaw in the distribution of the third branch of the trigemminal nerve.  Sciatica         Patient lives with: Wife -    In what type of home []  One story   [x] Two story   [] Split level   Number of stairs to enter 6   With handrail on the [x]  Right to enter   [x] Left to enter   Bathroom has a []  Tub only  [x] Tub/shower combo   [x] Walk in shower    []  Grab bars   Washing machine is on []  Main level   [] Second level   [x] Basement   Employer    Job Status []  Normal duty   [] Light duty   [] Off due to condition    [x]  Retired   [] Not employed   [] Disability  [] Other:  []  Return to work:    Work activities/duties      ADL/IADL Previous level of function Current level of function Who currently assists the patient with task   Bathing  [x] Independent  [] Assist [x] Independent  [] Assist    Dress/grooming [x] Independent  [] Assist [x] Independent  [] Assist    Transfer/mobility [x] Independent  [] Assist [x] Independent  [] Assist    Feeding [x] Independent  [] Assist [x] Independent  [] Assist    Toileting [x] Independent  [] Assist [x] Independent  [] Assist    Driving [x] Independent  [] Assist [x] Independent  [] Assist    Housekeeping [x] Independent  [] Assist [x] Independent  [] Assist    Grocery shop/meal prep [x] Independent  [] Assist [x] Independent  [] Assist      Gait Prior level of function Current level of function    [x] Independent  [] Assist [x] Independent  [] Assist   Device: [] Independent [] Independent    [] Straight Cane [] Quad cane [] Straight Cane [] Quad cane    [] Standard walker [] Rolling walker   [] 4 wheeled walker [] Standard walker [] Rolling walker   [] 4 wheeled walker    [] Wheelchair [] Wheelchair       Restrictions/Precautions:   [] None              [] No blood pressure/blood draw in: [] LUE [] RUE     Fall Risk   [x] No of last treatment/ Rounds:  Radiation:  []Yes  [x]No  []Pending []In progress [] Completed   Date of last treatment/ Rounds:       Came to Clinic  [] Bandaged   R   L   [x]Unbandaged   R   L    [] With Stocking    R   L     [] With Sleeve    R   L    [] Unna Boot   R   L    []With alternative compression:  [] Kinesiotaped:    [] R Hand [] R Arm   [] R Leg  [] R Breast  [] R Upper back      [] L Hand        [] L Arm   [] L Leg   [] L Breast   [] L Upper back     [] Skin Sensitivity      Skin Integrity/Appearance: location   [] Normal [x] Dry   []Moist/weeping/blisters     [] Warmth/mild inflammation  [] Congestive Dermatitis  [] Brawny/hardened   [] Owsley hump-dorsum   [] Rash    [] Calor, Warm, Red, hot  [] Chronic erythema  [] Crusted/bumpy       [] Fatty lobules            [] Loose, lobular          []  Soft, doughy  [] Hyperkeratosis        [] Hyperplasia             [] Hyperpigmentation/hemosiderin staining/gaitor distribution    [] Skin breakdown with lymphorrhea             [] Recent cellulitis     [] Cording (BILLIE)  [] Fibrosis        [] Papillomatosis        [] Elephantiasis           [] Peau d' orange skin change   [] Firm edema  [x] Pitting edema (1 min + to refill)  [] Non-pitting edema (refills < 1 min)  Other Symptoms:  [] Symptom Onset:    [x] Slow            [] Rapid     [] Acute  [x] Truncal/abdominal swelling [] Chest/axillary swelling   [] Genital swelling  [] Fungal rash in skin folds [] Scapular swelling     [x] Impaired ROM       [x] Impaired mobility           [] Other:  [] Stemmers sign:     [] Absent         [x] Positive  [] Scars:                    [] Thick/rigid   [] Raised    [] Hypertrophic/contracted    [x] Flattened     [x] Softened  [] Mobility of scar:     [] Poor            [] Fair        [x] Good           [] Normal         Wounds Location:   [x] None  [] Being addressed by Enrique Briggs  [] Shallow, painful venous   [] denudement (top layer removed by moisture) [] Erythema      [] Maceration (soft from wetness but skin not broken)   [] Exudate (fluids expelled)         [] Purulent   [] Radiation burns/ulcers     [] Superficial abrasions  [] Excoriation (compulsive picking) [] Eschar (hard crust/scab)                Color: location  [x] Normal [] Mottled [] Flushed/erythema   [] Other    Pitting Edema:   Edema Location: [x] R LE  [x]  L LE   [] R UE    [] L UE  [] 1+ Edema [x] 2+ Edema  [] 3+ Edema [] 4+ Edema  Edema Location:   [] R LE  []  L LE   [] R UE    [] L UE  [] 1+ Edema [] 2+ Edema  [] 3+ Edema [] 4+ Edema       When did swelling start? Aggravating factors:  [x] Activity  [] Stress  [] Sleep Position [] Travel [x] Hot Temperatures [] Diet   []  Other   Relieving factors:       [] Activity  [] Compression  [x] Rest  [] Cold Temperatures [] Diet   []  Other   Response to elevation: [] Persists   [x] Reduces   [] Unaffected    Education Included:  [x] General Lymphedema Information  [] Dos and Donts [] Self MLD [] Self Bandaging  [] Kinesiotaping      Pt received education on the following healthy behaviors:   [] Nutrition   [] Home Exercises   [] Hygiene  [x] Skin/nail care   [] NLN Risk Reduction Practice handout    Learner: [x] Patient [] Spouse  []Other [] Family  Method: [x] Verbal [x] Demonstration [] Handouts [] Exercise booklet  Response:  [x] Verbalized understanding [x] Demonstrated understanding      [] Needs assist            [] No understanding    Physical Status:  Range of motion: Deficits [x] Yes [] No       Comment: Range becomes limited when edema increases  Strength:       Deficits [] Yes [x] No        Comment:  Sensation:       Deficits [x] Yes [] No        Comment: Numbness and tingling in the R foot. Transfer:       Deficits [] Yes [x] No        Comment:  Ambulation:       Deficits [x] Yes [] No        Comment: Difficulty walking long distances when the edema gets bad.    Functional Status:  Self Care:   [x] Independent [] Needs Assist [] Dependent   Home Maintenance:    [x] Independent [] Needs Assist [] Dependent  Fatigue:   [x] None identified [] Minimum  [] Moderate [] Significant     Suggested Professional Referral:  [] Dietician      [] Nutritionist     [] Physical Therapy   [] Counseling   [] Wound Care   [] Endovenous Surgeon  [] Podiatrist    Measurements Lower Extremity  Measurements in 10 cm increments at areas noted.    They are circumferential.   Measurements in Centimeters Right Left   MTS          Dorsum   12  27.0 26.0   Inframalleolar 18      37.0 36.3   Supramalleolar  24   28.3 25.5   10   32 32.5 27.0   20   40 42.0 35.0   30  49 44.0 40.5   Knee  60  42.0 39.0   10 cm above knee  78 48.0 48.0   Thigh-widest     Hip-widest     Total 300.8 277.3       Assessment  Knowledge of home program:  [x] Good [] Fair  [] Poor  Family can assist with programming: [x] Yes  [] No  Instructions for patient:   [x] Verbal [x] Demonstration [x] Written    [] Education reviewed   [] Risk Reduction Practices I-VI handout issued    Compression Garment recommendation for medical management:    Lower Extremity Garments:       [x] Hanna Pacini Custom - Compression Stockings ( Knee High)  [] Circ-aid Juxtafit Essentials-leggings and compression anklets, 6 month warrenty  [] Med[employer] DuoMed Advantage thigh high stockings with silicone band   [x] [employer] B LE - foot and leg pieces   [x] CompreFlex Lite with sock lines ( foot/ ankle compression)   [x] CompreBoot  [x] Compression Shorts Pants  [] [employer]   [] Custom made Flat Knit Lizet Pants     Regular:   [x] 20/30 mmHg-UEs  [] 30/40 mmHg-Stage II -starting point for LEs, no skin changes [] 40/50 mmHg-Stage III, skin changes  [] 50/60 mmHg    Custom:   [] 18-21mmHg         [x]23-32mmhg            Lymphedema Stage: Per ISL Guidelines  [] 0 - Subclinical with no evidence on physical exam  [] 1 - Early onset, swelling/heaviness, pitting edema, subsides with limb elevation  [x] 2 - More advanced, fibrosis resulting in non-pitting edema, does not respond to elevation, thickening of the tissues  [] 3 - Elephantiasis, pitting absent, huge limbs with dry/scaly, papillomatosis, hyperkeratosis, fluid may be leaking with recurrent cellulitis. Acanthosis (deep body folds). Elevation &   diuretics ineffective. Patient would benefit from skilled physical therapy services in order to: Reduce the amount of edema in the LE and improve overall quality of life     Rehabilitation Potential/ Commitment: [x] Good [] Fair     [] Poor  Post-treatment symptom assessment for swelling, heaviness, tightness to the affected limb, chest or truncal area: Same as on arrival    Patient's Goal: To reduce the edema to help reduce the pain associated when he stops walking     Therapy Goals:                 LOWER EXTREMITY GOALS:     1. Pt will demonstrate compliance of maintaining lymphedema precautions to reduce the risks of infection and further exacerbations by the 3rd visit. 2. (Pt/Family) will demonstrate proper technique with bandaging and understand the principles and theory behind bandaging technique in order to assist with decreasing swelling up to ( 5+ ) cm.     3. Pt will demonstrate independence with decongestive exercise program in order to expedite fluid rerouting. 4. Pt will demonstrate competence with (traditional) lymphatic drainage massage in order to reroute lymphatic pathways for decreased swelling. 5. Pt will demonstrate competence with kinesiotape technique to affected area(s) to assist with decreasing swelling. 6. Pt will demonstrate independence donning the device with safe and effective use of pneumatic pump in order for possible home use. 7. Pt will demonstrate safe and effective use of compression garment to maintain decreased size upon discharge.     8. Pt will demonstrate safe and effective use of alternative bandaging/ velcro devices to simplify and reduce size upon

## 2020-06-01 ENCOUNTER — TELEPHONE (OUTPATIENT)
Dept: GASTROENTEROLOGY | Age: 70
End: 2020-06-01

## 2020-06-01 NOTE — TELEPHONE ENCOUNTER
LVM for wife checking to see if asha wanted to keep his appt on 06/10 or reschedule as he is scheduled to have heart surgery on 06/03

## 2020-06-02 ENCOUNTER — TELEPHONE (OUTPATIENT)
Dept: CARDIOTHORACIC SURGERY | Age: 70
End: 2020-06-02

## 2020-06-02 NOTE — TELEPHONE ENCOUNTER
Patient's wife called saying the surgery was denied and that a physician needs to do a peer to peer. I told her I would try to figure it out and call her back.

## 2020-06-02 NOTE — TELEPHONE ENCOUNTER
Spoke to [facility] yesterday. They were calling to clarify why the patient was referred to an out of network provider by our office. I gave her the information and she verbalized understanding.

## 2020-06-23 RX ORDER — SPIRONOLACTONE 50 MG/1
TABLET, FILM COATED ORAL
Qty: 90 TABLET | Refills: 0 | Status: SHIPPED | OUTPATIENT
Start: 2020-06-23 | End: 2020-06-23

## 2020-06-23 RX ORDER — SPIRONOLACTONE 50 MG/1
TABLET, FILM COATED ORAL
Qty: 90 TABLET | Refills: 0 | Status: SHIPPED | OUTPATIENT
Start: 2020-06-23 | End: 2021-01-25 | Stop reason: SDUPTHER

## 2020-06-25 RX ORDER — FUROSEMIDE 20 MG/1
40 TABLET ORAL 2 TIMES DAILY
Qty: 180 TABLET | Refills: 0 | Status: SHIPPED | OUTPATIENT
Start: 2020-06-25 | End: 2020-11-13 | Stop reason: SDUPTHER

## 2020-06-25 RX ORDER — LACTULOSE 10 G/15ML
SOLUTION ORAL
Refills: 1 | OUTPATIENT
Start: 2020-06-25

## 2020-06-26 ENCOUNTER — TELEPHONE (OUTPATIENT)
Dept: FAMILY MEDICINE CLINIC | Age: 70
End: 2020-06-26

## 2020-06-26 NOTE — TELEPHONE ENCOUNTER
Patient calling asking if dr. Vamsi Fermin would be willing to take over his lyrica? He cancelled his appt with neuro appt with Dr. Ravin Ko. He doesn't think he needs a neuro anymore but his lyrica is helping and wants to continue taking this    If so could you send a refill over for him to [facility]?

## 2020-06-30 RX ORDER — LACTULOSE 10 G/15ML
SOLUTION ORAL
Qty: 1 BOTTLE | Refills: 5 | Status: SHIPPED | OUTPATIENT
Start: 2020-06-30 | End: 2020-08-10

## 2020-07-01 ENCOUNTER — TELEMEDICINE (OUTPATIENT)
Dept: FAMILY MEDICINE CLINIC | Age: 70
End: 2020-07-01
Payer: COMMERCIAL

## 2020-07-01 ENCOUNTER — TELEPHONE (OUTPATIENT)
Dept: ONCOLOGY | Age: 70
End: 2020-07-01

## 2020-07-01 PROCEDURE — 99213 OFFICE O/P EST LOW 20 MIN: CPT | Performed by: INTERNAL MEDICINE

## 2020-07-01 RX ORDER — PREGABALIN 100 MG/1
CAPSULE ORAL
Qty: 180 CAPSULE | Refills: 5 | Status: SHIPPED | OUTPATIENT
Start: 2020-07-01 | End: 2021-08-17 | Stop reason: SDUPTHER

## 2020-07-01 NOTE — PROGRESS NOTES
Visit Information    Have you changed or started any medications since your last visit including any over-the-counter medicines, vitamins, or herbal medicines? no   Are you having any side effects from any of your medications? -  no  Have you stopped taking any of your medications? Is so, why? -  no    Have you seen any other physician or provider since your last visit? No  Have you had any other diagnostic tests since your last visit? No  Have you been seen in the emergency room and/or had an admission to a hospital since we last saw you? No  Have you had your routine dental cleaning in the past 6 months? no    Have you activated your [employer] account? If not, what are your barriers?  Yes     Patient Care Team:  Veto Agent, DO as PCP - General (Family Medicine)  Veto Agent, DO as PCP - [facility] Provider  Abdiel Villanueva MD as Consulting Physician (Gastroenterology)  [facility] MD as Surgeon (Vascular Surgery)  Carmelina Cespedes as Certified Registered Nurse (Gastroenterology)  Óscar Betts MD as Consulting Physician (Pain Management)  Simba Gomez MD as Consulting Physician (Rheumatology)  Kayla Shoemaker RN as Nurse Navigator    Medical History Review  Past Medical, Family, and Social History reviewed and does contribute to the patient presenting condition    Health Maintenance   Topic Date Due    Diabetic foot exam  03/27/2020    PSA counseling  07/24/2020    Shingles Vaccine (2 of 3) 07/23/2020 (Originally 2/11/2016)    Diabetic retinal exam  07/31/2020 (Originally 3/21/1960)    Hepatitis A vaccine (4 of 4 - Hep A Twinrix risk 4-dose series) 07/31/2020 (Originally 12/9/2010)    TSH testing  07/24/2020    Flu vaccine (1) 09/01/2020    A1C test (Diabetic or Prediabetic)  02/25/2021    Diabetic microalbuminuria test  02/25/2021    Lipid screen  02/25/2021    Potassium monitoring  02/25/2021    Creatinine monitoring  02/25/2021    Colon cancer screen colonoscopy  10/07/2021    DTaP/Tdap/Td vaccine (2 - Td) 12/17/2025    Pneumococcal 65+ years Vaccine  Completed    AAA screen  Completed    Hib vaccine  Aged Out    Meningococcal (ACWY) vaccine  Aged Out

## 2020-07-01 NOTE — TELEPHONE ENCOUNTER
Working on Lung Nodule Follow up Report. Chart reviewed. Our records indicate that your patient is due for their annual lung cancer screening follow up testing. For your convenience, we have pended the order for the scan for you. If you do not agree with the need for the test, please cancel the order and let us know. Sincerely,    [facility] Screening Program    Auto printed reminder letter sent to patient.

## 2020-07-01 NOTE — PROGRESS NOTES
Deepak Shearer is a 78 y.o. male evaluated via telephone on 7/1/2020. Consent:  He and/or health care decision maker is aware that that he may receive a bill for this telephone service, depending on his insurance coverage, and has provided verbal consent to proceed: Yes      Documentation:  I communicated with the patient and/or health care decision maker about patient is interested in transferring his lyrica over to our office   Was receiving/started by neurology for nerve pain to both legs but he feels it helps his overall pain as well   He is one month post op also from aortic valve replacement at [facility] AND IS overall feeling very well in regards to this   Has f/u with them on 7/9   Details of this discussion including any medical advice provided:   Sent in medication to requested pharmacy   Will need UDS at follow up in office. Call with q/c   Controlled substances monitoring: possible medication side effects, risk of tolerance and/or dependence, and alternative treatments discussed, no signs of potential drug abuse or diversion identified and [place]ARRS report reviewed today- activity consistent with treatment plan. I affirm this is a Patient Initiated Episode with a Patient who has not had a related appointment within my department in the past 7 days or scheduled within the next 24 hours.     Patient identification was verified at the start of the visit: Yes    Total Time: minutes: 11-20 minutes    Note: not billable if this call serves to triage the patient into an appointment for the relevant concern      Sigifredo Barkley

## 2020-07-12 ENCOUNTER — HOSPITAL ENCOUNTER (EMERGENCY)
Facility: CLINIC | Age: 70
Discharge: HOME | End: 2020-07-12
Attending: EMERGENCY MEDICINE
Payer: COMMERCIAL

## 2020-07-12 VITALS
HEIGHT: 72 IN | RESPIRATION RATE: 20 BRPM | HEART RATE: 83 BPM | DIASTOLIC BLOOD PRESSURE: 64 MMHG | BODY MASS INDEX: 33.86 KG/M2 | SYSTOLIC BLOOD PRESSURE: 120 MMHG | TEMPERATURE: 98 F | OXYGEN SATURATION: 96 % | WEIGHT: 250 LBS

## 2020-07-12 LAB
-: ABNORMAL
AMORPHOUS: ABNORMAL
BACTERIA: ABNORMAL
BILIRUBIN URINE: NEGATIVE
CASTS UA: ABNORMAL /LPF (ref 0–2)
COLOR: YELLOW
COMMENT UA: ABNORMAL
CRYSTALS, UA: ABNORMAL /HPF
EPITHELIAL CELLS UA: ABNORMAL /HPF (ref 0–5)
GLUCOSE URINE: NEGATIVE
KETONES, URINE: ABNORMAL
LEUKOCYTE ESTERASE, URINE: ABNORMAL
MUCUS: ABNORMAL
NITRITE, URINE: NEGATIVE
OTHER OBSERVATIONS UA: ABNORMAL
PH UA: 5.5 (ref 5–8)
PROTEIN UA: ABNORMAL
RBC UA: ABNORMAL /HPF (ref 0–2)
RENAL EPITHELIAL, UA: ABNORMAL /HPF
SPECIFIC GRAVITY UA: 1.02 (ref 1–1.03)
TRICHOMONAS: ABNORMAL
TURBIDITY: ABNORMAL
URINE HGB: ABNORMAL
UROBILINOGEN, URINE: NORMAL
WBC UA: ABNORMAL /HPF (ref 0–5)
YEAST: ABNORMAL

## 2020-07-12 PROCEDURE — 99284 EMERGENCY DEPT VISIT MOD MDM: CPT

## 2020-07-12 PROCEDURE — 6360000002 HC RX W HCPCS: Performed by: EMERGENCY MEDICINE

## 2020-07-12 PROCEDURE — 81001 URINALYSIS AUTO W/SCOPE: CPT

## 2020-07-12 PROCEDURE — 96372 THER/PROPH/DIAG INJ SC/IM: CPT

## 2020-07-12 PROCEDURE — 6370000000 HC RX 637 (ALT 250 FOR IP): Performed by: EMERGENCY MEDICINE

## 2020-07-12 PROCEDURE — 87086 URINE CULTURE/COLONY COUNT: CPT

## 2020-07-12 PROCEDURE — 87186 SC STD MICRODIL/AGAR DIL: CPT

## 2020-07-12 PROCEDURE — 87088 URINE BACTERIA CULTURE: CPT

## 2020-07-12 RX ORDER — LEVOFLOXACIN 500 MG/1
500 TABLET, FILM COATED ORAL ONCE
Status: COMPLETED | OUTPATIENT
Start: 2020-07-12 | End: 2020-07-12

## 2020-07-12 RX ORDER — KETOROLAC TROMETHAMINE 15 MG/ML
15 INJECTION, SOLUTION INTRAMUSCULAR; INTRAVENOUS ONCE
Status: COMPLETED | OUTPATIENT
Start: 2020-07-12 | End: 2020-07-12

## 2020-07-12 RX ORDER — CIPROFLOXACIN 500 MG/1
500 TABLET, FILM COATED ORAL 2 TIMES DAILY
Qty: 14 TABLET | Refills: 0 | Status: SHIPPED | OUTPATIENT
Start: 2020-07-12 | End: 2020-07-15 | Stop reason: ALTCHOICE

## 2020-07-12 RX ADMIN — LEVOFLOXACIN 500 MG: 500 TABLET, FILM COATED ORAL at 07:11

## 2020-07-12 RX ADMIN — KETOROLAC TROMETHAMINE 15 MG: 15 INJECTION, SOLUTION INTRAMUSCULAR; INTRAVENOUS at 06:46

## 2020-07-12 ASSESSMENT — ENCOUNTER SYMPTOMS
ALLERGIC/IMMUNOLOGIC NEGATIVE: 1
EYES NEGATIVE: 1
GASTROINTESTINAL NEGATIVE: 1
RESPIRATORY NEGATIVE: 1

## 2020-07-12 ASSESSMENT — PAIN DESCRIPTION - LOCATION
LOCATION: FLANK
LOCATION: PELVIS

## 2020-07-12 ASSESSMENT — PAIN SCALES - GENERAL
PAINLEVEL_OUTOF10: 8
PAINLEVEL_OUTOF10: 7
PAINLEVEL_OUTOF10: 8

## 2020-07-12 ASSESSMENT — PAIN DESCRIPTION - PAIN TYPE
TYPE: ACUTE PAIN
TYPE: ACUTE PAIN

## 2020-07-12 NOTE — ED PROVIDER NOTES
eMERGENCY dEPARTMENT eNCOUnter      Pt Name: Colleen Kumar  MRN: [id number]  [place] 1950  Date of evaluation: 7/12/2020      CHIEF COMPLAINT       Chief Complaint   Patient presents with    Dysuria    Flank Pain          HISTORY OF PRESENT ILLNESS    Colleen Kumar is a 78 y.o. male who presents to the emergency department for evaluation of several day history of dysuria. Patient is afebrile nontoxic looking does state that he has pain all across his suprapubic area. He has no back pain. REVIEW OF SYSTEMS       Review of Systems   Constitutional: Negative. HENT: Negative. Eyes: Negative. Respiratory: Negative. Cardiovascular: Negative. Gastrointestinal: Negative. Endocrine: Negative. Genitourinary: Positive for dysuria. Musculoskeletal: Negative. Skin: Negative. Allergic/Immunologic: Negative. Neurological: Negative. Hematological: Negative. Psychiatric/Behavioral: Negative. PAST MEDICAL HISTORY    has a past medical history of Anxiety state NEC, Arthritis, Sol esophagus, Chronic kidney disease, Cirrhosis ([address] Colon polyps, Diverticulosis of colon, Enlarged heart, Gout, Hepatic cirrhosis ([address] Hepatic cyst, Hepatitis C, chronic ([address] History of alcohol use, Hyperlipidemia, Hypertension, Lumbago, Lymphedema, Malignant neoplasm of prostate ([address] Otalgia of right ear, and Sciatica. SURGICAL HISTORY      has a past surgical history that includes Prostatectomy (6/2011); back surgery; Knee arthroscopy (Right); Tonsillectomy; ventral hernia repair (05/26/15); Paracentesis (11-4-15); hernia repair; Paracentesis (12/28/15); other surgical history (Right, 01/25/2016); Vein Surgery (Left, 12/07/2016); Colonoscopy (12/05/2016); Upper gastrointestinal endoscopy (10/24/2013); Upper gastrointestinal endoscopy (08/30/2017); pr egd transoral biopsy single/multiple (N/A, 8/30/2017); Colonoscopy (N/A, 10/7/2019);  Cardiac catheterization (10.30/2019); and Aortic valve repair. CURRENT MEDICATIONS       Previous Medications    ALLOPURINOL (ZYLOPRIM) 300 MG TABLET    Take 600 mg by mouth daily     CHOLECALCIFEROL (VITAMIN D) 50 MCG (2000 UT) CAPS CAPSULE    Take 1 capsule by mouth daily    FUROSEMIDE (LASIX) 20 MG TABLET    Take 2 tablets by mouth 2 times daily    LACTULOSE (CHRONULAC) 10 GM/15ML SOLUTION    TAKE 15 MLS BY MOUTH THREE TIMES A DAY    NIFEDIPINE (PROCARDIA XL) 30 MG EXTENDED RELEASE TABLET    Take 30 mg by mouth daily     OMEPRAZOLE (PRILOSEC) 20 MG DELAYED RELEASE CAPSULE    Take 2 capsules by mouth daily    PREGABALIN (LYRICA) 100 MG CAPSULE    TAKE 1 CAPSULE BY MOUTH 3 TIMES A DAY    SITAGLIPTIN (JANUVIA) 25 MG TABLET    Take 1 tablet by mouth daily    SPIRONOLACTONE (ALDACTONE) 50 MG TABLET    TAKE 1 TABLET BY MOUTH ONE TIME A DAY       ALLERGIES     is allergic to nsaids. FAMILY HISTORY     He indicated that his mother is alive. He indicated that his father is . He indicated that the status of his neg hx is unknown.     family history includes Cancer in his father. SOCIAL HISTORY      reports that he has quit smoking. His smoking use included cigarettes. He started smoking about 53 years ago. He has a 49.00 pack-year smoking history. He has never used smokeless tobacco. He reports current drug use. Drug: Marijuana. He reports that he does not drink alcohol. PHYSICAL EXAM     INITIAL VITALS:  height is 6' (1.829 m) and weight is 113.4 kg (250 lb). His oral temperature is 98 °F (36.7 °C). His blood pressure is 120/64 and his pulse is 83. His respiration is 20 and oxygen saturation is 96%.        Constitutional: Alert, oriented x3, nontoxic, afebrile, answering questions appropriately, acting properly for age, in no acute distress  HEENT: Extraocular muscles intact, mucus membranes moist, TMs clear bilaterally, no posterior pharyngeal erythema or exudates, Pupils equal, round, reactive to light,   Neck: Trachea midline, Supple without lymphadenopathy, no posterior midline neck tenderness to palpation  Cardiovascular: Regular rhythm and rate no S3, S4, or murmurs  Respiratory: Clear to auscultation bilaterally no wheezes, rhonchi, rales, no respiratory distress  Gastrointestinal: Soft, nontender, nondistended, positive bowel sounds. No rebound, rigidity, or guarding. Musculoskeletal: No extremity pain or swelling  Neurologic: Moving all 4 extremities without difficulty there are no gross focal neurologic deficits  Skin: Warm and dry      DIFFERENTIAL DIAGNOSIS/ MDM:     Dysuria with cloudy urine I am going to  get a urinalysis and probably end up starting the patient on antibiotics depending how that looks.     DIAGNOSTIC RESULTS     EKG: All EKG's are interpreted by the Emergency Department Physician who either signs or Co-signs this chart in the absence of a cardiologist.        Not indicated unless otherwise documented above    LABS:  Results for orders placed or performed during the hospital encounter of 07/12/20   Urinalysis Reflex to Culture    Specimen: Urine, clean catch   Result Value Ref Range    Color, UA YELLOW YELLOW    Turbidity UA CLOUDY (A) CLEAR    Glucose, Ur NEGATIVE NEGATIVE    Bilirubin Urine NEGATIVE NEGATIVE    Ketones, Urine TRACE (A) NEGATIVE    Specific Gravity, UA 1.020 1.005 - 1.030    Urine Hgb LARGE (A) NEGATIVE    pH, UA 5.5 5.0 - 8.0    Protein, UA 3+ (A) NEGATIVE    Urobilinogen, Urine Normal Normal    Nitrite, Urine NEGATIVE NEGATIVE    Leukocyte Esterase, Urine LARGE (A) NEGATIVE    Urinalysis Comments NOT REPORTED    Microscopic Urinalysis   Result Value Ref Range    -          WBC, UA TOO NUMEROUS TO COUNT 0 - 5 /HPF    RBC, UA TOO NUMEROUS TO COUNT 0 - 2 /HPF    Casts UA NOT REPORTED 0 - 2 /LPF    Crystals, UA NOT REPORTED None /HPF    Epithelial Cells UA 5 TO 10 0 - 5 /HPF    Renal Epithelial, UA NOT REPORTED 0 /HPF    Bacteria, UA MODERATE (A) None    Mucus, UA NOT REPORTED None    Trichomonas, UA

## 2020-07-12 NOTE — ED NOTES
Pt presents to the ED for suspicion of UTI. Pt states his symptoms started 3 days ago. Pt reports pain in his pelvic area, cloudy urine and urgency.       Juan Antonio Suh RN  73/40/70 [id number]

## 2020-07-13 ENCOUNTER — CARE COORDINATION (OUTPATIENT)
Dept: OTHER | Facility: CLINIC | Age: 70
End: 2020-07-13

## 2020-07-13 ENCOUNTER — HOSPITAL ENCOUNTER (OUTPATIENT)
Age: 70
Setting detail: SPECIMEN
Discharge: HOME | End: 2020-07-13
Payer: COMMERCIAL

## 2020-07-13 ENCOUNTER — TELEPHONE (OUTPATIENT)
Dept: FAMILY MEDICINE CLINIC | Age: 70
End: 2020-07-13

## 2020-07-13 LAB
ALBUMIN SERPL-MCNC: 4 G/DL (ref 3.5–5.2)
ALBUMIN/GLOBULIN RATIO: 1.1 (ref 1–2.5)
ALP BLD-CCNC: 81 U/L (ref 40–129)
ALT SERPL-CCNC: 10 U/L (ref 5–41)
ANION GAP SERPL CALCULATED.3IONS-SCNC: 18 MMOL/L (ref 9–17)
AST SERPL-CCNC: 14 U/L
BILIRUB SERPL-MCNC: 0.33 MG/DL (ref 0.3–1.2)
BUN BLDV-MCNC: 21 MG/DL (ref 8–23)
BUN/CREAT BLD: ABNORMAL (ref 9–20)
CALCIUM SERPL-MCNC: 9.5 MG/DL (ref 8.6–10.4)
CHLORIDE BLD-SCNC: 100 MMOL/L (ref 98–107)
CO2: 22 MMOL/L (ref 20–31)
CREAT SERPL-MCNC: 1.84 MG/DL (ref 0.7–1.2)
CULTURE: ABNORMAL
GFR AFRICAN AMERICAN: 45 ML/MIN
GFR NON-AFRICAN AMERICAN: 37 ML/MIN
GFR SERPL CREATININE-BSD FRML MDRD: ABNORMAL ML/MIN/{1.73_M2}
GFR SERPL CREATININE-BSD FRML MDRD: ABNORMAL ML/MIN/{1.73_M2}
GLUCOSE BLD-MCNC: 147 MG/DL (ref 70–99)
Lab: ABNORMAL
POTASSIUM SERPL-SCNC: 4.7 MMOL/L (ref 3.7–5.3)
SODIUM BLD-SCNC: 140 MMOL/L (ref 135–144)
SPECIMEN DESCRIPTION: ABNORMAL
TOTAL PROTEIN: 7.7 G/DL (ref 6.4–8.3)
URIC ACID: 4.9 MG/DL (ref 3.4–7)

## 2020-07-13 NOTE — TELEPHONE ENCOUNTER
Patient called in to let you know he was in the ER yesterday for UTI, I offered to schedule him a f/u appt he said they hosptial didn't say anything about him needing one? He said he just wanted to notify you. Do you want to review ER notes and have him schedule f/u appt ?

## 2020-07-13 NOTE — CARE COORDINATION
[facility] ED Follow Up Call    2020    Patient: Cristian Watson Patient : 1950   MRN: [id number]  Reason for Admission: cystitis  Discharge Date: 2020        Care Transitions ED Follow Up    Care Transitions Interventions  Do you have all of your prescriptions and are they filled?:  No                ACM attempted to reach patient for follow up call regarding recent ED visit for cystitis. Unable to leave a message as the mailbox was full . Will continue to follow.      Trae Nguyen BSN, RN- [facility]   Associate Care Manager  [phone]

## 2020-07-14 ENCOUNTER — CARE COORDINATION (OUTPATIENT)
Dept: OTHER | Facility: CLINIC | Age: 70
End: 2020-07-14

## 2020-07-14 NOTE — CARE COORDINATION
Ambulatory Care Coordination Note  7/14/2020  CM Risk Score: 3  Charlson 10 Year Mortality Risk Score: 100%     ACC: Zay Neumann RN    Summary Note: Spoke with patient today who said he is not better this morning. States the hospital just called him and they are calling in a different antibiotic for him, the Cipro is resistant. Pt said he is drinking fluids but feels really tired this morning and said he feels like he may have a low grade fever. Pt said he has no energy today. He has someone at home to get his medication and help him. Instructed patient to start the new antibiotic as soon as possible this morning, he said he would call [facility] and tell them to get it ready. Pt is agreeable to follow up calls. Will follow     Ambulatory Care Coordination Assessment    Care Coordination Protocol  Program Enrollment:  Rising Risk  Referral from Primary Care Provider:  No  Week 1 - Initial Assessment        Do you have Home O2 Therapy?:  No      Ability to seek help/take action for Emergent Urgent situations i.e. fire, crime, inclement weather or health crisis. :  Independent  Ability to ambulate to restroom:  Independent  Ability handle personal hygeine needs (bathing/dressing/grooming): Independent  Ability to manage Medications: Independent  Ability to drive and/or has transportation:  Independent  Ability to do shopping:  Independent  Is patient able to live independently?:  Yes     Current Housing:  Private Residence           Frequent urination at night?:  No  Do you use rails/bars?:  No  Do you have a non-slip tub mat?:  Yes        Suggested Interventions and Community Resources                  Prior to Admission medications    Medication Sig Start Date End Date Taking?  Authorizing Provider   ciprofloxacin (CIPRO) 500 MG tablet Take 1 tablet by mouth 2 times daily for 7 days 7/12/20 7/19/20  Briana Zelaya III, MD   pregabalin (LYRICA) 100 MG capsule TAKE 1 CAPSULE BY MOUTH 3 TIMES A DAY 7/1/20 8/1/20 Mike Strauss,    lactulose (CHRONULAC) 10 GM/15ML solution TAKE 15 MLS BY MOUTH THREE TIMES A DAY 6/30/20   Mike Strauss,    furosemide (LASIX) 20 MG tablet Take 2 tablets by mouth 2 times daily 6/25/20 6/25/21  KELSY Galarza - CNP   spironolactone (ALDACTONE) 50 MG tablet TAKE 1 TABLET BY MOUTH ONE TIME A DAY 6/23/20   KELSY Thurman - CNP   SITagliptin (JANUVIA) 25 MG tablet Take 1 tablet by mouth daily 2/24/20   Mike Strauss DO   Cholecalciferol (VITAMIN D) 50 MCG (2000 UT) CAPS capsule Take 1 capsule by mouth daily 2/24/20   Mike Strauss, DO   omeprazole (PRILOSEC) 20 MG delayed release capsule Take 2 capsules by mouth daily 2/24/20 2/23/21  Mike Strauss DO   NIFEdipine (PROCARDIA XL) 30 MG extended release tablet Take 30 mg by mouth daily  6/12/19   Historical Provider, MD   allopurinol (ZYLOPRIM) 300 MG tablet Take 600 mg by mouth daily  3/27/19   KELSY Rodríguez CNP       Future Appointments   Date Time Provider Phuc Macias   8/12/2020  1:45 PM DO LATRICE Betancourt [facility]   8/17/2020 10:50 AM Tashi Pratt MD [facility] Satinder Ibarra None

## 2020-07-15 ENCOUNTER — APPOINTMENT (OUTPATIENT)
Dept: CT IMAGING | Age: 70
End: 2020-07-15
Payer: COMMERCIAL

## 2020-07-15 ENCOUNTER — APPOINTMENT (OUTPATIENT)
Dept: GENERAL RADIOLOGY | Age: 70
End: 2020-07-15
Payer: COMMERCIAL

## 2020-07-15 ENCOUNTER — NURSE TRIAGE (OUTPATIENT)
Dept: OTHER | Facility: CLINIC | Age: 70
End: 2020-07-15

## 2020-07-15 ENCOUNTER — CARE COORDINATION (OUTPATIENT)
Dept: OTHER | Facility: CLINIC | Age: 70
End: 2020-07-15

## 2020-07-15 ENCOUNTER — HOSPITAL ENCOUNTER (OUTPATIENT)
Age: 70
Setting detail: OBSERVATION
Discharge: HOME | End: 2020-07-17
Attending: EMERGENCY MEDICINE | Admitting: INTERNAL MEDICINE
Payer: COMMERCIAL

## 2020-07-15 PROBLEM — N17.9 AKI (ACUTE KIDNEY INJURY) (HCC): Status: ACTIVE | Noted: 2020-07-15

## 2020-07-15 LAB
ABSOLUTE EOS #: 0.03 K/UL (ref 0–0.44)
ABSOLUTE IMMATURE GRANULOCYTE: 0.1 K/UL (ref 0–0.3)
ABSOLUTE LYMPH #: 1.43 K/UL (ref 1.1–3.7)
ABSOLUTE MONO #: 1.3 K/UL (ref 0.1–1.2)
ALBUMIN SERPL-MCNC: 3.8 G/DL (ref 3.5–5.2)
ALBUMIN/GLOBULIN RATIO: NORMAL (ref 1–2.5)
ALP BLD-CCNC: 88 U/L (ref 40–129)
ALT SERPL-CCNC: 9 U/L (ref 5–41)
ANION GAP SERPL CALCULATED.3IONS-SCNC: 18 MMOL/L (ref 9–17)
ANTI-XA UNFRAC HEPARIN: 0.63 IU/L (ref 0.3–0.7)
AST SERPL-CCNC: 13 U/L
BASOPHILS # BLD: 1 % (ref 0–2)
BASOPHILS ABSOLUTE: 0.09 K/UL (ref 0–0.2)
BILIRUB SERPL-MCNC: 0.3 MG/DL (ref 0.3–1.2)
BILIRUBIN DIRECT: 0.11 MG/DL
BILIRUBIN, INDIRECT: 0.19 MG/DL (ref 0–1)
BNP INTERPRETATION: ABNORMAL
BUN BLDV-MCNC: 28 MG/DL (ref 8–23)
BUN/CREAT BLD: 12 (ref 9–20)
CALCIUM SERPL-MCNC: 9.4 MG/DL (ref 8.6–10.4)
CHLORIDE BLD-SCNC: 95 MMOL/L (ref 98–107)
CO2: 22 MMOL/L (ref 20–31)
CREAT SERPL-MCNC: 2.41 MG/DL (ref 0.7–1.2)
D-DIMER QUANTITATIVE: 2.89 MG/L FEU (ref 0–0.59)
DIFFERENTIAL TYPE: ABNORMAL
EOSINOPHILS RELATIVE PERCENT: 0 % (ref 1–4)
GFR AFRICAN AMERICAN: 33 ML/MIN
GFR NON-AFRICAN AMERICAN: 27 ML/MIN
GFR SERPL CREATININE-BSD FRML MDRD: ABNORMAL ML/MIN/{1.73_M2}
GFR SERPL CREATININE-BSD FRML MDRD: ABNORMAL ML/MIN/{1.73_M2}
GLOBULIN: NORMAL G/DL (ref 1.5–3.8)
GLUCOSE BLD-MCNC: 103 MG/DL (ref 70–99)
HCT VFR BLD CALC: 37.9 % (ref 40.7–50.3)
HEMOGLOBIN: 11.7 G/DL (ref 13–17)
IMMATURE GRANULOCYTES: 1 %
INR BLD: 1.3
LACTIC ACID, SEPSIS WHOLE BLOOD: NORMAL MMOL/L (ref 0.5–1.9)
LACTIC ACID, SEPSIS WHOLE BLOOD: NORMAL MMOL/L (ref 0.5–1.9)
LACTIC ACID, SEPSIS: 0.6 MMOL/L (ref 0.5–1.9)
LACTIC ACID, SEPSIS: 0.9 MMOL/L (ref 0.5–1.9)
LYMPHOCYTES # BLD: 12 % (ref 24–43)
MAGNESIUM: 1.7 MG/DL (ref 1.6–2.6)
MCH RBC QN AUTO: 30.3 PG (ref 25.2–33.5)
MCHC RBC AUTO-ENTMCNC: 30.9 G/DL (ref 28.4–34.8)
MCV RBC AUTO: 98.2 FL (ref 82.6–102.9)
MONOCYTES # BLD: 11 % (ref 3–12)
MYOGLOBIN: 151 NG/ML (ref 28–72)
MYOGLOBIN: 164 NG/ML (ref 28–72)
NRBC AUTOMATED: 0 PER 100 WBC
PARTIAL THROMBOPLASTIN TIME: 40.1 SEC (ref 23.9–33.8)
PDW BLD-RTO: 14.4 % (ref 11.8–14.4)
PLATELET # BLD: 246 K/UL (ref 138–453)
PLATELET ESTIMATE: ABNORMAL
PMV BLD AUTO: 9.9 FL (ref 8.1–13.5)
POTASSIUM SERPL-SCNC: 4.7 MMOL/L (ref 3.7–5.3)
PRO-BNP: 1974 PG/ML
PROTHROMBIN TIME: 16 SEC (ref 11.5–14.2)
RBC # BLD: 3.86 M/UL (ref 4.21–5.77)
RBC # BLD: ABNORMAL 10*6/UL
SEG NEUTROPHILS: 75 % (ref 36–65)
SEGMENTED NEUTROPHILS ABSOLUTE COUNT: 9.07 K/UL (ref 1.5–8.1)
SODIUM BLD-SCNC: 135 MMOL/L (ref 135–144)
TOTAL PROTEIN: 8 G/DL (ref 6.4–8.3)
TROPONIN INTERP: ABNORMAL
TROPONIN INTERP: ABNORMAL
TROPONIN T: ABNORMAL NG/ML
TROPONIN T: ABNORMAL NG/ML
TROPONIN, HIGH SENSITIVITY: 36 NG/L (ref 0–22)
TROPONIN, HIGH SENSITIVITY: 37 NG/L (ref 0–22)
WBC # BLD: 12 K/UL (ref 3.5–11.3)
WBC # BLD: ABNORMAL 10*3/UL

## 2020-07-15 PROCEDURE — 83880 ASSAY OF NATRIURETIC PEPTIDE: CPT

## 2020-07-15 PROCEDURE — 83874 ASSAY OF MYOGLOBIN: CPT

## 2020-07-15 PROCEDURE — 6370000000 HC RX 637 (ALT 250 FOR IP): Performed by: NURSE PRACTITIONER

## 2020-07-15 PROCEDURE — G0378 HOSPITAL OBSERVATION PER HR: HCPCS

## 2020-07-15 PROCEDURE — 85730 THROMBOPLASTIN TIME PARTIAL: CPT

## 2020-07-15 PROCEDURE — 74176 CT ABD & PELVIS W/O CONTRAST: CPT

## 2020-07-15 PROCEDURE — 96374 THER/PROPH/DIAG INJ IV PUSH: CPT

## 2020-07-15 PROCEDURE — 80076 HEPATIC FUNCTION PANEL: CPT

## 2020-07-15 PROCEDURE — 6360000002 HC RX W HCPCS: Performed by: INTERNAL MEDICINE

## 2020-07-15 PROCEDURE — 87040 BLOOD CULTURE FOR BACTERIA: CPT

## 2020-07-15 PROCEDURE — 99285 EMERGENCY DEPT VISIT HI MDM: CPT

## 2020-07-15 PROCEDURE — 96375 TX/PRO/DX INJ NEW DRUG ADDON: CPT

## 2020-07-15 PROCEDURE — 99220 PR INITIAL OBSERVATION CARE/DAY 70 MINUTES: CPT | Performed by: NURSE PRACTITIONER

## 2020-07-15 PROCEDURE — 85379 FIBRIN DEGRADATION QUANT: CPT

## 2020-07-15 PROCEDURE — 96361 HYDRATE IV INFUSION ADD-ON: CPT

## 2020-07-15 PROCEDURE — 85610 PROTHROMBIN TIME: CPT

## 2020-07-15 PROCEDURE — 93005 ELECTROCARDIOGRAM TRACING: CPT | Performed by: NURSE PRACTITIONER

## 2020-07-15 PROCEDURE — 36415 COLL VENOUS BLD VENIPUNCTURE: CPT

## 2020-07-15 PROCEDURE — 84484 ASSAY OF TROPONIN QUANT: CPT

## 2020-07-15 PROCEDURE — 85025 COMPLETE CBC W/AUTO DIFF WBC: CPT

## 2020-07-15 PROCEDURE — 2580000003 HC RX 258: Performed by: NURSE PRACTITIONER

## 2020-07-15 PROCEDURE — 71045 X-RAY EXAM CHEST 1 VIEW: CPT

## 2020-07-15 PROCEDURE — 6370000000 HC RX 637 (ALT 250 FOR IP): Performed by: INTERNAL MEDICINE

## 2020-07-15 PROCEDURE — 85520 HEPARIN ASSAY: CPT

## 2020-07-15 PROCEDURE — 83735 ASSAY OF MAGNESIUM: CPT

## 2020-07-15 PROCEDURE — 96365 THER/PROPH/DIAG IV INF INIT: CPT

## 2020-07-15 PROCEDURE — 2580000003 HC RX 258: Performed by: INTERNAL MEDICINE

## 2020-07-15 PROCEDURE — 80048 BASIC METABOLIC PNL TOTAL CA: CPT

## 2020-07-15 PROCEDURE — 83605 ASSAY OF LACTIC ACID: CPT

## 2020-07-15 PROCEDURE — 6360000002 HC RX W HCPCS: Performed by: NURSE PRACTITIONER

## 2020-07-15 RX ORDER — SODIUM CHLORIDE 0.9 % (FLUSH) 0.9 %
10 SYRINGE (ML) INJECTION EVERY 12 HOURS SCHEDULED
Status: DISCONTINUED | OUTPATIENT
Start: 2020-07-15 | End: 2020-07-17 | Stop reason: HOSPADM

## 2020-07-15 RX ORDER — SODIUM POLYSTYRENE SULFONATE 15 G/60ML
30 SUSPENSION ORAL; RECTAL
Status: ACTIVE | OUTPATIENT
Start: 2020-07-15 | End: 2020-07-15

## 2020-07-15 RX ORDER — ONDANSETRON 2 MG/ML
4 INJECTION INTRAMUSCULAR; INTRAVENOUS EVERY 6 HOURS PRN
Status: DISCONTINUED | OUTPATIENT
Start: 2020-07-15 | End: 2020-07-17 | Stop reason: HOSPADM

## 2020-07-15 RX ORDER — HEPARIN SODIUM 5000 [USP'U]/ML
80 INJECTION, SOLUTION INTRAVENOUS; SUBCUTANEOUS PRN
Status: DISCONTINUED | OUTPATIENT
Start: 2020-07-15 | End: 2020-07-17 | Stop reason: HOSPADM

## 2020-07-15 RX ORDER — LACTULOSE 10 G/15ML
10 SOLUTION ORAL 3 TIMES DAILY
Status: DISCONTINUED | OUTPATIENT
Start: 2020-07-15 | End: 2020-07-17 | Stop reason: HOSPADM

## 2020-07-15 RX ORDER — NICOTINE 21 MG/24HR
1 PATCH, TRANSDERMAL 24 HOURS TRANSDERMAL DAILY PRN
Status: DISCONTINUED | OUTPATIENT
Start: 2020-07-15 | End: 2020-07-17 | Stop reason: HOSPADM

## 2020-07-15 RX ORDER — SODIUM CHLORIDE 0.9 % (FLUSH) 0.9 %
10 SYRINGE (ML) INJECTION PRN
Status: DISCONTINUED | OUTPATIENT
Start: 2020-07-15 | End: 2020-07-17 | Stop reason: HOSPADM

## 2020-07-15 RX ORDER — SODIUM CHLORIDE 9 MG/ML
INJECTION, SOLUTION INTRAVENOUS CONTINUOUS
Status: DISCONTINUED | OUTPATIENT
Start: 2020-07-15 | End: 2020-07-15 | Stop reason: DRUGHIGH

## 2020-07-15 RX ORDER — SODIUM CHLORIDE 9 MG/ML
INJECTION, SOLUTION INTRAVENOUS CONTINUOUS
Status: DISCONTINUED | OUTPATIENT
Start: 2020-07-15 | End: 2020-07-17 | Stop reason: HOSPADM

## 2020-07-15 RX ORDER — SULFAMETHOXAZOLE AND TRIMETHOPRIM 800; 160 MG/1; MG/1
1 TABLET ORAL 2 TIMES DAILY
COMMUNITY
End: 2020-08-12 | Stop reason: ALTCHOICE

## 2020-07-15 RX ORDER — PROMETHAZINE HYDROCHLORIDE 12.5 MG/1
12.5 TABLET ORAL EVERY 6 HOURS PRN
Status: DISCONTINUED | OUTPATIENT
Start: 2020-07-15 | End: 2020-07-17 | Stop reason: HOSPADM

## 2020-07-15 RX ORDER — HEPARIN SODIUM 5000 [USP'U]/ML
80 INJECTION, SOLUTION INTRAVENOUS; SUBCUTANEOUS ONCE
Status: COMPLETED | OUTPATIENT
Start: 2020-07-15 | End: 2020-07-15

## 2020-07-15 RX ORDER — HEPARIN SODIUM 10000 [USP'U]/100ML
18 INJECTION, SOLUTION INTRAVENOUS CONTINUOUS
Status: DISCONTINUED | OUTPATIENT
Start: 2020-07-15 | End: 2020-07-16

## 2020-07-15 RX ORDER — AMIODARONE HYDROCHLORIDE 200 MG/1
200 TABLET ORAL DAILY
COMMUNITY
End: 2020-08-12 | Stop reason: ALTCHOICE

## 2020-07-15 RX ORDER — ACETAMINOPHEN 325 MG/1
650 TABLET ORAL EVERY 6 HOURS PRN
Status: DISCONTINUED | OUTPATIENT
Start: 2020-07-15 | End: 2020-07-17 | Stop reason: HOSPADM

## 2020-07-15 RX ORDER — ACETAMINOPHEN 650 MG/1
650 SUPPOSITORY RECTAL EVERY 6 HOURS PRN
Status: DISCONTINUED | OUTPATIENT
Start: 2020-07-15 | End: 2020-07-17 | Stop reason: HOSPADM

## 2020-07-15 RX ORDER — SODIUM POLYSTYRENE SULFONATE 15 G/60ML
15 SUSPENSION ORAL; RECTAL
Status: DISPENSED | OUTPATIENT
Start: 2020-07-15 | End: 2020-07-15

## 2020-07-15 RX ORDER — AMIODARONE HYDROCHLORIDE 200 MG/1
200 TABLET ORAL DAILY
Status: DISCONTINUED | OUTPATIENT
Start: 2020-07-16 | End: 2020-07-17 | Stop reason: HOSPADM

## 2020-07-15 RX ORDER — HEPARIN SODIUM 5000 [USP'U]/ML
40 INJECTION, SOLUTION INTRAVENOUS; SUBCUTANEOUS PRN
Status: DISCONTINUED | OUTPATIENT
Start: 2020-07-15 | End: 2020-07-17 | Stop reason: HOSPADM

## 2020-07-15 RX ORDER — ALLOPURINOL 300 MG/1
600 TABLET ORAL DAILY
Status: DISCONTINUED | OUTPATIENT
Start: 2020-07-16 | End: 2020-07-17 | Stop reason: HOSPADM

## 2020-07-15 RX ORDER — ASPIRIN 81 MG/1
324 TABLET, CHEWABLE ORAL ONCE
Status: COMPLETED | OUTPATIENT
Start: 2020-07-15 | End: 2020-07-15

## 2020-07-15 RX ADMIN — SODIUM CHLORIDE: 9 INJECTION, SOLUTION INTRAVENOUS at 16:20

## 2020-07-15 RX ADMIN — SODIUM CHLORIDE: 9 INJECTION, SOLUTION INTRAVENOUS at 21:50

## 2020-07-15 RX ADMIN — HEPARIN SODIUM 9050 UNITS: 5000 INJECTION, SOLUTION INTRAVENOUS; SUBCUTANEOUS at 18:29

## 2020-07-15 RX ADMIN — LACTULOSE 10 G: 20 SOLUTION ORAL at 22:08

## 2020-07-15 RX ADMIN — HEPARIN SODIUM AND DEXTROSE 18 UNITS/KG/HR: 10000; 5 INJECTION INTRAVENOUS at 18:30

## 2020-07-15 RX ADMIN — ASPIRIN 81 MG 324 MG: 81 TABLET ORAL at 18:28

## 2020-07-15 RX ADMIN — MEROPENEM 1 G: 1 INJECTION, POWDER, FOR SOLUTION INTRAVENOUS at 22:08

## 2020-07-15 ASSESSMENT — ENCOUNTER SYMPTOMS
DIARRHEA: 0
NAUSEA: 0
COUGH: 0
VOMITING: 0
COLOR CHANGE: 0
SHORTNESS OF BREATH: 1
ABDOMINAL PAIN: 1
BACK PAIN: 0

## 2020-07-15 ASSESSMENT — PAIN SCALES - GENERAL: PAINLEVEL_OUTOF10: 0

## 2020-07-15 NOTE — ED NOTES
Pt. Was seen at [facility] ER and diagnosed with UTI 3 days ago. Pt. States he has been dizzy for the past 3 days as well. Upon arrival his BP was low. He had a recent change to his ATB due to culture results, from Cipro to Bactrim. Recently had open heart for valve replacement in June.       Ayan Jones RN  07/15/20 [id number]

## 2020-07-15 NOTE — CARE COORDINATION
Ambulatory Care Coordination Note  7/15/2020  CM Risk Score: 3  Charlson 10 Year Mortality Risk Score: 100%     ACC: Elias Horn RN    Summary Note: Spoke with patients wife, who said they called triage, and their daughter is on her way there now to pick them up and take him to the ED. Pt started the new antibiotic yesterday, as the UTI was not sensitive to Cipro, which is what he was originally prescribed. Wife said he has a temp of 101. Today. Despite starting the new antibiotic yesterday. Will follow         Care Coordination Interventions    Program Enrollment:  Rising Risk  Referral from Primary Care Provider:  No  Suggested Interventions and Community Resources         Goals Addressed    None         Prior to Admission medications    Medication Sig Start Date End Date Taking?  Authorizing Provider   ciprofloxacin (CIPRO) 500 MG tablet Take 1 tablet by mouth 2 times daily for 7 days 7/12/20 7/19/20  Yuliya Loza III, MD   pregabalin (LYRICA) 100 MG capsule TAKE 1 CAPSULE BY MOUTH 3 TIMES A DAY 7/1/20 8/1/20  Kusum Hays, DO   lactulose (CHRONULAC) 10 GM/15ML solution TAKE 15 MLS BY MOUTH THREE TIMES A DAY 6/30/20 Loni Shannan, DO   furosemide (LASIX) 20 MG tablet Take 2 tablets by mouth 2 times daily 6/25/20 6/25/21  Los Kaur APRN - CNP   spironolactone (ALDACTONE) 50 MG tablet TAKE 1 TABLET BY MOUTH ONE TIME A DAY 6/23/20   KELSY Luna - CNP   SITagliptin (JANUVIA) 25 MG tablet Take 1 tablet by mouth daily 2/24/20   Dalia Shannan, DO   Cholecalciferol (VITAMIN D) 50 MCG (2000 UT) CAPS capsule Take 1 capsule by mouth daily 2/24/20   Dalia Shannan, DO   omeprazole (PRILOSEC) 20 MG delayed release capsule Take 2 capsules by mouth daily 2/24/20 2/23/21  Dalia Shannan, DO   NIFEdipine (PROCARDIA XL) 30 MG extended release tablet Take 30 mg by mouth daily  6/12/19   Historical Provider, MD   allopurinol (ZYLOPRIM) 300 MG tablet Take 600 mg by mouth daily  3/27/19   Rach Lazaro APRN - CNP Future Appointments   Date Time Provider Phuc Macias   8/12/2020  1:45 PM DO [facility]ICE [facility]   8/17/2020 10:50 AM Antionette Arrington MD [facility] Satinder Ibarra None

## 2020-07-15 NOTE — PROGRESS NOTES
Transitions of Care Pharmacy Service   Medication Review    The patient's list of current home medications was reviewed earlier today in the ED. Source(s) of information: patient, family, [facility], [employer] refill report      Please feel free to call me with any questions about this encounter. Thank you.     Ameya Rogers [facility]   Transitions of Care Pharmacy Service  Phone:  [phone]  Fax: [phone]      Electronically signed by Ameya Rogers [address] [place] [address] on 7/15/2020 at 7:25 PM       Prior to Admission medications    Medication Sig       sulfamethoxazole-trimethoprim (BACTRIM DS;SEPTRA DS) 800-160 MG per tablet Take 1 tablet by mouth 2 times daily (started 7/14/20)       amiodarone (CORDARONE) 200 MG tablet Take 200 mg by mouth daily       SITagliptin (JANUVIA) 100 MG tablet Take 100 mg by mouth daily       pregabalin (LYRICA) 100 MG capsule TAKE 1 CAPSULE BY MOUTH 3 TIMES A DAY       lactulose (CHRONULAC) 10 GM/15ML solution TAKE 15 MLS BY MOUTH THREE TIMES A DAY       furosemide (LASIX) 20 MG tablet Take 2 tablets by mouth 2 times daily       spironolactone (ALDACTONE) 50 MG tablet TAKE 1 TABLET BY MOUTH ONE TIME A DAY       Cholecalciferol (VITAMIN D) 50 MCG (2000 UT) CAPS capsule Take 1 capsule by mouth daily       omeprazole (PRILOSEC) 20 MG delayed release capsule Take 2 capsules by mouth daily       NIFEdipine (PROCARDIA XL) 60 MG extended release tablet Take 60 mg by mouth daily        allopurinol (ZYLOPRIM) 300 MG tablet Take 600 mg by mouth daily Takes 2 tabs (=600mg) daily

## 2020-07-15 NOTE — ED PROVIDER NOTES
[facility] ED  eMERGENCY dEPARTMENT eNCOUnter      Pt Name: Papa Bernstein  MRN: [id number]  [place] 1950  Date of evaluation: 7/15/2020  Provider: KELSY Davies [id number]       Chief Complaint   Patient presents with    Dizziness    Hypotension         HISTORY OF PRESENT ILLNESS  (Location/Symptom, Timing/Onset, Context/Setting, Quality, Duration, Modifying Factors, Severity.)   Papa Bernstein is a 78 y.o. male who presents to the emergency department via private auto. Reports feeling lightheaded for the past few days. His BP was low upon arrival. Four days ago he was evaluated at the [place] [facility] for u UTI. He was placed on Cipro which was changed to bactrim after the culture was completed. Reports a fever today. Denies cough, diarrhea, back pain. Reports low abdominal discomfort/pressure. He had an episode of emesis 4 days ago. Rates his pain 0/10. The patient had an aortic valve replacement 6/3/2020 at [facility]. He was placed on daily aspirin after the procedure. The patient reports mild, intermittent SOB. Nursing Notes were reviewed.     ALLERGIES     Nsaids    CURRENT MEDICATIONS       Previous Medications    ALLOPURINOL (ZYLOPRIM) 300 MG TABLET    Take 600 mg by mouth daily Takes 2 tabs (=600mg) daily    AMIODARONE (CORDARONE) 200 MG TABLET    Take 200 mg by mouth daily    CHOLECALCIFEROL (VITAMIN D) 50 MCG (2000 UT) CAPS CAPSULE    Take 1 capsule by mouth daily    FUROSEMIDE (LASIX) 20 MG TABLET    Take 2 tablets by mouth 2 times daily    LACTULOSE (CHRONULAC) 10 GM/15ML SOLUTION    TAKE 15 MLS BY MOUTH THREE TIMES A DAY    NIFEDIPINE (PROCARDIA XL) 60 MG EXTENDED RELEASE TABLET    Take 60 mg by mouth daily     OMEPRAZOLE (PRILOSEC) 20 MG DELAYED RELEASE CAPSULE    Take 2 capsules by mouth daily    PREGABALIN (LYRICA) 100 MG CAPSULE    TAKE 1 CAPSULE BY MOUTH 3 TIMES A DAY    SITAGLIPTIN (JANUVIA) 100 MG TABLET    Take 100 mg by mouth daily SPIRONOLACTONE (ALDACTONE) 50 MG TABLET    TAKE 1 TABLET BY MOUTH ONE TIME A DAY    SULFAMETHOXAZOLE-TRIMETHOPRIM (BACTRIM DS;SEPTRA DS) 800-160 MG PER TABLET    Take 1 tablet by mouth 2 times daily       PAST MEDICAL HISTORY         Diagnosis Date    Anxiety state NEC     Arthritis     DJD    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 [address]     had paracentesis Sept 2015    Colon polyps 10/07/2019    tubular adenoma x2; hyperplastic polyp    Diverticulosis of colon     Enlarged heart 12/28/2015    HISTORY OF, is resolved at this time    Gout 12/28/2015    is resolved at this time    Hepatic cirrhosis ([facility] [address]     Hepatic cyst     Hepatitis C, chronic ([facility] [address]     Seeing Dr. Thiago Amaro MD at [facility]    History of alcohol use 9/18/2015    Hyperlipidemia     Hypertension     Lumbago     Lymphedema     Malignant neoplasm of prostate ([facility] [address]     Otalgia of right ear     radiates down the jaw in the distribution of the third branch of the trigemminal nerve.     Sciatica        SURGICAL HISTORY           Procedure Laterality Date    AORTIC VALVE REPAIR      BACK SURGERY      L4-5    CARDIAC CATHETERIZATION  10.30/2019    Dr. Yazmin Hansen COLONOSCOPY  12/05/2016    [facility], states due for another in 2  years    COLONOSCOPY N/A 10/7/2019    tubular adenoma x2; hyperplastic polyp    HERNIA REPAIR      KNEE ARTHROSCOPY Right     OTHER SURGICAL HISTORY Right 01/25/2016    10 liters removed peracentesis    PARACENTESIS  11-4-15    PARACENTESIS  12/28/15    CT EGD TRANSORAL BIOPSY SINGLE/MULTIPLE N/A 8/30/2017    EGD BIOPSY performed by April [facility] at [facility]  6/2011    TONSILLECTOMY      UPPER GASTROINTESTINAL ENDOSCOPY  10/24/2013    small segment barretts    UPPER GASTROINTESTINAL ENDOSCOPY  08/30/2017    VEIN SURGERY Left 12/07/2016    leg vein stripping    VENTRAL HERNIA REPAIR  05/26/15 FAMILY HISTORY           Problem Relation Age of Onset    Cancer Father         prostate     Other Neg Hx         blood clots     Family Status   Relation Name Status    Father          prostate cancer    Mother  Alive    Neg Hx  (Not Specified)        SOCIAL HISTORY      reports that he has quit smoking. His smoking use included cigarettes. He started smoking about 53 years ago. He has a 49.00 pack-year smoking history. He has never used smokeless tobacco. He reports current drug use. Drug: Marijuana. He reports that he does not drink alcohol. REVIEW OF SYSTEMS    (2-9 systems for level 4, 10 or more for level 5)     Review of Systems   Constitutional: Positive for fatigue and fever. Negative for chills and diaphoresis. Respiratory: Positive for shortness of breath. Negative for cough. Cardiovascular: Negative for chest pain, palpitations and leg swelling. Gastrointestinal: Positive for abdominal pain. Negative for diarrhea, nausea and vomiting. Genitourinary: Positive for difficulty urinating. Negative for flank pain, frequency, hematuria and urgency. Musculoskeletal: Negative for arthralgias, back pain, myalgias and neck pain. Skin: Negative for color change, rash and wound. Neurological: Positive for light-headedness. Negative for dizziness, syncope, facial asymmetry, speech difficulty, weakness, numbness and headaches. Psychiatric/Behavioral: Negative for confusion. Except as noted above the remainder of the review of systems was reviewed and negative. PHYSICAL EXAM    (up to 7 for level 4, 8 or more for level 5)     ED Triage Vitals [07/15/20 1607]   BP Temp Temp Source Pulse Resp SpO2 Height Weight   86/71 99.4 °F (37.4 °C) Oral 78 16 92 % 6' (1.829 m) 250 lb (113.4 kg)     Physical Exam  Vitals signs reviewed. Constitutional:       General: He is not in acute distress. Appearance: He is well-developed. He is not diaphoretic.    HENT:      Right Ear: External ear normal.      Left Ear: External ear normal.   Eyes:      General: No scleral icterus. Conjunctiva/sclera: Conjunctivae normal.   Neck:      Musculoskeletal: Neck supple. Cardiovascular:      Rate and Rhythm: Normal rate and regular rhythm. Heart sounds: Normal heart sounds. Pulmonary:      Effort: Pulmonary effort is normal. No respiratory distress. Breath sounds: Normal breath sounds. No wheezing or rales. Chest:      Chest wall: No tenderness. Abdominal:      General: There is no distension. Palpations: Abdomen is soft. Tenderness: There is no abdominal tenderness. Musculoskeletal:      Comments: Moves extremities. Skin:     General: Skin is warm and dry. Capillary Refill: Capillary refill takes less than 2 seconds. Findings: No rash. Neurological:      Mental Status: He is alert and oriented to person, place, and time. GCS: GCS eye subscore is 4. GCS verbal subscore is 5. GCS motor subscore is 6. Cranial Nerves: Cranial nerves are intact. No cranial nerve deficit. Motor: Motor function is intact. Coordination: Coordination is intact. Psychiatric:         Behavior: Behavior normal.         DIAGNOSTIC RESULTS     EKG: All EKG's are interpreted by the Emergency Department Physician who either signs or Co-signs this chart in the absence of a cardiologist.  EKG was interpreted by Dr. Valentina Riggins after completion.       RADIOLOGY:   Non-plain film images such as CT, Ultrasound and MRI are read by the radiologist. Javi Pradhan radiographic images are visualized and preliminarily interpreted by the emergency physician with the below findings:    Interpretation per the Radiologist below, if available at the time of this note:    Ct Abdomen Pelvis Wo Contrast Additional Contrast? None    Result Date: 7/15/2020  EXAMINATION: CT OF THE ABDOMEN AND PELVIS WITHOUT CONTRAST 7/15/2020 5:02 pm TECHNIQUE: CT of the abdomen and pelvis was performed without the administration of intravenous contrast. Multiplanar reformatted images are provided for review. Dose modulation, iterative reconstruction, and/or weight based adjustment of the mA/kV was utilized to reduce the radiation dose to as low as reasonably achievable. COMPARISON: 09/16/2015 HISTORY: ORDERING SYSTEM PROVIDED HISTORY: r/o kidney stone, mass TECHNOLOGIST PROVIDED HISTORY: r/o kidney stone, mass Reason for Exam: lower abd pain, difficulty urinating Acuity: Acute Type of Exam: Unknown FINDINGS: Lower Chest: The lung bases are clear. Prosthetic aortic valve. Prior sternal splitting procedure. The heart is borderline enlarged. Organs: Small calcified gallstone. No pericholecystic fluid or fat stranding. Liver morphology consistent with cirrhosis. There is a prominent left lobe with nodular contour. Portal venous hypertension with mild splenomegaly and upper abdominal varices. Spleen measures 15.7 cm in length. The pancreas, adrenal glands and left kidney are grossly normal with the limitations of a noncontrast study. There is right renal urothelial thickening with perirenal, parapelvic and periureteral fat stranding. Mild left periureteral fat stranding. Renal vascular calcifications. No urolithiasis. GI/Bowel: No evidence of appendicitis. No bowel obstruction. Pelvis: Prostate gland is surgically absent. Urinary bladder is grossly. Peritoneum/Retroperitoneum: Mild calcific aorto iliac atherosclerotic disease with no evidence of an aneurysm. No adenopathy, free air or free fluid. Bones/Soft Tissues: No acute bone or soft tissue abnormality. Degenerative disc disease at L4-L5 and L5-S1. There is right urothelial thickening involving the renal pelvis and ureter with adjacent fat stranding. No urolithiasis or acute obstructive uropathy. Findings suggest acute inflammation/infection. There is also mild left periureteral fat stranding. Liver morphology consistent with cirrhosis.   Portal venous hypertension with mild upper abdominal varices and mild splenomegaly. Prosthetic aortic valve. Status post prostatectomy. Xr Chest Portable    Result Date: 7/15/2020  EXAMINATION: ONE XRAY VIEW OF THE CHEST 7/15/2020 4:50 pm COMPARISON: Chest December 15, 2019. HISTORY: ORDERING SYSTEM PROVIDED HISTORY: fever TECHNOLOGIST PROVIDED HISTORY: fever Reason for Exam: feve Acuity: Acute Type of Exam: Initial FINDINGS: Sternotomy wires are noted. Cardiomegaly is present. Bibasilar atelectasis. No focal consolidation, pneumothorax, large pleural effusion or free air. Cardiomegaly with bibasilar atelectasis. No focal consolidation is seen to suggest pneumonia.         LABS:  Labs Reviewed   BASIC METABOLIC PANEL - Abnormal; Notable for the following components:       Result Value    Glucose 103 (*)     BUN 28 (*)     CREATININE 2.41 (*)     Chloride 95 (*)     Anion Gap 18 (*)     GFR Non- 27 (*)     GFR  33 (*)     All other components within normal limits   CBC WITH AUTO DIFFERENTIAL - Abnormal; Notable for the following components:    WBC 12.0 (*)     RBC 3.86 (*)     Hemoglobin 11.7 (*)     Hematocrit 37.9 (*)     Seg Neutrophils 75 (*)     Lymphocytes 12 (*)     Eosinophils % 0 (*)     Immature Granulocytes 1 (*)     Segs Absolute 9.07 (*)     Absolute Mono # 1.30 (*)     All other components within normal limits   TROP/MYOGLOBIN - Abnormal; Notable for the following components:    Troponin, High Sensitivity 37 (*)     Myoglobin 164 (*)     All other components within normal limits   APTT - Abnormal; Notable for the following components:    PTT 40.1 (*)     All other components within normal limits   PROTIME-INR - Abnormal; Notable for the following components:    Protime 16.0 (*)     All other components within normal limits   BRAIN NATRIURETIC PEPTIDE - Abnormal; Notable for the following components:    Pro-BNP 1,974 (*)     All other components within normal limits   CULTURE, BLOOD 1   CULTURE, BLOOD 1   HEPATIC FUNCTION PANEL   MAGNESIUM   LACTATE, SEPSIS   LACTATE, SEPSIS   URINE RT REFLEX TO CULTURE   HEPARIN LEVEL/ANTI-XA   HEPARIN LEVEL/ANTI-XA   TROP/MYOGLOBIN   PREVIOUS SPECIMEN   D-DIMER, QUANTITATIVE       All other labs were within normal range or not returned as of this dictation. EMERGENCY DEPARTMENT COURSE and DIFFERENTIAL DIAGNOSIS/MDM:   Vitals:    Vitals:    07/15/20 1624 07/15/20 1639 07/15/20 1643 07/15/20 1730   BP: 107/72 100/66  106/69   Pulse: 78 77 77 75   Resp: 15 24 23 26   Temp:       TempSrc:       SpO2: 92% 93% 97% 94%   Weight:       Height:           MEDICATIONS GIVEN IN THE ED:  Medications   0.9 % sodium chloride infusion ( Intravenous New Bag 7/15/20 1620)   heparin (porcine) injection 9,050 Units (has no administration in time range)   heparin (porcine) injection 4,550 Units (has no administration in time range)   heparin 25,000 units in dextrose 5% 250 mL infusion (18 Units/kg/hr × 113.4 kg Intravenous New Bag 7/15/20 1830)   aspirin chewable tablet 324 mg (324 mg Oral Given 7/15/20 1828)   heparin (porcine) injection 9,050 Units (9,050 Units Intravenous Given 7/15/20 1829)       CLINICAL DECISION MAKING:  The patient presented alert with a nontoxic appearance and was seen in conjunction with Dr. Karine Elliott. CT of the abdomen showed findings concerning for UTI. He has been on bactrim. Creatinine was elevated today at 2.41 which is new for the patient. He recently had an aortic valve replacement. There is concern for a PE. CT scan cannot be completed due to his renal function. He will be placed on heparin and admitted for further evaluation and treatment. CONSULTS:  IP CONSULT TO INTERNAL MEDICINE  IP CONSULT TO INFECTIOUS DISEASES      FINAL IMPRESSION      1. Urinary tract infection without hematuria, site unspecified    2.  Lightheadedness            Problem List  Patient Active Problem List   Diagnosis Code    Disc displacement, lumbar M51.26  Chronic hepatitis C with cirrhosis (HCC) B18.2, K74.60    Dilated cardiomyopathy I42.0    Gout M10.9    Diabetes type 2, controlled E11.9    Prostate cancer ([facility] [address] C61    Severe aortic insufficiency I35.1    Incisional hernia K43.2    Aortic atherosclerosis (HCC) I70.0    Cervicalgia M54.2    Sciatica M54.30    Diverticulosis of colon K57.30    Ascites due to alcoholic cirrhosis (HCC) I29.33    Essential hypertension I10    History of alcohol use Z87.898    Hypomagnesemia E83.42    Chronic hepatitis C virus infection (HCC) B18.2    Acquired hypothyroidism E03.9    Chondromalacia patellae M22.40    Awaiting organ transplant status Z76.82    Varicose veins of left lower extremity I83.92    Anxiety F41.9    Colon polyps K63.5    History of hepatitis C Z86.19    Right lumbar radiculitis M54.16    Post laminectomy syndrome M96.1    Hepatic cyst K76.89    Severe comorbid illness R69    Primary osteoarthritis of both knees M17.0    Chronic lumbar radiculopathy M54.16    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 9XX1    Abdominal pain R10.9    Tear of right acetabular labrum S73.191A    Hepatic cirrhosis (HCC) K74.60    Tinnitus H93.19    Chronic renal failure syndrome, stage 3 (moderate) (HCC) N18.3    Nephropathy N28.9    Type 2 diabetes mellitus, without long-term current use of insulin (HCC) E11.9    Dyslipidemia E78.5    MULU (acute kidney injury) ([facility] [address] N17.9         DISPOSITION/PLAN   DISPOSITION ADMIT       PATIENT REFERRED TO:   DO Prasanna Maddox [address] [place] [address]            DISCHARGE MEDICATIONS:     New Prescriptions    No medications on file           (Please note that portions of this note were completed with a voice recognition program.  Efforts were made to edit the dictations but occasionally words are mis-transcribed.)    Abhijeet Kennedy, KELSY - CNP Simba Beaulieu, APRN - CNP  07/15/20 1925

## 2020-07-16 ENCOUNTER — APPOINTMENT (OUTPATIENT)
Dept: NUCLEAR MEDICINE | Age: 70
End: 2020-07-16
Payer: COMMERCIAL

## 2020-07-16 LAB
-: ABNORMAL
ALBUMIN SERPL-MCNC: 3.2 G/DL (ref 3.5–5.2)
ALBUMIN/GLOBULIN RATIO: ABNORMAL (ref 1–2.5)
ALP BLD-CCNC: 75 U/L (ref 40–129)
ALT SERPL-CCNC: 10 U/L (ref 5–41)
AMORPHOUS: ABNORMAL
ANION GAP SERPL CALCULATED.3IONS-SCNC: 15 MMOL/L (ref 9–17)
ANTI-XA UNFRAC HEPARIN: 0.37 IU/L (ref 0.3–0.7)
AST SERPL-CCNC: 15 U/L
BACTERIA: ABNORMAL
BILIRUB SERPL-MCNC: 0.15 MG/DL (ref 0.3–1.2)
BILIRUBIN URINE: NEGATIVE
BUN BLDV-MCNC: 29 MG/DL (ref 8–23)
BUN/CREAT BLD: 13 (ref 9–20)
CALCIUM SERPL-MCNC: 8.6 MG/DL (ref 8.6–10.4)
CASTS UA: ABNORMAL /LPF
CHLORIDE BLD-SCNC: 100 MMOL/L (ref 98–107)
CO2: 21 MMOL/L (ref 20–31)
COLOR: YELLOW
COMMENT UA: ABNORMAL
CREAT SERPL-MCNC: 2.26 MG/DL (ref 0.7–1.2)
CRYSTALS, UA: ABNORMAL /HPF
EKG ATRIAL RATE: 75 BPM
EKG P AXIS: -4 DEGREES
EKG P-R INTERVAL: 200 MS
EKG Q-T INTERVAL: 402 MS
EKG QRS DURATION: 110 MS
EKG QTC CALCULATION (BAZETT): 448 MS
EKG R AXIS: -27 DEGREES
EKG T AXIS: 143 DEGREES
EKG VENTRICULAR RATE: 75 BPM
EPITHELIAL CELLS UA: ABNORMAL /HPF (ref 0–5)
GFR AFRICAN AMERICAN: 35 ML/MIN
GFR NON-AFRICAN AMERICAN: 29 ML/MIN
GFR SERPL CREATININE-BSD FRML MDRD: ABNORMAL ML/MIN/{1.73_M2}
GFR SERPL CREATININE-BSD FRML MDRD: ABNORMAL ML/MIN/{1.73_M2}
GLUCOSE BLD-MCNC: 132 MG/DL (ref 75–110)
GLUCOSE BLD-MCNC: 142 MG/DL (ref 70–99)
GLUCOSE BLD-MCNC: 146 MG/DL (ref 75–110)
GLUCOSE BLD-MCNC: 160 MG/DL (ref 75–110)
GLUCOSE BLD-MCNC: 188 MG/DL (ref 75–110)
GLUCOSE URINE: NEGATIVE
HCT VFR BLD CALC: 31.4 % (ref 40.7–50.3)
HCT VFR BLD CALC: 32 % (ref 40.7–50.3)
HEMOGLOBIN: 10 G/DL (ref 13–17)
HEMOGLOBIN: 9.9 G/DL (ref 13–17)
KETONES, URINE: NEGATIVE
LEUKOCYTE ESTERASE, URINE: ABNORMAL
MAGNESIUM: 1.7 MG/DL (ref 1.6–2.6)
MCH RBC QN AUTO: 29.6 PG (ref 25.2–33.5)
MCH RBC QN AUTO: 30.3 PG (ref 25.2–33.5)
MCHC RBC AUTO-ENTMCNC: 30.9 G/DL (ref 28.4–34.8)
MCHC RBC AUTO-ENTMCNC: 31.8 G/DL (ref 28.4–34.8)
MCV RBC AUTO: 95.2 FL (ref 82.6–102.9)
MCV RBC AUTO: 95.5 FL (ref 82.6–102.9)
MUCUS: ABNORMAL
NITRITE, URINE: NEGATIVE
NRBC AUTOMATED: 0 PER 100 WBC
NRBC AUTOMATED: 0 PER 100 WBC
OTHER OBSERVATIONS UA: ABNORMAL
PDW BLD-RTO: 14.2 % (ref 11.8–14.4)
PDW BLD-RTO: 14.4 % (ref 11.8–14.4)
PH UA: 5.5 (ref 5–8)
PLATELET # BLD: 226 K/UL (ref 138–453)
PLATELET # BLD: 234 K/UL (ref 138–453)
PMV BLD AUTO: 10.1 FL (ref 8.1–13.5)
PMV BLD AUTO: 10.2 FL (ref 8.1–13.5)
POTASSIUM SERPL-SCNC: 4.1 MMOL/L (ref 3.7–5.3)
PROTEIN UA: ABNORMAL
RBC # BLD: 3.3 M/UL (ref 4.21–5.77)
RBC # BLD: 3.35 M/UL (ref 4.21–5.77)
RBC UA: ABNORMAL /HPF (ref 0–2)
RENAL EPITHELIAL, UA: ABNORMAL /HPF
SODIUM BLD-SCNC: 136 MMOL/L (ref 135–144)
SODIUM,UR: 41 MMOL/L
SPECIFIC GRAVITY UA: 1.01 (ref 1–1.03)
TOTAL PROTEIN, URINE: 39 MG/DL
TOTAL PROTEIN: 6.6 G/DL (ref 6.4–8.3)
TRICHOMONAS: ABNORMAL
TURBIDITY: CLEAR
URINE HGB: ABNORMAL
UROBILINOGEN, URINE: NORMAL
WBC # BLD: 7.5 K/UL (ref 3.5–11.3)
WBC # BLD: 7.7 K/UL (ref 3.5–11.3)
WBC UA: ABNORMAL /HPF (ref 0–5)
YEAST: ABNORMAL

## 2020-07-16 PROCEDURE — A9540 TC99M MAA: HCPCS | Performed by: NURSE PRACTITIONER

## 2020-07-16 PROCEDURE — G0378 HOSPITAL OBSERVATION PER HR: HCPCS

## 2020-07-16 PROCEDURE — 36415 COLL VENOUS BLD VENIPUNCTURE: CPT

## 2020-07-16 PROCEDURE — 84300 ASSAY OF URINE SODIUM: CPT

## 2020-07-16 PROCEDURE — 97161 PT EVAL LOW COMPLEX 20 MIN: CPT

## 2020-07-16 PROCEDURE — 6370000000 HC RX 637 (ALT 250 FOR IP): Performed by: NURSE PRACTITIONER

## 2020-07-16 PROCEDURE — 6360000002 HC RX W HCPCS: Performed by: INTERNAL MEDICINE

## 2020-07-16 PROCEDURE — 83735 ASSAY OF MAGNESIUM: CPT

## 2020-07-16 PROCEDURE — 3430000000 HC RX DIAGNOSTIC RADIOPHARMACEUTICAL: Performed by: NURSE PRACTITIONER

## 2020-07-16 PROCEDURE — 99254 IP/OBS CNSLTJ NEW/EST MOD 60: CPT | Performed by: INTERNAL MEDICINE

## 2020-07-16 PROCEDURE — 93970 EXTREMITY STUDY: CPT

## 2020-07-16 PROCEDURE — 85520 HEPARIN ASSAY: CPT

## 2020-07-16 PROCEDURE — 6370000000 HC RX 637 (ALT 250 FOR IP): Performed by: INTERNAL MEDICINE

## 2020-07-16 PROCEDURE — 96366 THER/PROPH/DIAG IV INF ADDON: CPT

## 2020-07-16 PROCEDURE — 78580 LUNG PERFUSION IMAGING: CPT

## 2020-07-16 PROCEDURE — 82947 ASSAY GLUCOSE BLOOD QUANT: CPT

## 2020-07-16 PROCEDURE — 85027 COMPLETE CBC AUTOMATED: CPT

## 2020-07-16 PROCEDURE — 2580000003 HC RX 258: Performed by: INTERNAL MEDICINE

## 2020-07-16 PROCEDURE — 99225 PR SBSQ OBSERVATION CARE/DAY 25 MINUTES: CPT | Performed by: INTERNAL MEDICINE

## 2020-07-16 PROCEDURE — 80053 COMPREHEN METABOLIC PANEL: CPT

## 2020-07-16 PROCEDURE — 84156 ASSAY OF PROTEIN URINE: CPT

## 2020-07-16 PROCEDURE — 87086 URINE CULTURE/COLONY COUNT: CPT

## 2020-07-16 PROCEDURE — 81001 URINALYSIS AUTO W/SCOPE: CPT

## 2020-07-16 RX ORDER — DEXTROSE MONOHYDRATE 25 G/50ML
12.5 INJECTION, SOLUTION INTRAVENOUS PRN
Status: DISCONTINUED | OUTPATIENT
Start: 2020-07-16 | End: 2020-07-17 | Stop reason: HOSPADM

## 2020-07-16 RX ORDER — NIFEDIPINE 60 MG/1
60 TABLET, FILM COATED, EXTENDED RELEASE ORAL DAILY
Status: DISCONTINUED | OUTPATIENT
Start: 2020-07-16 | End: 2020-07-17 | Stop reason: HOSPADM

## 2020-07-16 RX ORDER — PREGABALIN 100 MG/1
100 CAPSULE ORAL 2 TIMES DAILY
Status: DISCONTINUED | OUTPATIENT
Start: 2020-07-16 | End: 2020-07-17 | Stop reason: HOSPADM

## 2020-07-16 RX ORDER — OMEPRAZOLE 20 MG/1
40 CAPSULE, DELAYED RELEASE ORAL DAILY
Status: DISCONTINUED | OUTPATIENT
Start: 2020-07-16 | End: 2020-07-17 | Stop reason: HOSPADM

## 2020-07-16 RX ORDER — DEXTROSE MONOHYDRATE 50 MG/ML
100 INJECTION, SOLUTION INTRAVENOUS PRN
Status: DISCONTINUED | OUTPATIENT
Start: 2020-07-16 | End: 2020-07-17 | Stop reason: HOSPADM

## 2020-07-16 RX ORDER — LANOLIN ALCOHOL/MO/W.PET/CERES
10 CREAM (GRAM) TOPICAL ONCE
Status: COMPLETED | OUTPATIENT
Start: 2020-07-16 | End: 2020-07-16

## 2020-07-16 RX ORDER — NICOTINE POLACRILEX 4 MG
15 LOZENGE BUCCAL PRN
Status: DISCONTINUED | OUTPATIENT
Start: 2020-07-16 | End: 2020-07-17 | Stop reason: HOSPADM

## 2020-07-16 RX ADMIN — AMIODARONE HYDROCHLORIDE 200 MG: 200 TABLET ORAL at 10:02

## 2020-07-16 RX ADMIN — LACTULOSE 10 G: 20 SOLUTION ORAL at 21:27

## 2020-07-16 RX ADMIN — Medication 5.3 MILLICURIE: at 10:15

## 2020-07-16 RX ADMIN — MEROPENEM 1 G: 1 INJECTION, POWDER, FOR SOLUTION INTRAVENOUS at 21:27

## 2020-07-16 RX ADMIN — OMEPRAZOLE 40 MG: 20 CAPSULE, DELAYED RELEASE ORAL at 11:55

## 2020-07-16 RX ADMIN — SODIUM CHLORIDE: 9 INJECTION, SOLUTION INTRAVENOUS at 12:12

## 2020-07-16 RX ADMIN — ACETAMINOPHEN 650 MG: 325 TABLET ORAL at 21:36

## 2020-07-16 RX ADMIN — ACETAMINOPHEN 650 MG: 325 TABLET ORAL at 10:01

## 2020-07-16 RX ADMIN — NIFEDIPINE 60 MG: 60 TABLET, FILM COATED, EXTENDED RELEASE ORAL at 11:55

## 2020-07-16 RX ADMIN — LACTULOSE 10 G: 20 SOLUTION ORAL at 14:44

## 2020-07-16 RX ADMIN — MEROPENEM 1 G: 1 INJECTION, POWDER, FOR SOLUTION INTRAVENOUS at 10:10

## 2020-07-16 RX ADMIN — INSULIN LISPRO 1 UNITS: 100 INJECTION, SOLUTION INTRAVENOUS; SUBCUTANEOUS at 10:02

## 2020-07-16 RX ADMIN — PREGABALIN 100 MG: 100 CAPSULE ORAL at 11:55

## 2020-07-16 RX ADMIN — PREGABALIN 100 MG: 100 CAPSULE ORAL at 21:26

## 2020-07-16 RX ADMIN — LACTULOSE 10 G: 20 SOLUTION ORAL at 10:02

## 2020-07-16 RX ADMIN — ALLOPURINOL 600 MG: 300 TABLET ORAL at 10:01

## 2020-07-16 RX ADMIN — MELATONIN TAB 3 MG 10.5 MG: 3 TAB at 21:36

## 2020-07-16 RX ADMIN — INSULIN LISPRO 1 UNITS: 100 INJECTION, SOLUTION INTRAVENOUS; SUBCUTANEOUS at 17:38

## 2020-07-16 ASSESSMENT — ENCOUNTER SYMPTOMS
NAUSEA: 0
COLOR CHANGE: 0
DIARRHEA: 0
VOMITING: 0
CHEST TIGHTNESS: 0
COUGH: 0
SHORTNESS OF BREATH: 0
ABDOMINAL PAIN: 1
BLOOD IN STOOL: 0
CONSTIPATION: 0
WHEEZING: 0

## 2020-07-16 ASSESSMENT — PAIN SCALES - GENERAL
PAINLEVEL_OUTOF10: 0
PAINLEVEL_OUTOF10: 2
PAINLEVEL_OUTOF10: 0
PAINLEVEL_OUTOF10: 3

## 2020-07-16 NOTE — H&P
[address]    HISTORY AND PHYSICAL EXAMINATION            Date:   7/15/2020  Patient name:  Grabiel Leiva  Date of admission:  7/15/2020  4:09 PM  MRN:   [id number]  Account:  [de-identified]  YOB: 1950  PCP:    Yesy Mena DO  Room:   [facility]/1005-01  Code Status:    Full Code    Chief Complaint:     Chief Complaint   Patient presents with    Dizziness    Hypotension     History Obtained From:     Patient and electronic medical record. History of Present Illness:     Grabiel Leiva is a 78 y.o. Non-/non  male who presents with Dizziness and Hypotension   and is admitted to the hospital for the management of MULU (acute kidney injury) ([facility] [place] The patient reports to the hospital with complaints of weakness, dizziness and general malaise. He also endorses lower abdominal pain that he describes as intermittent, dull, aching and moderate in severity. He endorses subjective fever and chills. The patient states that his symptoms started approximately 4 days ago. He went to an ER on 7/12/2020 and was diagnosed with a UTI. He was given a dose of Toradol and initially started on ciprofloxacin that was changed to Bactrim secondary to urine culture sensitivity. He states that he was having dysuria and urinary hesitancy at that time that has since resolved. He denies additional symptomology or modifying factors. The patient is a poor historian and has difficulty with sequence of events. His blood pressure was noted to be low with a systolic in the 39E upon arrival to the ER, was given IV fluids with satisfactory improvement in blood pressure. He has past medical history that includes hypertension, hepatitis C with cirrhosis status post Harvoni treatment, diabetes, CKD III, dyslipidemia and prostate cancer with prostatectomy.     D-dimer 2.89, creatinine 2.41, proBNP 1974, high-sensitivity troponin 37, WBC 12, lactic 0.9, 0.6.    Of note, the patient underwent an aortic valve replacement approximately 6/3/2020 at the [facility]. He follows outpatient with Dr. Margaret Oconnor with nephrology. A repeat sepsis focused exam has been completed 11:35 PM EDT. 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     Sciatica         Past Surgical History:     Past Surgical History:   Procedure Laterality Date    AORTIC VALVE REPAIR      BACK SURGERY      L4-5    CARDIAC CATHETERIZATION  10.30/2019    Dr. Pacheco Lemon COLONOSCOPY  12/05/2016    [facility], states due for another in 2  years    COLONOSCOPY N/A 10/7/2019    tubular adenoma x2; hyperplastic polyp    HERNIA REPAIR      KNEE ARTHROSCOPY Right     OTHER SURGICAL HISTORY Right 01/25/2016    10 liters removed peracentesis    PARACENTESIS  11-4-15    PARACENTESIS  12/28/15    [facility] EGD TRANSORAL BIOPSY SINGLE/MULTIPLE N/A 8/30/2017    EGD BIOPSY performed by Evens Brooks MD at [facility]  6/2011    TONSILLECTOMY      UPPER GASTROINTESTINAL ENDOSCOPY  10/24/2013    small segment barretts    UPPER GASTROINTESTINAL ENDOSCOPY  08/30/2017    VEIN SURGERY Left 12/07/2016    leg vein stripping    VENTRAL HERNIA REPAIR  05/26/15        Medications Prior to Admission:     Prior to Admission medications    Medication Sig Start Date End Date Taking? Authorizing Provider   sulfamethoxazole-trimethoprim (BACTRIM DS;SEPTRA DS) 800-160 MG per tablet Take 1 tablet by mouth 2 times daily   Yes Historical Provider, MD   amiodarone (CORDARONE) 200 MG tablet Take 200 mg by mouth daily   Yes Historical Provider, MD   SITagliptin (JANUVIA) 100 MG tablet Take 100 mg by mouth daily   Yes Historical Provider, MD   pregabalin (LYRICA) 100 MG capsule TAKE 1 CAPSULE BY MOUTH 3 TIMES A DAY 7/1/20 8/1/20 Yes Lynnette Aleman DO   lactulose (CHRONULAC) 10 GM/15ML solution TAKE 15 MLS BY MOUTH THREE TIMES A DAY 6/30/20  Yes Lynnette Aleman DO   furosemide (LASIX) 20 MG tablet Take 2 tablets by mouth 2 times daily 6/25/20 6/25/21 Yes KELSY Damon CNP   spironolactone (ALDACTONE) 50 MG tablet TAKE 1 TABLET BY MOUTH ONE TIME A DAY 6/23/20  Yes KELSY Salgado CNP   Cholecalciferol (VITAMIN D) 50 MCG (2000 UT) CAPS capsule Take 1 capsule by mouth daily 2/24/20  Yes Lynnette Aleman DO   omeprazole (PRILOSEC) 20 MG delayed release capsule Take 2 capsules by mouth daily 2/24/20 2/23/21 Yes Kusum Allen DO   NIFEdipine (PROCARDIA XL) 60 MG extended release tablet Take 60 mg by mouth daily  6/12/19  Yes Historical Provider, MD   allopurinol (ZYLOPRIM) 300 MG tablet Take 600 mg by mouth daily Takes 2 tabs (=600mg) daily 3/27/19  Yes KELSY Kessler CNP        Allergies:     Nsaids    Social History:     Tobacco:    reports that he has quit smoking. His smoking use included cigarettes. He started smoking about 53 years ago. He has a 49.00 pack-year smoking history. He has never used smokeless tobacco.  Alcohol:      reports no history of alcohol use. Drug Use:  reports current drug use. Drug: Marijuana.     Family History:     Family History   Problem Relation Age of Onset  Cancer Father         prostate     Other Neg Hx         blood clots       Review of Systems:     Positive and Negative as described in HPI. Review of Systems   Constitutional: Negative for chills, diaphoresis and fever. HENT: Negative for congestion. Eyes: Negative for visual disturbance. Respiratory: Negative for cough, chest tightness, shortness of breath and wheezing. Cardiovascular: Negative for chest pain, palpitations and leg swelling. Gastrointestinal: Positive for abdominal pain. Negative for blood in stool, constipation, diarrhea, nausea and vomiting. Endocrine: Negative for cold intolerance and heat intolerance. Genitourinary: Positive for difficulty urinating and dysuria. Resolved dysuria and difficulty with urination. Musculoskeletal: Negative for arthralgias and myalgias. Skin: Negative for color change and rash. Neurological: Negative for dizziness, weakness, light-headedness, numbness and headaches. Hematological: Does not bruise/bleed easily. Psychiatric/Behavioral: The patient is not nervous/anxious. All other systems reviewed and are negative. Physical Exam:   BP (!) 100/55   Pulse 70   Temp 98.7 °F (37.1 °C)   Resp 18   Ht 6' (1.829 m)   Wt 250 lb (113.4 kg)   SpO2 98%   BMI 33.91 kg/m²   Temp (24hrs), Av.1 °F (37.3 °C), Min:98.7 °F (37.1 °C), Max:99.4 °F (37.4 °C)    No results for input(s): POCGLU in the last 72 hours. No intake or output data in the 24 hours ending 07/15/20 2336    Physical Exam  Vitals signs and nursing note reviewed. Constitutional:       General: He is not in acute distress. Appearance: He is not diaphoretic. HENT:      Head: Normocephalic and atraumatic. Right Ear: Hearing normal.      Left Ear: Hearing normal.      Nose: Nose normal. No rhinorrhea. Eyes:      General: Lids are normal.      Extraocular Movements:      Right eye: Normal extraocular motion. Left eye: Normal extraocular motion. Conjunctiva/sclera: Conjunctivae normal.      Right eye: Right conjunctiva is not injected. Left eye: Left conjunctiva is not injected. Pupils: Pupils are equal, round, and reactive to light. Pupils are equal.      Right eye: Pupil is reactive. Left eye: Pupil is reactive. Neck:      Musculoskeletal: Neck supple. Thyroid: No thyromegaly. Vascular: No carotid bruit. Trachea: Trachea and phonation normal. No tracheal deviation. Cardiovascular:      Rate and Rhythm: Normal rate and regular rhythm. Pulses: Normal pulses. Heart sounds: S1 normal and S2 normal.      Comments: Trace edema bilateral lower extremities. Pulmonary:      Effort: Pulmonary effort is normal. No respiratory distress. Breath sounds: No stridor. Examination of the right-lower field reveals decreased breath sounds. Examination of the left-lower field reveals decreased breath sounds. Decreased breath sounds present. Abdominal:      General: Bowel sounds are normal. There is no distension. Palpations: Abdomen is soft. There is no mass. Tenderness: There is abdominal tenderness in the suprapubic area. There is no guarding. Musculoskeletal:         General: No tenderness. Skin:     General: Skin is warm and dry. Findings: No erythema, lesion or rash. Neurological:      Mental Status: He is alert and oriented to person, place, and time. He is not disoriented. Cranial Nerves: No cranial nerve deficit. Psychiatric:         Speech: Speech normal.         Behavior: Behavior normal. Behavior is cooperative.        Investigations:      Laboratory Testing:  Recent Results (from the past 24 hour(s))   Basic Metabolic Prof    Collection Time: 07/15/20  4:15 PM   Result Value Ref Range    Glucose 103 (H) 70 - 99 mg/dL    BUN 28 (H) 8 - 23 mg/dL    CREATININE 2.41 (H) 0.70 - 1.20 mg/dL    Bun/Cre Ratio 12 9 - 20    Calcium 9.4 8.6 - 10.4 mg/dL    Sodium 135 135 - 144 mmol/L    Potassium 4.7 3.7 - 5.3 mmol/L    Chloride 95 (L) 98 - 107 mmol/L    CO2 22 20 - 31 mmol/L    Anion Gap 18 (H) 9 - 17 mmol/L    GFR Non-African American 27 (L) >60 mL/min    GFR  33 (L) >60 mL/min    GFR Comment          GFR Staging NOT REPORTED    CBC with DIFF    Collection Time: 07/15/20  4:15 PM   Result Value Ref Range    WBC 12.0 (H) 3.5 - 11.3 k/uL    RBC 3.86 (L) 4.21 - 5.77 m/uL    Hemoglobin 11.7 (L) 13.0 - 17.0 g/dL    Hematocrit 37.9 (L) 40.7 - 50.3 %    MCV 98.2 82.6 - 102.9 fL    MCH 30.3 25.2 - 33.5 pg    MCHC 30.9 28.4 - 34.8 g/dL    RDW 14.4 11.8 - 14.4 %    Platelets 283 823 - 737 k/uL    MPV 9.9 8.1 - 13.5 fL    NRBC Automated 0.0 0.0 per 100 WBC    Differential Type NOT REPORTED     Seg Neutrophils 75 (H) 36 - 65 %    Lymphocytes 12 (L) 24 - 43 %    Monocytes 11 3 - 12 %    Eosinophils % 0 (L) 1 - 4 %    Basophils 1 0 - 2 %    Immature Granulocytes 1 (H) 0 %    Segs Absolute 9.07 (H) 1.50 - 8.10 k/uL    Absolute Lymph # 1.43 1.10 - 3.70 k/uL    Absolute Mono # 1.30 (H) 0.10 - 1.20 k/uL    Absolute Eos # 0.03 0.00 - 0.44 k/uL    Basophils Absolute 0.09 0.00 - 0.20 k/uL    Absolute Immature Granulocyte 0.10 0.00 - 0.30 k/uL    WBC Morphology NOT REPORTED     RBC Morphology NOT REPORTED     Platelet Estimate NOT REPORTED    Liver Profile    Collection Time: 07/15/20  4:15 PM   Result Value Ref Range    Alb 3.8 3.5 - 5.2 g/dL    Alkaline Phosphatase 88 40 - 129 U/L    ALT 9 5 - 41 U/L    AST 13 <40 U/L    Total Bilirubin 0.30 0.3 - 1.2 mg/dL    Bilirubin, Direct 0.11 <0.31 mg/dL    Bilirubin, Indirect 0.19 0.00 - 1.00 mg/dL    Total Protein 8.0 6.4 - 8.3 g/dL    Globulin NOT REPORTED 1.5 - 3.8 g/dL    Albumin/Globulin Ratio NOT REPORTED 1.0 - 2.5   Magnesium    Collection Time: 07/15/20  4:15 PM   Result Value Ref Range    Magnesium 1.7 1.6 - 2.6 mg/dL   Trop/Myoglobin    Collection Time: 07/15/20  4:15 PM   Result Value Ref Range    Troponin, High Sensitivity 37 (H) 0 - 22 ng/L    Troponin T NOT REPORTED <0.03 ng/mL    Troponin Interp NOT REPORTED     Myoglobin 164 (H) 28 - 72 ng/mL   Brain Natriuretic Peptide    Collection Time: 07/15/20  4:15 PM   Result Value Ref Range    Pro-BNP 1,974 (H) <300 pg/mL    BNP Interpretation Pro-BNP Reference Range:    EKG 12 Lead    Collection Time: 07/15/20  4:47 PM   Result Value Ref Range    Ventricular Rate 75 BPM    Atrial Rate 75 BPM    P-R Interval 200 ms    QRS Duration 110 ms    Q-T Interval 402 ms    QTc Calculation (Bazett) 448 ms    P Axis -4 degrees    R Axis -27 degrees    T Axis 143 degrees   Lactate, Sepsis    Collection Time: 07/15/20  5:50 PM   Result Value Ref Range    Lactic Acid, Sepsis 0.9 0.5 - 1.9 mmol/L    Lactic Acid, Sepsis, Whole Blood NOT REPORTED 0.5 - 1.9 mmol/L   APTT    Collection Time: 07/15/20  5:50 PM   Result Value Ref Range    PTT 40.1 (H) 23.9 - 33.8 sec   Protime-INR    Collection Time: 07/15/20  5:50 PM   Result Value Ref Range    Protime 16.0 (H) 11.5 - 14.2 sec    INR 1.3    D-Dimer, Quantitative    Collection Time: 07/15/20  5:50 PM   Result Value Ref Range    D-Dimer, Quant 2.89 (H) 0.00 - 0.59 mg/L FEU   Lactate, Sepsis    Collection Time: 07/15/20  7:15 PM   Result Value Ref Range    Lactic Acid, Sepsis 0.6 0.5 - 1.9 mmol/L    Lactic Acid, Sepsis, Whole Blood NOT REPORTED 0.5 - 1.9 mmol/L   TROP/MYOGLOBIN    Collection Time: 07/15/20  7:35 PM   Result Value Ref Range    Troponin, High Sensitivity 36 (H) 0 - 22 ng/L    Troponin T NOT REPORTED <0.03 ng/mL    Troponin Interp NOT REPORTED     Myoglobin 151 (H) 28 - 72 ng/mL       Imaging/Diagnostics:  Ct Abdomen Pelvis Wo Contrast Additional Contrast? None    Result Date: 7/15/2020  There is right urothelial thickening involving the renal pelvis and ureter with adjacent fat stranding. No urolithiasis or acute obstructive uropathy. Findings suggest acute inflammation/infection. There is also mild left periureteral fat stranding. Liver morphology consistent with cirrhosis. Portal venous hypertension with mild upper abdominal varices and mild splenomegaly. Prosthetic aortic valve. Status post prostatectomy. Xr Chest Portable    Result Date: 7/15/2020  Cardiomegaly with bibasilar atelectasis. No focal consolidation is seen to suggest pneumonia. Assessment :      Hospital Problems           Last Modified POA    * (Principal) MULU (acute kidney injury) ([facility] [address] 7/15/2020 Yes    Essential hypertension 7/15/2020 Yes    CKD (chronic kidney disease) stage 3, GFR 30-59 ml/min (HCC) 7/15/2020 Yes    Hepatic cirrhosis ([facility] [address] 7/15/2020 Yes    Type 2 diabetes mellitus with kidney complication, without long-term current use of insulin ([facility] [address] 7/15/2020 Yes    Acute cystitis without hematuria 7/15/2020 Yes    Elevated d-dimer 7/16/2020 Yes        Plan:     Patient status observation in the  Progressive Unit/Step down    1. Consult infectious disease. 2. Blood cultures incubating. 3. IV meropenem pending ID recommendations. 4. MULU superimposed on CKD- possibly due to NSAID and bactrim? 5. Gentle IV hydration. 6. Avoid nephrotoxic agents. 7. Monitor renal function. 8. Elevated d dimer- high intensity heparin drip. 9. VQ scan, rule out PE.  10. HTN- monitor and control blood pressure. Hold antihypertensive agents for now. 11. Cirrhosis- chronic, continue lactulose. 12. DM- monitor and control blood glucose, insulin sliding scale. 13. Supplemental oxygen as needed. 14. Telemetry. 15. Monitor vital signs. 16. Follow chemistries. 17. Renal diet. 18. Activity as tolerated with assist.     Plan of care discussed with patient, Taniya Reyes and [address] [place] [address].      Consultations:   IP CONSULT TO INTERNAL MEDICINE  IP CONSULT TO INFECTIOUS DISEASES    KELSY Jon - MARIANELA   7/15/2020  11:36 PM    Copy sent to Dr. Mook Huang DO     (Please note that portions of this note were completed with a voice recognition program. Efforts were made to edit the dictations but occasionally words are mis-transcribed.)

## 2020-07-16 NOTE — CONSULTS
[facility]  Initial Consult Note  Date: 7/16/2020    Hospital day :0     Impression:   1. ESBL E. coli urinary tract infection  2. Sepsis related to above presenting as the hypotension, toxic metabolic encephalopathy  3. Acute kidney injury on chronic kidney disease stage III  4. History of hepatitis C virus infection status post treatment with Harvoni and has cirrhosis. 5. Diabetes mellitus type 2.  6. Nonrheumatic aortic valve insufficiency status post aortic valve replacement 6/5/2020    Recommendations   · I agree with the antimicrobial therapy with meropenem which will cover the previously isolated urinary pathogen. · The concern at this point in time is whether there is secondary sepsis/bacteremia. · Blood cultures thus far remain negative. · Clinically the patient is improved with improved encephalopathy and resolved hypotension  · We will follow his clinical progress and culture data and make final antibiotic recommendations thereafter    Chief complaint/reason for consultation:   ESBL E. coli UTI    History of Present Illness:   Jose Huerta is a 78y.o.-year-old male who was initially admitted on 7/15/2020. Kory Springer has a history of hepatitis C virus infection status post treatment with Jean Paul Rivero in 2017, prostate cancer, peripheral vascular disease, chronic kidney disease stage III, anxiety, diabetes mellitus type 2, hyperlipidemia, dilated cardiomyopathy, venous insufficiency both lower extremities, lumbar radiculopathy, tophaceous gout, Sol's esophagitis, prostate cancer status post da Melany prostatectomyand nonrheumatic aortic valve insufficiency for which he underwent aortic valve replacement 6/5/2020. The patient reports that ever since he had his prostatectomy he has had issues with his urinary sphincter. He cannot urinate while standing up and essentially has to sit and totally relaxed/concentrate so that he is able to release his sphincter.   The patient reports that he developed lower abdominal/pelvic pain, bilateral flank pain and burning type pain when he needed to urinate. He was having difficulty urinating and ended up coming into the emergency room for evaluation on July 12, 2020. The patient was diagnosed with a urinary tract infection discharged on oral antibiotics with ciprofloxacin and the urine culture came back with an ESBL producing E. coli and the patient was called and had the antibiotic switched to Bactrim. The patient reports that his urinary symptoms had persisted and potentially had gotten worse and he took a dose of Bactrim on the day prior to admission but started having symptoms of dizziness, fatigue and wanted to sleep all the time, subsequently became confused, weak and had actually fallen while trying to get out of bed. The patient was having difficulty getting up and he called his daughter who found him confused. The patient also did report fevers and chills. He also reports some rhinorrhea and cough which he thinks was related to postnasal drip. The patient was brought into the emergency room for evaluation and was found to be hypotensive, imaging studies included a CT of the abdomen pelvis that showed findings concerning for a urinary tract infection. Lab studies shows elevated creatinine at 2.41 which is higher than his baseline. The patient was admitted started on meropenem and I was asked to evaluate and help with antibiotic choice. The patient is seen at bedside with his daughter and he is actually doing much better with less confusion and dizziness. He does report that the abdominal pain overall is improved. I have personally reviewed the past medical history, past surgical history, medications, social history, and family history, and I have updated the database accordingly.   Past Medical History:     Past Medical History:   Diagnosis Date    Anxiety state NEC     Arthritis     DJD    Sol esophagus 10/24/2013    small segment    Chronic kidney disease     [facility] ([facility] [address]     had paracentesis Sept 2015    Colon polyps 10/07/2019    tubular adenoma x2; hyperplastic polyp    Diverticulosis of colon     Enlarged heart 12/28/2015    HISTORY OF, is resolved at this time    Gout 12/28/2015    is resolved at this time    Hepatic cirrhosis ([facility] [address]     Hepatic cyst     Hepatitis C, chronic (HCC)     Seeing Dr. Dante Castleman, MD at [facility] for Liver Transplant    History of alcohol use 9/18/2015    Hyperlipidemia     Hypertension     Lumbago     Lymphedema     Malignant neoplasm of prostate ([facility] [address]     Otalgia of right ear     radiates down the jaw in the distribution of the third branch of the trigemminal nerve.     Sciatica      Past Surgical  History:     Past Surgical History:   Procedure Laterality Date    AORTIC VALVE REPAIR      BACK SURGERY      L4-5    CARDIAC CATHETERIZATION  10.30/2019    Dr. Fifi Varghese COLONOSCOPY  12/05/2016    [facility], states due for another in 2  years    COLONOSCOPY N/A 10/7/2019    tubular adenoma x2; hyperplastic polyp    HERNIA REPAIR      KNEE ARTHROSCOPY Right     OTHER SURGICAL HISTORY Right 01/25/2016    10 liters removed peracentesis    PARACENTESIS  11-4-15    PARACENTESIS  12/28/15    CO EGD TRANSORAL BIOPSY SINGLE/MULTIPLE N/A 8/30/2017    [facility] BIOPSY [facility] at [facility]  6/2011    TONSILLECTOMY      UPPER GASTROINTESTINAL ENDOSCOPY  10/24/2013    small segment barretts    UPPER GASTROINTESTINAL ENDOSCOPY  08/30/2017    VEIN SURGERY Left 12/07/2016    leg vein stripping    VENTRAL HERNIA REPAIR  05/26/15     Medications:      insulin lispro  0-6 Units Subcutaneous TID WC    insulin lispro  0-3 Units Subcutaneous Nightly    NIFEdipine  60 mg Oral Daily    omeprazole  40 mg Oral Daily    pregabalin  100 mg Oral BID    SITagliptin  100 mg Oral Daily    allopurinol  600 mg Oral Daily    amiodarone  200 mg Oral Making:   I have independently reviewed/ordered the following labs:  CBC with Differential:   Recent Labs     07/15/20  1615 07/16/20  0553   WBC 12.0* 7.7   HGB 11.7* 9.9*   HCT 37.9* 32.0*    226   LYMPHOPCT 12*  --    MONOPCT 11  --      BMP:   Recent Labs     07/15/20  1615 07/16/20  0553    136   K 4.7 4.1   CL 95* 100   CO2 22 21   BUN 28* 29*   CREATININE 2.41* 2.26*   MG 1.7 1.7     Hepatic Function Panel:   Recent Labs     07/15/20  1615 07/16/20  0553   PROT 8.0 6.6   LABALBU 3.8 3.2*   BILIDIR 0.11  --    IBILI 0.19  --    BILITOT 0.30 0.15*   ALKPHOS 88 75   ALT 9 10   AST 13 15     No results found for: CRP  No results found for: SEDRATE    No results for input(s): PROCAL in the last 72 hours.      Component  Value  Ref Range & Units  Status  Collected  Lab    Color, UA  YELLOW  YELLOW  Final  07/16/2020  6:02 AM  [facility]    Turbidity UA  CLEAR  CLEAR  Final  07/16/2020  6:02 AM  [facility]    Glucose, Ur  NEGATIVE  NEGATIVE  Final  07/16/2020  6:02 AM  [facility]    Bilirubin Urine  NEGATIVE  NEGATIVE  Final  07/16/2020  6:02 AM  [facility]    Ketones, Urine  NEGATIVE  NEGATIVE  Final  07/16/2020  6:02 AM  [facility]    Specific Gravity, UA  1.015  1.005 - 1.030  Final  07/16/2020  6:02 AM  [facility]    Urine Hgb  2+Abnormal    NEGATIVE  Final  07/16/2020  6:02 AM  [facility]    pH, UA  5.5  5.0 - 8.0  Final  07/16/2020  6:02 AM  [facility]    Protein, [place]  TRACEAbnormal    NEGATIVE  Final  07/16/2020  6:02 AM  [facility]    Urobilinogen, Urine  Normal  Normal  Final  07/16/2020  6:02 AM  [facility]    Nitrite, Urine  NEGATIVE  NEGATIVE  Final  07/16/2020  6:02 AM  [facility]    Leukocyte Esterase, Urine  MODAbnormal    NEGATIVE  Final  07/16/2020  6:02 AM  - [facility]    Urinalysis Comments  NOT REPORTED Final  07/16/2020  6:02 AM  [facility]      WBC, UA  10 TO 20  0 - 5 /HPF  Final  07/16/2020  6:02 AM  [facility]    RBC, UA  10 TO 20  0 - 2 /HPF  Final  07/16/2020  6:02 AM  [facility]    Casts UA  NOT REPORTED  /LPF  Final  07/16/2020  6:02 AM  [facility]    Crystals, UA  NOT REPORTED  None /HPF  Final  07/16/2020  6:02 AM  [facility]    Epithelial Cells UA  0 TO 2  0 - 5 /HPF  Final  07/16/2020  6:02 AM  [facility]          Color, UA  YELLOW  YELLOW  Final  07/12/2020  6:35 AM  [facility]    Turbidity UA  CLOUDYAbnormal    CLEAR  Final  07/12/2020  6:35 AM  [facility]    Glucose, Ur  NEGATIVE  NEGATIVE  Final  07/12/2020  6:35 AM  [facility]    Bilirubin Urine  NEGATIVE  NEGATIVE  Final  07/12/2020  6:35 AM  [facility]    Ketones, Urine  TRACEAbnormal    NEGATIVE  Final  07/12/2020  6:35 AM  [facility]    Specific Gravity, UA  1.020  1.005 - 1.030  Final  07/12/2020  6:35 AM  [facility]    Urine Hgb  LARGEAbnormal    NEGATIVE  Final  07/12/2020  6:35 AM  [facility]    pH, UA  5.5  5.0 - 8.0  Final  07/12/2020  6:35 AM  [facility]    Protein, UA  3+Abnormal    NEGATIVE  Final  07/12/2020  6:35 AM  [facility]    Urobilinogen, Urine  Normal  Normal  Final  07/12/2020  6:35 AM  [facility]    Nitrite, Urine  NEGATIVE  NEGATIVE  Final  07/12/2020  6:35 AM  [facility]    Leukocyte Esterase, Urine  LARGEAbnormal    NEGATIVE  Final  07/12/2020  6:35 AM  [facility]    Urinalysis Comments  NOT REPORTED   Final  07/12/2020  6:35 AM  [facility]      WBC, UA   0 - 5 /HPF  Final  07/12/2020  6:35 AM  [facility]    TOO NUMEROUS TO COUNT    RBC, UA   0 - 2 /HPF  Final  07/12/2020  6:35 AM  [facility]    TOO NUMEROUS TO COUNT    Casts UA  NOT REPORTED  0 - 2 /LPF  Final  07/12/2020  6:35 AM  [facility]    Crystals, UA  NOT REPORTED  None /HPF  Final  07/12/2020  6:35 AM  [facility]    Epithelial Cells UA  5 TO 10  0 - 5 /HPF  Final  07/12/2020  6:35 AM  [facility]        Imaging Studies:   CT OF THE ABDOMEN AND PELVIS WITHOUT CONTRAST 7/15/2020 5:02 pm   FINDINGS:   There is right urothelial thickening involving the renal pelvis and ureter with adjacent fat stranding. No urolithiasis or acute obstructive uropathy. Findings suggest acute inflammation/infection. There is also mild left periureteral fat stranding. Liver morphology consistent with cirrhosis. Portal venous hypertension with mild upper abdominal varices and mild splenomegaly. Prosthetic aortic valve. Status post prostatectomy. NUCLEAR MEDICINE PERFUSION SCAN. 7/16/2020  FINDINGS:  Low probability for pulmonary embolus. ONE XRAY VIEW OF THE CHEST 7/15/2020 4:50 pm   FINDINGS:   Cardiomegaly with bibasilar atelectasis. No focal consolidation is seen to suggest pneumonia. Cultures:     Culture, Blood 1 [[id number]]   Collected: 07/15/20 1750    Order Status: Completed  Specimen: Blood  Updated: 07/16/20 1341     Specimen Description  . BLOOD     Special Requests  LTAC,7CC     Culture  NO GROWTH 15 HOURS    Culture, Blood 1 [[id number]]   Collected: 07/15/20 1615    Order Status: Completed  Specimen: Blood  Updated: 07/16/20 1341     Specimen Description  . BLOOD     Special Requests  RTAC,10CC     Culture  NO GROWTH 15 HOURS    Culture, Urine [[id number]]   Collected: 07/16/20 0602    Order Status: No result  Specimen: Urine  Updated: 07/16/20 0934      Random Urine    Special Requests  07/12/2020  6:35 AM  [facility]    NOT REPORTED    Culture Abnormal    07/12/2020  6:35 AM  [address] >[id number] CFU/ML THIS ORGANISM IS AN EXTENDED-SPECTRUM BETA-LACTAMASE  AND RESISTANCE TO THERAPY WITH PENICILLINS, CEPHALOSPORINS AND AZTREONAM IS EXPECTED.  THESE ORGANISMS GENERALLY REMAIN SUSCEPTIBLE TO CARBAPENEMS.  CONSIDER ID CONSULTATION.     Escherichia coli (1)     Antibiotic Interpretation  MATTHIAS  Status     amikacin  Sensitive   Final      16   SUSCEPTIBLE    ampicillin  Resistant   Final      >=32   RESISTANT    ampicillin-sulbactam    Final      NOT REPORTED    aztreonam  Resistant   Final      >=64   RESISTANT    ceFAZolin  Resistant   Final      >=64   RESISTANT    ceFAZolin  Resistant  Cefazolin sensitivity results can be used to predict the effectiveness of oral cephalosporins (eg. Cephalexin) in uncomplicated Urinary Tract Infections due to E. coli, K. pneumoniae, and P. mirabilis  Final     cefepime  Resistant   Final      8   RESISTANT    cefTRIAXone  Resistant   Final      >=64   RESISTANT    ciprofloxacin  Resistant   Final      >=4   RESISTANT    ertapenem    Final      NOT REPORTED    Confirmatory Extended Spectrum Beta-Lactamase  Positive  POSITIVE  Final     gentamicin  Resistant   Final      >=16   RESISTANT    meropenem  Sensitive   Final      <=0.25   SUSCEPTIBLE    nitrofurantoin  Sensitive   Final      <=16   SUSCEPTIBLE    tigecycline    Final      NOT REPORTED    tobramycin  Resistant   Final      >=16   RESISTANT    trimethoprim-sulfamethoxazole  Sensitive   Final      <=20   SUSCEPTIBLE    piperacillin-tazobactam  Resistant   Final      8   RESISTANT              Thank you for allowing us to participate in the care of this patient. Please call with questions. Electronically signed by Carroll Kruger MD on 7/16/2020 at 1:55 PM      [facility]  Carroll Kruger MD  Perfect [employer] messaging  OFFICE: [phone]      This note is created with the assistance of a speech recognition program.  While intending to generate a document that actually reflects the content of the visit, the document can still have some errors including those of syntax and sound a like substitutions which may escape proof reading. In such instances, actual meaning can be extrapolated by contextual diversion.

## 2020-07-16 NOTE — CARE COORDINATION
Case Management Initial Discharge Plan  Kelly Ayalas,         Readmission Risk              Risk of Unplanned Readmission:        0             Met with:patient to discuss discharge plans. Information verified: address, contacts, phone number, , insurance Yes  PCP: Adonis Mitchell DO  Date of last visit:     Insurance Provider: BEN    Discharge Planning  Current Residence:  Private home   Living Arrangements:    spouse       Home has 2 stories/few stairs to climb to enter the home. Bed is upstairs and bath on both levels  Support Systems:    wife       Current Services PTA:  None     Agency: none       Patient able to perform ADL's:Independent     DME in home:  Nebulizer   DME used to aid ambulation prior to admission:   None   DME used during admission:  None     Potential Assistance Needed:  N/A    Pharmacy: [facility] is mail in and goes to [facility] Medications:  No  Does patient want to participate in local refill/ meds to beds program?       Patient agreeable to home care: No  [place] of choice provided:  n/a      Type of Home Care Services:  None  Patient expects to be discharged to:  home    Prior SNF/Rehab Placement and Facility: none   Agreeable to SNF/Rehab: No  [place] of choice provided: n/a   Evaluation: n/a    Expected Discharge date:  20  Follow Up Appointment: Best Day/ Time:  any     Transportation provider:  Per family  Transportation arrangements needed for discharge: No    Discharge Plan:   Patient lives at home with spouse. Spouse just got diagnosed with suspected colon cancer. He is very anxious to go home to assist her. Patient is normally independent, uses no mobility DME . He has never used any homecare or snf. He is s/p [facility] and did not use any snf or home care. He is admitted with MULU and acute cystitis . IV atb . He also has elevated D DImer and on heparin gtt.  VQ completed and low probablity so do not feel

## 2020-07-16 NOTE — PROGRESS NOTES
[facility]    Progress Note    7/16/2020    9:52 AM    Name:   Corin Mancia  MRN:     [id number]     Acct:      [de-identified]   Room:   [address] [place] [address] Day:  0  Admit Date:  7/15/2020  4:09 PM    PCP:   Lynn Gordon DO  Code Status:  Full Code    Subjective:     C/C:   Chief Complaint   Patient presents with    Dizziness    Hypotension     Interval History Status: not changed. No issues overnight  No complaints  Awaiting VQ/dopplers  On abx for ESBL  Asking to go home     Brief History:     Corin Mancia is a 78 y.o. M who presents with weakness, dizziness and general malaise. He also endorses lower abdominal pain that he describes as intermittent, dull, aching and moderate in severity. He endorses subjective fever and chills. The patient states that his symptoms started approximately 4 days ago. He went to an ER on 7/12/2020 and was diagnosed with a UTI. He was given a dose of Toradol and initially started on ciprofloxacin that was changed to Bactrim secondary to urine culture sensitivity. He states that he was having dysuria and urinary hesitancy at that time that has since resolved. He denies additional symptomology or modifying factors. The patient is a poor historian and has difficulty with sequence of events. His blood pressure was noted to be low with a systolic in the 67N upon arrival to the ER, was given IV fluids with satisfactory improvement in blood pressure. He has past medical history that includes hypertension, hepatitis C with cirrhosis status post Harvoni treatment, diabetes, CKD III, dyslipidemia and prostate cancer with prostatectomy.     D-dimer 2.89, creatinine 2.41, proBNP 1974, high-sensitivity troponin 37, WBC 12, lactic 0.9, 0.6.     Of note, the patient underwent an aortic valve replacement approximately 6/3/2020 at the [address] [place] [address],[address].     He follows outpatient with Dr. Cleve Hopson with nephrology.    A repeat sepsis focused exam has been completed 11:35 PM EDT. Review of Systems:     Constitutional:  negative for chills, fevers, sweats  Respiratory:  negative for cough, dyspnea on exertion, shortness of breath  Cardiovascular:  negative for chest pain, chest pressure/discomfort  Gastrointestinal:  negative for abdominal pain, constipation, diarrhea, nausea, vomiting  Neurological:  negative for dizziness, headache    Medications: Allergies: Allergies   Allergen Reactions    Nsaids      Because of Kidney issues        Current Meds:   Scheduled Meds:    insulin lispro  0-6 Units Subcutaneous TID WC    insulin lispro  0-3 Units Subcutaneous Nightly    allopurinol  600 mg Oral Daily    amiodarone  200 mg Oral Daily    lactulose  10 g Oral TID    sodium chloride flush  10 mL Intravenous 2 times per day    meropenem  1 g Intravenous Q12H     Continuous Infusions:    dextrose      heparin (porcine) 18 Units/kg/hr (07/15/20 1830)    sodium chloride 75 mL/hr at 07/15/20 2150     PRN Meds: glucose, dextrose, glucagon (rDNA), dextrose, heparin (porcine), heparin (porcine), sodium chloride flush, acetaminophen **OR** acetaminophen, promethazine **OR** ondansetron, nicotine    Data:     Past Medical History:   has a past medical history of Anxiety state NEC, Arthritis, Sol esophagus, Chronic kidney disease, Cirrhosis ([address] Colon polyps, Diverticulosis of colon, Enlarged heart, Gout, Hepatic cirrhosis ([facility] [address] Hepatic cyst, Hepatitis C, chronic ([facility] [address] History of alcohol use, Hyperlipidemia, Hypertension, Lumbago, Lymphedema, Malignant neoplasm of prostate ([facility] [address] Otalgia of right ear, and Sciatica. Social History:   reports that he has quit smoking. His smoking use included cigarettes. He started smoking about 53 years ago. He has a 49.00 pack-year smoking history. He has never used smokeless tobacco. He reports current drug use. Drug: Marijuana. He reports that he does not drink alcohol. Additional Contrast? None    Result Date: 7/15/2020  There is right urothelial thickening involving the renal pelvis and ureter with adjacent fat stranding. No urolithiasis or acute obstructive uropathy. Findings suggest acute inflammation/infection. There is also mild left periureteral fat stranding. Liver morphology consistent with cirrhosis. Portal venous hypertension with mild upper abdominal varices and mild splenomegaly. Prosthetic aortic valve. Status post prostatectomy. Xr Chest Portable    Result Date: 7/15/2020  Cardiomegaly with bibasilar atelectasis. No focal consolidation is seen to suggest pneumonia.        Physical Examination:        General appearance:  alert, cooperative and no distress  Mental Status:  oriented to person, place and time and normal affect  Lungs:  clear to auscultation bilaterally, normal effort  Heart:  regular rate and rhythm  Abdomen:  soft, nontender, nondistended, normal bowel sounds  Extremities:  no edema, redness, tenderness in the calves  Skin:  no gross lesions, rashes, induration    Assessment:        Hospital Problems           Last Modified POA    * (Principal) MULU (acute kidney injury) ([address] 7/15/2020 Yes    Essential hypertension 7/15/2020 Yes    CKD (chronic kidney disease) stage 3, GFR 30-59 ml/min ([address] 7/15/2020 Yes    Hepatic cirrhosis ([address] 7/15/2020 Yes    Type 2 diabetes mellitus with kidney complication, without long-term current use of insulin ([address] 7/15/2020 Yes    Acute cystitis without hematuria 7/15/2020 Yes    Elevated d-dimer 7/16/2020 Yes          Plan:        - Vitals, labs, imaging, medications reviewed  - Continue meropenem  - ID consult  - Monitor renal function - continue IVF  - D-dimer elevated, continue heparin  - Check VQ scan / dopplers  - Resume selected home medications  - DC planning if imaging negative and cleared by ID    Rut Draper MD  7/16/2020  9:52 AM

## 2020-07-16 NOTE — PROGRESS NOTES
[facility]  Occupational Therapy Not Seen Note    Patient not available for Occupational Therapy due to:    [] Testing:    [] Hemodialysis    [] Cancelled by RN:    []Refusal by Patient:    [] Surgery:     [] Intubation:     [] Pain Medication:    [] Sedation:     [] Spine Precautions :    [] Medical Instability:    [x] Other:Per PT pt is indep, will d/c from OT

## 2020-07-16 NOTE — PROGRESS NOTES
Physical Therapy    Facility/Department: [facility] PROGRESSIVE CARE  Initial Assessment    NAME: Whit Child  : 1950  MRN: [id number]    Date of Service: 2020    Discharge Recommendations: Home indep. Assessment   Assessment: 78 yr old male with no physical complaints. Here for urinary infection. At prev functional level. Skilled therapy not needed at this time  Activity Tolerance  Activity Tolerance: Patient Tolerated treatment well       Patient Diagnosis(es): The primary encounter diagnosis was Urinary tract infection without hematuria, site unspecified. A diagnosis of Lightheadedness was also pertinent to this visit. has a past medical history of Anxiety state NEC, Arthritis, Sol esophagus, Chronic kidney disease, Cirrhosis ([address] Colon polyps, Diverticulosis of colon, Enlarged heart, Gout, Hepatic cirrhosis ([address] Hepatic cyst, Hepatitis C, chronic ([address] History of alcohol use, Hyperlipidemia, Hypertension, Lumbago, Lymphedema, Malignant neoplasm of prostate ([address] Otalgia of right ear, and Sciatica. has a past surgical history that includes Prostatectomy (2011); back surgery; Knee arthroscopy (Right); Tonsillectomy; ventral hernia repair (05/26/15); Paracentesis (11-4-15); hernia repair; Paracentesis (12/28/15); other surgical history (Right, 2016); Vein Surgery (Left, 2016); Colonoscopy (2016); Upper gastrointestinal endoscopy (10/24/2013); Upper gastrointestinal endoscopy (2017); pr egd transoral biopsy single/multiple (N/A, 2017); Colonoscopy (N/A, 10/7/2019); Cardiac catheterization (10.); and Aortic valve repair. Restrictions  Restrictions/Precautions  Restrictions/Precautions: Contact Precautions  Vision/Hearing  Vision: Within Functional Limits  Hearing: Within functional limits     Subjective  General  Diagnosis: ESBL  Follows Commands: Within Functional Limits  Subjective  Subjective: I was only dizzy upon admission.  I haven;t had any episodes since. Pain Screening  Patient Currently in Pain: Denies  Vital Signs  Patient Currently in Pain: Denies  Oxygen Therapy  SpO2: (!) 89 %(inc to 93 after 1 min on 2L per NC)  Pulse Oximeter Device Mode: Intermittent  Pulse Oximeter Device Location: Finger  O2 Device: None (Room air)  Patient Observation  Observations: Pt agreeable to PT sabrinaal.        Orientation  Orientation  Overall Orientation Status: Within Functional Limits  Social/Functional History- Patient cares for wife who has new dx of cancer. Family is supportive. Bed and bath on second floor. No issues with mobility in home. Independent with driving and ADLs        Objective          AROM RLE (degrees)  RLE AROM: WFL  AROM LLE (degrees)  LLE AROM : WFL  Strength RLE  Strength RLE: WFL  Strength LLE  Strength LLE: WFL        Bed mobility  Scooting: Independent  Transfers  Sit to Stand: Independent  Stand to sit:  Independent  Ambulation  Ambulation?: Yes  Ambulation 1  Surface: level tile  Device: No Device  Assistance: Independent  Distance: 50  Comments: steady including turns     Balance  Posture: Good  Sitting - Static: Good  Sitting - Dynamic: Good  Standing - Static: Good  Standing - Dynamic: Good        Plan : d/c PT                                      AM-PAC Score  AM-PAC Inpatient Mobility Raw Score : 24 (07/16/20 1353)  AM-PAC Inpatient T-Scale Score : 61.14 (07/16/20 1353)  Mobility Inpatient CMS 0-100% Score: 0 (07/16/20 1353)  Mobility Inpatient CMS G-Code Modifier : [facility] (07/16/20 1353)          Goals  Patient Goals   Patient goals : go home       Therapy Time   Individual Concurrent Group Co-treatment   Time In 1330         Time Out 1350         Minutes [id number] [facility] Carmen Bañuelos, JENNY

## 2020-07-17 VITALS
DIASTOLIC BLOOD PRESSURE: 72 MMHG | WEIGHT: 254.3 LBS | SYSTOLIC BLOOD PRESSURE: 123 MMHG | BODY MASS INDEX: 34.44 KG/M2 | TEMPERATURE: 99.3 F | RESPIRATION RATE: 18 BRPM | HEART RATE: 82 BPM | OXYGEN SATURATION: 90 % | HEIGHT: 72 IN

## 2020-07-17 LAB
ANION GAP SERPL CALCULATED.3IONS-SCNC: 12 MMOL/L (ref 9–17)
BUN BLDV-MCNC: 17 MG/DL (ref 8–23)
BUN/CREAT BLD: 11 (ref 9–20)
CALCIUM SERPL-MCNC: 8.8 MG/DL (ref 8.6–10.4)
CHLORIDE BLD-SCNC: 103 MMOL/L (ref 98–107)
CO2: 22 MMOL/L (ref 20–31)
CREAT SERPL-MCNC: 1.48 MG/DL (ref 0.7–1.2)
CULTURE: NO GROWTH
GFR AFRICAN AMERICAN: 57 ML/MIN
GFR NON-AFRICAN AMERICAN: 47 ML/MIN
GFR SERPL CREATININE-BSD FRML MDRD: ABNORMAL ML/MIN/{1.73_M2}
GFR SERPL CREATININE-BSD FRML MDRD: ABNORMAL ML/MIN/{1.73_M2}
GLUCOSE BLD-MCNC: 143 MG/DL (ref 75–110)
GLUCOSE BLD-MCNC: 152 MG/DL (ref 70–99)
GLUCOSE BLD-MCNC: 157 MG/DL (ref 75–110)
Lab: NORMAL
POTASSIUM SERPL-SCNC: 4.4 MMOL/L (ref 3.7–5.3)
SODIUM BLD-SCNC: 137 MMOL/L (ref 135–144)
SPECIMEN DESCRIPTION: NORMAL

## 2020-07-17 PROCEDURE — 6370000000 HC RX 637 (ALT 250 FOR IP): Performed by: INTERNAL MEDICINE

## 2020-07-17 PROCEDURE — 99217 PR OBSERVATION CARE DISCHARGE MANAGEMENT: CPT | Performed by: INTERNAL MEDICINE

## 2020-07-17 PROCEDURE — 82947 ASSAY GLUCOSE BLOOD QUANT: CPT

## 2020-07-17 PROCEDURE — 99232 SBSQ HOSP IP/OBS MODERATE 35: CPT | Performed by: INTERNAL MEDICINE

## 2020-07-17 PROCEDURE — 36415 COLL VENOUS BLD VENIPUNCTURE: CPT

## 2020-07-17 PROCEDURE — 6360000002 HC RX W HCPCS: Performed by: INTERNAL MEDICINE

## 2020-07-17 PROCEDURE — 96366 THER/PROPH/DIAG IV INF ADDON: CPT

## 2020-07-17 PROCEDURE — 80048 BASIC METABOLIC PNL TOTAL CA: CPT

## 2020-07-17 PROCEDURE — 2580000003 HC RX 258: Performed by: INTERNAL MEDICINE

## 2020-07-17 PROCEDURE — G0378 HOSPITAL OBSERVATION PER HR: HCPCS

## 2020-07-17 PROCEDURE — 6370000000 HC RX 637 (ALT 250 FOR IP): Performed by: NURSE PRACTITIONER

## 2020-07-17 RX ADMIN — INSULIN LISPRO 1 UNITS: 100 INJECTION, SOLUTION INTRAVENOUS; SUBCUTANEOUS at 12:28

## 2020-07-17 RX ADMIN — LACTULOSE 10 G: 20 SOLUTION ORAL at 08:23

## 2020-07-17 RX ADMIN — MEROPENEM 1 G: 1 INJECTION, POWDER, FOR SOLUTION INTRAVENOUS at 10:39

## 2020-07-17 RX ADMIN — PREGABALIN 100 MG: 100 CAPSULE ORAL at 08:23

## 2020-07-17 RX ADMIN — INSULIN LISPRO 1 UNITS: 100 INJECTION, SOLUTION INTRAVENOUS; SUBCUTANEOUS at 08:23

## 2020-07-17 RX ADMIN — NIFEDIPINE 60 MG: 60 TABLET, FILM COATED, EXTENDED RELEASE ORAL at 08:24

## 2020-07-17 RX ADMIN — OMEPRAZOLE 40 MG: 20 CAPSULE, DELAYED RELEASE ORAL at 08:23

## 2020-07-17 RX ADMIN — ALLOPURINOL 600 MG: 300 TABLET ORAL at 08:24

## 2020-07-17 RX ADMIN — AMIODARONE HYDROCHLORIDE 200 MG: 200 TABLET ORAL at 08:23

## 2020-07-17 ASSESSMENT — ENCOUNTER SYMPTOMS
RESPIRATORY NEGATIVE: 1
ALLERGIC/IMMUNOLOGIC NEGATIVE: 1
GASTROINTESTINAL NEGATIVE: 1

## 2020-07-17 NOTE — PROGRESS NOTES
[facility]    Progress Note    7/17/2020    9:06 AM    Name:   Chapis Romo  MRN:     [id number]     Acct:      [de-identified]   Room:   [address] [place] [address] Day:  0  Admit Date:  7/15/2020  4:09 PM    PCP:   Loren Jernigan DO  Code Status:  Full Code    Subjective:     C/C:   Chief Complaint   Patient presents with    Dizziness    Hypotension     Interval History Status: not changed. No issues overnight  Symptoms stable  Scans negative  On IV abx per ID  No other complaints     Brief History:     Chapis Romo is a 78 y.o. M who presents with weakness, dizziness and general malaise. He also endorses lower abdominal pain that he describes as intermittent, dull, aching and moderate in severity. He endorses subjective fever and chills. The patient states that his symptoms started approximately 4 days ago. He went to an ER on 7/12/2020 and was diagnosed with a UTI. He was given a dose of Toradol and initially started on ciprofloxacin that was changed to Bactrim secondary to urine culture sensitivity. He states that he was having dysuria and urinary hesitancy at that time that has since resolved. He denies additional symptomology or modifying factors. The patient is a poor historian and has difficulty with sequence of events. His blood pressure was noted to be low with a systolic in the 88R upon arrival to the ER, was given IV fluids with satisfactory improvement in blood pressure.   He has past medical history that includes hypertension, hepatitis C with cirrhosis status post Harvoni treatment, diabetes, CKD III, dyslipidemia and prostate cancer with prostatectomy.     D-dimer 2.89, creatinine 2.41, proBNP 1974, high-sensitivity troponin 37, WBC 12, lactic 0.9, 0.6.     Of note, the patient underwent an aortic valve replacement approximately 6/3/2020 at the [address] [place] [address],[address].     He follows outpatient with Dr. Ellie Camacho with nephrology.      A repeat sepsis focused exam has been completed 11:35 PM EDT. Review of Systems:     Constitutional:  negative for chills, fevers, sweats  Respiratory:  negative for cough, dyspnea on exertion, shortness of breath  Cardiovascular:  negative for chest pain, chest pressure/discomfort  Gastrointestinal:  negative for abdominal pain, constipation, diarrhea, nausea, vomiting  Neurological:  negative for dizziness, headache    Medications: Allergies: Allergies   Allergen Reactions    Nsaids      Because of Kidney issues        Current Meds:   Scheduled Meds:    insulin lispro  0-6 Units Subcutaneous TID WC    insulin lispro  0-3 Units Subcutaneous Nightly    NIFEdipine  60 mg Oral Daily    omeprazole  40 mg Oral Daily    pregabalin  100 mg Oral BID    SITagliptin  100 mg Oral Daily    allopurinol  600 mg Oral Daily    amiodarone  200 mg Oral Daily    lactulose  10 g Oral TID    sodium chloride flush  10 mL Intravenous 2 times per day    meropenem  1 g Intravenous Q12H     Continuous Infusions:    dextrose      sodium chloride 75 mL/hr at 07/16/20 1212     PRN Meds: glucose, dextrose, glucagon (rDNA), dextrose, heparin (porcine), heparin (porcine), sodium chloride flush, acetaminophen **OR** acetaminophen, promethazine **OR** ondansetron, nicotine    Data:     Past Medical History:   has a past medical history of Anxiety state NEC, Arthritis, Sol esophagus, Chronic kidney disease, Cirrhosis ([address] Colon polyps, Diverticulosis of colon, Enlarged heart, Gout, Hepatic cirrhosis ([address] Hepatic cyst, Hepatitis C, chronic ([address] History of alcohol use, Hyperlipidemia, Hypertension, Lumbago, Lymphedema, Malignant neoplasm of prostate ([address] Otalgia of right ear, and Sciatica. Social History:   reports that he has quit smoking. His smoking use included cigarettes. He started smoking about 53 years ago. He has a 49.00 pack-year smoking history.  He has never used smokeless tobacco. He reports current Elevated d-dimer 7/16/2020 Yes          Plan:        - Vitals, labs, imaging, medications reviewed  - ID consulted - continue meropenem for ESBL  - Monitor renal function - continue IVF  - D-dimer elevated - VQ scan / dopplers - negative - dc heparin gtt  - Resume selected home medications  - DC planning today if cleared by ID    Dino Arellano MD  7/17/2020  9:06 AM

## 2020-07-17 NOTE — PROGRESS NOTES
Pt discharged to home by car with friend, pt read and understood discharge instructions, pt will follow up with pcp and nephrology, pt has no new medications prescribed, pt discharged in stable condition

## 2020-07-17 NOTE — PROGRESS NOTES
[facility]  Progress Note    Max Zavala  MRN: [id number]  Date: 7/17/2020    Reason for F/U :   ESBL E. coli urinary tract infection    Impression :   1. ESBL E. coli urinary tract infection  2. Sepsis related to above presenting as the hypotension, toxic metabolic encephalopathy  3. Acute kidney injury on chronic kidney disease stage III  4. History of hepatitis C virus infection status post treatment with Harvoni and has cirrhosis. 5. Diabetes mellitus type 2.  6. Nonrheumatic aortic valve insufficiency status post aortic valve replacement 6/5/2020    Recommendations:   · The patient has been on intravenous antimicrobial therapy with meropenem. · Clinically he continues to do well with no further encephalopathy, fevers, or hypotension. · The options for discharge would be to have him resume the Bactrim or he could be discharged on IV antimicrobial therapy. · His creatinine has improved significantly down to 1.48 today and I do feel that Bactrim can cause elevation of the creatinine but this is not typically indicative of decreased renal function. · The plan will be for him to be discharged on the Bactrim to complete 10-day course of treatment  · We did discuss that if he cannot tolerated or has worsening symptoms he can be switched to intravenous antimicrobial therapy with ertapenem  · I did also discuss with him being seen by his urologist Dr. Tangela Winslow for the issues with his urinary stream    Infection Control Recommendations:   Contact precautions    Discharge Planning:   Patient will need Midline Catheter Insertion/ PICC line Insertion: No  Patient will need: Home IV , [place],  SNF,  LTAC: Undetermined  Patient willneed outpatient wound care: No    MedicalDecision making / Summary of Stay:   Max Zavala is a 78y.o.-year-old male who was initially admitted on 7/15/2020.    Aidee Reyes has a history of hepatitis C virus infection status post treatment with Harvoni in 2017, prostate cancer, peripheral vascular disease, chronic kidney disease stage III, anxiety, diabetes mellitus type 2, hyperlipidemia, dilated cardiomyopathy, venous insufficiency both lower extremities, lumbar radiculopathy, tophaceous gout, Sol's esophagitis, prostate cancer status post da Melany prostatectomyand nonrheumatic aortic valve insufficiency for which he underwent aortic valve replacement 6/5/2020.     The patient reports that ever since he had his prostatectomy he has had issues with his urinary sphincter. He cannot urinate while standing up and essentially has to sit and totally relaxed/concentrate so that he is able to release his sphincter. The patient reports that he developed lower abdominal/pelvic pain, bilateral flank pain and burning type pain when he needed to urinate. He was having difficulty urinating and ended up coming into the emergency room for evaluation on July 12, 2020. The patient was diagnosed with a urinary tract infection discharged on oral antibiotics with ciprofloxacin and the urine culture came back with an ESBL producing E. coli and the patient was called and had the antibiotic switched to Bactrim. The patient reports that his urinary symptoms had persisted and potentially had gotten worse and he took a dose of Bactrim on the day prior to admission but started having symptoms of dizziness, fatigue and wanted to sleep all the time, subsequently became confused, weak and had actually fallen while trying to get out of bed. The patient was having difficulty getting up and he called his daughter who found him confused. The patient also did report fevers and chills. He also reports some rhinorrhea and cough which he thinks was related to postnasal drip. The patient was brought into the emergency room for evaluation and was found to be hypotensive, imaging studies included a CT of the abdomen pelvis that showed findings concerning for a urinary tract infection.   Lab studies shows elevated creatinine at 2.41 which is higher than his baseline. The patient was admitted started on meropenem and I was asked to evaluate and help with antibiotic choice. Current evaluation:2020    /68   Pulse 71   Temp 98.2 °F (36.8 °C) (Oral)   Resp 18   Ht 6' (1.829 m)   Wt 254 lb 4.8 oz (115.3 kg)   SpO2 92%   BMI 34.49 kg/m²     Temperature Range: Temp: 98.2 °F (36.8 °C) Temp  Av.6 °F (37 °C)  Min: 98.2 °F (36.8 °C)  Max: 98.8 °F (37.1 °C)  The patient is seen and evaluated at bedside he is awake and alert in no acute distress. He is doing well feeling good and does not have any subjective fevers or chills. No abdominal pain nausea vomiting or diarrhea. He does not have any dysuria or frequency. He continues to report the issues with difficulty with his urinary stream.    Review of Systems   Constitutional: Negative. HENT: Negative. Respiratory: Negative. Cardiovascular: Negative. Gastrointestinal: Negative. Genitourinary: Negative. Musculoskeletal: Negative. Skin: Negative. Allergic/Immunologic: Negative. Neurological: Negative. Physical Examination :     Physical Exam  Constitutional:       Appearance: He is well-developed. HENT:      Head: Normocephalic and atraumatic. Neck:      Musculoskeletal: Normal range of motion and neck supple. Cardiovascular:      Rate and Rhythm: Normal rate. Heart sounds: Normal heart sounds. No friction rub. No gallop. Pulmonary:      Effort: Pulmonary effort is normal.      Breath sounds: Normal breath sounds. No wheezing. Abdominal:      General: Bowel sounds are normal.      Palpations: Abdomen is soft. There is no mass. Tenderness: There is no abdominal tenderness. Comments: Obese abdomen-mildly protuberant   Musculoskeletal: Normal range of motion. Lymphadenopathy:      Cervical: No cervical adenopathy. Skin:     General: Skin is warm and dry.    Neurological:      Mental Status: He is alert and oriented to person, place, and time. Laboratory data:   I have independently reviewed the followinglabs:  CBC with Differential:   Recent Labs     07/15/20  1615 07/16/20  0553 07/16/20  1654   WBC 12.0* 7.7 7.5   HGB 11.7* 9.9* 10.0*   HCT 37.9* 32.0* 31.4*    226 234   LYMPHOPCT 12*  --   --    MONOPCT 11  --   --      BMP:   Recent Labs     07/15/20  1615 07/16/20  0553 07/17/20  0802    136 137   K 4.7 4.1 4.4   CL 95* 100 103   CO2 22 21 22   BUN 28* 29* 17   CREATININE 2.41* 2.26* 1.48*   MG 1.7 1.7  --      Hepatic Function Panel:   Recent Labs     07/15/20  1615 07/16/20  0553   PROT 8.0 6.6   LABALBU 3.8 3.2*   BILIDIR 0.11  --    IBILI 0.19  --    BILITOT 0.30 0.15*   ALKPHOS 88 75   ALT 9 10   AST 13 15     No results for input(s): VANCOTROUGH in the last 72 hours. No results found for: CRP  No results found for: SEDRATE    No results for input(s): PROCAL in the last 72 hours. Imaging Studies:   No new imaging    Cultures:     Culture, Blood 1 [[id number]]   Collected: 07/15/20 1750    Order Status: Completed  Specimen: Blood  Updated: 07/17/20 0856     Specimen Description  . BLOOD     Special Requests  LTAC,7CC     Culture  NO GROWTH 2 DAYS    Culture, Blood 1 [[id number]]   Collected: 07/15/20 1615    Order Status: Completed  Specimen: Blood  Updated: 07/17/20 0856     Specimen Description  . BLOOD     Special Requests  RTAC,10CC     Culture  NO GROWTH 2 DAYS    Culture, Urine [[id number]]   Collected: 07/16/20 0602    Order Status: Completed  Specimen: Urine  Updated: 07/17/20 0734     Specimen Description  . URINE     Special Requests  NOT REPORTED     Culture  NO GROWTH          Medications:      insulin lispro  0-6 Units Subcutaneous TID WC    insulin lispro  0-3 Units Subcutaneous Nightly    NIFEdipine  60 mg Oral Daily    omeprazole  40 mg Oral Daily    pregabalin  100 mg Oral BID    SITagliptin  100 mg Oral Daily    allopurinol  600 mg Oral

## 2020-07-18 PROBLEM — Z16.12 ESBL (EXTENDED SPECTRUM BETA-LACTAMASE) PRODUCING BACTERIA INFECTION: Status: ACTIVE | Noted: 2020-07-18

## 2020-07-18 PROBLEM — A49.9 ESBL (EXTENDED SPECTRUM BETA-LACTAMASE) PRODUCING BACTERIA INFECTION: Status: ACTIVE | Noted: 2020-07-18

## 2020-07-18 NOTE — DISCHARGE SUMMARY
with IVF. Treated with meropenem for ESBL UTI. ID consulted, d/c on bactrim.        Significant therapeutic interventions:   - Vitals, labs, imaging, medications reviewed  - ID consulted - continue meropenem for ESBL  - Monitor renal function - continue IVF  - D-dimer elevated - VQ scan / dopplers - negative - dc heparin gtt  - Resume selected home medications  - DC planning today if cleared by ID       Significant Diagnostic Studies:   Labs / Micro:  CBC:   Lab Results   Component Value Date    WBC 7.5 07/16/2020    RBC 3.30 07/16/2020    HGB 10.0 07/16/2020    HCT 31.4 07/16/2020    MCV 95.2 07/16/2020    MCH 30.3 07/16/2020    MCHC 31.8 07/16/2020    RDW 14.2 07/16/2020     07/16/2020     BMP:    Lab Results   Component Value Date    GLUCOSE 152 07/17/2020     07/17/2020    K 4.4 07/17/2020     07/17/2020    CO2 22 07/17/2020    ANIONGAP 12 07/17/2020    BUN 17 07/17/2020    CREATININE 1.48 07/17/2020    BUNCRER 11 07/17/2020    CALCIUM 8.8 07/17/2020    LABGLOM 47 07/17/2020    GFRAA 57 07/17/2020    GFR      07/17/2020    GFR NOT REPORTED 07/17/2020     HFP:    Lab Results   Component Value Date    PROT 6.6 07/16/2020     CMP:    Lab Results   Component Value Date    GLUCOSE 152 07/17/2020     07/17/2020    K 4.4 07/17/2020     07/17/2020    CO2 22 07/17/2020    BUN 17 07/17/2020    CREATININE 1.48 07/17/2020    ANIONGAP 12 07/17/2020    ALKPHOS 75 07/16/2020    ALT 10 07/16/2020    AST 15 07/16/2020    BILITOT 0.15 07/16/2020    LABALBU 3.2 07/16/2020    ALBUMIN NOT REPORTED 07/16/2020    LABGLOM 47 07/17/2020    GFRAA 57 07/17/2020    GFR      07/17/2020    GFR NOT REPORTED 07/17/2020    PROT 6.6 07/16/2020    CALCIUM 8.8 07/17/2020     PT/INR:    Lab Results   Component Value Date    PROTIME 16.0 07/15/2020    INR 1.3 07/15/2020     PTT:   Lab Results   Component Value Date    APTT 40.1 07/15/2020     FLP:    Lab Results   Component Value Date    CHOL 239 02/25/2020    CHOL 152 Patient: follow up with PCP     Discharge Medications:      Medication List      CHANGE how you take these medications    Januvia 100 MG tablet  Generic drug:  SITagliptin  What changed:  Another medication with the same name was removed. Continue taking this medication, and follow the directions you see here. CONTINUE taking these medications    amiodarone 200 MG tablet  Commonly known as:  CORDARONE     furosemide 20 MG tablet  Commonly known as:  LASIX  Take 2 tablets by mouth 2 times daily     lactulose 10 GM/15ML solution  Commonly known as:  CHRONULAC  TAKE 15 MLS BY MOUTH THREE TIMES A DAY     NIFEdipine 60 MG extended release tablet  Commonly known as:  PROCARDIA XL     omeprazole 20 MG delayed release capsule  Commonly known as:  PRILOSEC  Take 2 capsules by mouth daily     pregabalin 100 MG capsule  Commonly known as:  LYRICA  TAKE 1 CAPSULE BY MOUTH 3 TIMES A DAY     spironolactone 50 MG tablet  Commonly known as:  ALDACTONE  TAKE 1 TABLET BY MOUTH ONE TIME A DAY     sulfamethoxazole-trimethoprim 800-160 MG per tablet  Commonly known as:  BACTRIM DS;SEPTRA DS     vitamin D 50 MCG (2000 UT) Caps capsule  Take 1 capsule by mouth daily     Zyloprim 300 MG tablet  Generic drug:  allopurinol        STOP taking these medications    ciprofloxacin 500 MG tablet  Commonly known as:  Cipro            No discharge procedures on file. Time Spent on discharge is  35 mins in patient examination, evaluation, counseling as well as medication reconciliation, prescriptions for required medications, discharge plan and follow up. Electronically signed by   Rut Draper MD  7/18/2020  7:53 AM      Thank you Dr. Mary Woodard DO for the opportunity to be involved in this patient's care.

## 2020-07-21 ENCOUNTER — CARE COORDINATION (OUTPATIENT)
Dept: OTHER | Facility: CLINIC | Age: 70
End: 2020-07-21

## 2020-07-21 LAB
CULTURE: NORMAL
CULTURE: NORMAL
Lab: NORMAL
Lab: NORMAL
SPECIMEN DESCRIPTION: NORMAL
SPECIMEN DESCRIPTION: NORMAL

## 2020-07-21 NOTE — CARE COORDINATION
Ambulatory Care Coordination Note  7/21/2020  CM Risk Score: 3  Charlson 10 Year Mortality Risk Score: 100%     ACC: Elias Horn RN    Summary Note: Spoke with patient who said he is on the mend. Juan Elm he is drinking plenty of fluids to keep his urine clear. Pt denies fever or chills. Pt said he will check with Dr. Altagracia Flood about getting a follow up appt. Pt said he is out in the yard trying to get a little exercise, but is taking breaks. Will follow         Care Coordination Interventions    Program Enrollment:  Rising Risk  Referral from Primary Care Provider:  No  Suggested Interventions and Community Resources         Goals Addressed                 This Visit's Progress     COMPLETED: Patient Stated (pt-stated)        Pt will  new prescription this morning to treat UTI    Barriers: none  Plan for overcoming my barriers: N/A  Confidence: 10/10  Anticipated Goal Completion Date: 7/14/2020            Prior to Admission medications    Medication Sig Start Date End Date Taking?  Authorizing Provider   sulfamethoxazole-trimethoprim (BACTRIM DS;SEPTRA DS) 800-160 MG per tablet Take 1 tablet by mouth 2 times daily    Historical Provider, MD   amiodarone (CORDARONE) 200 MG tablet Take 200 mg by mouth daily    Historical Provider, MD   SITagliptin (JANUVIA) 100 MG tablet Take 100 mg by mouth daily    Historical Provider, MD   pregabalin (LYRICA) 100 MG capsule TAKE 1 CAPSULE BY MOUTH 3 TIMES A DAY 7/1/20 8/1/20  Kusum Hays DO   lactulose (CHRONULAC) 10 GM/15ML solution TAKE 15 MLS BY MOUTH THREE TIMES A DAY 6/30/20   Dalia Shannan, DO   furosemide (LASIX) 20 MG tablet Take 2 tablets by mouth 2 times daily 6/25/20 6/25/21  Los Kaur, APRN - CNP   spironolactone (ALDACTONE) 50 MG tablet TAKE 1 TABLET BY MOUTH ONE TIME A DAY 6/23/20   Jen Mora APRN - CNP   Cholecalciferol (VITAMIN D) 50 MCG (2000 UT) CAPS capsule Take 1 capsule by mouth daily 2/24/20   Dalia Shannan, DO   omeprazole (PRILOSEC) 20 MG delayed release capsule Take 2 capsules by mouth daily 2/24/20 2/23/21  Sammy Stone DO   NIFEdipine (PROCARDIA XL) 60 MG extended release tablet Take 60 mg by mouth daily  6/12/19   Historical Provider, MD   allopurinol (ZYLOPRIM) 300 MG tablet Take 600 mg by mouth daily Takes 2 tabs (=600mg) daily 3/27/19   KELSY Espino - CNP       Future Appointments   Date Time Provider Phuc Macias   8/12/2020  1:45 PM DO LATRICE [facility]   8/17/2020 10:50 AM Shantell Murillo MD [facility] None

## 2020-07-29 ENCOUNTER — CARE COORDINATION (OUTPATIENT)
Dept: OTHER | Facility: CLINIC | Age: 70
End: 2020-07-29

## 2020-07-29 NOTE — CARE COORDINATION
Ambulatory Care Coordination Note  7/29/2020  CM Risk Score: 3  Charlson 10 Year Mortality Risk Score: 100%     ACC: Osvaldo Mitchell RN    Summary Note: Spoke with patient today who said he is continuing to drink ample fluids to prevent dehydration and assist with UTI prevention. Pt denies fever, pain or burning with urination. Said his urine is a light yellow and clear. Pt has follow up with pcp on 8/12 and said the infectious disease doctor told him he needs to see a urologist, so patient will be discussing this with his pcp on his visit,. Pt denies any needs today and is agreeable to continued follow up         Care Coordination Interventions    Program Enrollment:  Rising Risk  Referral from Primary Care Provider:  No  Suggested Interventions and Community Resources  Disease Specific Clinic:  In Process  Other Services or Interventions:  pt will be requesting referral to urology          Goals Addressed                 This Visit's Progress     Patient Stated (pt-stated)        Pt will request urology consult from pcp on his follow up appt on 8/12/2020    Barriers: none  Plan for overcoming my barriers: N/A  Confidence: 10/10  Anticipated Goal Completion Date: 8/12/2020       Patient Stated (pt-stated)        Pt will drink ample fluids to remain hydrated to assist with uti prevention    Barriers: none  Plan for overcoming my barriers: N/A  Confidence: 10/10  Anticipated Goal Completion Date: 7/29/2020 and ongoing  Pt is drinking fluids and making sure his urine is light yellow in color             Prior to Admission medications    Medication Sig Start Date End Date Taking?  Authorizing Provider   sulfamethoxazole-trimethoprim (BACTRIM DS;SEPTRA DS) 800-160 MG per tablet Take 1 tablet by mouth 2 times daily    Historical Provider, MD   amiodarone (CORDARONE) 200 MG tablet Take 200 mg by mouth daily    Historical Provider, MD   SITagliptin (JANUVIA) 100 MG tablet Take 100 mg by mouth daily    Historical Provider, MD   pregabalin (LYRICA) 100 MG capsule TAKE 1 CAPSULE BY MOUTH 3 TIMES A DAY 7/1/20 8/1/20  Kusum Hays DO   lactulose (CHRONULAC) 10 GM/15ML solution TAKE 15 MLS BY MOUTH THREE TIMES A DAY 6/30/20   Loren Jernigan DO   furosemide (LASIX) 20 MG tablet Take 2 tablets by mouth 2 times daily 6/25/20 6/25/21  KELSY Dumont - CNP   spironolactone (ALDACTONE) 50 MG tablet TAKE 1 TABLET BY MOUTH ONE TIME A DAY 6/23/20   KELSY Putnam CNP   Cholecalciferol (VITAMIN D) 50 MCG (2000 UT) CAPS capsule Take 1 capsule by mouth daily 2/24/20   Loren Jernigan DO   omeprazole (PRILOSEC) 20 MG delayed release capsule Take 2 capsules by mouth daily 2/24/20 2/23/21  Loren Jernigan DO   NIFEdipine (PROCARDIA XL) 60 MG extended release tablet Take 60 mg by mouth daily  6/12/19   Historical Provider, MD   allopurinol (ZYLOPRIM) 300 MG tablet Take 600 mg by mouth daily Takes 2 tabs (=600mg) daily 3/27/19   KELSY Zaldivar CNP       Future Appointments   Date Time Provider Phuc Macias   8/12/2020  1:45 PM DO LATRICE Sapp [facility]   8/17/2020 10:50 AM Hugo Yanes MD [facility] None   9/2/2020  1:45 PM Radha Morales MD gr[facility] exc [address]

## 2020-07-31 ENCOUNTER — HOSPITAL ENCOUNTER (OUTPATIENT)
Age: 70
Discharge: HOME | End: 2020-07-31
Payer: COMMERCIAL

## 2020-07-31 LAB
ALBUMIN SERPL-MCNC: 4.4 G/DL (ref 3.5–5.2)
ALBUMIN SERPL-MCNC: 4.5 G/DL (ref 3.5–5.2)
ALBUMIN/GLOBULIN RATIO: 1.2 (ref 1–2.5)
ALP BLD-CCNC: 84 U/L (ref 40–129)
ALT SERPL-CCNC: 11 U/L (ref 5–41)
ANION GAP SERPL CALCULATED.3IONS-SCNC: 16 MMOL/L (ref 9–17)
ANION GAP SERPL CALCULATED.3IONS-SCNC: 16 MMOL/L (ref 9–17)
AST SERPL-CCNC: 16 U/L
BILIRUB SERPL-MCNC: 0.22 MG/DL (ref 0.3–1.2)
BILIRUBIN DIRECT: <0.08 MG/DL
BILIRUBIN, INDIRECT: ABNORMAL MG/DL (ref 0–1)
BUN BLDV-MCNC: 29 MG/DL (ref 8–23)
BUN BLDV-MCNC: 30 MG/DL (ref 8–23)
BUN/CREAT BLD: ABNORMAL (ref 9–20)
CALCIUM SERPL-MCNC: 10 MG/DL (ref 8.6–10.4)
CHLORIDE BLD-SCNC: 100 MMOL/L (ref 98–107)
CHLORIDE BLD-SCNC: 100 MMOL/L (ref 98–107)
CO2: 21 MMOL/L (ref 20–31)
CO2: 22 MMOL/L (ref 20–31)
CREAT SERPL-MCNC: 1.48 MG/DL (ref 0.7–1.2)
CREAT SERPL-MCNC: 1.57 MG/DL (ref 0.7–1.2)
CREATININE URINE: 57.4 MG/DL (ref 39–259)
GFR AFRICAN AMERICAN: 54 ML/MIN
GFR AFRICAN AMERICAN: 57 ML/MIN
GFR NON-AFRICAN AMERICAN: 44 ML/MIN
GFR NON-AFRICAN AMERICAN: 47 ML/MIN
GFR SERPL CREATININE-BSD FRML MDRD: ABNORMAL ML/MIN/{1.73_M2}
GLOBULIN: ABNORMAL G/DL (ref 1.5–3.8)
GLUCOSE BLD-MCNC: 88 MG/DL (ref 70–99)
HCT VFR BLD CALC: 40.7 % (ref 40.7–50.3)
HEMOGLOBIN: 12.6 G/DL (ref 13–17)
MCH RBC QN AUTO: 30.1 PG (ref 25.2–33.5)
MCHC RBC AUTO-ENTMCNC: 31 G/DL (ref 28.4–34.8)
MCV RBC AUTO: 97.1 FL (ref 82.6–102.9)
NRBC AUTOMATED: 0 PER 100 WBC
PDW BLD-RTO: 15.1 % (ref 11.8–14.4)
PLATELET # BLD: 351 K/UL (ref 138–453)
PMV BLD AUTO: 10.9 FL (ref 8.1–13.5)
POTASSIUM SERPL-SCNC: 4.9 MMOL/L (ref 3.7–5.3)
POTASSIUM SERPL-SCNC: 5.1 MMOL/L (ref 3.7–5.3)
RBC # BLD: 4.19 M/UL (ref 4.21–5.77)
SODIUM BLD-SCNC: 137 MMOL/L (ref 135–144)
SODIUM BLD-SCNC: 138 MMOL/L (ref 135–144)
TOTAL PROTEIN, URINE: 10 MG/DL
TOTAL PROTEIN: 8 G/DL (ref 6.4–8.3)
URINE TOTAL PROTEIN CREATININE RATIO: 0.17 (ref 0–0.2)
WBC # BLD: 13.6 K/UL (ref 3.5–11.3)

## 2020-07-31 PROCEDURE — 85027 COMPLETE CBC AUTOMATED: CPT

## 2020-07-31 PROCEDURE — 84156 ASSAY OF PROTEIN URINE: CPT

## 2020-07-31 PROCEDURE — 80048 BASIC METABOLIC PNL TOTAL CA: CPT

## 2020-07-31 PROCEDURE — 82040 ASSAY OF SERUM ALBUMIN: CPT

## 2020-07-31 PROCEDURE — 82565 ASSAY OF CREATININE: CPT

## 2020-07-31 PROCEDURE — 84520 ASSAY OF UREA NITROGEN: CPT

## 2020-07-31 PROCEDURE — 80076 HEPATIC FUNCTION PANEL: CPT

## 2020-07-31 PROCEDURE — 82570 ASSAY OF URINE CREATININE: CPT

## 2020-07-31 PROCEDURE — 80051 ELECTROLYTE PANEL: CPT

## 2020-07-31 PROCEDURE — 36415 COLL VENOUS BLD VENIPUNCTURE: CPT

## 2020-08-10 RX ORDER — LACTULOSE 10 G/15ML
SOLUTION ORAL
Qty: 473 ML | Refills: 5 | Status: SHIPPED | OUTPATIENT
Start: 2020-08-10 | End: 2020-10-09

## 2020-08-12 ENCOUNTER — OFFICE VISIT (OUTPATIENT)
Dept: FAMILY MEDICINE CLINIC | Age: 70
End: 2020-08-12
Payer: COMMERCIAL

## 2020-08-12 VITALS
OXYGEN SATURATION: 98 % | DIASTOLIC BLOOD PRESSURE: 82 MMHG | HEART RATE: 70 BPM | TEMPERATURE: 97.6 F | WEIGHT: 249 LBS | SYSTOLIC BLOOD PRESSURE: 130 MMHG | BODY MASS INDEX: 33.72 KG/M2 | RESPIRATION RATE: 18 BRPM | HEIGHT: 72 IN

## 2020-08-12 PROCEDURE — 1111F DSCHRG MED/CURRENT MED MERGE: CPT | Performed by: INTERNAL MEDICINE

## 2020-08-12 PROCEDURE — 99214 OFFICE O/P EST MOD 30 MIN: CPT | Performed by: INTERNAL MEDICINE

## 2020-08-12 RX ORDER — HYDROXYZINE PAMOATE 25 MG/1
25 CAPSULE ORAL 4 TIMES DAILY PRN
Qty: 60 CAPSULE | Refills: 0 | Status: SHIPPED | OUTPATIENT
Start: 2020-08-12 | End: 2020-08-12 | Stop reason: SDUPTHER

## 2020-08-12 RX ORDER — ALLOPURINOL 300 MG/1
300 TABLET ORAL 2 TIMES DAILY
COMMUNITY
Start: 2020-07-20 | End: 2020-08-12

## 2020-08-12 RX ORDER — HYDROXYZINE PAMOATE 25 MG/1
25 CAPSULE ORAL 4 TIMES DAILY PRN
Qty: 60 CAPSULE | Refills: 0 | Status: SHIPPED | OUTPATIENT
Start: 2020-08-12 | End: 2020-09-11

## 2020-08-12 ASSESSMENT — ENCOUNTER SYMPTOMS
CONSTIPATION: 0
STRIDOR: 0
ABDOMINAL DISTENTION: 0
WHEEZING: 0
DIARRHEA: 0
COUGH: 0
CHOKING: 0
SHORTNESS OF BREATH: 0
ANAL BLEEDING: 0
ABDOMINAL PAIN: 0
BLOOD IN STOOL: 0
CHEST TIGHTNESS: 0

## 2020-08-12 NOTE — PROGRESS NOTES
screen colonoscopy  10/07/2021    DTaP/Tdap/Td vaccine (2 - Td) 12/17/2025    Pneumococcal 65+ years Vaccine  Completed    AAA screen  Completed    Hib vaccine  Aged Out    Meningococcal (ACWY) vaccine  Aged Out

## 2020-08-12 NOTE — PROGRESS NOTES
Post-Discharge Transitional Care Management Services or Hospital Follow Up      Olen Kehr   YOB: 1950    Date of Office Visit:  8/12/2020  Date of Hospital Admission: 7/15/20  Date of Hospital Discharge: 7/17/20  Readmission Risk Score(high >=14%.  Medium >=10%):No data recorded    Care management risk score Rising risk (score 2-5) and Complex Care (Scores >=6): 3     Non face to face  following discharge, date last encounter closed (first attempt may have been earlier): *No documented post hospital discharge outreach found in the last 14 days *No documented post hospital discharge outreach found in the last 14 days    Call initiated 2 business days of discharge: *No response recorded in the last 14 days     Patient Active Problem List   Diagnosis    Disc displacement, lumbar    Chronic hepatitis C with cirrhosis ([facility] [address]    Dilated cardiomyopathy    Gout    Diabetes type 2, controlled    Prostate cancer ([facility] [address]    Severe aortic insufficiency    Incisional hernia    Aortic atherosclerosis ([facility] [address]    Cervicalgia    Sciatica    Diverticulosis of colon    Ascites due to alcoholic cirrhosis ([facility] [address]    Essential hypertension    History of alcohol use    Hypomagnesemia    Chronic hepatitis C virus infection ([facility] [address]    Acquired hypothyroidism    Chondromalacia patellae    Awaiting organ transplant status    Varicose veins of left lower extremity    Anxiety    Colon polyps    History of hepatitis C    Right lumbar radiculitis    Post laminectomy syndrome    Hepatic cyst    Severe comorbid illness    Primary osteoarthritis of both knees    Chronic lumbar radiculopathy    Sol esophagus    CKD (chronic kidney disease) stage 3, GFR 30-59 ml/min (HCC)    Chronic tophaceous gout    Abdominal pain    Tear of right acetabular labrum    Hepatic cirrhosis (HCC)    Tinnitus    Chronic renal failure syndrome, stage 3 (moderate) (HCC)    Nephropathy    Type 2 diabetes mellitus with kidney complication, without long-term current use of insulin ([facility] [address]    Dyslipidemia    MULU (acute kidney injury) ([facility] [address]    Acute cystitis without hematuria    Elevated d-dimer    ESBL (extended spectrum beta-lactamase) producing bacteria infection       Allergies   Allergen Reactions    Nsaids      Because of Kidney issues        Medications listed as ordered at the time of discharge from [facility] Medication Instructions NICOLETTE:    Printed on:08/12/20 1664   Medication Information                      allopurinol (ZYLOPRIM) 300 MG tablet  Take 600 mg by mouth daily Takes 2 tabs (=600mg) daily             Cholecalciferol (VITAMIN D) 50 MCG (2000 UT) CAPS capsule  Take 1 capsule by mouth daily             furosemide (LASIX) 20 MG tablet  Take 2 tablets by mouth 2 times daily             hydrOXYzine (VISTARIL) 25 MG capsule  Take 1 capsule by mouth 4 times daily as needed for Anxiety             lactulose (CHRONULAC) 10 GM/15ML solution  TAKE 15 MLS BY MOUTH THREE TIMES A DAY             NIFEdipine (PROCARDIA XL) 60 MG extended release tablet  Take 60 mg by mouth daily              omeprazole (PRILOSEC) 20 MG delayed release capsule  Take 2 capsules by mouth daily             pregabalin (LYRICA) 100 MG capsule  TAKE 1 CAPSULE BY MOUTH 3 TIMES A DAY             SITagliptin (JANUVIA) 100 MG tablet  Take 100 mg by mouth daily             spironolactone (ALDACTONE) 50 MG tablet  TAKE 1 TABLET BY MOUTH ONE TIME A DAY                   Medications marked \"taking\" at this time  Outpatient Medications Marked as Taking for the 8/12/20 encounter (Office Visit) with Mook Huang, DO   Medication Sig Dispense Refill    hydrOXYzine (VISTARIL) 25 MG capsule Take 1 capsule by mouth 4 times daily as needed for Anxiety 60 capsule 0    lactulose (CHRONULAC) 10 GM/15ML solution TAKE 15 MLS BY MOUTH THREE TIMES A  mL 5    SITagliptin (JANUVIA) 100 MG tablet Take 100 mg by mouth daily      testicular pain. Musculoskeletal: Positive for arthralgias. Skin: Negative for rash. Allergic/Immunologic: Positive for immunocompromised state. Neurological: Negative for dizziness, weakness, light-headedness, numbness and headaches. Hematological: Negative for adenopathy. Does not bruise/bleed easily. Psychiatric/Behavioral: Positive for sleep disturbance. Negative for behavioral problems, confusion, decreased concentration, dysphoric mood, hallucinations, self-injury and suicidal ideas. The patient is nervous/anxious. The patient is not hyperactive. Vitals:    08/12/20 1404   BP: 130/82   Pulse: 70   Resp: 18   Temp: 97.6 °F (36.4 °C)   TempSrc: Tympanic   SpO2: 98%   Weight: 249 lb (112.9 kg)   Height: 6' (1.829 m)     Body mass index is 33.77 kg/m². Wt Readings from Last 3 Encounters:   08/12/20 249 lb (112.9 kg)   07/17/20 254 lb 4.8 oz (115.3 kg)   07/12/20 250 lb (113.4 kg)     BP Readings from Last 3 Encounters:   08/12/20 130/82   07/17/20 123/72   07/12/20 120/64       Physical Exam  Vitals signs reviewed. Constitutional:       General: He is not in acute distress. Appearance: Normal appearance. He is well-developed. He is obese. He is not ill-appearing, toxic-appearing or diaphoretic. Comments: Chronically ill     HENT:      Head: Normocephalic and atraumatic. Right Ear: Tympanic membrane, ear canal and external ear normal.      Left Ear: Tympanic membrane, ear canal and external ear normal.      Mouth/Throat:      Mouth: Mucous membranes are moist.   Neck:      Musculoskeletal: Normal range of motion and neck supple. No neck rigidity. Thyroid: No thyroid mass or thyroid tenderness. Vascular: No carotid bruit. Cardiovascular:      Rate and Rhythm: Regular rhythm. Tachycardia present. No extrasystoles are present. Pulses: Normal pulses. Heart sounds: Normal heart sounds, S1 normal and S2 normal. Heart sounds not distant. No murmur.    Pulmonary: Effort: Pulmonary effort is normal. No bradypnea, accessory muscle usage, prolonged expiration, respiratory distress or retractions. Breath sounds: Normal breath sounds. Decreased air movement present. No stridor or transmitted upper airway sounds. No decreased breath sounds, wheezing, rhonchi or rales. Abdominal:      General: Bowel sounds are normal.      Palpations: Abdomen is soft. There is no hepatomegaly or splenomegaly. Tenderness: There is no abdominal tenderness. Musculoskeletal:      Right shoulder: He exhibits no tenderness, no bony tenderness, no pain, no spasm, normal pulse and normal strength. Left knee: He exhibits normal range of motion, no swelling, no effusion, no ecchymosis, no deformity, no laceration and no erythema. No tenderness found. Lumbar back: He exhibits spasm. He exhibits normal range of motion, no tenderness, no bony tenderness, no swelling, no edema, no deformity, no pain and normal pulse. Right lower le+ Pitting Edema present. Left lower le+ Pitting Edema present. Lymphadenopathy:      Cervical: No cervical adenopathy. Skin:     General: Skin is warm and dry. Capillary Refill: Capillary refill takes less than 2 seconds. Findings: No rash. Neurological:      General: No focal deficit present. Mental Status: He is alert and oriented to person, place, and time. Cranial Nerves: Cranial nerves are intact. No cranial nerve deficit. Sensory: No sensory deficit. Motor: Motor function is intact. No weakness, atrophy or abnormal muscle tone. Coordination: Coordination is intact. Coordination normal.      Gait: Gait abnormal.   Psychiatric:         Mood and Affect: Mood normal.         Behavior: Behavior normal.         Thought Content: Thought content normal.         Judgment: Judgment normal.             Assessment/Plan:  1.  Type 2 diabetes mellitus with complication, without long-term current use of insulin ([facility] [address]  Due for A1C, will check with other labs   Still taking januvia and other meds   a1c has been well controlled   Seek immediate medical attention for any chest pain , severe trouble breathing and/or visual changes.     -  DIABETES FOOT EXAM  - ME DISCHARGE MEDS RECONCILED W/ CURRENT OUTPATIENT MED LIST    2. History of prostate cancer  Referral to [facility] urology due to hx of prostate cancer and urinary sxs along with recent uti   - Anisha Barron MD, Urology, [place]  - Psa screening; Future  - ME DISCHARGE MEDS RECONCILED W/ CURRENT OUTPATIENT MED LIST    3. Acute cystitis without hematuria  See above   No urinary sxs besides slow stream recently ; does not have prostate had radical prostatectomy   - Anisha Barron MD, Urology, [facility] MED LIST    4. Leukocytosis, unspecified type  Concerned with mild leukocytosis   Will check cbc again as well as path smear, may need to see heme/onc if still abnormal   - White Blood Count And Differential; Future  - Path Review, Smear; Future  - ME DISCHARGE MEDS RECONCILED W/ CURRENT OUTPATIENT MED LIST    5. Hypothyroidism, unspecified type  Due for tsh , ordered   - TSH with Reflex; Future  - T4, Free; Future    6. Aortic valve insufficiency, etiology of cardiac valve disease unspecified  Reviewed [facility] discharge summary , med rec . Off amio , all questions answered   Scar looks very good       7.  Hospital discharge follow-up  Reviewed both recent discharge summaries from June and July 2020 ; reviewed med rec , labs and pertinent imaging /labs etc   All questions answered  follow up with specialists as scheduled       Medical Decision Making: moderate complexity

## 2020-08-13 ENCOUNTER — CARE COORDINATION (OUTPATIENT)
Dept: OTHER | Facility: CLINIC | Age: 70
End: 2020-08-13

## 2020-08-13 NOTE — CARE COORDINATION
Ambulatory Care Coordination Note  8/13/2020  CM Risk Score: 3  Charlson 10 Year Mortality Risk Score: 100%     ACC: Umm Perez RN    Summary Note: ACM attempted to reach patient for follow up call regarding follow up appts . HIPAA compliant message left requesting a return phone call at patients convenience. Will continue to follow. Care Coordination Interventions    Program Enrollment:  Rising Risk  Referral from Primary Care Provider:  No  Suggested Interventions and Community Resources  Disease Specific Clinic:  In Process (Comment: Rheumatology- Dr Dez Alonzo  Urology - Dr Gina Barrera)  Other Services or Interventions:  pt will be requesting referral to urology          Goals Addressed                 This Visit's Progress     Patient Stated (pt-stated)   On track     Pt will request urology consult from pcp on his follow up appt on 8/12/2020    Barriers: none  Plan for overcoming my barriers: N/A  Confidence: 10/10  Anticipated Goal Completion Date: 8/12/2020            Prior to Admission medications    Medication Sig Start Date End Date Taking?  Authorizing Provider   hydrOXYzine (VISTARIL) 25 MG capsule Take 1 capsule by mouth 4 times daily as needed for Anxiety 8/12/20 9/11/20  Kusum Hays DO   lactulose (CHRONULAC) 10 GM/15ML solution TAKE 15 MLS BY MOUTH THREE TIMES A DAY 8/10/20   Kusum Talbot DO   SITagliptin (JANUVIA) 100 MG tablet Take 100 mg by mouth daily    Historical Provider, MD   pregabalin (LYRICA) 100 MG capsule TAKE 1 CAPSULE BY MOUTH 3 TIMES A DAY 7/1/20 8/12/20  Corine Givens DO   furosemide (LASIX) 20 MG tablet Take 2 tablets by mouth 2 times daily  Patient taking differently: Take 40 mg by mouth daily  6/25/20 6/25/21  Darrelyn KELSY Meléndez CNP   spironolactone (ALDACTONE) 50 MG tablet TAKE 1 TABLET BY MOUTH ONE TIME A DAY 6/23/20   KELSY Mina CNP   Cholecalciferol (VITAMIN D) 50 MCG (2000 UT) CAPS capsule Take 1 capsule by mouth daily 2/24/20   Corine Givens DO omeprazole (PRILOSEC) 20 MG delayed release capsule Take 2 capsules by mouth daily 2/24/20 2/23/21  Sigifredo Filippo DO   NIFEdipine (PROCARDIA XL) 60 MG extended release tablet Take 60 mg by mouth daily  6/12/19   Historical Provider, MD   allopurinol (ZYLOPRIM) 300 MG tablet Take 600 mg by mouth daily Takes 2 tabs (=600mg) daily 3/27/19   KELSY Vaughn - CNP       Future Appointments   Date Time Provider Phuc Jjisti   8/17/2020 10:50 AM Antionette Arrington MD [facility] None   9/2/2020  1:45 PM April Khan MD gr[facility] exc [facility]a [facility]itz   2/10/2021  1:30 PM SigifredoDO Glory Rojas Celine

## 2020-08-20 ENCOUNTER — CARE COORDINATION (OUTPATIENT)
Dept: OTHER | Facility: CLINIC | Age: 70
End: 2020-08-20

## 2020-08-20 SDOH — ECONOMIC STABILITY: TRANSPORTATION INSECURITY
IN THE PAST 12 MONTHS, HAS THE LACK OF TRANSPORTATION KEPT YOU FROM MEDICAL APPOINTMENTS OR FROM GETTING MEDICATIONS?: NO

## 2020-08-20 SDOH — ECONOMIC STABILITY: INCOME INSECURITY: HOW HARD IS IT FOR YOU TO PAY FOR THE VERY BASICS LIKE FOOD, HOUSING, MEDICAL CARE, AND HEATING?: NOT HARD AT ALL

## 2020-08-20 SDOH — ECONOMIC STABILITY: TRANSPORTATION INSECURITY
IN THE PAST 12 MONTHS, HAS LACK OF TRANSPORTATION KEPT YOU FROM MEETINGS, WORK, OR FROM GETTING THINGS NEEDED FOR DAILY LIVING?: NO

## 2020-08-20 NOTE — CARE COORDINATION
Ambulatory Care Coordination Note  8/20/2020  CM Risk Score: 3  Charlson 10 Year Mortality Risk Score: 100%     ACC: Yadira Rose, RN    Summary Note: Spoke briefly with patient, pt said his allergies were bothering him, so we did not speak very long today. Discussed with patient his follow up with Dr Deann Cisneros in [place] for his urinary retention problem. The referral was sent over by Dr Stephanie Painting (nephrology) on his visit there on 8/17. Pt said I think they have already called, I informed patient I did not see a note in the chart and suggested I could call them for him, he declined and said he will call. Pt denies fever or pain with urination. Was unable to discuss blood sugars or any other symptoms with patient today as he did not want to talk long. Will follow         Care Coordination Interventions    Program Enrollment:  Rising Risk  Referral from Primary Care Provider:  No  Suggested Interventions and Community Resources  Disease Specific Clinic:  In Process (Comment: Rheumatology- Dr Salina Bearoa  Urology - Dr Deann Cisneros )  Other Services or Interventions:  referred to Dr Deann Cisneros in [place] [id number]    None         Prior to Admission medications    Medication Sig Start Date End Date Taking? Authorizing Provider   hydrOXYzine (VISTARIL) 25 MG capsule Take 1 capsule by mouth 4 times daily as needed for Anxiety  Patient not taking: Reported on 8/17/2020 8/12/20 9/11/20  Faye Nery, DO   lactulose (CHRONULAC) 10 GM/15ML solution TAKE 15 MLS BY MOUTH THREE TIMES A DAY 8/10/20   Kusum Bolaños, DO   SITagliptin (JANUVIA) 100 MG tablet Take 100 mg by mouth daily    Historical Provider, MD   pregabalin (LYRICA) 100 MG capsule TAKE 1 CAPSULE BY MOUTH 3 TIMES A DAY  Patient taking differently: 2 times daily.  TAKE 1 CAPSULE BY MOUTH 3 TIMES A DAY 7/1/20 8/17/20  Faye Nery, DO   furosemide (LASIX) 20 MG tablet Take 2 tablets by mouth 2 times daily  Patient taking differently: Take 40 mg by mouth daily  6/25/20 6/25/21 Lena Fleischer, APRN - CNP   spironolactone (ALDACTONE) 50 MG tablet TAKE 1 TABLET BY MOUTH ONE TIME A DAY 6/23/20   Ova KELSY Liu CNP   Cholecalciferol (VITAMIN D) 50 MCG (2000 UT) CAPS capsule Take 1 capsule by mouth daily 2/24/20   Gwendolyn Rocha DO   omeprazole (PRILOSEC) 20 MG delayed release capsule Take 2 capsules by mouth daily 2/24/20 2/23/21  Gwendolyn Rocha DO   NIFEdipine (PROCARDIA XL) 60 MG extended release tablet Take 60 mg by mouth daily  6/12/19   Historical Provider, MD   allopurinol (ZYLOPRIM) 300 MG tablet Take 600 mg by mouth daily Takes 2 tabs (=600mg) daily 3/27/19   KELSY Veloz CNP       Future Appointments   Date Time Provider Phuc Macias   9/2/2020  1:45 PM Jade Joel MD grtl[facility] exc Berta Calhoun   11/16/2020 11:10 AM Virgil Guaman MD [facility] None   2/10/2021  1:30 PM DO Kelly Bird

## 2020-08-21 ENCOUNTER — OFFICE VISIT (OUTPATIENT)
Dept: FAMILY MEDICINE CLINIC | Age: 70
End: 2020-08-21
Payer: COMMERCIAL

## 2020-08-21 ENCOUNTER — HOSPITAL ENCOUNTER (OUTPATIENT)
Age: 70
Setting detail: SPECIMEN
Discharge: HOME | End: 2020-08-21
Payer: COMMERCIAL

## 2020-08-21 VITALS
TEMPERATURE: 100.6 F | BODY MASS INDEX: 33.86 KG/M2 | OXYGEN SATURATION: 96 % | HEIGHT: 72 IN | HEART RATE: 82 BPM | WEIGHT: 250 LBS

## 2020-08-21 PROCEDURE — 99213 OFFICE O/P EST LOW 20 MIN: CPT | Performed by: NURSE PRACTITIONER

## 2020-08-21 ASSESSMENT — ENCOUNTER SYMPTOMS
SHORTNESS OF BREATH: 0
CHEST TIGHTNESS: 0
SORE THROAT: 0
WHEEZING: 0
VOICE CHANGE: 0
SINUS PRESSURE: 0
EYE DISCHARGE: 0
COUGH: 1
EYE REDNESS: 0

## 2020-08-21 NOTE — PROGRESS NOTES
Mouth/Throat:      Pharynx: No oropharyngeal exudate or posterior oropharyngeal erythema. Eyes:      General:         Right eye: No discharge. Left eye: No discharge. Cardiovascular:      Rate and Rhythm: Normal rate and regular rhythm. Heart sounds: Normal heart sounds. No murmur. Pulmonary:      Effort: Pulmonary effort is normal. No respiratory distress. Breath sounds: Normal breath sounds. No wheezing or rales. Skin:     General: Skin is warm. Findings: No rash. Neurological:      Mental Status: He is alert. Patient examined car side due to the COVID-19 pandemic. Pulse 82   Temp 100.6 °F (38.1 °C) (Temporal)   Ht 6' (1.829 m)   Wt 250 lb (113.4 kg)   SpO2 96%   BMI 33.91 kg/m²     Assessment:       Diagnosis Orders   1. Cough  COVID-19 Ambulatory       Plan: Will send out COVID19 testing. Possible treatment alterations based on the results. Patient instructed to self-quarantine until testing results are back. Patient instructed not to return to work until results are back. Tylenol as needed for fever/pain. Encouraged adequate hydration and rest.  The patient indicates understanding of these issues and agrees with the plan. Educational materials provided on AVS.  Follow up if symptoms do not improve/worsen. Discussed symptoms that will warrant urgent ED evaluation/treatment. Patient given educational materials - see patient instructions. Discussed use, benefit, and side effects of prescribed medications. All patientquestions answered. Pt voiced understanding.     Electronically signed by KELSY Baldwin CNP on 8/21/2020at 2:46 PM

## 2020-08-23 LAB — SARS-COV-2, NAA: NOT DETECTED

## 2020-08-24 ENCOUNTER — HOSPITAL ENCOUNTER (OUTPATIENT)
Age: 70
Setting detail: SPECIMEN
Discharge: HOME | End: 2020-08-24
Payer: COMMERCIAL

## 2020-08-24 PROBLEM — R16.1 SPLENOMEGALY: Status: ACTIVE | Noted: 2020-07-15

## 2020-08-24 LAB
ABSOLUTE EOS #: 0.2 K/UL (ref 0–0.44)
ABSOLUTE IMMATURE GRANULOCYTE: 0.06 K/UL (ref 0–0.3)
ABSOLUTE LYMPH #: 2.21 K/UL (ref 1.1–3.7)
ABSOLUTE MONO #: 1.05 K/UL (ref 0.1–1.2)
ABSOLUTE RETIC #: 0.06 M/UL (ref 0.03–0.08)
ANION GAP SERPL CALCULATED.3IONS-SCNC: 18 MMOL/L (ref 9–17)
BASOPHILS # BLD: 1 % (ref 0–2)
BASOPHILS ABSOLUTE: 0.06 K/UL (ref 0–0.2)
BUN BLDV-MCNC: 21 MG/DL (ref 8–23)
BUN/CREAT BLD: ABNORMAL (ref 9–20)
CALCIUM SERPL-MCNC: 9.5 MG/DL (ref 8.6–10.4)
CHLORIDE BLD-SCNC: 101 MMOL/L (ref 98–107)
CO2: 19 MMOL/L (ref 20–31)
CREAT SERPL-MCNC: 1.52 MG/DL (ref 0.7–1.2)
CREATININE URINE: 116.2 MG/DL (ref 39–259)
DIFFERENTIAL TYPE: ABNORMAL
EOSINOPHILS RELATIVE PERCENT: 2 % (ref 1–4)
GFR AFRICAN AMERICAN: 56 ML/MIN
GFR NON-AFRICAN AMERICAN: 46 ML/MIN
GFR SERPL CREATININE-BSD FRML MDRD: ABNORMAL ML/MIN/{1.73_M2}
GFR SERPL CREATININE-BSD FRML MDRD: ABNORMAL ML/MIN/{1.73_M2}
GLUCOSE BLD-MCNC: 87 MG/DL (ref 70–99)
HCT VFR BLD CALC: 42.1 % (ref 40.7–50.3)
HEMOGLOBIN: 12.9 G/DL (ref 13–17)
IMMATURE GRANULOCYTES: 1 %
IMMATURE RETIC FRACT: 23.4 % (ref 2.7–18.3)
LYMPHOCYTES # BLD: 20 % (ref 24–43)
MCH RBC QN AUTO: 29.1 PG (ref 25.2–33.5)
MCHC RBC AUTO-ENTMCNC: 30.6 G/DL (ref 28.4–34.8)
MCV RBC AUTO: 95 FL (ref 82.6–102.9)
MONOCYTES # BLD: 9 % (ref 3–12)
NRBC AUTOMATED: 0 PER 100 WBC
PDW BLD-RTO: 15.1 % (ref 11.8–14.4)
PLATELET # BLD: 421 K/UL (ref 138–453)
PLATELET ESTIMATE: ABNORMAL
PMV BLD AUTO: 10.6 FL (ref 8.1–13.5)
POTASSIUM SERPL-SCNC: 4.9 MMOL/L (ref 3.7–5.3)
PROSTATE SPECIFIC ANTIGEN: <0.01 UG/L
RBC # BLD: 4.43 M/UL (ref 4.21–5.77)
RBC # BLD: ABNORMAL 10*6/UL
RETIC %: 1.4 % (ref 0.5–1.9)
RETIC HEMOGLOBIN: 27.7 PG (ref 28.2–35.7)
SEG NEUTROPHILS: 68 % (ref 36–65)
SEGMENTED NEUTROPHILS ABSOLUTE COUNT: 7.63 K/UL (ref 1.5–8.1)
SODIUM BLD-SCNC: 138 MMOL/L (ref 135–144)
THYROXINE, FREE: 1.22 NG/DL (ref 0.93–1.7)
TOTAL PROTEIN, URINE: 15 MG/DL
TSH SERPL DL<=0.05 MIU/L-ACNC: 5.84 MIU/L (ref 0.3–5)
URINE TOTAL PROTEIN CREATININE RATIO: 0.13 (ref 0–0.2)
WBC # BLD: 11.2 K/UL (ref 3.5–11.3)
WBC # BLD: 11.4 K/UL (ref 3.5–11.3)
WBC # BLD: ABNORMAL 10*3/UL

## 2020-08-25 ENCOUNTER — TELEPHONE (OUTPATIENT)
Dept: FAMILY MEDICINE CLINIC | Age: 70
End: 2020-08-25

## 2020-08-25 LAB
PATHOLOGIST REVIEW: NORMAL
SURGICAL PATHOLOGY REPORT: NORMAL

## 2020-08-25 NOTE — TELEPHONE ENCOUNTER
Pt called and would like to know the results of his lab results and what he should do. He is still not feeling well.

## 2020-08-26 ENCOUNTER — VIRTUAL VISIT (OUTPATIENT)
Dept: FAMILY MEDICINE CLINIC | Age: 70
End: 2020-08-26
Payer: COMMERCIAL

## 2020-08-26 PROCEDURE — 99442 PR PHYS/QHP TELEPHONE EVALUATION 11-20 MIN: CPT | Performed by: INTERNAL MEDICINE

## 2020-08-26 RX ORDER — AMOXICILLIN 500 MG/1
500 CAPSULE ORAL 3 TIMES DAILY
Qty: 30 CAPSULE | Refills: 0 | Status: SHIPPED | OUTPATIENT
Start: 2020-08-26 | End: 2020-09-05

## 2020-08-26 RX ORDER — BENZONATATE 100 MG/1
100 CAPSULE ORAL 3 TIMES DAILY PRN
Qty: 30 CAPSULE | Refills: 0 | Status: SHIPPED | OUTPATIENT
Start: 2020-08-26 | End: 2020-09-11

## 2020-08-26 NOTE — PROGRESS NOTES
Gali Mathews is a 78 y.o. male evaluated via telephone on 8/26/2020. Consent:  He and/or health care decision maker is aware that that he may receive a bill for this telephone service, depending on his insurance coverage, and has provided verbal consent to proceed: Yes      Documentation:  I communicated with the patient and/or health care decision maker about here to review recent labs some by me and others done by nephro in the last week   Still feeling run down , fatigued . Occasional lightheaded and dizzy   Did have a pretty severe cough and fever a week ago and came in for covid testing   It came back negative though and they did not treat for anything else  Cough is overall improving over the last few days   No further fevers   Blood work showed mild leuks and mild anemia with elevated reticulocyte count . Otherwise all other blood work overall okay  tsh mildly elevated but t4 normal     Details of this discussion including any medical advice provided: referral to heme/onc due to blood count and elevated white blood cell count ; new since 7/31 with no recent steroids   Also will treat for bacterial infection based on blood work and symptoms with amox for 7-10 days and tessalon pearles   Call with q/c   Push fluids and rest  F/u with specialist as scheduled   Add MV to daily regimen at least until seen by heme /onc       I affirm this is a Patient Initiated Episode with a Patient who has not had a related appointment within my department in the past 7 days or scheduled within the next 24 hours.     Patient identification was verified at the start of the visit: Yes    Total Time: minutes: 11-20 minutes    Note: not billable if this call serves to triage the patient into an appointment for the relevant concern      Mike Strauss

## 2020-09-03 ENCOUNTER — TELEPHONE (OUTPATIENT)
Dept: FAMILY MEDICINE CLINIC | Age: 70
End: 2020-09-03

## 2020-09-03 ENCOUNTER — CARE COORDINATION (OUTPATIENT)
Dept: OTHER | Facility: CLINIC | Age: 70
End: 2020-09-03

## 2020-09-03 NOTE — TELEPHONE ENCOUNTER
Spoke with patient explaining why Dr Jen Falk wanted him to go to hematology/oncology. Patient seemed to understand.

## 2020-09-03 NOTE — CARE COORDINATION
Ambulatory Care Coordination Note  9/3/2020  CM Risk Score: 3  Charlson 10 Year Mortality Risk Score: 100%     ACC: Andrea Mack, RN    Summary Note: Spoke with patient today, said he is still feeling very tired, said his cough is better, but he still doesn't have much energy. Pt denies fever. Pt voiced concern about why he has to see a hematologist. Pt does have an appt on 9/15/2020 with Dr. Jose Mayorga. Pt said I just didn't understand what the concern is about and why I am going. I encouraged patient to send a message in My Chart to Dr Jen Falk, patient said he would rather me contact the office and ask the doctor to give him a call. I agreed. I placed a call to Dr. Jen Falk office. Spoke with Virgina Brunner who said Dr. Jen Falk is on vacation til next Tuesday, she said she will call the patient and speak with him.  Will follow         Care Coordination Interventions    Program Enrollment:  Rising Risk  Referral from Primary Care Provider:  No  Suggested Interventions and Community Resources  Disease Specific Clinic:  In Process (Comment: Rheumatology- Dr Munoz Generous  Urology - Dr Emma Shaikh )  Other Services or Interventions:  referred to Dr Emma Shaikh in [facility]                 This Visit's Progress     Patient Stated (pt-stated)        Pt will request urology consult from pcp on his follow up appt on 8/12/2020    Barriers: none  Plan for overcoming my barriers: N/A  Confidence: 10/10  Anticipated Goal Completion Date: 10/12/2020    Follow up has been postponed patient said due to needing to see a hematologist        Patient Stated (pt-stated)   On track     Pt will drink ample fluids to remain hydrated to assist with uti prevention    Barriers: none  Plan for overcoming my barriers: N/A  Confidence: 10/10  Anticipated Goal Completion Date: 7/29/2020 and ongoing  Pt is drinking fluids and making sure his urine is light yellow in color        Patient Stated (pt-stated)        Pt will discuss his concern with his pcp in regard to seeing a hematologist. Pt agreed to be available at his home number. Barriers: none  Plan for overcoming my barriers: N/A  Confidence: 8/10  Anticipated Goal Completion Date: 9/4/2020            Prior to Admission medications    Medication Sig Start Date End Date Taking? Authorizing Provider   amoxicillin (AMOXIL) 500 MG capsule Take 1 capsule by mouth 3 times daily for 10 days 8/26/20 9/5/20  Sigifredo Lev, DO   hydrOXYzine (VISTARIL) 25 MG capsule Take 1 capsule by mouth 4 times daily as needed for Anxiety 8/12/20 9/11/20  Kusum Hays DO   lactulose (CHRONULAC) 10 GM/15ML solution TAKE 15 MLS BY MOUTH THREE TIMES A DAY 8/10/20   Kusum Singh DO   SITagliptin (JANUVIA) 100 MG tablet Take 100 mg by mouth daily    Historical Provider, MD   pregabalin (LYRICA) 100 MG capsule TAKE 1 CAPSULE BY MOUTH 3 TIMES A DAY  Patient taking differently: 2 times daily.  TAKE 1 CAPSULE BY MOUTH 3 TIMES A DAY 7/1/20 8/26/20  Sigifredo Lev, DO   furosemide (LASIX) 20 MG tablet Take 2 tablets by mouth 2 times daily  Patient taking differently: Take 40 mg by mouth daily  6/25/20 6/25/21  NicoKELSY Elizalde CNP   spironolactone (ALDACTONE) 50 MG tablet TAKE 1 TABLET BY MOUTH ONE TIME A DAY 6/23/20   KELSY Ford - CNP   Cholecalciferol (VITAMIN D) 50 MCG (2000 UT) CAPS capsule Take 1 capsule by mouth daily 2/24/20   Sigifredo Lev, DO   omeprazole (PRILOSEC) 20 MG delayed release capsule Take 2 capsules by mouth daily 2/24/20 2/23/21  Sigifredo Lev, DO   NIFEdipine (PROCARDIA XL) 60 MG extended release tablet Take 60 mg by mouth daily  6/12/19   Historical Provider, MD   allopurinol (ZYLOPRIM) 300 MG tablet Take 600 mg by mouth daily Takes 2 tabs (=600mg) daily 3/27/19   KELSY Vaughn CNP       Future Appointments   Date Time Provider Phuc Macias   9/15/2020 11:30 AM Hamilton Valderrama MD [facility]   10/1/2020  2:00 PM MD LINH T[facility]   11/16/2020 11:10 AM Nicky Salcido MD [facility] None   2/10/2021  1:30 PM DO Kraig Sapp

## 2020-09-11 RX ORDER — BENZONATATE 100 MG/1
CAPSULE ORAL
Qty: 30 CAPSULE | Refills: 0 | Status: SHIPPED | OUTPATIENT
Start: 2020-09-11 | End: 2020-10-01

## 2020-09-15 ENCOUNTER — TELEPHONE (OUTPATIENT)
Dept: ONCOLOGY | Age: 70
End: 2020-09-15

## 2020-09-15 ENCOUNTER — HOSPITAL ENCOUNTER (OUTPATIENT)
Facility: MEDICAL CENTER | Age: 70
Discharge: HOME | End: 2020-09-15
Payer: COMMERCIAL

## 2020-09-15 ENCOUNTER — INITIAL CONSULT (OUTPATIENT)
Dept: ONCOLOGY | Age: 70
End: 2020-09-15
Payer: COMMERCIAL

## 2020-09-15 VITALS
DIASTOLIC BLOOD PRESSURE: 83 MMHG | HEIGHT: 72 IN | HEART RATE: 83 BPM | TEMPERATURE: 97.9 F | WEIGHT: 241.7 LBS | SYSTOLIC BLOOD PRESSURE: 132 MMHG | RESPIRATION RATE: 20 BRPM | BODY MASS INDEX: 32.74 KG/M2

## 2020-09-15 DIAGNOSIS — D64.9 NORMOCYTIC ANEMIA: ICD-10-CM

## 2020-09-15 LAB
ABSOLUTE EOS #: 0.08 K/UL (ref 0–0.44)
ABSOLUTE IMMATURE GRANULOCYTE: 0.04 K/UL (ref 0–0.3)
ABSOLUTE LYMPH #: 2.07 K/UL (ref 1.1–3.7)
ABSOLUTE MONO #: 0.8 K/UL (ref 0.1–1.2)
ABSOLUTE RETIC #: 0.07 M/UL (ref 0.03–0.08)
BASOPHILS # BLD: 1 % (ref 0–2)
BASOPHILS ABSOLUTE: 0.07 K/UL (ref 0–0.2)
DIFFERENTIAL TYPE: ABNORMAL
EOSINOPHILS RELATIVE PERCENT: 1 % (ref 1–4)
FOLATE: 7.8 NG/ML
HCT VFR BLD CALC: 41.9 % (ref 40.7–50.3)
HEMOGLOBIN: 13 G/DL (ref 13–17)
IMMATURE GRANULOCYTES: 0 %
IMMATURE RETIC FRACT: 16.2 % (ref 2.7–18.3)
IRON SATURATION: 23 % (ref 20–55)
IRON: 39 UG/DL (ref 59–158)
LYMPHOCYTES # BLD: 20 % (ref 24–43)
MCH RBC QN AUTO: 28.5 PG (ref 25.2–33.5)
MCHC RBC AUTO-ENTMCNC: 31 G/DL (ref 28.4–34.8)
MCV RBC AUTO: 91.9 FL (ref 82.6–102.9)
MONOCYTES # BLD: 8 % (ref 3–12)
NRBC AUTOMATED: 0 PER 100 WBC
PDW BLD-RTO: 15.6 % (ref 11.8–14.4)
PLATELET # BLD: 311 K/UL (ref 138–453)
PLATELET ESTIMATE: ABNORMAL
PMV BLD AUTO: 9.4 FL (ref 8.1–13.5)
RBC # BLD: 4.56 M/UL (ref 4.21–5.77)
RBC # BLD: ABNORMAL 10*6/UL
RETIC %: 1.7 % (ref 0.5–1.9)
RETIC HEMOGLOBIN: 31.3 PG (ref 28.2–35.7)
SEG NEUTROPHILS: 70 % (ref 36–65)
SEGMENTED NEUTROPHILS ABSOLUTE COUNT: 7.3 K/UL (ref 1.5–8.1)
TOTAL IRON BINDING CAPACITY: 172 UG/DL (ref 250–450)
UNSATURATED IRON BINDING CAPACITY: 133 UG/DL (ref 112–347)
VITAMIN B-12: 456 PG/ML (ref 232–1245)
WBC # BLD: 10.4 K/UL (ref 3.5–11.3)
WBC # BLD: ABNORMAL 10*3/UL

## 2020-09-15 PROCEDURE — 99244 OFF/OP CNSLTJ NEW/EST MOD 40: CPT | Performed by: INTERNAL MEDICINE

## 2020-09-15 PROCEDURE — 82607 VITAMIN B-12: CPT

## 2020-09-15 PROCEDURE — 85045 AUTOMATED RETICULOCYTE COUNT: CPT

## 2020-09-15 PROCEDURE — 83550 IRON BINDING TEST: CPT

## 2020-09-15 PROCEDURE — 83540 ASSAY OF IRON: CPT

## 2020-09-15 PROCEDURE — 86334 IMMUNOFIX E-PHORESIS SERUM: CPT

## 2020-09-15 PROCEDURE — 99201 HC NEW PT, E/M LEVEL 1: CPT | Performed by: INTERNAL MEDICINE

## 2020-09-15 PROCEDURE — 82746 ASSAY OF FOLIC ACID SERUM: CPT

## 2020-09-15 PROCEDURE — 85025 COMPLETE CBC W/AUTO DIFF WBC: CPT

## 2020-09-15 PROCEDURE — 36415 COLL VENOUS BLD VENIPUNCTURE: CPT

## 2020-09-15 NOTE — LETTER
_    Bhavesh White MD    9/15/2020     Sigifredo Barkley, DO   [address] [address]    Dear Dr Remberto Singh:    Thank you for referring Ling Maki, 1950, to me for evaluation. Below are the relevant portions of my assessment and plan of care. Mr. Ling Maki is a very pleasant 78 y.o. male with history of multiple co morbidities as listed. Patient is referred for further management of anemia. Patient had no hematologic problems in the past.  He had valve replacement with pork valve in July 2020. He had significant drop of hemoglobin during that hospitalization. Since then hemoglobin is been recovering. Patient also developed acute renal insufficiency. It has been improving. Patient denies any active bleeding. No melena or hematochezia. No hematemesis. He is complaining of weakness and fatigue. He feels tired. No weight loss or decreased appetite. .  No fever or night sweats. No enlarged lymph nodes. Patient quit smoking 15 years ago. He used to smoke 1 pack/day since age 15 he quit alcohol 5 years ago. Brenden Aponte PAST MEDICAL HISTORY: has a past medical history of Abdominal varicosities, Anxiety state NEC, Arthritis, Sol esophagus, Chronic kidney disease, Cirrhosis ([address] Colon polyps, Diverticulosis of colon, Enlarged heart, Gout, Hepatic cirrhosis ([address] Hepatic cyst, Hepatitis C, chronic ([address] History of alcohol use, Hyperlipidemia, Hypertension, Lumbago, Lymphedema, Malignant neoplasm of prostate ([address] Otalgia of right ear, Portal venous hypertension ([address] Sciatica, and Splenomegaly. PAST SURGICAL HISTORY: has a past surgical history that includes Prostatectomy (6/2011); back surgery; Knee arthroscopy (Right); Tonsillectomy; ventral hernia repair (05/26/15); Paracentesis (11-4-15); hernia repair; Paracentesis (12/28/15); other surgical history (Right, 01/25/2016); Vein Surgery (Left, 12/07/2016);  Colonoscopy (12/05/2016); Upper gastrointestinal endoscopy (10/24/2013); Upper gastrointestinal endoscopy (08/30/2017); pr egd transoral biopsy single/multiple (N/A, 8/30/2017); Colonoscopy (N/A, 10/7/2019); Cardiac catheterization (10.30/2019); and Aortic valve repair. CURRENT MEDICATIONS:  has a current medication list which includes the following prescription(s): benzonatate, lactulose, sitagliptin, pregabalin, furosemide, spironolactone, vitamin d, omeprazole, nifedipine, and allopurinol. ALLERGIES:  is allergic to nsaids. FAMILY HISTORY: Negative for any hematological or oncological conditions. SOCIAL HISTORY:  reports that he quit smoking about 5 years ago. His smoking use included cigarettes. He started smoking about 54 years ago. He has a 49.00 pack-year smoking history. He has never used smokeless tobacco. He reports current drug use. Drug: Marijuana. He reports that he does not drink alcohol. REVIEW OF SYSTEMS:     · General: Positive for weakness and fatigue. No unanticipated weight loss or decreased appetite. No fever or chills. · Eyes: No blurred vision, eye pain or double vision. · Ears: No hearing problems or drainage. No tinnitus. · Throat: No sore throat, problems with swallowing or dysphagia. · Respiratory: No cough, sputum or hemoptysis. No shortness of breath. No pleuritic chest pain. · Cardiovascular: No chest pain, orthopnea or PND. No lower extremity edema. No palpitation. · Gastrointestinal: No problems with swallowing. No abdominal pain or bloating. No nausea or vomiting. No diarrhea or constipation. No GI bleeding. · Genitourinary: No dysuria, hematuria, frequency or urgency. · Musculoskeletal: No muscle aches or pains. No limitation of movement. No back pain. No gait disturbance, No joint complaints. · Dermatologic: No skin rashes or pruritus. No skin lesions or discolorations.    · Psychiatric: No depression, anxiety, or stress or signs of schizophrenia. No change in mood or affect. · Hematologic: No history of bleeding tendency. No bruises or ecchymosis. No history of clotting problems. · Infectious disease: No fever, chills or frequent infections. · Endocrine: No polydipsia or polyuria. No temperature intolerance. · Neurologic: No headaches or dizziness. No weakness or numbness of the extremities. No changes in balance, coordination,  memory, mentation, behavior. · Allergic/Immunologic: No nasal congestion or hives. No repeated infections. PHYSICAL EXAM:  The patient is not in acute distress. Vital signs: Blood pressure 132/83, pulse 83, temperature 97.9 °F (36.6 °C), temperature source Temporal, resp. rate 20, height 6' (1.829 m), weight 241 lb 11.2 oz (109.6 kg).      General appearance - well appearing, not in pain or distress  Mental status - good mood, alert and oriented  Eyes - pupils equal and reactive, extraocular eye movements intact  Ears - bilateral TM's and external ear canals normal  Nose - normal and patent, no erythema, discharge or polyps  Mouth - mucous membranes moist, pharynx normal without lesions  Neck - supple, no significant adenopathy  Lymphatics - no palpable lymphadenopathy, no hepatosplenomegaly  Chest - clear to auscultation, no wheezes, rales or rhonchi, symmetric air entry  Heart - normal rate, regular rhythm, normal S1, S2, no murmurs, rubs, clicks or gallops  Abdomen - soft, nontender, nondistended, no masses or organomegaly  Neurological - alert, oriented, normal speech, no focal findings or movement disorder noted  Musculoskeletal - no joint tenderness, deformity or swelling  Extremities - peripheral pulses normal, no pedal edema, no clubbing or cyanosis  Skin - normal coloration and turgor, no rashes, no suspicious skin lesions noted     Review of Diagnostic data:   Lab Results   Component Value Date    WBC 10.4 09/15/2020    HGB 13.0 09/15/2020    HCT 41.9 09/15/2020    MCV 91.9 09/15/2020  09/15/2020       Chemistry        Component Value Date/Time     08/24/2020 1330    K 4.9 08/24/2020 1330     08/24/2020 1330    CO2 19 (L) 08/24/2020 1330    BUN 21 08/24/2020 1330    CREATININE 1.52 (H) 08/24/2020 1330        Component Value Date/Time    CALCIUM 9.5 08/24/2020 1330    ALKPHOS 84 07/31/2020 1127    AST 16 07/31/2020 1127    ALT 11 07/31/2020 1127    BILITOT 0.22 (L) 07/31/2020 1127            IMPRESSION:   Normocytic anemia  Chronic renal insufficiency  Recent valve replacement  Multiple comorbidities as listed    PLAN: I reviewed the labs available to me and discussed with the patient. For more than 60 minutes of face to face discussion, I explained to the patient the nature of this hematologic problem. I explained the significance of these abnormalities in layman language. Obviously previous labs showed normal hemoglobin level. Patient developed anemia in July which is the time of the valve surgery. He was recovering well. Most recent hemoglobin is 12.9. Patient continues to have weakness and fatigue. I do not believe his symptoms can be explained by this degree of anemia. Furthermore repeated lab test today showed normal hemoglobin level. Patient continues to have renal insufficiency. Patient symptoms could be related to cardiac or renal causes. However I would like to rule our other causes for that anemia so we will check the serum iron and ferritin and will check vitamin B12 and folate and protein electrophoresis. We will replace any deficiency. We will make further recommendations based on the results. Patient's questions were answered to the best of his satisfaction and he verbalized full understanding and agreement. If you have questions, please do not hesitate to call me. I look forward to following Hilda Chris along with you.     Sincerely,                            [address] [facility], [place] [facility] Cell: [phone]    This note is created with the assistance of a speech recognition program.  While intending to generate a document that actually reflects the content of the visit, the document can still have some errors including those of syntax and sound a like substitutions which may escape proof reading. It such instances, actual meaning can be extrapolated by contextual diversion.

## 2020-09-15 NOTE — TELEPHONE ENCOUNTER
AVS from 9/15/20     Labs today  Call patient with results and further recommendations    Pt had labs drawn today   appt put on lab generic schedule for nurses to follow lab results    Pt was given AVS and appt schedule

## 2020-09-16 ENCOUNTER — CARE COORDINATION (OUTPATIENT)
Dept: OTHER | Facility: CLINIC | Age: 70
End: 2020-09-16

## 2020-09-16 NOTE — CARE COORDINATION
Ambulatory Care Coordination Note  9/16/2020  CM Risk Score: 3  Charlson 10 Year Mortality Risk Score: 100%     ACC: Aravind Walton, RN    Summary Note: Spoke with patient who was driving through to get some food. Could not complete med review due to patient driving. Pt did say he saw Dr Lola Mullins yesterday and had blood drawn and has more ordered for Friday he said. Said he still continues to feel fatigued no other symptoms. Will continue to follow         Care Coordination Interventions    Program Enrollment:  Rising Risk  Referral from Primary Care Provider:  No  Suggested Interventions and Community Resources  Disease Specific Clinic:  In Process (Comment: Rheumatology- Dr Martinez Lob  Urology - Dr Dinorah Delgado . Hematology/Oncology ( [address])  )  Other Services or Interventions:  referred to Dr Dinorah Delgado in [place] [address]                 This Visit's Progress     COMPLETED: Patient Stated (pt-stated)        Pt will discuss his concern with his pcp in regard to seeing a hematologist. Pt agreed to be available at his home number. Barriers: none  Plan for overcoming my barriers: N/A  Confidence: 8/10  Anticipated Goal Completion Date: 9/4/2020       Patient Stated (pt-stated)   On track     Pt will follow up with Hem/Onc Dr Era Cooper    Barriers: none  Plan for overcoming my barriers: N/A  Confidence: 10/10  Anticipated Goal Completion Date: 9/15/2020            Prior to Admission medications    Medication Sig Start Date End Date Taking? Authorizing Provider   benzonatate (TESSALON) 100 MG capsule TAKE 1 CAPSULE BY MOUTH 3 TIMES A DAY AS NEEDED FOR COUGH 9/11/20   Kusum Hays,    lactulose (CHRONULAC) 10 GM/15ML solution TAKE 15 MLS BY MOUTH THREE TIMES A DAY 8/10/20   Kusum Keating, DO   SITagliptin (JANUVIA) 100 MG tablet Take 100 mg by mouth daily    Historical Provider, MD   pregabalin (LYRICA) 100 MG capsule TAKE 1 CAPSULE BY MOUTH 3 TIMES A DAY  Patient taking differently: 2 times daily.  TAKE

## 2020-09-16 NOTE — PROGRESS NOTES
_               Mr. Jose Hardin is a very pleasant 78 y.o. male with history of multiple co morbidities as listed. Patient is referred for further management of anemia. Patient had no hematologic problems in the past.  He had valve replacement with pork valve in July 2020. He had significant drop of hemoglobin during that hospitalization. Since then hemoglobin is been recovering. Patient also developed acute renal insufficiency. It has been improving. Patient denies any active bleeding. No melena or hematochezia. No hematemesis. He is complaining of weakness and fatigue. He feels tired. No weight loss or decreased appetite. .  No fever or night sweats. No enlarged lymph nodes. Patient quit smoking 15 years ago. He used to smoke 1 pack/day since age 15 he quit alcohol 5 years ago. Lilliam Lloyd PAST MEDICAL HISTORY: has a past medical history of Abdominal varicosities, Anxiety state NEC, Arthritis, Sol esophagus, Chronic kidney disease, Cirrhosis ([address] Colon polyps, Diverticulosis of colon, Enlarged heart, Gout, Hepatic cirrhosis ([address] Hepatic cyst, Hepatitis C, chronic ([address] History of alcohol use, Hyperlipidemia, Hypertension, Lumbago, Lymphedema, Malignant neoplasm of prostate ([address] Otalgia of right ear, Portal venous hypertension ([address] Sciatica, and Splenomegaly. PAST SURGICAL HISTORY: has a past surgical history that includes Prostatectomy (6/2011); back surgery; Knee arthroscopy (Right); Tonsillectomy; ventral hernia repair (05/26/15); Paracentesis (11-4-15); hernia repair; Paracentesis (12/28/15); other surgical history (Right, 01/25/2016); Vein Surgery (Left, 12/07/2016); Colonoscopy (12/05/2016); Upper gastrointestinal endoscopy (10/24/2013); Upper gastrointestinal endoscopy (08/30/2017); pr egd transoral biopsy single/multiple (N/A, 8/30/2017); Colonoscopy (N/A, 10/7/2019);  Cardiac catheterization (10.30/2019); and Aortic valve repair. CURRENT MEDICATIONS:  has a current medication list which includes the following prescription(s): benzonatate, lactulose, sitagliptin, pregabalin, furosemide, spironolactone, vitamin d, omeprazole, nifedipine, and allopurinol. ALLERGIES:  is allergic to nsaids. FAMILY HISTORY: Negative for any hematological or oncological conditions. SOCIAL HISTORY:  reports that he quit smoking about 5 years ago. His smoking use included cigarettes. He started smoking about 54 years ago. He has a 49.00 pack-year smoking history. He has never used smokeless tobacco. He reports current drug use. Drug: Marijuana. He reports that he does not drink alcohol. REVIEW OF SYSTEMS:     · General: Positive for weakness and fatigue. No unanticipated weight loss or decreased appetite. No fever or chills. · Eyes: No blurred vision, eye pain or double vision. · Ears: No hearing problems or drainage. No tinnitus. · Throat: No sore throat, problems with swallowing or dysphagia. · Respiratory: No cough, sputum or hemoptysis. No shortness of breath. No pleuritic chest pain. · Cardiovascular: No chest pain, orthopnea or PND. No lower extremity edema. No palpitation. · Gastrointestinal: No problems with swallowing. No abdominal pain or bloating. No nausea or vomiting. No diarrhea or constipation. No GI bleeding. · Genitourinary: No dysuria, hematuria, frequency or urgency. · Musculoskeletal: No muscle aches or pains. No limitation of movement. No back pain. No gait disturbance, No joint complaints. · Dermatologic: No skin rashes or pruritus. No skin lesions or discolorations. · Psychiatric: No depression, anxiety, or stress or signs of schizophrenia. No change in mood or affect. · Hematologic: No history of bleeding tendency. No bruises or ecchymosis. No history of clotting problems. · Infectious disease: No fever, chills or frequent infections.    · Endocrine: No polydipsia or polyuria. No temperature intolerance. · Neurologic: No headaches or dizziness. No weakness or numbness of the extremities. No changes in balance, coordination,  memory, mentation, behavior. · Allergic/Immunologic: No nasal congestion or hives. No repeated infections. PHYSICAL EXAM:  The patient is not in acute distress. Vital signs: Blood pressure 132/83, pulse 83, temperature 97.9 °F (36.6 °C), temperature source Temporal, resp. rate 20, height 6' (1.829 m), weight 241 lb 11.2 oz (109.6 kg).      General appearance - well appearing, not in pain or distress  Mental status - good mood, alert and oriented  Eyes - pupils equal and reactive, extraocular eye movements intact  Ears - bilateral TM's and external ear canals normal  Nose - normal and patent, no erythema, discharge or polyps  Mouth - mucous membranes moist, pharynx normal without lesions  Neck - supple, no significant adenopathy  Lymphatics - no palpable lymphadenopathy, no hepatosplenomegaly  Chest - clear to auscultation, no wheezes, rales or rhonchi, symmetric air entry  Heart - normal rate, regular rhythm, normal S1, S2, no murmurs, rubs, clicks or gallops  Abdomen - soft, nontender, nondistended, no masses or organomegaly  Neurological - alert, oriented, normal speech, no focal findings or movement disorder noted  Musculoskeletal - no joint tenderness, deformity or swelling  Extremities - peripheral pulses normal, no pedal edema, no clubbing or cyanosis  Skin - normal coloration and turgor, no rashes, no suspicious skin lesions noted     Review of Diagnostic data:   Lab Results   Component Value Date    WBC 10.4 09/15/2020    HGB 13.0 09/15/2020    HCT 41.9 09/15/2020    MCV 91.9 09/15/2020     09/15/2020       Chemistry        Component Value Date/Time     08/24/2020 1330    K 4.9 08/24/2020 1330     08/24/2020 1330    CO2 19 (L) 08/24/2020 1330    BUN 21 08/24/2020 1330    CREATININE 1.52 (H) 08/24/2020 1330

## 2020-09-17 LAB
PATHOLOGIST: NORMAL
SERUM IFX INTERP: NORMAL

## 2020-09-18 ENCOUNTER — CARE COORDINATION (OUTPATIENT)
Dept: OTHER | Facility: CLINIC | Age: 70
End: 2020-09-18

## 2020-09-18 ENCOUNTER — TELEPHONE (OUTPATIENT)
Dept: ONCOLOGY | Age: 70
End: 2020-09-18

## 2020-09-18 NOTE — CARE COORDINATION
Ambulatory Care Coordination Note  2020  CM Risk Score: 3  Charlson 10 Year Mortality Risk Score: 100%     ACC: Beronica Tong RN    Summary Note: Received call from patient, he said he has been waiting on a call from the oncology office at [facility] on his lab results. Upon review of record I did see where there is a note for the patient to be called with results. I called the office, spoke with a  who transferred me to the nurse triage line to leave a message. I left a message with the patient name, , phone number and reason for the call. Care Coordination Interventions    Program Enrollment:  Rising Risk  Referral from Primary Care Provider:  No  Suggested Interventions and Community Resources  Disease Specific Clinic:  In Process (Comment: Rheumatology- Dr Becerra [place]  Urology - Dr Amy Perry . Hematology/Oncology ( [address])  )  Other Services or Interventions:  referred to Dr Amy Perry in [place] [address]    None         Prior to Admission medications    Medication Sig Start Date End Date Taking? Authorizing Provider   benzonatate (TESSALON) 100 MG capsule TAKE 1 CAPSULE BY MOUTH 3 TIMES A DAY AS NEEDED FOR COUGH 20   Kusum Hays DO   lactulose (CHRONULAC) 10 GM/15ML solution TAKE 15 MLS BY MOUTH THREE TIMES A DAY 8/10/20   Kusum Ashley DO   SITagliptin (JANUVIA) 100 MG tablet Take 100 mg by mouth daily    Historical Provider, MD   pregabalin (LYRICA) 100 MG capsule TAKE 1 CAPSULE BY MOUTH 3 TIMES A DAY  Patient taking differently: 2 times daily.  TAKE 1 CAPSULE BY MOUTH 3 TIMES A DAY 7/1/20 9/15/20  Nan Harmon DO   furosemide (LASIX) 20 MG tablet Take 2 tablets by mouth 2 times daily  Patient taking differently: Take by mouth daily  20  KELSY Nichols CNP   spironolactone (ALDACTONE) 50 MG tablet TAKE 1 TABLET BY MOUTH ONE TIME A DAY 20   KELSY Salazar CNP   Cholecalciferol (VITAMIN D) 50 MCG (2000 UT) CAPS capsule Take 1 capsule by mouth daily 2/24/20   Lynn Gordon DO   omeprazole (PRILOSEC) 20 MG delayed release capsule Take 2 capsules by mouth daily 2/24/20 2/23/21  Lynn Gordon DO   NIFEdipine (PROCARDIA XL) 60 MG extended release tablet Take 60 mg by mouth daily  6/12/19   Historical Provider, MD   allopurinol (ZYLOPRIM) 300 MG tablet Take 600 mg by mouth daily Takes 2 tabs (=600mg) daily 3/27/19   KELSY Hagan - CNP       Future Appointments   Date Time Provider Phuc Macias   10/1/2020  2:00 PM Jessica Daniels MD [facility]   11/16/2020 11:10 AM Freda Hawkins MD [facility] None   2/10/2021  1:30 PM DO Rach Spencer [facility]

## 2020-09-18 NOTE — TELEPHONE ENCOUNTER
Writer reviewed with Dr. Elena Soares via phone. No outstanding labs that require correction, felt that these values are due to the bleeding loss from surgery and will recover in time. Pt to follow up with Cardiology. Writer phoned Remi Saint to update. Understanding voiced.

## 2020-09-18 NOTE — TELEPHONE ENCOUNTER
On generic schedule to follow up labs that were done on exit day of md exam.    Only abnormal were:  9/15/2020  3:16 PM - Johnny, Maddy Incoming Lab Results From [employer]     Component  Value  Ref Range & Units  Status  Collected  Lab    Iron  39Low    59 - 158 ug/dL  Final  09/15/2020  1:18 PM  [address]    TIBC  172Low    250 - 450 ug/dL             Please advise

## 2020-09-30 ENCOUNTER — CARE COORDINATION (OUTPATIENT)
Dept: OTHER | Facility: CLINIC | Age: 70
End: 2020-09-30

## 2020-09-30 NOTE — CARE COORDINATION
release tablet Take 60 mg by mouth daily  6/12/19   Historical Provider, MD   allopurinol (ZYLOPRIM) 300 MG tablet Take 600 mg by mouth daily Takes 2 tabs (=600mg) daily 3/27/19   KELSY Vaughn - CNP       Future Appointments   Date Time Provider Phuc Colleen   10/1/2020  2:00 PM [facility]   11/16/2020 11:10 AM Antionette Arrington MD [facility] None   2/10/2021  1:30 PM DO Danielle Bearden [facility]     Roberto LANN, RN- [facility]  Associate Care Manager  [phone]

## 2020-10-01 ENCOUNTER — OFFICE VISIT (OUTPATIENT)
Dept: GASTROENTEROLOGY | Age: 70
End: 2020-10-01
Payer: COMMERCIAL

## 2020-10-01 ENCOUNTER — HOSPITAL ENCOUNTER (OUTPATIENT)
Age: 70
Setting detail: SPECIMEN
Discharge: HOME | End: 2020-10-01
Payer: COMMERCIAL

## 2020-10-01 VITALS — BODY MASS INDEX: 33.36 KG/M2 | WEIGHT: 246 LBS

## 2020-10-01 PROBLEM — E66.01 MORBID OBESITY (HCC): Status: ACTIVE | Noted: 2020-10-01

## 2020-10-01 PROBLEM — D12.2 ADENOMATOUS POLYP OF ASCENDING COLON: Status: ACTIVE | Noted: 2020-10-01

## 2020-10-01 LAB
AFP: 1.5 UG/L
ALBUMIN SERPL-MCNC: 4.1 G/DL (ref 3.5–5.2)
ALBUMIN/GLOBULIN RATIO: ABNORMAL (ref 1–2.5)
ALP BLD-CCNC: 85 U/L (ref 40–129)
ALT SERPL-CCNC: 10 U/L (ref 5–41)
ANION GAP SERPL CALCULATED.3IONS-SCNC: 13 MMOL/L (ref 9–17)
AST SERPL-CCNC: 16 U/L
BILIRUB SERPL-MCNC: 0.16 MG/DL (ref 0.3–1.2)
BILIRUBIN DIRECT: <0.08 MG/DL
BILIRUBIN, INDIRECT: ABNORMAL MG/DL (ref 0–1)
BUN BLDV-MCNC: 14 MG/DL (ref 8–23)
CHLORIDE BLD-SCNC: 98 MMOL/L (ref 98–107)
CO2: 28 MMOL/L (ref 20–31)
CREAT SERPL-MCNC: 1.23 MG/DL (ref 0.7–1.2)
GFR AFRICAN AMERICAN: >60 ML/MIN
GFR NON-AFRICAN AMERICAN: 59 ML/MIN
GFR SERPL CREATININE-BSD FRML MDRD: ABNORMAL ML/MIN/{1.73_M2}
GFR SERPL CREATININE-BSD FRML MDRD: ABNORMAL ML/MIN/{1.73_M2}
GLOBULIN: ABNORMAL G/DL (ref 1.5–3.8)
POTASSIUM SERPL-SCNC: 4.6 MMOL/L (ref 3.7–5.3)
PROSTATE SPECIFIC ANTIGEN: <0.01 UG/L
SODIUM BLD-SCNC: 139 MMOL/L (ref 135–144)
TOTAL PROTEIN: 7.8 G/DL (ref 6.4–8.3)

## 2020-10-01 PROCEDURE — 82105 ALPHA-FETOPROTEIN SERUM: CPT

## 2020-10-01 PROCEDURE — 80076 HEPATIC FUNCTION PANEL: CPT

## 2020-10-01 PROCEDURE — 36415 COLL VENOUS BLD VENIPUNCTURE: CPT

## 2020-10-01 PROCEDURE — 99214 OFFICE O/P EST MOD 30 MIN: CPT | Performed by: INTERNAL MEDICINE

## 2020-10-01 PROCEDURE — 84153 ASSAY OF PSA TOTAL: CPT

## 2020-10-01 PROCEDURE — 84520 ASSAY OF UREA NITROGEN: CPT

## 2020-10-01 PROCEDURE — 82565 ASSAY OF CREATININE: CPT

## 2020-10-01 PROCEDURE — 80051 ELECTROLYTE PANEL: CPT

## 2020-10-01 RX ORDER — BENZONATATE 100 MG/1
CAPSULE ORAL
Qty: 30 CAPSULE | Refills: 0 | Status: SHIPPED | OUTPATIENT
Start: 2020-10-01 | End: 2021-02-10

## 2020-10-01 ASSESSMENT — ENCOUNTER SYMPTOMS
ANAL BLEEDING: 0
NAUSEA: 0
SHORTNESS OF BREATH: 1
BLOOD IN STOOL: 0
ABDOMINAL DISTENTION: 1
ABDOMINAL PAIN: 0
EYES NEGATIVE: 1
ALLERGIC/IMMUNOLOGIC NEGATIVE: 1
VOMITING: 0
RECTAL PAIN: 0
CONSTIPATION: 0
TROUBLE SWALLOWING: 0
DIARRHEA: 0
BACK PAIN: 1

## 2020-10-01 NOTE — PROGRESS NOTES
GI OFFICE FOLLOW UP    Blas Teran is a 78 y.o. male evaluated via on 10/1/2020. Consent:  He and/or health care decision maker is aware that that he may receive a bill for this telephone service, depending on his insurance coverage, and has provided verbal consent to proceed: YES      INTERVAL HISTORY:   No referring provider defined for this encounter. Chief Complaint   Patient presents with    Cirrhosis     hx of Sol's, protal venous hypertension, abdominal varicosities       1. Chronic hepatitis C with cirrhosis (HCC)    2. Splenomegaly    3. Portal venous hypertension (HCC)    4. Morbid obesity ([address]    5. Adenomatous polyp of ascending colon      Here for f/u      The patient is here as a follow up of his recent GI procedure. The results have been sent to you separately   The findings were explained to the patient in detail and biopsies were also discussed   with him    Found to have multiple adenomatous polyps      Had valve replacement at [facility] in June    Has Cirrhosis    Followed by Nephrology     On diuretics    No bleeding    No melena    Has hx of candida esophagitis in the past    Had Colon in past with polyps adenomatous    No ETOH         HISTORY OF PRESENT ILLNESS: Sigifredo Avendano is a 78 y.o. male with a past history remarkable for , referred for evaluation of   Chief Complaint   Patient presents with    Cirrhosis     hx of Sol's, protal venous hypertension, abdominal varicosities   . Past Medical,Family, and Social History reviewed and does contribute to the patient presenting condition. Patient's PMH/PSH,SH,PSYCH Hx, MEDs, ALLERGIES, and ROS were all reviewed and updated in the appropriate sections.     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     Portal venous hypertension ([facility] 07/15/2020    Sciatica     Splenomegaly 07/15/2020       Past Surgical History:   Procedure Laterality Date    AORTIC VALVE REPAIR      BACK SURGERY      L4-5    CARDIAC CATHETERIZATION  10.30/2019    Dr. Dennie Provencal COLONOSCOPY  12/05/2016    [facility], states due for another in 2  years    COLONOSCOPY N/A 10/7/2019    tubular adenoma x2; hyperplastic polyp    HERNIA REPAIR      KNEE ARTHROSCOPY Right     OTHER SURGICAL HISTORY Right 01/25/2016    10 liters removed peracentesis    PARACENTESIS  11-4-15    PARACENTESIS  12/28/15    [place] EGD TRANSORAL BIOPSY SINGLE/MULTIPLE N/A 8/30/2017    EGD BIOPSY performed by R[facility]to Dietz [facility] at [facility]  6/2011    TONSILLECTOMY      UPPER GASTROINTESTINAL ENDOSCOPY  10/24/2013    small segment barretts    UPPER GASTROINTESTINAL ENDOSCOPY  08/30/2017    VEIN SURGERY Left 12/07/2016    leg vein stripping    VENTRAL HERNIA REPAIR  05/26/15       CURRENT MEDICATIONS:    Current Outpatient Medications:     benzonatate (TESSALON) 100 MG capsule, TAKE 1 CAPSULE BY MOUTH 3 TIMES A DAY AS NEEDED FOR COUGH, Disp: 30 capsule, Rfl: 0    lactulose (CHRONULAC) 10 GM/15ML solution, TAKE 15 MLS BY MOUTH THREE TIMES A DAY, Disp: 473 mL, Rfl: 5    SITagliptin (JANUVIA) 100 MG tablet, Take 100 mg by mouth daily, Disp: , Rfl:     furosemide (LASIX) 20 MG tablet, Take 2 tablets by mouth 2 times daily (Patient taking differently: Take by mouth daily ), Disp: 180 tablet, Rfl: 0    spironolactone (ALDACTONE) 50 MG tablet, TAKE 1 TABLET BY MOUTH ONE TIME A DAY, Disp: 90 tablet, Rfl: 0    Cholecalciferol (VITAMIN D) 50 MCG (2000 UT) CAPS capsule, Take 1 capsule by mouth daily, Disp: 90 capsule, Rfl: 5    omeprazole (PRILOSEC) 20 MG delayed release capsule, Take 2 capsules by mouth daily, Disp: 180 capsule, Rfl: 5    NIFEdipine (PROCARDIA XL) 60 MG extended release tablet, Take 60 mg by mouth daily , Disp: , Rfl:     allopurinol (ZYLOPRIM) 300 MG tablet, Take 600 mg by mouth daily Takes 2 tabs (=600mg) daily, Disp: 90 tablet, Rfl: 1    pregabalin (LYRICA) 100 MG capsule, TAKE 1 CAPSULE BY MOUTH 3 TIMES A DAY (Patient taking differently: 2 times daily.  TAKE 1 CAPSULE BY MOUTH 3 TIMES A DAY), Disp: 180 capsule, Rfl: 5    ALLERGIES:   Allergies   Allergen Reactions    Nsaids      Because of Kidney issues        FAMILY HISTORY:       Problem Relation Age of Onset    Cancer Father         prostate     Other Neg Hx         blood clots         SOCIAL HISTORY:   Social History     Socioeconomic History    Marital status:      Spouse name: Not on file    Number of children: Not on file    Years of education: Not on file    Highest education level: Not on file   Occupational History    Not on file   Social Needs    Financial resource strain: Not hard at all   [place] insecurity     Worry: Not on file     Inability: Not on file    Transportation needs     Medical: No     Non-medical: No   Tobacco Use    Smoking status: Former Smoker     Packs/day: 1.00     Years: 49.00     Pack years: 49.00     Types: Cigarettes     Start date: 8/18/1966     Last attempt to quit: 1/1/2015     Years since quittin.7    Smokeless tobacco: Never Used    Tobacco comment: maybe 1 cigarette per day, updated from progress note dated 10/16/17    Substance and Sexual Activity    Alcohol use: No     Alcohol/week: 0.0 standard drinks    Drug use: Yes     Types: Marijuana     Comment: occasional    Sexual activity: Not on file   Lifestyle    Physical activity     Days per week: Not on file     Minutes per session: Not on file    Stress: Not on file   Relationships    Social connections     Talks on phone: Not on file     Gets together: Not on file     Attends [employer] service: Not on file     Active member of club or organization: Not on file     Attends meetings of clubs or organizations: Not on file     Relationship status: Not on file    Intimate partner violence     Fear of current or ex partner: Not on file     Emotionally abused: Not on file     Physically abused: Not on file     Forced sexual activity: Not on file   Other Topics Concern    Not on file   Social History Narrative    Not on file         REVIEW OF SYSTEMS:         Review of Systems   Constitutional: Positive for fatigue. Negative for unexpected weight change (weight gain). HENT: Negative for trouble swallowing. Eyes: Negative. Respiratory: Positive for shortness of breath. Cardiovascular: Positive for leg swelling (mild). Gastrointestinal: Positive for abdominal distention. Negative for abdominal pain (off and on), anal bleeding, blood in stool, constipation, diarrhea (off and on), nausea, rectal pain and vomiting. Endocrine: Negative. Gout     Genitourinary: Negative. Musculoskeletal: Positive for arthralgias and back pain. Skin: Negative. Allergic/Immunologic: Negative. Neurological: Negative. Negative for dizziness, weakness and light-headedness. Hematological: Negative. Psychiatric/Behavioral: Positive for sleep disturbance. Negative for agitation. The patient is nervous/anxious.         PHYSICAL EXAMINATION: Vital signs reviewed per the nursing documentation. Wt 246 lb (111.6 kg)   BMI 33.36 kg/m²   Body mass index is 33.36 kg/m². Physical Exam  Nursing note reviewed. Constitutional:       Appearance: He is well-developed. Comments: Anxious   Obesity     HENT:      Head: Normocephalic and atraumatic. Eyes:      Conjunctiva/sclera: Conjunctivae normal.      Pupils: Pupils are equal, round, and reactive to light. Neck:      Musculoskeletal: Normal range of motion and neck supple. Cardiovascular:      Rate and Rhythm: Normal rate and regular rhythm. Pulmonary:      Effort: Pulmonary effort is normal.      Breath sounds: Rales present. Abdominal:      General: Bowel sounds are normal.      Palpations: Abdomen is soft. Comments: Mild tender  Bloated    Genitourinary:     Rectum: Normal.   Musculoskeletal: Normal range of motion. Skin:     General: Skin is warm. Neurological:      Mental Status: He is alert and oriented to person, place, and time. Deep Tendon Reflexes: Reflexes are normal and symmetric.            LABORATORY DATA: Reviewed  Lab Results   Component Value Date    WBC 10.4 09/15/2020    HGB 13.0 09/15/2020    HCT 41.9 09/15/2020    MCV 91.9 09/15/2020     09/15/2020     08/24/2020    K 4.9 08/24/2020     08/24/2020    CO2 19 (L) 08/24/2020    BUN 21 08/24/2020    CREATININE 1.52 (H) 08/24/2020    LABALBU 4.5 07/31/2020    BILITOT 0.22 (L) 07/31/2020    ALKPHOS 84 07/31/2020    AST 16 07/31/2020    ALT 11 07/31/2020    INR 1.3 07/15/2020         Lab Results   Component Value Date    RBC 4.56 09/15/2020    HGB 13.0 09/15/2020    MCV 91.9 09/15/2020    MCH 28.5 09/15/2020    MCHC 31.0 09/15/2020    RDW 15.6 (H) 09/15/2020    MPV 9.4 09/15/2020    BASOPCT 1 09/15/2020    LYMPHSABS 2.07 09/15/2020    MONOSABS 0.80 09/15/2020    NEUTROABS 7.30 09/15/2020    EOSABS 0.08 09/15/2020    BASOSABS 0.07 09/15/2020         DIAGNOSTIC TESTING:     No results found.       Assessment  1. Chronic hepatitis C with cirrhosis (HCC)    2. Splenomegaly    3. Portal venous hypertension (HCC)    4. Morbid obesity ([address]    5. Adenomatous polyp of ascending colon        Plan    Low Na diet    No red meat    F/u with Nepro logy for diuretics management    Pt was advised in detail about some life style and dietary modifications. He was advised about avoidance of caffeine, nicotine and chocolate. Pt was also told to stay away from any kind of fast foods, soda pops. He was also advised to avoid lots of spices, grease and fried food etc.     Instructions were also given about trying to arrange the timing, quality and quantity of food. Instructions were given about using ample amount of fiber including dietary and supplemental fiber either metamucil, bennafiber or citrucell etc.  Pt was advised about drinking ample amount of water without any colors or chemicals. Stress was given about regular exercise. Pt has verbalized understanding and agreement to these modifications. More than half of patient's clinic visit time was spent in counseling about lifestyle and dietary modifications  Patient's  questions were answered in this regard as well  The patient has verbalized understanding and agreement       I communicated with the patient and/or health care decision maker about   Details of this discussion including any medical advice provided:YES      I affirm this is a Patient Initiated Episode with an Established Patient who has not had a related appointment within my department in the past 7 days or scheduled within the next 24 hours. Total Time: minutes: 21-30 minutes    Note: not billable if this call serves to triage the patient into an appointment for the relevant concern      Thank you for allowing me to participate in the care of Mr. Khushboo Baez. For any further questions please do not hesitate to contact me. I have reviewed and agree with the ROS entered by the MA/LPN.

## 2020-10-08 ENCOUNTER — HOSPITAL ENCOUNTER (OUTPATIENT)
Dept: OCCUPATIONAL THERAPY | Age: 70
Setting detail: THERAPIES SERIES
Discharge: HOME | End: 2020-10-08

## 2020-10-08 NOTE — DISCHARGE SUMMARY
[] [address] [place] [address] Blvd. Occupational Therapy       [address] [facility], 1st Floor       Phone: [phone]       Fax: [phone] [] [facility] at [address] [place] [address]. [facility]       Phone: [phone]       Fax: [phone]    Date: 10/8/2020  Patient: Blas Teran  : 1950  MRN: [id number]    AT THIS TIME PATIENT IS OUTSIDE OF HIS POC. PATIENT IS DISCHARGED FROM OT LYMPHEDEMA.      Electronically signed by: CLAYTON Tucker

## 2020-10-09 ENCOUNTER — TELEPHONE (OUTPATIENT)
Dept: FAMILY MEDICINE CLINIC | Age: 70
End: 2020-10-09

## 2020-10-09 RX ORDER — LACTULOSE 10 G/15ML
SOLUTION ORAL
Qty: 473 ML | Refills: 0 | Status: SHIPPED | OUTPATIENT
Start: 2020-10-09 | End: 2020-10-16 | Stop reason: SDUPTHER

## 2020-10-15 ENCOUNTER — CARE COORDINATION (OUTPATIENT)
Dept: OTHER | Facility: CLINIC | Age: 70
End: 2020-10-15

## 2020-10-15 NOTE — CARE COORDINATION
Ambulatory Care Coordination Note  10/15/2020  CM Risk Score: 3  Charlson 10 Year Mortality Risk Score: 100%     ACC: Karen Rice, RN    Summary Note: ACM attempted to reach patient for follow up call regarding follow up with GI physician and diet restrictions. HIPAA compliant message left requesting a return phone call at patients convenience. Will continue to follow. Care Coordination Interventions    Program Enrollment:  Rising Risk  Referral from Primary Care Provider:  No  Suggested Interventions and Community Resources  Disease Specific Clinic:  In Process (Comment: Rheumatology- Dr Woods Fuse  Urology - Dr Joaquina Panchal . Hematology/Oncology ( [address])  )  Other Services or Interventions:  referred to Dr Joaquina Panchal in [place] [address]    None         Prior to Admission medications    Medication Sig Start Date End Date Taking? Authorizing Provider   lactulose (CHRONULAC) 10 GM/15ML solution TAKE 15 MLS BY MOUTH THREE TIMES A DAY 10/9/20   Valerie Pierre PA-C   benzonatate (TESSALON) 100 MG capsule TAKE 1 CAPSULE BY MOUTH 3 TIMES A DAY AS NEEDED FOR COUGH 10/1/20   Kusum Hendrix,    SITagliptin (JANUVIA) 100 MG tablet Take 100 mg by mouth daily    Historical Provider, MD   pregabalin (LYRICA) 100 MG capsule TAKE 1 CAPSULE BY MOUTH 3 TIMES A DAY  Patient taking differently: 2 times daily.  TAKE 1 CAPSULE BY MOUTH 3 TIMES A DAY 7/1/20 9/15/20  Toni Sandoval DO   furosemide (LASIX) 20 MG tablet Take 2 tablets by mouth 2 times daily  Patient taking differently: Take by mouth daily  6/25/20 6/25/21  KELSY Dickinson CNP   spironolactone (ALDACTONE) 50 MG tablet TAKE 1 TABLET BY MOUTH ONE TIME A DAY 6/23/20   KELSY Harden - CNP   Cholecalciferol (VITAMIN D) 50 MCG (2000 UT) CAPS capsule Take 1 capsule by mouth daily 2/24/20   Toni Sandoval DO   omeprazole (PRILOSEC) 20 MG delayed release capsule Take 2 capsules by mouth daily 2/24/20 2/23/21  Toni Sandoval DO   NIFEdipine (PROCARDIA

## 2020-10-16 RX ORDER — LACTULOSE 10 G/15ML
SOLUTION ORAL
Qty: 1992 ML | Refills: 5 | Status: SHIPPED | OUTPATIENT
Start: 2020-10-16 | End: 2021-12-20 | Stop reason: SDUPTHER

## 2020-11-03 ENCOUNTER — CARE COORDINATION (OUTPATIENT)
Dept: OTHER | Facility: CLINIC | Age: 70
End: 2020-11-03

## 2020-11-03 NOTE — CARE COORDINATION
Ambulatory Care Coordination Note  11/3/2020  CM Risk Score: 3  Charlson 10 Year Mortality Risk Score: 100%     ACC: Yuko Jules, KATELYN    Summary Note: ACM attempted 3rd call to reach patient for follow Associate Care Management up call. HIPAA compliant message left requesting a return phone call at patients convenience. UTR letter sent to patient via My chart. Will follow             Care Coordination Interventions    Program Enrollment:  Rising Risk  Referral from Primary Care Provider:  No  Suggested Interventions and Community Resources  Disease Specific Clinic:  In Process (Comment: Rheumatology- Dr Brittany Lee  Urology - Dr [address] . Hematology/Oncology ( [address])  )  Other Services or Interventions:  referred to Dr [address] in [place] [address]    None         Prior to Admission medications    Medication Sig Start Date End Date Taking? Authorizing Provider   lactulose (CHRONULAC) 10 GM/15ML solution TAKE 15 MLS BY MOUTH THREE TIMES A DAY 10/16/20   Kusum Hays,    benzonatate (TESSALON) 100 MG capsule TAKE 1 CAPSULE BY MOUTH 3 TIMES A DAY AS NEEDED FOR COUGH 10/1/20   Kusum Goss, DO   SITagliptin (JANUVIA) 100 MG tablet Take 100 mg by mouth daily    Historical Provider, MD   pregabalin (LYRICA) 100 MG capsule TAKE 1 CAPSULE BY MOUTH 3 TIMES A DAY  Patient taking differently: 2 times daily.  TAKE 1 CAPSULE BY MOUTH 3 TIMES A DAY 7/1/20 9/15/20  Catheryn Organ, DO   furosemide (LASIX) 20 MG tablet Take 2 tablets by mouth 2 times daily  Patient taking differently: Take by mouth daily  6/25/20 6/25/21  KELSY Johnson - CNP   spironolactone (ALDACTONE) 50 MG tablet TAKE 1 TABLET BY MOUTH ONE TIME A DAY 6/23/20   KELSY King - CNP   Cholecalciferol (VITAMIN D) 50 MCG (2000 UT) CAPS capsule Take 1 capsule by mouth daily 2/24/20   Catheryn Organ, DO   omeprazole (PRILOSEC) 20 MG delayed release capsule Take 2 capsules by mouth daily 2/24/20 2/23/21  Catheryn Organ, DO   NIFEdipine (PROCARDIA

## 2020-11-12 ENCOUNTER — CARE COORDINATION (OUTPATIENT)
Dept: OTHER | Facility: CLINIC | Age: 70
End: 2020-11-12

## 2020-11-12 NOTE — CARE COORDINATION
Ambulatory Care Coordination Note  11/12/2020  CM Risk Score: 3  Charlson 10 Year Mortality Risk Score: 100%     ACC: Karen Rice, KATELYN    Summary Note:   A1C 6.7 in feb 2020. Pt called and I returned call. Pt said he has a history of gout and said his last uric acid was low, he feels he might have damage in his feet due to the gout. Pt said he isn't sure if he has nerve damage but at night it feels like his feet are in hot water. Pt and I discussed his symptoms, he agrees to call his pcp and request an appt to see her. Pt will call today. Pt denies any other symptoms today. Will follow       Care Coordination Interventions    Program Enrollment:  Rising Risk  Referral from Primary Care Provider:  No  Suggested Interventions and Community Resources  Disease Specific Clinic:  In Process (Comment: Rheumatology- Dr Woods Fuse  Urology - Dr Joaquina Panchal . Hematology/Oncology ( [address])  )  Other Services or Interventions:  referred to Dr Joaquina Panchal in [facility]                 This Visit's Progress     Patient Stated (pt-stated)   On track     Pt will drink ample fluids to remain hydrated to assist with uti prevention    Barriers: none  Plan for overcoming my barriers: N/A  Confidence: 10/10  Anticipated Goal Completion Date: 7/29/2020 and ongoing  Pt is drinking fluids and making sure his urine is light yellow in color        COMPLETED: Patient Stated (pt-stated)        Pt will follow up with Hem/Onc Dr Julio C Figueroa    Barriers: none  Plan for overcoming my barriers: N/A  Confidence: 10/10  Anticipated Goal Completion Date: 9/15/2020            Prior to Admission medications    Medication Sig Start Date End Date Taking?  Authorizing Provider   lactulose (CHRONULAC) 10 GM/15ML solution TAKE 15 MLS BY MOUTH THREE TIMES A DAY 10/16/20   Kusum Hays, DO   benzonatate (TESSALON) 100 MG capsule TAKE 1 CAPSULE BY MOUTH 3 TIMES A DAY AS NEEDED FOR COUGH 10/1/20   Kusum Hendrix, DO   SITagliptin (JANUVIA) 100 MG

## 2020-11-25 ENCOUNTER — CARE COORDINATION (OUTPATIENT)
Dept: OTHER | Facility: CLINIC | Age: 70
End: 2020-11-25

## 2020-11-25 NOTE — CARE COORDINATION
Ambulatory Care Coordination Note  11/25/2020  CM Risk Score: 3  Charlson 10 Year Mortality Risk Score: 100%     ACC: Teresa Jimenez, RN    Summary Note: ACM attempted to reach patient for follow up call regarding care coordination. HIPAA compliant message left requesting a return phone call at patients convenience. Will continue to follow. Care Coordination Interventions    Program Enrollment:  Rising Risk  Referral from Primary Care Provider:  No  Suggested Interventions and Community Resources  Disease Specific Clinic:  In Process (Comment: Rheumatology- Dr Efe Nobles  Urology - Dr Radhika Presley . Hematology/Oncology ( [address])  )  Other Services or Interventions:  referred to Dr Radhika Presley in [facility] [address]    None         Prior to Admission medications    Medication Sig Start Date End Date Taking? Authorizing Provider   ferrous sulfate (IRON 325) 325 (65 Fe) MG tablet Take 325 mg by mouth every other day    Historical Provider, MD   vitamin B-12 (CYANOCOBALAMIN) 500 MCG tablet Take 500 mcg by mouth every other day    Historical Provider, MD   furosemide (LASIX) 40 MG tablet Take 1 tablet by mouth daily 11/16/20 2/14/21  Valerie Cowan MD   lactulose (CHRONULAC) 10 GM/15ML solution TAKE 15 MLS BY MOUTH THREE TIMES A DAY 10/16/20   Kusum Garcia DO   benzonatate (TESSALON) 100 MG capsule TAKE 1 CAPSULE BY MOUTH 3 TIMES A DAY AS NEEDED FOR COUGH 10/1/20   Kusum Garcia DO   SITagliptin (JANUVIA) 100 MG tablet Take 100 mg by mouth daily    Historical Provider, MD   pregabalin (LYRICA) 100 MG capsule TAKE 1 CAPSULE BY MOUTH 3 TIMES A DAY  Patient taking differently: 2 times daily.  TAKE 1 CAPSULE BY MOUTH 3 TIMES A DAY 7/1/20 11/16/20  Vinnie Tan DO   spironolactone (ALDACTONE) 50 MG tablet TAKE 1 TABLET BY MOUTH ONE TIME A DAY 6/23/20   KELSY Moreno - CNP   Cholecalciferol (VITAMIN D) 50 MCG (2000 UT) CAPS capsule Take 1 capsule by mouth daily 2/24/20   Vinnie Tan DO   omeprazole (PRILOSEC) 20 MG delayed release capsule Take 2 capsules by mouth daily 2/24/20 2/23/21  Genna Chen DO   NIFEdipine (PROCARDIA XL) 60 MG extended release tablet Take 60 mg by mouth daily  6/12/19   Historical Provider, MD   allopurinol (ZYLOPRIM) 300 MG tablet Take 600 mg by mouth daily Takes 2 tabs (=600mg) daily 3/27/19   KELSY Edward - CNP       Future Appointments   Date Time Provider Phuc Macias   12/23/2020  2:00 PM Manuelito Lewis MD [facility]   2/10/2021  1:30 PM Idell Saxon, DO Rochell Libman [facility]   3/8/2021 11:10 AM Jacoby Aldrich MD [facility] None

## 2020-12-02 ENCOUNTER — CARE COORDINATION (OUTPATIENT)
Dept: OTHER | Facility: CLINIC | Age: 70
End: 2020-12-02

## 2020-12-02 NOTE — CARE COORDINATION
TAKE 15 MLS BY MOUTH THREE TIMES A DAY 10/16/20   Kusum Hays, DO   benzonatate (TESSALON) 100 MG capsule TAKE 1 CAPSULE BY MOUTH 3 TIMES A DAY AS NEEDED FOR COUGH 10/1/20   Kusum Goss, DO   SITagliptin (JANUVIA) 100 MG tablet Take 100 mg by mouth daily    Historical Provider, MD   pregabalin (LYRICA) 100 MG capsule TAKE 1 CAPSULE BY MOUTH 3 TIMES A DAY  Patient taking differently: 2 times daily.  TAKE 1 CAPSULE BY MOUTH 3 TIMES A DAY 7/1/20 11/16/20  Catheryn Organ, DO   spironolactone (ALDACTONE) 50 MG tablet TAKE 1 TABLET BY MOUTH ONE TIME A DAY 6/23/20   KELSY King CNP   Cholecalciferol (VITAMIN D) 50 MCG (2000 UT) CAPS capsule Take 1 capsule by mouth daily 2/24/20   Cathmirthan Organ, DO   omeprazole (PRILOSEC) 20 MG delayed release capsule Take 2 capsules by mouth daily 2/24/20 2/23/21  Ezekiel Organ, DO   NIFEdipine (PROCARDIA XL) 60 MG extended release tablet Take 60 mg by mouth daily  6/12/19   Historical Provider, MD   allopurinol (ZYLOPRIM) 300 MG tablet Take 600 mg by mouth daily Takes 2 tabs (=600mg) daily 3/27/19   KELSY Harris CNP       Future Appointments   Date Time Provider Phuc Macias   2/10/2021  1:30 PM Ezekiel Organ, DO LATRICE Benitez [facility]   3/8/2021 11:10 AM Lesley Sandoval MD [facility] None

## 2020-12-08 ENCOUNTER — TELEPHONE (OUTPATIENT)
Dept: FAMILY MEDICINE CLINIC | Age: 70
End: 2020-12-08

## 2020-12-17 ENCOUNTER — HOSPITAL ENCOUNTER (OUTPATIENT)
Age: 70
Discharge: HOME | End: 2020-12-17
Payer: COMMERCIAL

## 2020-12-17 LAB
ABSOLUTE EOS #: 0.17 K/UL (ref 0–0.44)
ABSOLUTE IMMATURE GRANULOCYTE: 0.04 K/UL (ref 0–0.3)
ABSOLUTE LYMPH #: 2.63 K/UL (ref 1.1–3.7)
ABSOLUTE MONO #: 0.91 K/UL (ref 0.1–1.2)
ANION GAP SERPL CALCULATED.3IONS-SCNC: 17 MMOL/L (ref 9–17)
BASOPHILS # BLD: 1 % (ref 0–2)
BASOPHILS ABSOLUTE: 0.09 K/UL (ref 0–0.2)
BUN BLDV-MCNC: 21 MG/DL (ref 8–23)
BUN/CREAT BLD: ABNORMAL (ref 9–20)
CALCIUM SERPL-MCNC: 9.8 MG/DL (ref 8.6–10.4)
CHLORIDE BLD-SCNC: 101 MMOL/L (ref 98–107)
CO2: 21 MMOL/L (ref 20–31)
CREAT SERPL-MCNC: 1.37 MG/DL (ref 0.7–1.2)
CREATININE URINE: 46.9 MG/DL (ref 39–259)
DIFFERENTIAL TYPE: ABNORMAL
EOSINOPHILS RELATIVE PERCENT: 2 % (ref 1–4)
GFR AFRICAN AMERICAN: >60 ML/MIN
GFR NON-AFRICAN AMERICAN: 52 ML/MIN
GFR SERPL CREATININE-BSD FRML MDRD: ABNORMAL ML/MIN/{1.73_M2}
GFR SERPL CREATININE-BSD FRML MDRD: ABNORMAL ML/MIN/{1.73_M2}
GLUCOSE BLD-MCNC: 124 MG/DL (ref 70–99)
HCT VFR BLD CALC: 44.3 % (ref 40.7–50.3)
HEMOGLOBIN: 13.6 G/DL (ref 13–17)
IMMATURE GRANULOCYTES: 0 %
LYMPHOCYTES # BLD: 26 % (ref 24–43)
MCH RBC QN AUTO: 29.9 PG (ref 25.2–33.5)
MCHC RBC AUTO-ENTMCNC: 30.7 G/DL (ref 28.4–34.8)
MCV RBC AUTO: 97.4 FL (ref 82.6–102.9)
MONOCYTES # BLD: 9 % (ref 3–12)
NRBC AUTOMATED: 0 PER 100 WBC
PDW BLD-RTO: 16.8 % (ref 11.8–14.4)
PLATELET # BLD: 217 K/UL (ref 138–453)
PLATELET ESTIMATE: ABNORMAL
PMV BLD AUTO: 11.3 FL (ref 8.1–13.5)
POTASSIUM SERPL-SCNC: 5.3 MMOL/L (ref 3.7–5.3)
RBC # BLD: 4.55 M/UL (ref 4.21–5.77)
RBC # BLD: ABNORMAL 10*6/UL
SEG NEUTROPHILS: 62 % (ref 36–65)
SEGMENTED NEUTROPHILS ABSOLUTE COUNT: 6.16 K/UL (ref 1.5–8.1)
SODIUM BLD-SCNC: 139 MMOL/L (ref 135–144)
TOTAL PROTEIN, URINE: 11 MG/DL
URINE TOTAL PROTEIN CREATININE RATIO: 0.23 (ref 0–0.2)
WBC # BLD: 10 K/UL (ref 3.5–11.3)
WBC # BLD: ABNORMAL 10*3/UL

## 2020-12-17 PROCEDURE — 36415 COLL VENOUS BLD VENIPUNCTURE: CPT

## 2020-12-17 PROCEDURE — 84156 ASSAY OF PROTEIN URINE: CPT

## 2020-12-17 PROCEDURE — 80048 BASIC METABOLIC PNL TOTAL CA: CPT

## 2020-12-17 PROCEDURE — 85025 COMPLETE CBC W/AUTO DIFF WBC: CPT

## 2020-12-17 PROCEDURE — 82570 ASSAY OF URINE CREATININE: CPT

## 2020-12-23 ENCOUNTER — CARE COORDINATION (OUTPATIENT)
Dept: OTHER | Facility: CLINIC | Age: 70
End: 2020-12-23

## 2020-12-23 NOTE — CARE COORDINATION
to assist with uti prevention    Barriers: none  Plan for overcoming my barriers: N/A  Confidence: 10/10  Anticipated Goal Completion Date: 7/29/2020 and ongoing  Pt is drinking fluids and making sure his urine is light yellow in color             Prior to Admission medications    Medication Sig Start Date End Date Taking? Authorizing Provider   ferrous sulfate (IRON 325) 325 (65 Fe) MG tablet Take 325 mg by mouth every other day    Historical Provider, MD   vitamin B-12 (CYANOCOBALAMIN) 500 MCG tablet Take 500 mcg by mouth every other day    Historical Provider, MD   furosemide (LASIX) 40 MG tablet Take 1 tablet by mouth daily 11/16/20 2/14/21  Adriana Coates MD   lactulose (CHRONULAC) 10 GM/15ML solution TAKE 15 MLS BY MOUTH THREE TIMES A DAY 10/16/20   Kusum Louis, DO   benzonatate (TESSALON) 100 MG capsule TAKE 1 CAPSULE BY MOUTH 3 TIMES A DAY AS NEEDED FOR COUGH 10/1/20   Kusum Louis, DO   SITagliptin (JANUVIA) 100 MG tablet Take 100 mg by mouth daily    Historical Provider, MD   pregabalin (LYRICA) 100 MG capsule TAKE 1 CAPSULE BY MOUTH 3 TIMES A DAY  Patient taking differently: 2 times daily.  TAKE 1 CAPSULE BY MOUTH 3 TIMES A DAY 7/1/20 11/16/20  Kaushik Chua DO   spironolactone (ALDACTONE) 50 MG tablet TAKE 1 TABLET BY MOUTH ONE TIME A DAY 6/23/20   KELSY Luong CNP   Cholecalciferol (VITAMIN D) 50 MCG (2000 UT) CAPS capsule Take 1 capsule by mouth daily 2/24/20   Kaushik Chua DO   omeprazole (PRILOSEC) 20 MG delayed release capsule Take 2 capsules by mouth daily 2/24/20 2/23/21  Kaushik Chua DO   NIFEdipine (PROCARDIA XL) 60 MG extended release tablet Take 60 mg by mouth daily  6/12/19   Historical Provider, MD   allopurinol (ZYLOPRIM) 300 MG tablet Take 600 mg by mouth daily Takes 2 tabs (=600mg) daily 3/27/19   KELSY Rueda CNP       Future Appointments   Date Time Provider Phuc Macias   1/27/2021  2:00 PM Serafin Turner MD [facility]   2/10/2021  1:30 PM Kaushik Chua

## 2021-01-11 ENCOUNTER — HOSPITAL ENCOUNTER (OUTPATIENT)
Age: 71
Setting detail: SPECIMEN
Discharge: HOME | End: 2021-01-11
Payer: COMMERCIAL

## 2021-01-11 LAB
ALBUMIN SERPL-MCNC: 4.4 G/DL (ref 3.5–5.2)
ALBUMIN/GLOBULIN RATIO: 1.4 (ref 1–2.5)
ALP BLD-CCNC: 74 U/L (ref 40–129)
ALT SERPL-CCNC: 12 U/L (ref 5–41)
ANION GAP SERPL CALCULATED.3IONS-SCNC: 16 MMOL/L (ref 9–17)
AST SERPL-CCNC: 23 U/L
BILIRUB SERPL-MCNC: 0.25 MG/DL (ref 0.3–1.2)
BUN BLDV-MCNC: 21 MG/DL (ref 8–23)
BUN/CREAT BLD: ABNORMAL (ref 9–20)
CALCIUM SERPL-MCNC: 9.4 MG/DL (ref 8.6–10.4)
CHLORIDE BLD-SCNC: 104 MMOL/L (ref 98–107)
CO2: 24 MMOL/L (ref 20–31)
CREAT SERPL-MCNC: 1.45 MG/DL (ref 0.7–1.2)
GFR AFRICAN AMERICAN: 59 ML/MIN
GFR NON-AFRICAN AMERICAN: 48 ML/MIN
GFR SERPL CREATININE-BSD FRML MDRD: ABNORMAL ML/MIN/{1.73_M2}
GFR SERPL CREATININE-BSD FRML MDRD: ABNORMAL ML/MIN/{1.73_M2}
GLUCOSE BLD-MCNC: 149 MG/DL (ref 70–99)
POTASSIUM SERPL-SCNC: 4.1 MMOL/L (ref 3.7–5.3)
SODIUM BLD-SCNC: 144 MMOL/L (ref 135–144)
TOTAL PROTEIN: 7.6 G/DL (ref 6.4–8.3)
URIC ACID: 4 MG/DL (ref 3.4–7)

## 2021-01-13 ENCOUNTER — CARE COORDINATION (OUTPATIENT)
Dept: OTHER | Facility: CLINIC | Age: 71
End: 2021-01-13

## 2021-01-13 NOTE — CARE COORDINATION
Ambulatory Care Coordination Note  1/13/2021  CM Risk Score: 3  Charlson 10 Year Mortality Risk Score: 100%     ACC: Jack Oliver, RN    Summary Note: Pt said his feet are still bothering him, feels it may be getting a little worse all the time, said he feels like he may be losing some feeling sometimes. Pt said he had a vascular study about a year ago the doctor told him there was good circulation in his legs. Pt said he has some edema at the end of the day but when he elevates his legs it gets better. Pt said his right foot gets pale sometimes. Pt and I discussed and he will notify Dr. Adam Astorga during his follow up visit with her next month. Pt does not check his blood sugar at home, we discussed this. His last A1C was 6.7 in feb of last year. Pt did understand about HA1C, I explained to him what this lab reflects. Pt said he will see what Dr. Sharda Benavides wants to do in regard to ordering a repeat next month. Medications were reconciled today. Pt manages his own care and medications. Will continue to follow, pt may need diabetic education, will await if A1C is ordered. Care Coordination Interventions    Program Enrollment: Rising Risk  Referral from Primary Care Provider: No  Suggested Interventions and Community Resources  Disease Specific Clinic: In Process (Comment: Rheumatology- Dr Rothman Pulling  Urology - Dr Anjali Ceja .   Hematology/Oncology ( [address])  )  Other Services or Interventions: referred to Dr Anjali Ceja in [facility]                 This Visit's Progress     COMPLETED: Care Coordination Self Management        CC Self Management Goal  Patient Goal (What steps will patient take to achieve goal?): pain under control   Patient is able to discuss self-management of condition(s): hepatitis and pain control   Pt demonstrates adherence to medications  Pt demonstrates understanding of self-monitoring  Patient is able to identify Red Flags:  Alert to potential adverse drug reactions(s) or side effects and actions to take should they arise  Discuss target symptoms and actions to take should they arise  Identify problems that require immediate PCP or specialist visit  Patient demonstrates understanding of access to PCP/Specialist:  Understands about scheduling routine Follow Up appointments   Understands about sick day appointment options for worsening of symptoms/progression (Same Day, E Visits)       COMPLETED: Patient Stated (pt-stated)        Pt will request urology consult from pcp on his follow up appt on 8/12/2020    Barriers: none  Plan for overcoming my barriers: N/A  Confidence: 10/10  Anticipated Goal Completion Date: 1/12/2020    Follow up has been postponed patient said due to needing to see a hematologist        Patient Stated (pt-stated)   On track     Pt will drink ample fluids to remain hydrated to assist with uti prevention    Barriers: none  Plan for overcoming my barriers: N/A  Confidence: 10/10  Anticipated Goal Completion Date: 7/29/2020 and ongoing  Pt is drinking fluids and making sure his urine is light yellow in color        Patient Stated (pt-stated)        Pt will discuss his foot concern on his follow up with pcp in Feb 2021    Barriers: none  Plan for overcoming my barriers: N/A  Confidence: 10/10  Anticipated Goal Completion Date: 2/28/2021            Prior to Admission medications    Medication Sig Start Date End Date Taking?  Authorizing Provider   ferrous sulfate (IRON 325) 325 (65 Fe) MG tablet Take 325 mg by mouth every other day   Yes Historical Provider, MD   vitamin B-12 (CYANOCOBALAMIN) 500 MCG tablet Take 500 mcg by mouth every other day   Yes Historical Provider, MD   furosemide (LASIX) 40 MG tablet Take 1 tablet by mouth daily 11/16/20 2/14/21 Yes Davina Spicer MD   lactulose (CHRONULAC) 10 GM/15ML solution TAKE 15 MLS BY MOUTH THREE TIMES A DAY 10/16/20  Yes Melissa Griffin DO   SITagliptin (JANUVIA) 100 MG tablet Take 100 mg by mouth daily   Yes Historical Provider, MD   spironolactone (ALDACTONE) 50 MG tablet TAKE 1 TABLET BY MOUTH ONE TIME A DAY 6/23/20  Yes KELSY Huertas CNP   Cholecalciferol (VITAMIN D) 50 MCG (2000 UT) CAPS capsule Take 1 capsule by mouth daily 2/24/20  Yes Emeli Levine DO   omeprazole (PRILOSEC) 20 MG delayed release capsule Take 2 capsules by mouth daily 2/24/20 2/23/21 Yes Kusum oHrn DO   NIFEdipine (PROCARDIA XL) 60 MG extended release tablet Take 60 mg by mouth daily  6/12/19  Yes Historical Provider, MD   allopurinol (ZYLOPRIM) 300 MG tablet Take 600 mg by mouth daily Takes 2 tabs (=600mg) daily 3/27/19  Yes KELSY Mckinnon CNP   benzonatate (TESSALON) 100 MG capsule TAKE 1 CAPSULE BY MOUTH 3 TIMES A DAY AS NEEDED FOR COUGH  Patient not taking: Reported on 1/13/2021 10/1/20   Emeli Levine DO   pregabalin (LYRICA) 100 MG capsule TAKE 1 CAPSULE BY MOUTH 3 TIMES A DAY  Patient taking differently: 2 times daily.  TAKE 1 CAPSULE BY MOUTH 3 TIMES A DAY 7/1/20 11/16/20  Emeli Levine DO       Future Appointments   Date Time Provider Phuc Macias   1/27/2021  2:00 PM MD denver Garcia [facility]   2/10/2021  1:30 PM DO Cherie Hernandez [facility]   3/8/2021 11:10 AM MD KETAN Tijerina None

## 2021-01-25 ENCOUNTER — HOSPITAL ENCOUNTER (OUTPATIENT)
Dept: ULTRASOUND IMAGING | Age: 71
Discharge: HOME | End: 2021-01-27
Payer: COMMERCIAL

## 2021-01-25 DIAGNOSIS — K76.6 PORTAL VENOUS HYPERTENSION (HCC): ICD-10-CM

## 2021-01-25 DIAGNOSIS — R16.1 SPLENOMEGALY: ICD-10-CM

## 2021-01-25 DIAGNOSIS — E66.01 MORBID OBESITY (HCC): ICD-10-CM

## 2021-01-25 DIAGNOSIS — K74.60 CHRONIC HEPATITIS C WITH CIRRHOSIS (HCC): ICD-10-CM

## 2021-01-25 DIAGNOSIS — B18.2 CHRONIC HEPATITIS C WITH CIRRHOSIS (HCC): ICD-10-CM

## 2021-01-25 DIAGNOSIS — K74.69 OTHER CIRRHOSIS OF LIVER (HCC): ICD-10-CM

## 2021-01-25 DIAGNOSIS — I10 ESSENTIAL HYPERTENSION: ICD-10-CM

## 2021-01-25 PROCEDURE — 76705 ECHO EXAM OF ABDOMEN: CPT

## 2021-01-25 RX ORDER — SPIRONOLACTONE 50 MG/1
TABLET, FILM COATED ORAL
Qty: 90 TABLET | Refills: 1 | Status: SHIPPED | OUTPATIENT
Start: 2021-01-25 | End: 2021-08-30 | Stop reason: SDUPTHER

## 2021-01-27 ENCOUNTER — OFFICE VISIT (OUTPATIENT)
Dept: GASTROENTEROLOGY | Age: 71
End: 2021-01-27
Payer: COMMERCIAL

## 2021-01-27 VITALS
BODY MASS INDEX: 34.58 KG/M2 | SYSTOLIC BLOOD PRESSURE: 118 MMHG | TEMPERATURE: 97.2 F | WEIGHT: 255 LBS | DIASTOLIC BLOOD PRESSURE: 76 MMHG

## 2021-01-27 DIAGNOSIS — N28.9 RENAL INSUFFICIENCY: ICD-10-CM

## 2021-01-27 DIAGNOSIS — K74.69 OTHER CIRRHOSIS OF LIVER (HCC): Primary | ICD-10-CM

## 2021-01-27 DIAGNOSIS — K57.30 DIVERTICULOSIS OF COLON: ICD-10-CM

## 2021-01-27 DIAGNOSIS — D12.2 ADENOMATOUS POLYP OF ASCENDING COLON: ICD-10-CM

## 2021-01-27 DIAGNOSIS — E66.01 MORBID OBESITY (HCC): ICD-10-CM

## 2021-01-27 DIAGNOSIS — R16.1 SPLENOMEGALY: ICD-10-CM

## 2021-01-27 DIAGNOSIS — Z87.898 HISTORY OF ALCOHOL USE: ICD-10-CM

## 2021-01-27 DIAGNOSIS — K76.6 PORTAL VENOUS HYPERTENSION (HCC): ICD-10-CM

## 2021-01-27 PROCEDURE — 99213 OFFICE O/P EST LOW 20 MIN: CPT | Performed by: INTERNAL MEDICINE

## 2021-01-27 ASSESSMENT — ENCOUNTER SYMPTOMS
BACK PAIN: 1
EYES NEGATIVE: 1
TROUBLE SWALLOWING: 0
DIARRHEA: 0
NAUSEA: 0
BLOOD IN STOOL: 0
CONSTIPATION: 0
ABDOMINAL PAIN: 1
SHORTNESS OF BREATH: 1
RECTAL PAIN: 0
VOMITING: 0
ANAL BLEEDING: 0
ABDOMINAL DISTENTION: 1
ALLERGIC/IMMUNOLOGIC NEGATIVE: 1

## 2021-01-27 NOTE — PROGRESS NOTES
GI OFFICE FOLLOW UP    Josephine Espinoza is a 78 y.o. male evaluated via on 1/27/2021. Consent:  He and/or health care decision maker is aware that that he may receive a bill for this telephone service, depending on his insurance coverage, and has provided verbal consent to proceed: YES      INTERVAL HISTORY:   No referring provider defined for this encounter. Chief Complaint   Patient presents with    Cirrhosis     had US and labs done, is doing ok       1. Other cirrhosis of liver ([address]    2. Morbid obesity ([address]    3. Renal insufficiency    4. History of alcohol use    5. Diverticulosis of colon    6. Adenomatous polyp of ascending colon    7. Splenomegaly    8. Portal venous hypertension (HCC)      Patient seen in my office as a follow-up accompanied by his wife    Overall clinically feeling well    Has been taking diuretics and followed by nephrologist has some renal insufficiency    Blood work-up was reviewed with him    Denies any overt bleeding melanotic stools had a colonoscopy done in the past with multiple tubular adenomas    He had upper endoscopy done at [facility] clinic in the past    Denies any rectal bleeding melanotic stools    Had cardiac surgery done [address] PRESENT ILLNESS: Darlin Gleason is a 78 y.o. male with a past history remarkable for , referred for evaluation of   Chief Complaint   Patient presents with    Cirrhosis     had US and labs done, is doing ok   . Past Medical,Family, and Social History reviewed and does contribute to the patient presenting condition. Patient's PMH/PSH,SH,PSYCH Hx, MEDs, ALLERGIES, and ROS were all reviewed and updated in the appropriate sections.     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     Portal venous hypertension ([facility] 07/15/2020    Sciatica     Splenomegaly 07/15/2020       Past Surgical History:   Procedure Laterality Date    AORTIC VALVE REPAIR      BACK SURGERY      L4-5    CARDIAC CATHETERIZATION  10.30/2019    Dr. Hulan Hammans COLONOSCOPY  12/05/2016    [facility], states due for another in 2  years    COLONOSCOPY N/A 10/7/2019    tubular adenoma x2; hyperplastic polyp    HERNIA REPAIR      KNEE ARTHROSCOPY Right     OTHER SURGICAL HISTORY Right 01/25/2016    10 liters removed peracentesis    PARACENTESIS  11-4-15    PARACENTESIS  12/28/15    MA EGD TRANSORAL BIOPSY SINGLE/MULTIPLE N/A 8/30/2017    EGD BIOPSY performed by Kristin Saldana [facility] at [facility]  6/2011    TONSILLECTOMY      UPPER GASTROINTESTINAL ENDOSCOPY  10/24/2013    small segment barretts    UPPER GASTROINTESTINAL ENDOSCOPY  08/30/2017    VEIN SURGERY Left 12/07/2016    leg vein stripping    VENTRAL HERNIA REPAIR  05/26/15       CURRENT MEDICATIONS:    Current Outpatient Medications:     spironolactone (ALDACTONE) 50 MG tablet, Take one tablet orally once daily, Disp: 90 tablet, Rfl: 1    ferrous sulfate (IRON 325) 325 (65 Fe) MG tablet, Take 325 mg by mouth every other day, Disp: , Rfl:     vitamin B-12 (CYANOCOBALAMIN) 500 MCG tablet, Take 500 mcg by mouth every other day, Disp: , Rfl:     furosemide (LASIX) 40 MG tablet, Take 1 tablet by mouth daily, Disp: 90 tablet, Rfl: 3    lactulose (CHRONULAC) 10 GM/15ML solution, TAKE 15 MLS BY MOUTH THREE TIMES A DAY, Disp: 1992 mL, Rfl: 5    benzonatate (TESSALON) 100 MG capsule, TAKE 1 CAPSULE BY MOUTH 3 TIMES A DAY AS NEEDED FOR COUGH, Disp: 30 capsule, Rfl: 0    SITagliptin (JANUVIA) 100 MG tablet, Take 100 mg by mouth daily, Disp: , Rfl:     Cholecalciferol (VITAMIN D) 50 MCG (2000 UT) CAPS capsule, Take 1 capsule by mouth daily, Disp: 90 capsule, Rfl: 5    omeprazole (PRILOSEC) 20 MG delayed release capsule, Take 2 capsules by mouth daily, Disp: 180 capsule, Rfl: 5    NIFEdipine (PROCARDIA XL) 60 MG extended release tablet, Take 60 mg by mouth daily , Disp: , Rfl:     allopurinol (ZYLOPRIM) 300 MG tablet, Take 600 mg by mouth daily Takes 2 tabs (=600mg) daily, Disp: 90 tablet, Rfl: 1    pregabalin (LYRICA) 100 MG capsule, TAKE 1 CAPSULE BY MOUTH 3 TIMES A DAY (Patient taking differently: 2 times daily.  TAKE 1 CAPSULE BY MOUTH 3 TIMES A DAY), Disp: 180 capsule, Rfl: 5    ALLERGIES:   Allergies   Allergen Reactions    Nsaids      Because of Kidney issues        FAMILY HISTORY:       Problem Relation Age of Onset    Cancer Father         prostate     Other Neg Hx         blood clots         SOCIAL HISTORY:   Social History     Socioeconomic History    Marital status:      Spouse name: Not on file    Number of children: Not on file    Years of education: Not on file    Highest education level: Not on file   Occupational History    Not on file   Social Needs    Financial resource strain: Not hard at all   [employer] insecurity     Worry: Not on file     Inability: Not on file    Transportation needs     Medical: No     Non-medical: No   Tobacco Use    Smoking status: Former Smoker     Packs/day: 1.00     Years: 49.00     Pack years: 49.00     Types: Cigarettes     Start date: 1966     Quit date: 2015     Years since quittin.0    Smokeless tobacco: Never Used    Tobacco comment: maybe 1 cigarette per day, updated from progress note dated 10/16/17    Substance and Sexual Activity    Alcohol use: No     Alcohol/week: 0.0 standard drinks    Drug use: Yes     Types: Marijuana     Comment: occasional    Sexual activity: Not on file   Lifestyle    Physical activity     Days per week: Not on file     Minutes per session: Not on file    Stress: Not on file   Relationships    Social connections     Talks on phone: Not on file     Gets together: Not on file     Attends Confucianist service: Not on file     Active member of club or organization: Not on file     Attends meetings of clubs or organizations: Not on file     Relationship status: Not on file    Intimate partner violence     Fear of current or ex partner: Not on file     Emotionally abused: Not on file     Physically abused: Not on file     Forced sexual activity: Not on file   Other Topics Concern    Not on file   Social History Narrative    Not on file         REVIEW OF SYSTEMS:         Review of Systems   Constitutional: Positive for fatigue. Negative for unexpected weight change (weight gain). HENT: Negative for trouble swallowing. Eyes: Negative. Respiratory: Positive for shortness of breath. Cardiovascular: Positive for leg swelling. Gastrointestinal: Positive for abdominal distention and abdominal pain (off and on at night). Negative for anal bleeding, blood in stool, constipation, diarrhea (off and on), nausea, rectal pain and vomiting. Endocrine: Negative. Gout     Genitourinary: Negative. Musculoskeletal: Positive for arthralgias and back pain. Skin: Negative. Allergic/Immunologic: Negative. Neurological: Negative. Negative for dizziness, weakness and light-headedness.    Hematological: Negative. Psychiatric/Behavioral: Positive for sleep disturbance. Negative for agitation. The patient is nervous/anxious. PHYSICAL EXAMINATION: Vital signs reviewed per the nursing documentation. /76   Temp 97.2 °F (36.2 °C)   Wt 255 lb (115.7 kg)   BMI 34.58 kg/m²   Body mass index is 34.58 kg/m². Physical Exam  Nursing note reviewed. Constitutional:       Appearance: He is well-developed. Comments: Anxious  Obesity   HENT:      Head: Normocephalic and atraumatic. Eyes:      Conjunctiva/sclera: Conjunctivae normal.      Pupils: Pupils are equal, round, and reactive to light. Neck:      Musculoskeletal: Normal range of motion and neck supple. Cardiovascular:      Rate and Rhythm: Normal rate and regular rhythm. Pulmonary:      Effort: Pulmonary effort is normal.      Breath sounds: Rales present. Abdominal:      General: Bowel sounds are normal.      Palpations: Abdomen is soft. Comments: Obese belly nontender bowel sounds positive   Genitourinary:     Rectum: Normal.   Musculoskeletal: Normal range of motion. Comments: Bilateral edema noted   Skin:     General: Skin is warm. Neurological:      Mental Status: He is alert and oriented to person, place, and time. Deep Tendon Reflexes: Reflexes are normal and symmetric.            LABORATORY DATA: Reviewed  Lab Results   Component Value Date    WBC 10.0 12/17/2020    HGB 13.6 12/17/2020    HCT 44.3 12/17/2020    MCV 97.4 12/17/2020     12/17/2020     01/11/2021    K 4.1 01/11/2021     01/11/2021    CO2 24 01/11/2021    BUN 21 01/11/2021    CREATININE 1.45 (H) 01/11/2021    LABALBU 4.4 01/11/2021    BILITOT 0.25 (L) 01/11/2021    ALKPHOS 74 01/11/2021    AST 23 01/11/2021    ALT 12 01/11/2021    INR 1.3 07/15/2020         Lab Results   Component Value Date    RBC 4.55 12/17/2020    HGB 13.6 12/17/2020    MCV 97.4 12/17/2020    MCH 29.9 12/17/2020    MCHC 30.7 12/17/2020    RDW 16.8 (H) Initiated Episode with an Established Patient who has not had a related appointment within my department in the past 7 days or scheduled within the next 24 hours. Total Time: minutes: 21-30 minutes    Note: not billable if this call serves to triage the patient into an appointment for the relevant concern      Thank you for allowing me to participate in the care of Mr. Samantha Bowers. For any further questions please do not hesitate to contact me. I have reviewed and agree with the ROS entered by the MA/LPN.          Donta Maddox MD, [facility]  Board Certified in Gastroenterology and [address] [place] [address] Gastroenterology  Office #: [phone]

## 2021-02-02 DIAGNOSIS — K74.69 OTHER CIRRHOSIS OF LIVER (HCC): Primary | ICD-10-CM

## 2021-02-02 NOTE — PROGRESS NOTES
Received fax from [facility], for a request to correct Abd Complete radiology order for [address] [facility] d/t not done within [facility]. Dr Brandt Blanca orders ok to change order and it is to look for/rule out ascites. New rx ordered and did fax back to Jolly at [phone].

## 2021-02-03 ENCOUNTER — CARE COORDINATION (OUTPATIENT)
Dept: OTHER | Facility: CLINIC | Age: 71
End: 2021-02-03

## 2021-02-03 NOTE — CARE COORDINATION
Ambulatory Care Coordination Note  2/3/2021  CM Risk Score: 3  Charlson 10 Year Mortality Risk Score: 100%     ACC: Aleksandar Mckeon RN    Summary Note: ACM attempted to reach patient for follow up call regarding recent follow up visit with GI and recent liver US. HIPAA compliant message left requesting a return phone call at patients convenience. Will continue to follow. Care Coordination Interventions    Program Enrollment: Rising Risk  Referral from Primary Care Provider: No  Suggested Interventions and Community Resources  Disease Specific Clinic: In Process (Comment: Rheumatology- Dr Valle Fairly  Urology - Dr Michael Osorio .   Hematology/Oncology ( [address])  )  Other Services or Interventions: referred to Dr Michael Osorio in [facility]                 This Visit's Progress     COMPLETED: Care Coordination Self Management        CC Self Management Goal  Patient Goal (What steps will patient take to achieve goal?): pain under control   Patient is able to discuss self-management of condition(s): hepatitis and pain control   Pt demonstrates adherence to medications  Pt demonstrates understanding of self-monitoring  Patient is able to identify Red Flags:  Alert to potential adverse drug reactions(s) or side effects and actions to take should they arise  Discuss target symptoms and actions to take should they arise  Identify problems that require immediate PCP or specialist visit  Patient demonstrates understanding of access to PCP/Specialist:  Understands about scheduling routine Follow Up appointments   Understands about sick day appointment options for worsening of symptoms/progression (Same Day, E Visits)       COMPLETED: Patient Stated (pt-stated)        Pt will request urology consult from pcp on his follow up appt on 8/12/2020    Barriers: none  Plan for overcoming my barriers: N/A  Confidence: 10/10  Anticipated Goal Completion Date: 1/12/2020    Follow up has been postponed patient said due to needing to see a hematologist        Patient Stated (pt-stated)   On track     Pt will drink ample fluids to remain hydrated to assist with uti prevention    Barriers: none  Plan for overcoming my barriers: N/A  Confidence: 10/10  Anticipated Goal Completion Date: 7/29/2020 and ongoing  Pt is drinking fluids and making sure his urine is light yellow in color        COMPLETED: Patient Stated (pt-stated)        Pt will discuss his foot concern on his follow up with pcp in Feb 2021    Barriers: none  Plan for overcoming my barriers: N/A  Confidence: 10/10  Anticipated Goal Completion Date: 2/28/2021            Prior to Admission medications    Medication Sig Start Date End Date Taking?  Authorizing Provider   ferrous sulfate (IRON 325) 325 (65 Fe) MG tablet Take 325 mg by mouth every other day   Yes Historical Provider, MD   vitamin B-12 (CYANOCOBALAMIN) 500 MCG tablet Take 500 mcg by mouth every other day   Yes Historical Provider, MD   furosemide (LASIX) 40 MG tablet Take 1 tablet by mouth daily 11/16/20 2/14/21 Yes Maria Martínez MD   lactulose (CHRONULAC) 10 GM/15ML solution TAKE 15 MLS BY MOUTH THREE TIMES A DAY 10/16/20  Yes Venkat Jorgensen DO   SITagliptin (JANUVIA) 100 MG tablet Take 100 mg by mouth daily   Yes Historical Provider, MD   Cholecalciferol (VITAMIN D) 50 MCG (2000 UT) CAPS capsule Take 1 capsule by mouth daily 2/24/20  Yes Venkat Jorgensen DO   omeprazole (PRILOSEC) 20 MG delayed release capsule Take 2 capsules by mouth daily 2/24/20 2/23/21 Yes Kusum Meier DO   NIFEdipine (PROCARDIA XL) 60 MG extended release tablet Take 60 mg by mouth daily  6/12/19  Yes Historical Provider, MD   allopurinol (ZYLOPRIM) 300 MG tablet Take 600 mg by mouth daily Takes 2 tabs (=600mg) daily 3/27/19  Yes KELSY Will - CNP   spironolactone (ALDACTONE) 50 MG tablet Take one tablet orally once daily 1/25/21   Venkat Jorgensen DO   benzonatate (TESSALON) 100 MG capsule TAKE 1 CAPSULE BY MOUTH 3 TIMES A DAY AS NEEDED FOR COUGH 10/1/20   Minal Faster, DO   pregabalin (LYRICA) 100 MG capsule TAKE 1 CAPSULE BY MOUTH 3 TIMES A DAY  Patient taking differently: 2 times daily.  TAKE 1 CAPSULE BY MOUTH 3 TIMES A DAY 7/1/20 11/16/20  Minal Faster, DO       Future Appointments   Date Time Provider [facility]   2/10/2021  1:30 PM Minal Hawk, DO LATRICE Benitez [facility]   3/8/2021 11:10 AM Saqib Valle MD [facility] None   5/26/2021  1:00 PM Mathieu Brice MD [facility] exc [facility]

## 2021-02-03 NOTE — PROGRESS NOTES
Rossana Pereira at [facility] [phone] called, asking it US Abd limited should be done because pt had US liver done on 1/25/21. Dr Armando Blair advises that he wants ascites to be addresses. He asks that the 1/25/21 report be amended to comment on ascites or to perform the US Abd limited as ordered. Writer did return call to Rossana Pereira and did leave vm message for her with these instructions.

## 2021-02-04 RX ORDER — SITAGLIPTIN 100 MG/1
TABLET, FILM COATED ORAL
Qty: 90 TABLET | Refills: 2 | Status: SHIPPED | OUTPATIENT
Start: 2021-02-04 | End: 2021-09-30 | Stop reason: SDUPTHER

## 2021-02-05 NOTE — PROGRESS NOTES
Per Dr Roberto Garcia, if radiology can do addendum for comments on ascites, this order is not needed. Did speak to [facility] at [phone], Deborah Washington, they will have radiologist re-read with addendum and fax to us. Did call pt and let him know to wait on scheduling new US order until the radiologist comments on 1/25/21 US. Pt verbalizes understanding.

## 2021-02-08 ENCOUNTER — HOSPITAL ENCOUNTER (OUTPATIENT)
Age: 71
Setting detail: SPECIMEN
Discharge: HOME | End: 2021-02-08
Payer: COMMERCIAL

## 2021-02-08 DIAGNOSIS — K74.69 OTHER CIRRHOSIS OF LIVER (HCC): ICD-10-CM

## 2021-02-08 LAB
ABSOLUTE EOS #: 0.18 K/UL (ref 0–0.44)
ABSOLUTE IMMATURE GRANULOCYTE: 0.03 K/UL (ref 0–0.3)
ABSOLUTE LYMPH #: 2.24 K/UL (ref 1.1–3.7)
ABSOLUTE MONO #: 0.76 K/UL (ref 0.1–1.2)
ALBUMIN SERPL-MCNC: 4.2 G/DL (ref 3.5–5.2)
ALBUMIN/GLOBULIN RATIO: 1.3 (ref 1–2.5)
ALP BLD-CCNC: 74 U/L (ref 40–129)
ALT SERPL-CCNC: 13 U/L (ref 5–41)
ANION GAP SERPL CALCULATED.3IONS-SCNC: 13 MMOL/L (ref 9–17)
AST SERPL-CCNC: 17 U/L
BASOPHILS # BLD: 1 % (ref 0–2)
BASOPHILS ABSOLUTE: 0.07 K/UL (ref 0–0.2)
BILIRUB SERPL-MCNC: 0.33 MG/DL (ref 0.3–1.2)
BILIRUBIN DIRECT: 0.11 MG/DL
BILIRUBIN, INDIRECT: 0.22 MG/DL (ref 0–1)
BUN BLDV-MCNC: 18 MG/DL (ref 8–23)
CHLORIDE BLD-SCNC: 102 MMOL/L (ref 98–107)
CO2: 25 MMOL/L (ref 20–31)
CREAT SERPL-MCNC: 1.34 MG/DL (ref 0.7–1.2)
DIFFERENTIAL TYPE: ABNORMAL
EOSINOPHILS RELATIVE PERCENT: 2 % (ref 1–4)
GFR AFRICAN AMERICAN: >60 ML/MIN
GFR NON-AFRICAN AMERICAN: 53 ML/MIN
GFR SERPL CREATININE-BSD FRML MDRD: ABNORMAL ML/MIN/{1.73_M2}
GFR SERPL CREATININE-BSD FRML MDRD: ABNORMAL ML/MIN/{1.73_M2}
GLOBULIN: NORMAL G/DL (ref 1.5–3.8)
HCT VFR BLD CALC: 45.9 % (ref 40.7–50.3)
HEMOGLOBIN: 14.5 G/DL (ref 13–17)
IMMATURE GRANULOCYTES: 0 %
LYMPHOCYTES # BLD: 24 % (ref 24–43)
MCH RBC QN AUTO: 31.2 PG (ref 25.2–33.5)
MCHC RBC AUTO-ENTMCNC: 31.6 G/DL (ref 28.4–34.8)
MCV RBC AUTO: 98.7 FL (ref 82.6–102.9)
MONOCYTES # BLD: 8 % (ref 3–12)
NRBC AUTOMATED: 0 PER 100 WBC
PDW BLD-RTO: 14.9 % (ref 11.8–14.4)
PLATELET # BLD: 226 K/UL (ref 138–453)
PLATELET ESTIMATE: ABNORMAL
PMV BLD AUTO: 11.1 FL (ref 8.1–13.5)
POTASSIUM SERPL-SCNC: 4.5 MMOL/L (ref 3.7–5.3)
RBC # BLD: 4.65 M/UL (ref 4.21–5.77)
RBC # BLD: ABNORMAL 10*6/UL
SEG NEUTROPHILS: 65 % (ref 36–65)
SEGMENTED NEUTROPHILS ABSOLUTE COUNT: 6.18 K/UL (ref 1.5–8.1)
SODIUM BLD-SCNC: 140 MMOL/L (ref 135–144)
TOTAL PROTEIN: 7.4 G/DL (ref 6.4–8.3)
WBC # BLD: 9.5 K/UL (ref 3.5–11.3)
WBC # BLD: ABNORMAL 10*3/UL

## 2021-02-09 DIAGNOSIS — M65.30 TRIGGER FINGER OF RIGHT HAND, UNSPECIFIED FINGER: Primary | ICD-10-CM

## 2021-02-10 ENCOUNTER — VIRTUAL VISIT (OUTPATIENT)
Dept: FAMILY MEDICINE CLINIC | Age: 71
End: 2021-02-10
Payer: COMMERCIAL

## 2021-02-10 DIAGNOSIS — M19.279: ICD-10-CM

## 2021-02-10 DIAGNOSIS — G62.9 NEUROPATHY: ICD-10-CM

## 2021-02-10 DIAGNOSIS — M1A.0790 CHRONIC GOUT OF FOOT, UNSPECIFIED CAUSE, UNSPECIFIED LATERALITY: ICD-10-CM

## 2021-02-10 DIAGNOSIS — F43.21 GRIEF: ICD-10-CM

## 2021-02-10 DIAGNOSIS — I10 ESSENTIAL HYPERTENSION: Primary | ICD-10-CM

## 2021-02-10 DIAGNOSIS — D64.9 ANEMIA, UNSPECIFIED TYPE: ICD-10-CM

## 2021-02-10 DIAGNOSIS — K74.69 OTHER CIRRHOSIS OF LIVER (HCC): ICD-10-CM

## 2021-02-10 DIAGNOSIS — F41.9 ANXIETY: ICD-10-CM

## 2021-02-10 PROCEDURE — 99443 PR PHYS/QHP TELEPHONE EVALUATION 21-30 MIN: CPT | Performed by: INTERNAL MEDICINE

## 2021-02-10 RX ORDER — LORAZEPAM 0.5 MG/1
0.5 TABLET ORAL NIGHTLY PRN
Qty: 30 TABLET | Refills: 1 | Status: SHIPPED | OUTPATIENT
Start: 2021-02-10 | End: 2021-04-29

## 2021-02-10 ASSESSMENT — PATIENT HEALTH QUESTIONNAIRE - PHQ9
SUM OF ALL RESPONSES TO PHQ QUESTIONS 1-9: 0
2. FEELING DOWN, DEPRESSED OR HOPELESS: 0
SUM OF ALL RESPONSES TO PHQ QUESTIONS 1-9: 0
SUM OF ALL RESPONSES TO PHQ QUESTIONS 1-9: 0

## 2021-02-10 NOTE — PROGRESS NOTES
Visit Information    Have you changed or started any medications since your last visit including any over-the-counter medicines, vitamins, or herbal medicines? no   Have you stopped taking any of your medications? Is so, why? -  no  Are you having any side effects from any of your medications? - no    Have you seen any other physician or provider since your last visit?  no   Have you had any other diagnostic tests since your last visit?  no   Have you been seen in the emergency room and/or had an admission in a hospital since we last saw you?  no   Have you had your routine dental cleaning in the past 6 months?  no     Do you have an active MyChart account? If no, what is the barrier?   Yes    Patient Care Team:  Narciso Tobar DO as PCP - General (Family Medicine)  Narciso Tobar DO as PCP - [facility]  Brad Martin MD as Consulting Physician (Gastroenterology)  Irene Baez MD as Surgeon (Vascular Surgery)  Henry Mooney as Certified Registered Nurse (Gastroenterology)  Susan Anguiano MD as Consulting Physician (Pain Management)  So Pierre MD as Consulting Physician (Rheumatology)  Heather De Los Santos, KATELYN as Nurse Navigator  Star Abraham, KATELYN as Ambulatory Care Manager    Medical History Review  Past Medical, Family, and Social History reviewed and  contribute to the patient presenting condition    Health Maintenance   Topic Date Due    Diabetic retinal exam  03/[birth year]/1962    Hepatitis A vaccine (4 of 4 - Hep A Twinrix risk 4-dose series) 12/09/2010    Shingles Vaccine (2 of 3) 02/11/2016    A1C test (Diabetic or Prediabetic)  02/25/2021    Diabetic microalbuminuria test  02/25/2021    Lipid screen  02/25/2021    Low dose CT lung screening  04/24/2021    Diabetic foot exam  08/12/2021    TSH testing  08/24/2021    PSA counseling  10/01/2021    Colon cancer screen colonoscopy  10/07/2021    Potassium monitoring  02/08/2022    Creatinine monitoring  02/08/2022  DTaP/Tdap/Td vaccine (2 - Td) 12/17/2025    Flu vaccine  Completed    Pneumococcal 65+ years Vaccine  Completed    AAA screen  Completed    Hib vaccine  Aged Out    Meningococcal (ACWY) vaccine  Aged Out

## 2021-02-10 NOTE — PROGRESS NOTES
Livier Rosa is a 78 y.o. male evaluated via telephone on 2/10/2021. Consent:  He and/or health care decision maker is aware that that he may receive a bill for this telephone service, depending on his insurance coverage, and has provided verbal consent to proceed: Yes      Documentation:  I communicated with the patient and/or health care decision maker about here for routine check up/follow up   Has seen multiple/many specialists since last visit   Saw oncologist for consult due to some lab abnormalities and fatigue   He felt all labs were normal or related to chronic conditions and no follow up or additional medications needed  Pt did start taking iron and vitamin b12 every other day though due to lab values from 9/2020. He states it has helped his energy levels somewhat overall so he has continued   He has had multiple recent labs ie cbc , cmp uric acid , psa   All are good or stable  Cr 1.3 -1.4 and gfr around 50 so stable   Does see nephro and gi   Had recent up dated u/s as well 1/2020 and showed chronic cholecystitis and cirrhosis . Stable     Also stated seeing urology   They recommended sx/procedure   He refused .  Then started flomax but he stated he started urinating a lot more so he stopped it   Has f/u in the next month and will consider or procedure     He also is going to see sx for trigger finger tomorrow; had injection and it failed so likely will have release for this as well     Is very anxious   Always has been but has a lot of stressors right now as well   Taking care of two young/teenage children   Is the guardians of his sons children after he passed away the last year   Also wife diagnosied with cancer the last year   It is incurable , she is udnergoing chemo every other week now   He is in charge of all the house hold duties since she cannot do much due to fatigue   Along with taking care of all his chronic conditions    Also ants to stop seeing rheum at [facility] as gout is stable Off daily prednisone   On 300 mg of allpurinol last uric acid 4       Details of this discussion including any medical advice provided:  Did give small script of ativan   Use sparingly   Reviewed SE   Patient refuses daily medication ie paxil , zoloft , wellbutrin   Has tried many in the remote past and did not feel they worked or did not like SE. Controlled substances monitoring: possible medication side effects, risk of tolerance and/or dependence, and alternative treatments discussed, no signs of potential drug abuse or diversion identified and [place]AR[place] report reviewed today- activity consistent with treatment plan. Will refer to podiatry   Likely degenerative /damage form chronic gout   Has daily foot pain now from this   Continue allopurinol and will fill as patient okay to stop seeing rheum and will now see podiatry locally  Reviewed all recent labs and imaging , no need for additional   F/u with specialists as scheduled   Spent over 25 minutes on phone with patient counseling on chronic conditions , treatment plan . All questions answered. I affirm this is a Patient Initiated Episode with a Patient who has not had a related appointment within my department in the past 7 days or scheduled within the next 24 hours.     Patient identification was verified at the start of the visit: Yes    Total Time: minutes: 21-30 minutes    Note: not billable if this call serves to triage the patient into an appointment for the relevant concern      Minal Hawk

## 2021-02-11 ENCOUNTER — OFFICE VISIT (OUTPATIENT)
Dept: ORTHOPEDIC SURGERY | Age: 71
End: 2021-02-11
Payer: COMMERCIAL

## 2021-02-11 VITALS
SYSTOLIC BLOOD PRESSURE: 105 MMHG | HEIGHT: 72 IN | WEIGHT: 255 LBS | DIASTOLIC BLOOD PRESSURE: 81 MMHG | BODY MASS INDEX: 34.54 KG/M2 | TEMPERATURE: 97.2 F | HEART RATE: 64 BPM

## 2021-02-11 DIAGNOSIS — M65.341 TRIGGER RING FINGER OF RIGHT HAND: Primary | ICD-10-CM

## 2021-02-11 PROCEDURE — 99213 OFFICE O/P EST LOW 20 MIN: CPT | Performed by: ORTHOPAEDIC SURGERY

## 2021-02-11 ASSESSMENT — ENCOUNTER SYMPTOMS
ABDOMINAL DISTENTION: 0
CHEST TIGHTNESS: 0
NAUSEA: 0
CONSTIPATION: 0
SHORTNESS OF BREATH: 0
DIARRHEA: 0
APNEA: 0
ABDOMINAL PAIN: 0
COUGH: 0
COLOR CHANGE: 0
VOMITING: 0

## 2021-02-11 NOTE — PROGRESS NOTES
[facility]  [address] [address]  Dept: [phone]  Dept Fax: [phone]        Right Pinky Finger  New Patient    Subjective:     Chief Complaint   Patient presents with    Trigger finger     Right trigger finger, pinky     HPI:     Fani Castro presents today with right pinky trigger finger. The patient is right-handed. He states that he has had a trigger finger in his right pinky since 01/01/2016. The patient states that his finger only hurts when he is going to extend it due to it being locked. The pain is now described as Adriel Piter. There is painful popping and clicking in the finger. There finger does catch and lock up. The patient has tried diana taping his pinky finger to his ring finger with good relief so the pinky finger does not flex up on its own. No previous injury or trauma to that finger. The patient has tried a cortisone injection by Dr. Roshan Simpson with no good relief. No previous surgeries to that finger/hand. The patient denies numbness and tingling in his hand/fingers. The patient reports that the finger does not affect his  strength. The patient cannot take NSAIDs. ROS:     Review of Systems   Constitutional: Negative for activity change, appetite change, fatigue and fever. Respiratory: Negative for apnea, cough, chest tightness and shortness of breath. Cardiovascular: Negative for chest pain, palpitations and leg swelling. Gastrointestinal: Negative for abdominal distention, abdominal pain, constipation, diarrhea, nausea and vomiting. Genitourinary: Negative for difficulty urinating, dysuria and hematuria. Musculoskeletal: Positive for arthralgias. Negative for gait problem, joint swelling and myalgias. Skin: Negative for color change and rash. Neurological: Negative for dizziness, weakness, numbness and headaches. Psychiatric/Behavioral: Negative for sleep disturbance. 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     Portal venous hypertension ([facility] [address] 07/15/2020    Sciatica     Splenomegaly 07/15/2020       Past Surgical History:    Past Surgical History:   Procedure Laterality Date    AORTIC VALVE REPAIR      BACK SURGERY      L4-5    CARDIAC CATHETERIZATION  10.30/2019    Dr. José Miguel Moore COLONOSCOPY  12/05/2016    [facility], states due for another in 2  years    COLONOSCOPY N/A 10/7/2019    tubular adenoma x2; hyperplastic polyp    HERNIA REPAIR      KNEE ARTHROSCOPY Right     OTHER SURGICAL HISTORY Right 01/25/2016    10 liters removed peracentesis    PARACENTESIS  11-4-15    PARACENTESIS  12/28/15    [place] EGD TRANSORAL BIOPSY SINGLE/MULTIPLE N/A 8/30/2017    [facility]GD BIOPSY [facility] at [facility]  6/2011    TONSILLECTOMY      UPPER GASTROINTESTINAL ENDOSCOPY  10/24/2013    small segment barretts    UPPER GASTROINTESTINAL ENDOSCOPY  08/30/2017    VEIN SURGERY Left 12/07/2016    leg vein stripping    VENTRAL HERNIA REPAIR  05/26/15       CurrentMedications: Current Outpatient Medications   Medication Sig Dispense Refill    LORazepam (ATIVAN) 0.5 MG tablet Take 1 tablet by mouth nightly as needed for Anxiety for up to 30 days. 30 tablet 1    JANUVIA 100 MG tablet TAKE 1 TABLET BY MOUTH ONE TIME A DAY 90 tablet 2    spironolactone (ALDACTONE) 50 MG tablet Take one tablet orally once daily 90 tablet 1    ferrous sulfate (IRON 325) 325 (65 Fe) MG tablet Take 325 mg by mouth every other day      vitamin B-12 (CYANOCOBALAMIN) 500 MCG tablet Take 500 mcg by mouth every other day      furosemide (LASIX) 40 MG tablet Take 1 tablet by mouth daily 90 tablet 3    lactulose (CHRONULAC) 10 GM/15ML solution TAKE 15 MLS BY MOUTH THREE TIMES A DAY 1992 mL 5    Cholecalciferol (VITAMIN D) 50 MCG (2000 UT) CAPS capsule Take 1 capsule by mouth daily 90 capsule 5    omeprazole (PRILOSEC) 20 MG delayed release capsule Take 2 capsules by mouth daily 180 capsule 5    NIFEdipine (PROCARDIA XL) 60 MG extended release tablet Take 60 mg by mouth daily       allopurinol (ZYLOPRIM) 300 MG tablet Take 600 mg by mouth daily Takes 2 tabs (=600mg) daily 90 tablet 1    pregabalin (LYRICA) 100 MG capsule TAKE 1 CAPSULE BY MOUTH 3 TIMES A DAY (Patient taking differently: 2 times daily. TAKE 1 CAPSULE BY MOUTH 3 TIMES A DAY) 180 capsule 5     No current facility-administered medications for this visit.         Allergies:    Nsaids    Social History:   Social History     Socioeconomic History    Marital status:      Spouse name: None    Number of children: None    Years of education: None    Highest education level: None   Occupational History    None   Social Needs    Financial resource strain: Not hard at all   [place] insecurity     Worry: None     Inability: None    Transportation needs     Medical: No     Non-medical: No   Tobacco Use    Smoking status: Former Smoker     Packs/day: 1.00     Years: 49.00     Pack years: 49.00     Types: Cigarettes     Start date: 8/18/1966 Quit date: 2015     Years since quittin.1    Smokeless tobacco: Never Used    Tobacco comment: maybe 1 cigarette per day, updated from progress note dated 10/16/17    Substance and Sexual Activity    Alcohol use: No     Alcohol/week: 0.0 standard drinks    Drug use: Yes     Types: Marijuana     Comment: occasional    Sexual activity: None   Lifestyle    Physical activity     Days per week: None     Minutes per session: None    Stress: None   Relationships    Social connections     Talks on phone: None     Gets together: None     Attends Church service: None     Active member of club or organization: None     Attends meetings of clubs or organizations: None     Relationship status: None    Intimate partner violence     Fear of current or ex partner: None     Emotionally abused: None     Physically abused: None     Forced sexual activity: None   Other Topics Concern    None   Social History Narrative    None       Family History:  Family History   Problem Relation Age of Onset    Cancer Father         prostate     Other Neg Hx         blood clots       Vitals:   /81   Pulse 64   Temp 97.2 °F (36.2 °C)   Ht 6' (1.829 m)   Wt 255 lb (115.7 kg)   BMI 34.58 kg/m²  Body mass index is 34.58 kg/m². Physical Examination:     Orthopedics    GENERAL: Alert and oriented X3 in no acute distress. SKIN: Visual inspection reveals no soft tissue swelling or festus abnormality, no rotational deformity, Intact without lesions or ulcerations. NEURO: Radial, Ulnar and Median nerves are intact to sensory and motor testing. VASC: Capillary refill is less than 3 seconds, no signs of thoracic outlet syndrome, negative Michael's test.    Hand & Wrist Exam    LOCATION: Right Pinky Finger  MUSC: Good strength is available in these motions. Pain with resisted DIP flexion. ROM: Full flexor and extensor tendon function is intact. Full ROM of the hand and fingers. PALPATION: No pain to palpation. Positive trigger finger with the small finger. Tendon getting stuck under the A-1 pulley. Assessment:     1. Trigger ring finger of right hand      Procedures:    Procedure: no  Radiology:   X-rays taken today were reviewed with the patient. HAND X-RAY    3 views including PA, oblique and lateral of the right hand were performed revealing mild degenerative changes of the radiocarpal joint. No significant changes when compared to old x-rays taken on 08/07/2006.   No acute fractures or dislocations were noted    Impression: Negative right hand x-rays except for mild to moderate degenerative changes    Plan: Treatment : I reviewed the X-ray with the patient. We discussed the etiologies and natural histories of a trigger finger of the right pinky finger. We discussed the various treatment alternatives including anti-inflammatory medications, physical therapy, injections, further imaging studies and as a last result surgery. During today's visit, I explained to the patient that the chances of another injection working are only about 50%. The only way to fix a triggered finger from getting stuck/caught is a surgery to go in and cut the A-1 pulley. Therefore, the patient has elected in proceeding with a right small finger trigger release surgery. The risks/benefits/alternatives and potential complications have been discussed with the patient. We also had a long discussion regarding the procedure itself and expectation of the recovery. All questions and concerns with addressed. The patient was informed to stop his Aspirin a week before surgery. He can take Tylenol for pain post surgery since he cannot take NSAIDs. Patient was instructed to call our office with any question or concerns prior to the procedure occurs. A physical therapy prescription was not given. Patient should return to the clinic one week post surgery for his first post operative appointment to follow up with  Madelyn Casper PA-C. The patient will call the office immediately with any problems. No orders of the defined types were placed in this encounter. No orders of the defined types were placed in this encounter.       Clark Ramires, MS, AT, ATC, am scribing for and in the presence of Alina Santizo D.O.. 2/11/2021  6:50 PM            Electronically signed by Roxy William DO, on 2/11/2021 at 6:50 PM Vaishnavi Stewart DO, have personally seen this patient and I have reviewed the CC, PMH, FHX and Social History as provided by other clinical staff. I reassessed the HPI and ROS as scribed by Zack Rose ATC in my presence and it is both accurate and complete. Thereafter, I personally performed the PE, reviewed the imaging and established the DX and POC. I agree with the documentation provided by the . I have reviewed all documentation in its entirety prior to providing my signature indicating agreement. Any areas of disagreement are noted on the chart.     Electronically signed by Ambika Jones DO on 2/11/2021 at 6:52 PM

## 2021-02-15 ENCOUNTER — OFFICE VISIT (OUTPATIENT)
Dept: PODIATRY | Age: 71
End: 2021-02-15
Payer: COMMERCIAL

## 2021-02-15 VITALS — TEMPERATURE: 97.1 F | BODY MASS INDEX: 33.86 KG/M2 | WEIGHT: 250 LBS | RESPIRATION RATE: 16 BRPM | HEIGHT: 72 IN

## 2021-02-15 DIAGNOSIS — M79.605 PAIN IN BOTH LOWER EXTREMITIES: Primary | ICD-10-CM

## 2021-02-15 DIAGNOSIS — M79.2 NEURITIS: ICD-10-CM

## 2021-02-15 DIAGNOSIS — M79.604 PAIN IN BOTH LOWER EXTREMITIES: Primary | ICD-10-CM

## 2021-02-15 PROCEDURE — 99203 OFFICE O/P NEW LOW 30 MIN: CPT | Performed by: PODIATRIST

## 2021-02-15 RX ORDER — HYDROCODONE BITARTRATE AND ACETAMINOPHEN 10; 325 MG/1; MG/1
1 TABLET ORAL EVERY 6 HOURS PRN
Qty: 120 TABLET | Refills: 0 | Status: SHIPPED | OUTPATIENT
Start: 2021-02-15 | End: 2021-03-17

## 2021-02-15 NOTE — PROGRESS NOTES
[facility]  [facility]  [address] [place] [address]  Dept: [phone]  Dept Fax: [phone]    NEW PATIENT PROGRESS NOTE  Date of patient's visit: 2/15/2021  Patient's Name:  Ratna Sebastian YOB: 1950            Patient Care Team:  Toney Kayser, DO as PCP - General (Family Medicine)  Toney Kayser, DO as PCP - [facility] [place] [facility]  Hemanth Solo MD as Consulting Physician (Gastroenterology)  Zainab Short MD as Surgeon (Vascular Surgery)  Coleenmaddison Ingram as Certified Registered Nurse (Gastroenterology)  Mika De La Torre MD as Consulting Physician (Pain Management)  Sheryl Booth MD as Consulting Physician (Rheumatology)  Annette Aldrich RN as Nurse Navigator  Maria Amaro RN as [address] [place] [address]  Barry Adler DPM as Physician (Podiatry)        Chief Complaint   Patient presents with    New Patient    Foot Pain         HPI:   Ratna Sebastian is a 78 y.o. male who presents to the office today complaining of  Bilateral foot pain. Symptoms began 4 year(s) ago. Patient relates pain is Present. Pain is rated 5 out of 10 and is described as intermittent. Treatments prior to today's visit include: Pt has seen PMR , rheumatologist, neurologist. States that nothing is helping with the constant pain to his feet. Currently denies F/C/N/V.  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    Dr. Kevin Morel COLONOSCOPY  2016    [facility], states due for another in 2  years    COLONOSCOPY N/A 10/7/2019    tubular adenoma x2; hyperplastic polyp    HERNIA REPAIR      KNEE ARTHROSCOPY Right     OTHER SURGICAL HISTORY Right 2016    10 liters removed peracentesis    PARACENTESIS  11-4-15    PARACENTESIS  12/28/15    [place] EGD TRANSORAL BIOPSY SINGLE/MULTIPLE N/A 2017    EGD BIOPSY performed by Mary Almendarez MD at [address] [place] [address]  2011    TONSILLECTOMY      UPPER GASTROINTESTINAL ENDOSCOPY  10/24/2013    small segment barretts    UPPER GASTROINTESTINAL ENDOSCOPY  2017    VEIN SURGERY Left 2016    leg vein stripping    VENTRAL HERNIA REPAIR  05/26/15       Family History   Problem Relation Age of Onset    Cancer Father         prostate     Other Neg Hx         blood clots       Social History     Tobacco Use    Smoking status: Former Smoker     Packs/day: 1.00     Years: 49.00     Pack years: 49.00     Types: Cigarettes     Start date: 1966     Quit date: 2015     Years since quittin.1    Smokeless tobacco: Never Used    Tobacco comment: maybe 1 cigarette per day, updated from progress note dated 10/16/17    Substance Use Topics    Alcohol use: No     Alcohol/week: 0.0 standard drinks       Review of Systems    Review of Systems:   History obtained from chart review and the patient  General ROS: negative for - chills, fatigue, fever, night sweats or weight gain  Constitutional: Negative for chills, diaphoresis, fatigue, fever and unexpected weight change. Musculoskeletal: Positive for arthralgias, gait problem and joint swelling. Neurological ROS: negative for - behavioral changes, confusion, headaches or seizures. Negative for weakness and numbness. Dermatological ROS: negative for - mole changes, rash  Cardiovascular: Negative for leg swelling. Gastrointestinal: Negative for constipation, diarrhea, nausea and vomiting. Lower Extremity Physical Examination:   Vitals:   Vitals:    02/15/21 1038   Resp: 16   Temp: 97.1 °F (36.2 °C)     General: AAO x 3 in NAD. Dermatologic Exam:  Skin lesion/ulceration Absent . Skin No rashes or nodules noted. .   Skin is thin, with flaky sloughing skin as well as decreased hair growth to the lower leg  Small red hemosiderin deposits seen dorsal foot   Musculoskeletal:     1st MPJ ROM decreased, Bilateral.  Muscle strength 5/5, Bilateral.  Pain present upon palpation of toenails 1-5, Bilateral. decreased medial longitudinal arch, Bilateral.  Ankle ROM decreased,Bilateral.    Dorsally contracted digits present digits 2, Bilateral.     Vascular: DP pulses 1/4 bilateral.  PT pulses 0/4 bilateral.   CFT <5 seconds, Bilateral.  Hair growth absent to the level of the digits, Bilateral.  Edema present, Bilateral.  Varicosities absent, Bilateral. Erythema absent, Bilateral    Neurological: Sensation diminshed to light touch to level of digits, Bilateral.  Protective sensation intact 6/10 sites via 5.07/10g Stoddard-Amy Monofilament, Bilateral.  negative Tinel's, Bilateral.  negative Valleix sign, Bilateral.      Integument: Warm, dry, supple, Bilateral.  Open lesion absent, Bilateral.  Interdigital maceration absent to web spaces 4, Bilateral.  Nails 1-5 left and 1-5 right thickened > 3.0 mm, dystrophic and crumbly, discolored with subungual debris. Fissures absent, Bilateral.       Asessment: Patient is a 78 y.o. male with:    Diagnosis Orders   1.  Pain in both lower extremities  HYDROcodone-acetaminophen (NORCO)  MG per tablet 2. Neuritis  HYDROcodone-acetaminophen (NORCO)  MG per tablet         Plan: Patient examined and evaluated. Current condition and treatment options discussed in detail. Discussed conservative and surgical options with the patient. Advised pt to consider contact [facility] for second opinion as he has exhausted all referrals. All labs were reviewed including the above findings were reviewed prior to the patients arrival and with the patient today. Previous patient encounter was reviewed. Encounters from the patients other medical providers were reviewed and noted. Time was spent educating the patient and their families/caregivers on proper care of the feet and ankles. All the above diagnosis were addressed at todays visit and all questions were answered. A total of 35 minutes was spent with this patients encounter    RX: Norco 10/325 one tab po q6r prn pain      Verbal and written instructions given to patient. Contact office with any questions/problems/concerns. RTC in 1month(s).     2/15/2021    Electronically signed by Naveen Arevalo DPM on 2/15/2021 at 10:46 AM  2/15/2021

## 2021-02-22 ENCOUNTER — HOSPITAL ENCOUNTER (OUTPATIENT)
Dept: ULTRASOUND IMAGING | Age: 71
Discharge: HOME | End: 2021-02-24
Payer: COMMERCIAL

## 2021-02-22 DIAGNOSIS — K74.69 OTHER CIRRHOSIS OF LIVER (HCC): ICD-10-CM

## 2021-02-22 PROCEDURE — 76705 ECHO EXAM OF ABDOMEN: CPT

## 2021-02-24 ENCOUNTER — CARE COORDINATION (OUTPATIENT)
Dept: OTHER | Facility: CLINIC | Age: 71
End: 2021-02-24

## 2021-02-24 ENCOUNTER — HOSPITAL ENCOUNTER (OUTPATIENT)
Age: 71
Setting detail: OUTPATIENT SURGERY
Discharge: HOME | End: 2021-02-24
Attending: ORTHOPAEDIC SURGERY | Admitting: ORTHOPAEDIC SURGERY
Payer: COMMERCIAL

## 2021-02-24 VITALS
BODY MASS INDEX: 33.46 KG/M2 | TEMPERATURE: 97.8 F | HEART RATE: 59 BPM | DIASTOLIC BLOOD PRESSURE: 70 MMHG | HEIGHT: 72 IN | OXYGEN SATURATION: 95 % | RESPIRATION RATE: 14 BRPM | SYSTOLIC BLOOD PRESSURE: 113 MMHG | WEIGHT: 247 LBS

## 2021-02-24 DIAGNOSIS — M65.351 TRIGGER LITTLE FINGER OF RIGHT HAND: Primary | ICD-10-CM

## 2021-02-24 PROCEDURE — 7100000011 HC PHASE II RECOVERY - ADDTL 15 MIN: Performed by: ORTHOPAEDIC SURGERY

## 2021-02-24 PROCEDURE — 2709999900 HC NON-CHARGEABLE SUPPLY: Performed by: ORTHOPAEDIC SURGERY

## 2021-02-24 PROCEDURE — 2500000003 HC RX 250 WO HCPCS: Performed by: ORTHOPAEDIC SURGERY

## 2021-02-24 PROCEDURE — 3600000002 HC SURGERY LEVEL 2 BASE: Performed by: ORTHOPAEDIC SURGERY

## 2021-02-24 PROCEDURE — 7100000010 HC PHASE II RECOVERY - FIRST 15 MIN: Performed by: ORTHOPAEDIC SURGERY

## 2021-02-24 PROCEDURE — 3600000012 HC SURGERY LEVEL 2 ADDTL 15MIN: Performed by: ORTHOPAEDIC SURGERY

## 2021-02-24 PROCEDURE — 26055 INCISE FINGER TENDON SHEATH: CPT | Performed by: ORTHOPAEDIC SURGERY

## 2021-02-24 RX ORDER — HYDROCODONE BITARTRATE AND ACETAMINOPHEN 5; 325 MG/1; MG/1
1 TABLET ORAL EVERY 8 HOURS PRN
Qty: 21 TABLET | Refills: 0 | Status: SHIPPED | OUTPATIENT
Start: 2021-02-24 | End: 2021-03-03

## 2021-02-24 RX ORDER — BUPIVACAINE HYDROCHLORIDE 5 MG/ML
INJECTION, SOLUTION EPIDURAL; INTRACAUDAL PRN
Status: DISCONTINUED | OUTPATIENT
Start: 2021-02-24 | End: 2021-02-24 | Stop reason: ALTCHOICE

## 2021-02-24 RX ORDER — TAMSULOSIN HYDROCHLORIDE 0.4 MG/1
0.4 CAPSULE ORAL DAILY
COMMUNITY

## 2021-02-24 RX ORDER — LIDOCAINE HYDROCHLORIDE 10 MG/ML
INJECTION, SOLUTION EPIDURAL; INFILTRATION; INTRACAUDAL; PERINEURAL PRN
Status: DISCONTINUED | OUTPATIENT
Start: 2021-02-24 | End: 2021-02-24 | Stop reason: ALTCHOICE

## 2021-02-24 ASSESSMENT — PAIN SCALES - GENERAL: PAINLEVEL_OUTOF10: 0

## 2021-02-24 NOTE — H&P
History and Physical Update    Pt Name: Shabana Elena  MRN: [id number]  [place]: 1950  Date of evaluation: 2/24/2021      [x] I have reviewed the Orthopedic Progress Note by Dr Rachel robledo 2/11/21 in epic which meets the criteria for an Interval History and Physical note and is attached below. [x] I have examined  Shabana Elena  There are no changes to the patient who is scheduled for a local right small finger trigger release by Dr Rachel Hollingsworth for right small trigger finger. The patient was seen preoperatively by his PCP Dr Raleigh Garcia 2/10/21 with a note in [facility]. He denies new health changes, fever, chills, wheezing, cough, increased SOB, chest pain, open sores or wounds. No blood thinning medication. Vital signs: /70   Pulse 65   Temp 97.8 °F (36.6 °C) (Temporal)   Resp 20   Ht 6' (1.829 m)   Wt 247 lb (112 kg)   SpO2 94%   BMI 33.50 kg/m²     Allergies:  Nsaids    Medications:    Prior to Admission medications    Medication Sig Start Date End Date Taking? Authorizing Provider   tamsulosin (FLOMAX) 0.4 MG capsule Take 0.4 mg by mouth daily   Yes Historical Provider, MD   HYDROcodone-acetaminophen (NORCO)  MG per tablet Take 1 tablet by mouth every 6 hours as needed for Pain for up to 30 days. Intended supply: 30 days 2/15/21 3/17/21 Yes Stella Alvarez DPM   LORazepam (ATIVAN) 0.5 MG tablet Take 1 tablet by mouth nightly as needed for Anxiety for up to 30 days.  2/10/21 3/12/21 Yes Kusum Hays,    JANUVIA 100 MG tablet TAKE 1 TABLET BY MOUTH ONE TIME A DAY 2/4/21  Yes Raleigh Garcia,    spironolactone (ALDACTONE) 50 MG tablet Take one tablet orally once daily 1/25/21  Yes Kusum Alonzo, DO   ferrous sulfate (IRON 325) 325 (65 Fe) MG tablet Take 325 mg by mouth every other day   Yes Historical Provider, MD   vitamin B-12 (CYANOCOBALAMIN) 500 MCG tablet Take 500 mcg by mouth every other day   Yes Historical Provider, MD furosemide (LASIX) 40 MG tablet Take 1 tablet by mouth daily 11/16/20 2/24/21 Yes Pat Hayden MD   lactulose (CHRONULAC) 10 GM/15ML solution TAKE 15 MLS BY MOUTH THREE TIMES A DAY 10/16/20  Yes Dede Tompkins DO   pregabalin (LYRICA) 100 MG capsule TAKE 1 CAPSULE BY MOUTH 3 TIMES A DAY  Patient taking differently: 2 times daily. TAKE 1 CAPSULE BY MOUTH 3 TIMES A DAY 7/1/20 2/24/21 Yes Dede Tompkins DO   Cholecalciferol (VITAMIN D) 50 MCG (2000 UT) CAPS capsule Take 1 capsule by mouth daily 2/24/20  Yes Dede Tompkins DO   omeprazole (PRILOSEC) 20 MG delayed release capsule Take 2 capsules by mouth daily 2/24/20 2/24/21 Yes Kusum Ortega,    NIFEdipine (PROCARDIA XL) 60 MG extended release tablet Take 60 mg by mouth daily  6/12/19  Yes Historical Provider, MD   allopurinol (ZYLOPRIM) 300 MG tablet Take 600 mg by mouth daily Takes 2 tabs (=600mg) daily 3/27/19  Yes KELSY Olivares - CNP         This is a 78 y.o. male who is pleasant, cooperative, alert and oriented x3, in no acute distress. Heart: Heart sounds are distant. HR 65 regular rate and rhythm without murmur, gallop or rub. Lungs: Normal respiratory effort with equal expansion, unlabored and clear to auscultation without wheezes or rales bilaterally   Abdomen: soft, nontender, nondistended with bowel sounds . Labs:  Recent Labs     02/08/21  1201   HGB 14.5   HCT 45.9   WBC 9.5   MCV 98.7         K 4.5      CO2 25   BUN 18   CREATININE 1.34*   AST 17   ALT 13   LABALBU 4.2       No results for input(s): COVID19 in the last 720 hours.     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     Portal venous hypertension ([address] 07/15/2020    Sciatica      Splenomegaly 07/15/2020            Past Surgical History:    Past Surgical History         Past Surgical History:   Procedure Laterality Date    AORTIC VALVE REPAIR        BACK SURGERY         L4-5    CARDIAC CATHETERIZATION   10.30/2019     Dr. Sis Espino COLONOSCOPY   12/05/2016     [facility] due for another in 2  years    COLONOSCOPY N/A 10/7/2019     tubular adenoma x2; hyperplastic polyp    HERNIA REPAIR        KNEE ARTHROSCOPY Right      OTHER SURGICAL HISTORY Right 01/25/2016     10 liters removed peracentesis    PARACENTESIS   11-4-15    PARACENTESIS   12/28/15    NV EGD TRANSORAL BIOPSY SINGLE/MULTIPLE N/A 8/30/2017     [facility] BIOPSY [facility] at [facility]   6/2011    TONSILLECTOMY        UPPER GASTROINTESTINAL ENDOSCOPY   10/24/2013     small segment barretts    UPPER GASTROINTESTINAL ENDOSCOPY   08/30/2017    VEIN SURGERY Left 12/07/2016 leg vein stripping    VENTRAL HERNIA REPAIR   05/26/15            CurrentMedications:   Current Facility-Administered Medications          Current Outpatient Medications   Medication Sig Dispense Refill    LORazepam (ATIVAN) 0.5 MG tablet Take 1 tablet by mouth nightly as needed for Anxiety for up to 30 days. 30 tablet 1    JANUVIA 100 MG tablet TAKE 1 TABLET BY MOUTH ONE TIME A DAY 90 tablet 2    spironolactone (ALDACTONE) 50 MG tablet Take one tablet orally once daily 90 tablet 1    ferrous sulfate (IRON 325) 325 (65 Fe) MG tablet Take 325 mg by mouth every other day        vitamin B-12 (CYANOCOBALAMIN) 500 MCG tablet Take 500 mcg by mouth every other day        furosemide (LASIX) 40 MG tablet Take 1 tablet by mouth daily 90 tablet 3    lactulose (CHRONULAC) 10 GM/15ML solution TAKE 15 MLS BY MOUTH THREE TIMES A DAY 1992 mL 5    Cholecalciferol (VITAMIN D) 50 MCG (2000 UT) CAPS capsule Take 1 capsule by mouth daily 90 capsule 5    omeprazole (PRILOSEC) 20 MG delayed release capsule Take 2 capsules by mouth daily 180 capsule 5    NIFEdipine (PROCARDIA XL) 60 MG extended release tablet Take 60 mg by mouth daily         allopurinol (ZYLOPRIM) 300 MG tablet Take 600 mg by mouth daily Takes 2 tabs (=600mg) daily 90 tablet 1    pregabalin (LYRICA) 100 MG capsule TAKE 1 CAPSULE BY MOUTH 3 TIMES A DAY (Patient taking differently: 2 times daily. TAKE 1 CAPSULE BY MOUTH 3 TIMES A DAY) 180 capsule 5      No current facility-administered medications for this visit.              Allergies:    Nsaids     Social History:   Social History   Social History            Socioeconomic History    Marital status:        Spouse name: None    Number of children: None    Years of education: None    Highest education level: None   Occupational History    None   Social Needs    Financial resource strain: Not hard at all   Shoutitout insecurity       Worry: None       Inability: None    Transportation needs Medical: No       Non-medical: No   Tobacco Use    Smoking status: Former Smoker       Packs/day: 1.00       Years: 49.00       Pack years: 49.00       Types: Cigarettes       Start date: 1966       Quit date: 2015       Years since quittin.1    Smokeless tobacco: Never Used    Tobacco comment: maybe 1 cigarette per day, updated from progress note dated 10/16/17    Substance and Sexual Activity    Alcohol use: No       Alcohol/week: 0.0 standard drinks    Drug use: Yes       Types: Marijuana       Comment: occasional    Sexual activity: None   Lifestyle    Physical activity       Days per week: None       Minutes per session: None    Stress: None   Relationships    Social connections       Talks on phone: None       Gets together: None       Attends [employer] service: None       Active member of club or organization: None       Attends meetings of clubs or organizations: None       Relationship status: None    Intimate partner violence       Fear of current or ex partner: None       Emotionally abused: None       Physically abused: None       Forced sexual activity: None   Other Topics Concern    None   Social History Narrative    None            Family History:  Family History         Family History   Problem Relation Age of Onset    Cancer Father           prostate     Other Neg Hx           blood clots            Vitals:   /81   Pulse 64   Temp 97.2 °F (36.2 °C)   Ht 6' (1.829 m)   Wt 255 lb (115.7 kg)   BMI 34.58 kg/m²  Body mass index is 34.58 kg/m². Physical Examination:      Orthopedics     GENERAL: Alert and oriented X3 in no acute distress. SKIN: Visual inspection reveals no soft tissue swelling or festus abnormality, no rotational deformity, Intact without lesions or ulcerations. NEURO: Radial, Ulnar and Median nerves are intact to sensory and motor testing. Treatment : I reviewed the X-ray with the patient. We discussed the etiologies and natural histories of a trigger finger of the right pinky finger. We discussed the various treatment alternatives including anti-inflammatory medications, physical therapy, injections, further imaging studies and as a last result surgery. During today's visit, I explained to the patient that the chances of another injection working are only about 50%. The only way to fix a triggered finger from getting stuck/caught is a surgery to go in and cut the A-1 pulley. Therefore, the patient has elected in proceeding with a right small finger trigger release surgery. The risks/benefits/alternatives and potential complications have been discussed with the patient. We also had a long discussion regarding the procedure itself and expectation of the recovery. All questions and concerns with addressed. The patient was informed to stop his Aspirin a week before surgery. He can take Tylenol for pain post surgery since he cannot take NSAIDs. Patient was instructed to call our office with any question or concerns prior to the procedure occurs. A physical therapy prescription was not given. Patient should return to the clinic one week post surgery for his first post operative appointment to follow up with  Celsete Herr PA-C. The patient will call the office immediately with any problems. Encounter Medications    No orders of the defined types were placed in this encounter. No orders of the defined types were placed in this encounter.         Tracey Arauz MS, AT, ATC, am scribing for and in the presence of Sarah Ross D.O.. 2/11/2021  6:50 PM                 Electronically signed by Elvira Daniels DO, on 2/11/2021 at 6:50 PM Sabine Valencia DO, have personally seen this patient and I have reviewed the CC, PMH, FHX and Social History as provided by other clinical staff. I reassessed the HPI and ROS as scribed by Nanda Miranda ATC in my presence and it is both accurate and complete. Thereafter, I personally performed the PE, reviewed the imaging and established the DX and POC. I agree with the documentation provided by the . I have reviewed all documentation in its entirety prior to providing my signature indicating agreement. Any areas of disagreement are noted on the chart.      Electronically signed by Mone Nieves DO on 2/11/2021 at 6:52 PM                  Revision History

## 2021-02-24 NOTE — OP NOTE
Operative Note      Patient: Young Myles  YOB: 1950  MRN: [id number]    Date of Procedure: 2/24/2021    Pre-Op Diagnosis: RIGHT SMALL TRIGGER FINGER    Post-Op Diagnosis: Same       Procedure(s):  RIGHT SMALL FINGER TRIGGER RELEASE    Surgeon(s):  Jia Sanford DO    Assistant:   Resident: Tsering Bettencourt DO; Shekhar Robles DO    Anesthesia: Local    Estimated Blood Loss (mL): Minimal    Complications: None    Specimens:   * No specimens in log *    Implants:  * No implants in log *      Drains: * No LDAs found *    Findings: Right small finger triggering    Detailed Description of Procedure:   Patient understands risk and benefits of the procedure. Informed consent was signed. Operative site was marked. Patient was taken the operative suite and placed in the supine position on the operative table. The right arm was placed on an arm table. A 12 inch tourniquet was placed in the proximal forearm. A surgical timeout was performed. A metacarpal block, field block and ulnar nerve block was performed with a total of 20 mL of 1% lidocaine plain and 8 mL of half percent Marcaine plain. Patient was prepped draped in normal sterile fashion. Oblique incision was planned over top of the A1 pulley to the small finger. Incision was made. Blunt dissection was carried in line with the flexor tendons. Digital nerves were protected with retractors. The A1 pulley was identified and incised in line of the flexor tendons. The flexor tendons could be retracted from the wound. The patient cannot actively flex and extend the small finger without triggering present. Tourniquet was dropped. Wound was closed with 3-0 nylon suture. Bulky dressing was placed.   Patient was transferred to the recovery room    Electronically signed by Jia Sanford DO on 2/24/2021 at 5:25 PM

## 2021-02-24 NOTE — BRIEF OP NOTE
Brief Postoperative Note      Patient: Lane Devlin  YOB: 1950  MRN: [id number]    Date of Procedure: 2/24/2021    Pre-Op Diagnosis: RIGHT SMALL TRIGGER FINGER    Post-Op Diagnosis: RIGHT SMALL TRIGGER FINGER       Procedure(s):  RIGHT SMALL FINGER TRIGGER RELEASE    Surgeon(s):  Kathy Tijerina DO    Assistant:  Resident: Kelly Costa DO; Aubery Kocher, DO    Anesthesia: Local    Estimated Blood Loss (mL): Minimal    TT: 12 minutes    Complications: None    Specimens:   * No specimens in log *    Implants:  * No implants in log *      Drains: * No LDAs found *    Findings: RIGHT SMALL TRIGGER FINGER    Electronically signed by Leslie Hardwick DO on 2/24/2021 at 11:22 AM

## 2021-02-25 ENCOUNTER — CARE COORDINATION (OUTPATIENT)
Dept: OTHER | Facility: CLINIC | Age: 71
End: 2021-02-25

## 2021-02-25 NOTE — CARE COORDINATION
Ambulatory Care Coordination Note  2021  CM Risk Score: 3  Charlson 10 Year Mortality Risk Score: 100%     ACC: Lucy De Guzman RN    Summary Note: [address] [facility]) contacted the patient by telephone to follow up on progress, discuss new issues or concerns, and reinforce/ provide patient education. Verified name and  with patient as identifiers. Summary Note: Spoke with patient today. Who said he is doing better with his foot pain, states his finger is just sore, no pain, he has the dressing on his finger and is aware he can remove on 3rd post op day. He has a copy of his discharge instructions and is following them. Pt has completed follow ups with pcp, specialist and podiatry. Will plan for graduation on next call. Pt feels he will be ready at that time for graduation from program.     Reinforced/ Provided Education:  Discussed red flags and appropriate site of care based on symptoms and resources available to patient including: PCP and Specialist.     Importance and benefits of: Follow up with PCP and specialist, medication adherence, self monitoring and reporting of symptoms. Plan:  Continue monthly outreaches to provide telephonic support, education and resources as needed. Discuss / follow up on: Goal progress and  Status of foot pain      Pt verbalized understanding and is agreeable to follow up contact. Care Coordination Interventions    Program Enrollment: Rising Risk  Referral from Primary Care Provider: No  Suggested Interventions and Community Resources  Disease Specific Clinic: In Process (Comment: Rheumatology- Dr Silke Bustamante  Urology - Dr Lilian Patel .   Hematology/Oncology ( [address])  )  Other Services or Interventions: referred to Dr Lilian Patel in [facility]                 This Visit's Progress     Patient Stated (pt-stated)   On track     Pt will drink ample fluids to remain hydrated to assist with uti prevention    Barriers: none  Plan for overcoming my barriers: N/A  Confidence: 10/10  Anticipated Goal Completion Date: 7/29/2020 and ongoing  Pt is drinking fluids and making sure his urine is light yellow in color             Prior to Admission medications    Medication Sig Start Date End Date Taking? Authorizing Provider   tamsulosin (FLOMAX) 0.4 MG capsule Take 0.4 mg by mouth daily    Historical Provider, MD   HYDROcodone-acetaminophen (NORCO) 5-325 MG per tablet Take 1 tablet by mouth every 8 hours as needed for Pain for up to 7 days. 2/24/21 3/3/21  Tricia Dasilva DO   HYDROcodone-acetaminophen (NORCO)  MG per tablet Take 1 tablet by mouth every 6 hours as needed for Pain for up to 30 days. Intended supply: 30 days 2/15/21 3/17/21  Sonali Burnham DPM   LORazepam (ATIVAN) 0.5 MG tablet Take 1 tablet by mouth nightly as needed for Anxiety for up to 30 days. 2/10/21 3/12/21  Tia Chavira DO   JANUVIA 100 MG tablet TAKE 1 TABLET BY MOUTH ONE TIME A DAY 2/4/21   Tia Chavira DO   spironolactone (ALDACTONE) 50 MG tablet Take one tablet orally once daily 1/25/21   Tia Chavira DO   ferrous sulfate (IRON 325) 325 (65 Fe) MG tablet Take 325 mg by mouth every other day    Historical Provider, MD   vitamin B-12 (CYANOCOBALAMIN) 500 MCG tablet Take 500 mcg by mouth every other day    Historical Provider, MD   furosemide (LASIX) 40 MG tablet Take 1 tablet by mouth daily 11/16/20 2/24/21  Leonela Diaz MD   lactulose (CHRONULAC) 10 GM/15ML solution TAKE 15 MLS BY MOUTH THREE TIMES A DAY 10/16/20   Kusum Santiago DO   pregabalin (LYRICA) 100 MG capsule TAKE 1 CAPSULE BY MOUTH 3 TIMES A DAY  Patient taking differently: 2 times daily.  TAKE 1 CAPSULE BY MOUTH 3 TIMES A DAY 7/1/20 2/24/21  Tia Chavira DO   Cholecalciferol (VITAMIN D) 50 MCG (2000 UT) CAPS capsule Take 1 capsule by mouth daily 2/24/20   Tia Chavira DO   omeprazole (PRILOSEC) 20 MG delayed release capsule Take 2 capsules by mouth daily 2/24/20 2/24/21  Tia Chavira,    NIFEdipine (PROCARDIA XL) 60 MG extended release tablet Take 60 mg by mouth daily  6/12/19   Historical Provider, MD   allopurinol (ZYLOPRIM) 300 MG tablet Take 600 mg by mouth daily Takes 2 tabs (=600mg) daily 3/27/19   [facility], APRN - CNP       Future Appointments   Date Time Provider Phuc Macias   3/3/2021 11:15 AM [address] Eli Mott, PA-C Sports Med [facility]   3/8/2021 11:10 AM Vikas Rosa MD [facility] Ne[facility] None   5/26/2021  1:00 PM MD denver Garcia exc [facility]

## 2021-03-02 NOTE — PROGRESS NOTES
[facility] AND SPORTS MEDICINE  [address] [facility] [address]  Dept: [phone]  Dept Fax: [phone]        Post Operative Follow Up    Subjective:   No chief complaint on file. Post Op Surgery:     The patient is here for a follow up after having a RIGHT SMALL FINGER TRIGGER RELEASE. The date of surgery was on 2/24/2021. Therefore we are 1 weeks post op. Currently the patient's pain is controlled with [place]. Patient states they are not experiencing any triggering of his finger. He is happy with the results of his surgery. Review of Systems   Constitutional: Positive for activity change. Negative for appetite change, fatigue and fever. Respiratory: Negative. Negative for apnea, cough, chest tightness and shortness of breath. Cardiovascular: Negative. Negative for chest pain, palpitations and leg swelling. Gastrointestinal: Negative for abdominal distention, abdominal pain, constipation, diarrhea, nausea and vomiting. Genitourinary: Negative for difficulty urinating, dysuria and hematuria. Musculoskeletal: Positive for joint swelling. Negative for arthralgias, gait problem and myalgias. Skin: Negative for color change and rash. Neurological: Negative for dizziness, weakness, numbness and headaches. Psychiatric/Behavioral: Negative for sleep disturbance. I have reviewed the CC, HPI, ROS, PMH, FHX, Social History, and if not present in this note, I have reviewed in the patient's chart. I agree with the documentation provided by other staff and have reviewed their documentation prior to providing my signature indicating agreement. Vitals: There were no vitals taken for this visit. There is no height or weight on file to calculate BMI. Physical Examination:     Orthopedics:    GENERAL: Alert and oriented X3 in no acute distress. SKIN: Intact without lesions or ulcerations. Incision is healing well with no signs of infection. Sutures were removed  NEURO: Intact to sensory and motor testing. VASC: Capillary refill is less than 3 seconds. Post Op Exam:    LOCATION: Right small finger  SITE: Distal neurocirculatory status is intact. EXAM: Sensation is intact to light touch, there is full motor function of the extremity. ROM: Full and nonpainful  Procedures:     Procedure:  No    Radiology:   No results found. Assessment:     1. Trigger ring finger of right hand      Plan:   Post Op Treatment : Patient had the treatment regimen reviewed today 1 weeks s/p RIGHT SMALL FINGER TRIGGER RELEASE. I reviewed the films with the patient. We discussed some of the etiologies and natural histories of recovery after a trigger finger release. We also discussed the various treatment alternatives including anti-inflammatory medications, physical therapy, injections, further imaging studies and as a last resort surgery. During today's visit, we discussed that he is doing really well and he is happy with the results of his surgery. He needs to keep his incision clean and dry. It should not get red hot and swollen and if it does he should follow-up in the office right away. He should not submerge his hand in any dirty water. Patient should return to the office in 3 weeks to f/u with Chivo Montelongo D.O. The patient will call the office immediately with any problems that may arise. No orders of the defined types were placed in this encounter. No orders of the defined types were placed in this encounter. Liz Gandhi PA-C, have personally seen this patient, reviewed the chart including history, and imaging if done. I personally  performed the physical exam and obtained any needed additional history. I placed orders, performed or supervised procedures and developed the treatment plan. Electronically signed by Rojean Nageotte, PA-C, on 3/3/2021 at 5:23 PM      Electronically signed by Rojean Nageotte, PA-C, on 3/2/2021 at 5:23 PM

## 2021-03-03 ENCOUNTER — OFFICE VISIT (OUTPATIENT)
Dept: ORTHOPEDIC SURGERY | Age: 71
End: 2021-03-03

## 2021-03-03 VITALS
DIASTOLIC BLOOD PRESSURE: 72 MMHG | BODY MASS INDEX: 35.21 KG/M2 | WEIGHT: 260 LBS | HEART RATE: 69 BPM | SYSTOLIC BLOOD PRESSURE: 112 MMHG | TEMPERATURE: 98.2 F | RESPIRATION RATE: 12 BRPM | HEIGHT: 72 IN

## 2021-03-03 DIAGNOSIS — M65.341 TRIGGER RING FINGER OF RIGHT HAND: Primary | ICD-10-CM

## 2021-03-03 PROCEDURE — 99024 POSTOP FOLLOW-UP VISIT: CPT | Performed by: PHYSICIAN ASSISTANT

## 2021-03-03 ASSESSMENT — ENCOUNTER SYMPTOMS
ABDOMINAL DISTENTION: 0
VOMITING: 0
RESPIRATORY NEGATIVE: 1
CHEST TIGHTNESS: 0
APNEA: 0
CONSTIPATION: 0
NAUSEA: 0
DIARRHEA: 0
SHORTNESS OF BREATH: 0
ABDOMINAL PAIN: 0
COUGH: 0
COLOR CHANGE: 0

## 2021-03-24 DIAGNOSIS — M25.551 RIGHT HIP PAIN: Primary | ICD-10-CM

## 2021-03-25 ENCOUNTER — OFFICE VISIT (OUTPATIENT)
Dept: ORTHOPEDIC SURGERY | Age: 71
End: 2021-03-25
Payer: COMMERCIAL

## 2021-03-25 ENCOUNTER — CARE COORDINATION (OUTPATIENT)
Dept: OTHER | Facility: CLINIC | Age: 71
End: 2021-03-25

## 2021-03-25 VITALS
DIASTOLIC BLOOD PRESSURE: 70 MMHG | OXYGEN SATURATION: 94 % | HEIGHT: 72 IN | TEMPERATURE: 98.2 F | WEIGHT: 260.4 LBS | HEART RATE: 63 BPM | SYSTOLIC BLOOD PRESSURE: 110 MMHG | BODY MASS INDEX: 35.27 KG/M2

## 2021-03-25 DIAGNOSIS — M25.851 RIGHT HIP IMPINGEMENT SYNDROME: Primary | ICD-10-CM

## 2021-03-25 DIAGNOSIS — M43.06 LUMBAR SPONDYLOLYSIS: ICD-10-CM

## 2021-03-25 PROCEDURE — 20611 DRAIN/INJ JOINT/BURSA W/US: CPT | Performed by: ORTHOPAEDIC SURGERY

## 2021-03-25 PROCEDURE — 99213 OFFICE O/P EST LOW 20 MIN: CPT | Performed by: ORTHOPAEDIC SURGERY

## 2021-03-25 RX ORDER — LIDOCAINE HYDROCHLORIDE 10 MG/ML
4 INJECTION, SOLUTION INFILTRATION; PERINEURAL ONCE
Status: COMPLETED | OUTPATIENT
Start: 2021-03-25 | End: 2021-03-25

## 2021-03-25 RX ORDER — METHYLPREDNISOLONE ACETATE 40 MG/ML
40 INJECTION, SUSPENSION INTRA-ARTICULAR; INTRALESIONAL; INTRAMUSCULAR; SOFT TISSUE ONCE
Status: COMPLETED | OUTPATIENT
Start: 2021-03-25 | End: 2021-03-25

## 2021-03-25 RX ADMIN — LIDOCAINE HYDROCHLORIDE 4 ML: 10 INJECTION, SOLUTION INFILTRATION; PERINEURAL at 13:23

## 2021-03-25 RX ADMIN — METHYLPREDNISOLONE ACETATE 40 MG: 40 INJECTION, SUSPENSION INTRA-ARTICULAR; INTRALESIONAL; INTRAMUSCULAR; SOFT TISSUE at 13:23

## 2021-03-25 ASSESSMENT — ENCOUNTER SYMPTOMS
VOMITING: 0
CONSTIPATION: 0
COLOR CHANGE: 0
APNEA: 0
ABDOMINAL DISTENTION: 0
CHEST TIGHTNESS: 0
COUGH: 0
NAUSEA: 0
SHORTNESS OF BREATH: 0
ABDOMINAL PAIN: 0
DIARRHEA: 0

## 2021-03-25 NOTE — PROGRESS NOTES
[facility]  [address] [place] [id number]  Dept: [phone]  Dept Fax: [phone]        Right Hip Office Visit    Subjective:     Chief Complaint   Patient presents with    Hip Pain     right hip pain, labral year, MRI- 1/25/19, CT- 10/15/19     HPI:     Suleman Bledsoe presents with a 5 year history of pain in the right hip since 2016. The pain does not radiate into the thigh and into the groin. The pain is not present over the lateral aspect of the hip but it is present posteriorly. The pain is most troublesome during ambulatory activity and now limits the patient`s walking distance to shorter distances. Night pain, which disturbs the patient`s sleep is not a problem. The patient has had to give up some activities such as working without pain, which has produced a consequent deterioration in quality of life. He has tried norco, sleeping and reduction of activity and reports no improvement. Back pain is present and does interfere with the patient`s life as does the hip. The patient has had a cortisone injection into the intraarticular joint capsule by Dr. Narciso Brito on 09/2019 with good pain relief. Patient also states that he has had epidural injections in the past by Dr. Dunia Tamayo. The patient has not tried physical therapy. The patient has not had surgery. The opposite hip is okay. Knee pain is not noted. 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 He also states that he has to sit down very frequently and anytime he is outside of his home, he states that he looks for the nearest seat so he can sit down. Patient also states that he has been having issues with his liver and he has edema in his legs. ROS:     Review of Systems   Constitutional: Positive for activity change. Negative for appetite change.    Respiratory: tubular adenoma x2; hyperplastic polyp    FINGER TRIGGER RELEASE Right 02/24/2021    pinky finger    FINGER TRIGGER RELEASE Right 2/24/2021    RIGHT SMALL FINGER TRIGGER RELEASE performed by Emerald Nguyen DO at [facility] ARTHROSCOPY Right     OTHER SURGICAL HISTORY Right 01/25/2016    10 liters removed peracentesis    PARACENTESIS  11/04/2015    PARACENTESIS  12/28/2015    DC EGD TRANSORAL BIOPSY SINGLE/MULTIPLE N/A 08/30/2017    EGD BIOPSY performed [facility] Faizan [facility]egas [facility] at [facility]  06/2011    TONSILLECTOMY      UPPER GASTROINTESTINAL ENDOSCOPY  10/24/2013    small segment barretts    UPPER GASTROINTESTINAL ENDOSCOPY  08/30/2017    VEIN SURGERY Left 12/07/2016    leg vein stripping    VENTRAL HERNIA REPAIR  05/26/2015     Current Medications:   Current Outpatient Medications   Medication Sig Dispense Refill    tamsulosin (FLOMAX) 0.4 MG capsule Take 0.4 mg by mouth daily      JANUVIA 100 MG tablet TAKE 1 TABLET BY MOUTH ONE TIME A DAY 90 tablet 2    spironolactone (ALDACTONE) 50 MG tablet Take one tablet orally once daily 90 tablet 1    ferrous sulfate (IRON 325) 325 (65 Fe) MG tablet Take 325 mg by mouth every other day      vitamin B-12 (CYANOCOBALAMIN) 500 MCG tablet Take 500 mcg by mouth every other day      furosemide (LASIX) 40 MG tablet Take 1 tablet by mouth daily 90 tablet 3    lactulose (CHRONULAC) 10 GM/15ML solution TAKE 15 MLS BY MOUTH THREE TIMES A DAY 1992 mL 5    pregabalin (LYRICA) 100 MG capsule TAKE 1 CAPSULE BY MOUTH 3 TIMES A DAY (Patient taking differently: 100 mg 2 times daily.  ) 180 capsule 5    Cholecalciferol (VITAMIN D) 50 MCG (2000 UT) CAPS capsule Take 1 capsule by mouth daily 90 capsule 5    omeprazole (PRILOSEC) 20 MG delayed release capsule Take 2 capsules by mouth daily 180 capsule 5    NIFEdipine (PROCARDIA XL) 60 MG extended release tablet Take 60 mg by mouth daily       allopurinol (ZYLOPRIM) 300 MG tablet Take 600 mg by mouth daily Takes 2 tabs (=600mg) daily 90 tablet 1     No current facility-administered medications for this visit.       Allergies:    Nsaids    Social History:   Social History     Socioeconomic History    Marital status:      Spouse name: None    Number of children: None    Years of education: None    Highest education level: None   Occupational History    None   Social Needs    Financial resource strain: Not hard at all   [address] insecurity     Worry: None     Inability: None    Transportation needs     Medical: No     Non-medical: No   Tobacco Use    Smoking status: Former Smoker     Packs/day: 1.00     Years: 49.00     Pack years: 49.00     Types: Cigarettes     Start date: 1966     Quit date: 2015     Years since quittin.2    Smokeless tobacco: Never Used    Tobacco comment: maybe 1 cigarette per day, updated from progress note dated 10/16/17    Substance and Sexual Activity    Alcohol use: No     Alcohol/week: 0.0 standard drinks    Drug use: Yes     Types: Marijuana     Comment: occasional    Sexual activity: None   Lifestyle    Physical activity     Days per week: None     Minutes per session: None    Stress: None   Relationships    Social connections     Talks on phone: None     Gets together: None     Attends Rastafarian service: None     Active member of club or organization: None     Attends meetings of clubs or organizations: None     Relationship status: None    Intimate partner violence     Fear of current or ex partner: None     Emotionally abused: None     Physically abused: None     Forced sexual activity: None   Other Topics Concern    None   Social History Narrative    None     Family History:  Family History   Problem Relation Age of Onset    Cancer Father         prostate     Other Neg Hx         blood clots     Vitals:   /70 (Site: Left Upper Arm, Position: Sitting, Cuff Size: Large Adult)   Pulse 63   Temp 98.2 °F (36.8 °C)   Ht 6' (1.829 m)   Wt 260 lb 6.4 oz (118.1 kg)   SpO2 94%   BMI 35.32 kg/m²  Body mass index is 35.32 kg/m². Physical Examination:     Orthopedics:    GENERAL: Alert and oriented X3 in no acute distress. SKIN: Intact without lesions or ulcerations. NEURO: Intact to sensory and motor testing. VASC: Capillary refill is less than 3 seconds. Edema noted in the BLE. Right Hip     GEN: Alert and oriented X 3, in no acute distress. GAIT: The patient's gait was observed while entering the exam room and was noted to be non antalgic. The extremity is in anatomic alignment. SKIN: Intact without rashes, lesions, or ulcerations. No obvious deformity or swelling. NEURO: The patient responds to light touch throughout right LE. Patellar and Achilles reflexes are 2/4. VASC: The right LE is neurovascularly intact with 2/4 DP and 2/4 PT pulses. Brisk capillary refill. ROM: 90 degrees flexion, 30 degrees abduction, 30 degrees adduction, 10 degrees of internal rotation, 30 degrees of external rotation. Pain with FADIR. SI joint pain with BECKIE. MUSC: Strength is 4/5 flexion, abduction, internal rotation, external rotation. PALP: The patient is not tender to palpation over the greater trochanter, right paraspinal SI joint pain,   TEST: Log roll is (+) and recreates SI joint pain. No apparent leg length discrepancy. Anterior joint pain noted with (+) Stenchfield test. Negative Trendelenburg test. Negative Surendra's test. No labral clunks. No pain on compression. Assessment:     1. Right hip impingement syndrome    2. Lumbar spondylolysis      Procedures:    Procedure: yes    INTRA-ARTICULAR HIP INJECTION    Location: Right Hip  Procedure: Lilli Montes agreed today for a Corticosteroid injection into the right hip joint. The patient was placed in the lateral position with the affected side up.   Utilizing [employer] ultrasound unit with a variable frequency linear transducer for precise placement with the patient supine, the hip joint is found. Vascular settings on the ultrasound are used to identify the neurovascular in the groin. Utilizing sterile technique a 5 cc solution containing 4cc of 1% Lidocaine and 1 cc containing 40mg of depomedrol was injected into the hip joint with a 22 gu. spinal needle. The wound was cleansed and a Band-Aid was placed. The patient tolerated the procedure without difficulty. Adverse reactions of the injection was discussed with the patient including signs of infection (increasing pain, redness, swelling) and the patient was instructed to call immediately with any of these symptoms. The images are stored on SD card in the machine until downloaded to the patient's chart. Radiology:   No results found. Plan:   Treatment : I reviewed the X-ray, MRI and CT Scan with the patient and I informed them that the hip does not have any evident fractures that I can delineate but there is arthritis with a labral tear. We discussed the etiologies and natural histories of Tear of the labrum in the right hip. We discussed the various treatment alternatives including anti-inflammatory medications, physical therapy, injections, further imaging studies and as a last result surgery. During today's visit, I explained to the patient that his labral tear is what is causing him to have the groin pain that he had during my physical examination. However, in terms of his hi pain that he complained of today, I explained to him that I feel his pain is coming from his lumbar spine and not his hip based on where he pointed at the beginning of the visit. I then explained to him that I feel it would be best for us to have him return back to Dr. Aziza Osborn where they can do an epidural injection to try and relieve the pain. But today, I explained to the patient that we can try a hip injection today to see if it helps alleviate some of his pain since he really feels the hip is the pain generator of where he pointed.  Then once we do the injection, I explained to him that he should keep a record of the injection relief that he may get from the injection today and he should walk around to see how he feels. Now let's say he does not have any pain relief from the hip injection, I explained to the patient that we will get an x-ray and MRI of the lumbar spine and we will possibly refer him back to a spine doctor so he can try the epidural injections again to see if that is the main pain generator. The patient then stated that he understands the plan and at this time, the patient has opted for a cortisone injection into the intraarticular joint capsule of the Right hip to help reduce inflammation and pain. The injection site should never get red, hot, or swollen and if it does the patient will contact our office right away. The patient may experience a increase in soreness the first 24-48 hours due to a cortisone flair and can take anti-inflammatories for a short period of time to reduce that soreness. The patient should not submerge the injection site in water for a minimum of 24 hours to avoid infection. This means no lakes, pools, ponds, or hot tubs for 24 hours. If the patient is diabetic the injection may increase their blood sugar for up to one week. The patient can do this cortisone injection once every 3 months as needed. If the injections stop working and do not give the patient relief the patient should consider surgical interventions to produce long term relief. Patient should return to the clinic in 4- 6 weeks to follow up with Ron Verdugo D.O. The patient will call the office immediately with any problems. Orders Placed This Encounter   Medications    methylPREDNISolone acetate (DEPO-MEDROL) injection 40 mg    lidocaine 1 % injection 4 mL       No orders of the defined types were placed in this encounter. Rachana Bennett Day V, am scribing for and in the presence of Ron Verdugo D. O.. 4/4/2021  4:15 PM      oRn NUNES DO, have personally seen this patient and I have reviewed the CC, PMH, FHX and Social History as provided by other clinical staff. I reassessed the HPI and ROS as scribed by Daksha Lieberman Medical Scribe in my presence and it is both accurate and complete. Thereafter, I personally performed the PE, reviewed the imaging and established the DX and POC. I agree with the documentation provided by the Medical Scribe. I have reviewed all documentation in its entirety prior to providing my signature indicating agreement. Any areas of disagreement are noted on the chart. Electronically signed by Dali Hood DO on 4/4/2021 at 4:15 PM        I spent 20 minutes face to face time with this patient.      Electronically signed by Dali Hood DO, on 4/4/2021 at 4:15 PM

## 2021-03-25 NOTE — CARE COORDINATION
Authorizing Provider   tamsulosin (FLOMAX) 0.4 MG capsule Take 0.4 mg by mouth daily    Historical Provider, MD   JANUVIA 100 MG tablet TAKE 1 TABLET BY MOUTH ONE TIME A DAY 2/4/21   Lou Alvares DO   spironolactone (ALDACTONE) 50 MG tablet Take one tablet orally once daily 1/25/21   Lou Alvares DO   ferrous sulfate (IRON 325) 325 (65 Fe) MG tablet Take 325 mg by mouth every other day    Historical Provider, MD   vitamin B-12 (CYANOCOBALAMIN) 500 MCG tablet Take 500 mcg by mouth every other day    Historical Provider, MD   furosemide (LASIX) 40 MG tablet Take 1 tablet by mouth daily 11/16/20 3/25/21  Arlet Snider MD   lactulose (CHRONULAC) 10 GM/15ML solution TAKE 15 MLS BY MOUTH THREE TIMES A DAY 10/16/20   Kusum Chandler DO   pregabalin (LYRICA) 100 MG capsule TAKE 1 CAPSULE BY MOUTH 3 TIMES A DAY  Patient taking differently: 100 mg 2 times daily.   7/1/20 3/25/21  Lou Alvares DO   Cholecalciferol (VITAMIN D) 50 MCG (2000 UT) CAPS capsule Take 1 capsule by mouth daily 2/24/20   Lou Alvares DO   omeprazole (PRILOSEC) 20 MG delayed release capsule Take 2 capsules by mouth daily 2/24/20 3/25/21  Lou Alvares DO   NIFEdipine (PROCARDIA XL) 60 MG extended release tablet Take 60 mg by mouth daily  6/12/19   Historical Provider, MD   allopurinol (ZYLOPRIM) 300 MG tablet Take 600 mg by mouth daily Takes 2 tabs (=600mg) daily 3/27/19   KELSY Tolbert - CNP       Future Appointments   Date Time Provider Phuc Macias   4/22/2021 11:50 AM [address] [facility] Joshi   5/26/2021  1:00 PM Angela Chaudhry MD [id number]   9/20/2021 11:10 AM Arlet Snider MD [facility] None

## 2021-04-21 DIAGNOSIS — M25.851 RIGHT HIP IMPINGEMENT SYNDROME: Primary | ICD-10-CM

## 2021-04-21 DIAGNOSIS — M43.06 LUMBAR SPONDYLOLYSIS: ICD-10-CM

## 2021-04-22 ENCOUNTER — HOSPITAL ENCOUNTER (OUTPATIENT)
Age: 71
Setting detail: SPECIMEN
Discharge: HOME | End: 2021-04-22
Payer: COMMERCIAL

## 2021-04-22 ENCOUNTER — OFFICE VISIT (OUTPATIENT)
Dept: ORTHOPEDIC SURGERY | Age: 71
End: 2021-04-22
Payer: COMMERCIAL

## 2021-04-22 VITALS
HEART RATE: 76 BPM | SYSTOLIC BLOOD PRESSURE: 128 MMHG | DIASTOLIC BLOOD PRESSURE: 78 MMHG | HEIGHT: 72 IN | WEIGHT: 260 LBS | BODY MASS INDEX: 35.21 KG/M2

## 2021-04-22 DIAGNOSIS — N18.31 STAGE 3A CHRONIC KIDNEY DISEASE (HCC): ICD-10-CM

## 2021-04-22 DIAGNOSIS — M25.851 RIGHT HIP IMPINGEMENT SYNDROME: ICD-10-CM

## 2021-04-22 DIAGNOSIS — M17.11 PRIMARY OSTEOARTHRITIS OF RIGHT KNEE: Primary | ICD-10-CM

## 2021-04-22 DIAGNOSIS — M48.061 SPINAL STENOSIS OF LUMBAR REGION, UNSPECIFIED WHETHER NEUROGENIC CLAUDICATION PRESENT: ICD-10-CM

## 2021-04-22 LAB
ANION GAP SERPL CALCULATED.3IONS-SCNC: 13 MMOL/L (ref 9–17)
BUN BLDV-MCNC: 22 MG/DL (ref 8–23)
BUN/CREAT BLD: ABNORMAL (ref 9–20)
CALCIUM IONIZED: 1.25 MMOL/L (ref 1.13–1.33)
CALCIUM SERPL-MCNC: 9.3 MG/DL (ref 8.6–10.4)
CHLORIDE BLD-SCNC: 101 MMOL/L (ref 98–107)
CO2: 23 MMOL/L (ref 20–31)
CREAT SERPL-MCNC: 1.12 MG/DL (ref 0.7–1.2)
GFR AFRICAN AMERICAN: >60 ML/MIN
GFR NON-AFRICAN AMERICAN: >60 ML/MIN
GFR SERPL CREATININE-BSD FRML MDRD: ABNORMAL ML/MIN/{1.73_M2}
GFR SERPL CREATININE-BSD FRML MDRD: ABNORMAL ML/MIN/{1.73_M2}
GLUCOSE BLD-MCNC: 134 MG/DL (ref 70–99)
HCT VFR BLD CALC: 45.1 % (ref 40.7–50.3)
HEMOGLOBIN: 14.8 G/DL (ref 13–17)
MCH RBC QN AUTO: 32.5 PG (ref 25.2–33.5)
MCHC RBC AUTO-ENTMCNC: 32.8 G/DL (ref 28.4–34.8)
MCV RBC AUTO: 99.1 FL (ref 82.6–102.9)
NRBC AUTOMATED: 0 PER 100 WBC
PDW BLD-RTO: 13.9 % (ref 11.8–14.4)
PLATELET # BLD: 267 K/UL (ref 138–453)
PMV BLD AUTO: 10.8 FL (ref 8.1–13.5)
POTASSIUM SERPL-SCNC: 4.3 MMOL/L (ref 3.7–5.3)
PTH INTACT: 62.83 PG/ML (ref 15–65)
RBC # BLD: 4.55 M/UL (ref 4.21–5.77)
SODIUM BLD-SCNC: 137 MMOL/L (ref 135–144)
VITAMIN D 25-HYDROXY: 38.9 NG/ML (ref 30–100)
WBC # BLD: 11 K/UL (ref 3.5–11.3)

## 2021-04-22 PROCEDURE — 99213 OFFICE O/P EST LOW 20 MIN: CPT | Performed by: ORTHOPAEDIC SURGERY

## 2021-04-22 PROCEDURE — 20610 DRAIN/INJ JOINT/BURSA W/O US: CPT | Performed by: ORTHOPAEDIC SURGERY

## 2021-04-22 RX ORDER — METHYLPREDNISOLONE ACETATE 40 MG/ML
40 INJECTION, SUSPENSION INTRA-ARTICULAR; INTRALESIONAL; INTRAMUSCULAR; SOFT TISSUE ONCE
Status: COMPLETED | OUTPATIENT
Start: 2021-04-22 | End: 2021-04-22

## 2021-04-22 RX ORDER — LIDOCAINE HYDROCHLORIDE 10 MG/ML
4 INJECTION, SOLUTION INFILTRATION; PERINEURAL ONCE
Status: COMPLETED | OUTPATIENT
Start: 2021-04-22 | End: 2021-04-22

## 2021-04-22 RX ADMIN — LIDOCAINE HYDROCHLORIDE 4 ML: 10 INJECTION, SOLUTION INFILTRATION; PERINEURAL at 12:55

## 2021-04-22 RX ADMIN — METHYLPREDNISOLONE ACETATE 40 MG: 40 INJECTION, SUSPENSION INTRA-ARTICULAR; INTRALESIONAL; INTRAMUSCULAR; SOFT TISSUE at 12:55

## 2021-04-22 ASSESSMENT — ENCOUNTER SYMPTOMS
VOMITING: 0
APNEA: 0
ABDOMINAL DISTENTION: 0
COUGH: 0
COLOR CHANGE: 0
CONSTIPATION: 0
NAUSEA: 0
ABDOMINAL PAIN: 0
CHEST TIGHTNESS: 0
SHORTNESS OF BREATH: 0
DIARRHEA: 0

## 2021-04-22 NOTE — PROGRESS NOTES
[facility]  [address] [place] [id number]  Dept: [phone]  Dept Fax: [phone]        Right Hip Office Visit    Subjective:     Chief Complaint   Patient presents with    Hip Pain     Right hip labral tear, intra-articular injection- 3/25/21    Knee Pain     Right knee pain     HPI:     Tomi Velazquez presents with a 5 year history of pain in the right hip. The pain does not radiate into the thigh and into the groin. The pain is not present over the lateral aspect of the hip. The pain is most troublesome during ambulatory activity and now limits the patient`s walking distance to shorter distances. Night pain, which disturbs the patient`s sleep is a problem. The patient has had to give up some activities such as walking long walks, sleeping comfortably at night and working, which has produced a consequent deterioration in quality of life. He has tried sleeping, norco and reduction of activity and reports no improvement. Back pain is present but is tolerable and does not interfere with the patient`s life as does the hip. The patient has had a cortisone injection into the intra-articular joint capsule on 03/25/2021 with mild pain relief for two weeks. The patient has not tried physical therapy. The patient has not had surgery. The opposite hip is okay. Knee pain is moderately noted. Patient has had an MRI of the right hip. Patient has had an EMG/NCS and a CT scan that showed right foraminal narrowing in his low back. Tomi Velazquez presents today for right knee pain. The pain has been present for 3 weeks. The patient recalls no specific injury. The patient has tried norco and rest with minimal improvement. The pain is now described as dull and achy. There is pain on weight bearing. The knee not swelled. There is no painful popping and clicking. The knee has not caught or locked up.  The knee feels it wants to give out but it has not given out. It is stiff upon arising from sitting. It is painful to go up and down stairs and sit for a prolonged time. Patient has not had a cortisone injection. Patient has not had a lubricating injection. Patient states that he had a scope done on his right knee in 1986 and he states that his knee felt good up until recently. ROS:     Review of Systems   Constitutional: Positive for activity change. Negative for appetite change. Respiratory: Negative for apnea, cough, chest tightness and shortness of breath. Cardiovascular: Negative for chest pain, palpitations and leg swelling. Gastrointestinal: Negative for abdominal distention, abdominal pain, constipation, diarrhea, nausea and vomiting. Genitourinary: Negative for difficulty urinating, dysuria and hematuria. Musculoskeletal: Positive for arthralgias. Negative for gait problem, joint swelling and myalgias. Skin: Negative for color change and rash. Neurological: Negative for dizziness, weakness, numbness and headaches. Psychiatric/Behavioral: Negative for sleep disturbance.      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 Mai Rubin [facility] at [facility] for [facility] Trans[facility]    History of alcohol use 9/18/2015    Hyperlipidemia     Hypertension     Lumbago     Lymphedema     Malignant neoplasm of prostate ([address]     Otalgia of right ear     radiates down the jaw in the distribution of the third branch of the trigemminal nerve.     Portal venous hypertension ([address] 07/15/2020    Sciatica     Splenomegaly 07/15/2020     Past Surgical History:    Past Surgical History:   Procedure Laterality Date    [facility]PA[facility]  [facility]/03/2020    [facility]    BACK SURGERY      L4-5    CARDIAC CATHETERIZATION  10.30/2019    Dr. Gayathri Vidales COLONOSCOPY  12/05/2016    [facility], states due for another in 2  years    COLONOSCOPY N/A 10/07/2019    tubular adenoma x2; hyperplastic polyp    FINGER TRIGGER RELEASE Right 02/24/2021    pinky finger    FINGER TRIGGER RELEASE Right 2/24/2021    RIGHT SMALL FINGER TRIGGER [facility]     OTHER SURGICAL HISTORY Right 01/25/2016    10 liters removed peracentesis    PARACENTESIS  11/04/2015    PARACENTESIS  12/28/2015    MO EGD TRANSORAL BIOPSY SINGLE/MULTIPLE N/A 08/30/2017    EGD BIOPSY [facility]  06/2011    TONSILLECTOMY      UPPER GASTROINTESTINAL ENDOSCOPY  10/24/2013    small segment barretts    UPPER GASTROINTESTINAL ENDOSCOPY  08/30/2017    VEIN SURGERY Left 12/07/2016    leg vein stripping    VENTRAL HERNIA REPAIR  05/26/2015     Current Medications:   Current Outpatient Medications   Medication Sig Dispense Refill    furosemide (LASIX) 40 MG tablet Take 1 tablet by mouth 2 times daily 90 tablet 3    tamsulosin (FLOMAX) 0.4 MG capsule Take 0.4 mg by mouth daily      JANUVIA 100 MG tablet TAKE 1 TABLET BY MOUTH ONE TIME A DAY 90 tablet 2    spironolactone (ALDACTONE) 50 MG tablet Take one tablet orally once daily 90 tablet 1    ferrous sulfate (IRON 325) 325 (65 Fe) MG tablet Take 325 mg by mouth every other day      vitamin B-12 (CYANOCOBALAMIN) 500 MCG tablet Take 500 mcg by mouth every other day      lactulose (CHRONULAC) 10 GM/15ML solution TAKE 15 MLS BY MOUTH THREE TIMES A DAY 1992 mL 5    Cholecalciferol (VITAMIN D) 50 MCG ( UT) CAPS capsule Take 1 capsule by mouth daily 90 capsule 5    NIFEdipine (PROCARDIA XL) 60 MG extended release tablet Take 60 mg by mouth daily       allopurinol (ZYLOPRIM) 300 MG tablet Take 600 mg by mouth daily Takes 2 tabs (=600mg) daily 90 tablet 1    pregabalin (LYRICA) 100 MG capsule TAKE 1 CAPSULE BY MOUTH 3 TIMES A DAY (Patient taking differently: 100 mg 2 times daily. ) 180 capsule 5    omeprazole (PRILOSEC) 20 MG delayed release capsule Take 2 capsules by mouth daily 180 capsule 5     No current facility-administered medications for this visit.       Allergies:    Nsaids    Social History:   Social History     Socioeconomic History    Marital status:      Spouse name: None    Number of children: None    Years of education: None    Highest education level: None   Occupational History    None   Social Needs    Financial resource strain: Not hard at all   Karlie-Joss insecurity     Worry: None     Inability: None    Transportation needs     Medical: No     Non-medical: No   Tobacco Use    Smoking status: Former Smoker     Packs/day: 1.00     Years: 49.00     Pack years: 49.00     Types: Cigarettes     Start date: 1966     Quit date: 2015     Years since quittin.3    Smokeless tobacco: Never Used    Tobacco comment: maybe 1 cigarette per day, updated from progress note dated 10/16/17    Substance and Sexual Activity    Alcohol use: No     Alcohol/week: 0.0 standard drinks    Drug use: Yes     Types: Marijuana     Comment: occasional    Sexual activity: None   Lifestyle    Physical activity     Days per week: None     Minutes per session: None    Stress: None   Relationships    Social connections     Talks on phone: None     Gets together: None     Attends [employer] service: None     Active member of club or organization: None     Attends meetings of clubs or organizations: None     Relationship status: None    Intimate partner violence     Fear of current or ex partner: None     Emotionally abused: None     Physically abused: None     Forced sexual activity: None   Other Topics Concern    None   Social History Narrative    None     Family History:  Family History   Problem Relation Age of Onset    Cancer Father         prostate     Other Neg Hx         blood clots     Vitals:   /78   Pulse 76   Ht 6' (1.829 m)   Wt 260 lb (117.9 kg)   BMI 35.26 kg/m²  Body mass index is 35.26 kg/m². Physical Examination:     Orthopedics:    GENERAL: Alert and oriented X3 in no acute distress. SKIN: Intact without lesions or ulcerations. NEURO: Intact to sensory and motor testing. VASC: Capillary refill is less than 3 seconds. Right Hip     GEN: Alert and oriented X 3, in no acute distress. GAIT: The patient's gait was observed while entering the exam room and was noted to be non antalgic. The extremity is in anatomic alignment. SKIN: Intact without rashes, lesions, or ulcerations. No obvious deformity or swelling. NEURO: The patient responds to light touch throughout right LE. Patellar and Achilles reflexes are 2/4. VASC: The right LE is neurovascularly intact with 2/4 DP and 2/4 PT pulses. Brisk capillary refill. ROM: 95 degrees flexion, 40 degrees abduction, 30 degrees adduction, 10 degrees of internal rotation, 70 degrees of external rotation able to independently straight leg raise. No pain with FADIR or BECKIE. MUSC: Strength is 5/5 flexion, abduction, internal rotation, external rotation. PALP: The patient is not tender to palpation over the greater trochanter. TEST: Log roll is (-). No apparent leg length discrepancy. Negative Trendelenburg test. Negative Surendra's test. No labral clunks. No pain on compression. Knee Exam    LOCATION:  Right Knee  GEN: Alert and oriented X 3, in no acute distress. GAIT: The patient's gait was observed while entering the exam room and was noted to be non antalgic.  The extremity is in anatomic alignment. SKIN: Intact without rashes, lesions, or ulcerations. No obvious deformity or swelling. NEURO: The patient responds to light touch throughout right LE. Patellar and Achilles reflexes are 2/4. VASC: The right LE is neurovascularly intact with 2/4 DP and 2/4 PT pulses. Brisk capillary refill. ROM: 0/130 degrees. There is no effusion. MUSC: decreased quad tone  LIGAMENT: Lachman's test is Negative with Good endpoint. Anterior drawer Negative. Posterior drawer Negative. There is  No varus instability at 0 degrees and No varus instability at 30 degrees. There is No valgus instability at 0 degrees and No valgus instability at 30 degrees. SPECIAL: Martha test is negative with no clunks, no crepitation, and no pain. PALP: There is no joint line pain. Assessment:     1. Primary osteoarthritis of right knee    2. Spinal stenosis of lumbar region, unspecified whether neurogenic claudication present    3. Right hip impingement syndrome      Procedures:    Procedure: yes    Regular Knee Injection    Location: Right Knee  Procedure: I discussed in detail the risks, benefits and complications of the corticosteroid injection which included but are not limited to: infection, skin reactions, hot swollen, and anaphylaxis with the patient. Danuta Dozier verbalized understanding and they have agreed to have the corticosteroid injection into the right knee. The patient was placed in the Supine position on the exam table. The superior lateral portal was identified and marked with a ball point pen. The skin was prepped with betadine in a sterile fashion. Utilizing a clean technique with sterile gloves, a 5 cc solution containing 4 cc of 1.0% Lidocaine with 1 cc containing 40 mg of Depo-medrol  was injected. There was no resistance to the injection. The wound was cleansed and a band-aid was placed. the patient tolerated the procedure without difficulty.  Adverse reactions to the injection were discussed to him that the fastest thing that I can do today for his knee pain is a cortisone injection. I then asked the patient if he is in enough pain today for us to try a cortisone injection and he stated that he is. I then told him that we will refer him to Dr. Emmanuel Mccall or Dr. Hua Valle and we will inject his knee today. The patient then stated that he understands the plan and at this time, the patient has opted for a referral to Dr. Emmanuel Mccall and a cortisone injection into the joint capsule of the Right knee to help reduce inflammation and pain. The injection site should never get red, hot, or swollen and if it does the patient will contact our office right away. The patient may experience a increase in soreness the first 24-48 hours due to a cortisone flair and can take anti-inflammatories for a short period of time to reduce that soreness. The patient should not submerge the injection site in water for a minimum of 24 hours to avoid infection. This means no lakes, pools, ponds, or hot tubs for 24 hours. If the patient is diabetic the injection may increase their blood sugar for up to one week. The patient can do this cortisone injection once every 3 months as needed. If the injections stop working and do not give the patient relief the patient should consider surgical interventions to produce long term relief. Patient should return to the clinic in 3 months or PRN to follow up with Wilmar La D.O. The patient will call the office immediately with any problems.       Orders Placed This Encounter   Medications    lidocaine 1 % injection 4 mL    methylPREDNISolone acetate (DEPO-MEDROL) injection 40 mg     Orders Placed This Encounter   Procedures   Katerina Ceballos MD, Orthopedic Surgery, [place]     Referral Priority:   Routine     Referral Type:   Eval and Treat     Referral Reason:   Specialty Services Required     Referred to Provider:   Kya Araiza MD     Requested Specialty:   Orthopedic Surgery     Number of Visits Requested:   1     I, Emmy Sheets, am scribing for and in the presence of Yaritza Stephanie FERGUSON 4/24/2021 6:42 PM    I spent 16 minutes of face to face time with this patient. Rito Mcguire DO, have personally seen this patient and I have reviewed the CC, PMH, FHX and Social History as provided by other clinical staff. I reassessed the HPI and ROS as scribed by Emmy Sheets Medical Scribe in my presence and it is both accurate and complete. Thereafter, I personally performed the PE, reviewed the imaging and established the DX and POC. I agree with the documentation provided by the Medical Scribe. I have reviewed all documentation in its entirety prior to providing my signature indicating agreement. Any areas of disagreement are noted on the chart.     Electronically signed by Pam Mccullough DO on 4/24/2021 at 6:43 PM        Electronically signed by Pam Mccullough DO, on 4/24/2021 at 6:42 PM

## 2021-04-23 ENCOUNTER — TELEPHONE (OUTPATIENT)
Dept: ORTHOPEDIC SURGERY | Age: 71
End: 2021-04-23

## 2021-04-27 ENCOUNTER — CARE COORDINATION (OUTPATIENT)
Dept: OTHER | Facility: CLINIC | Age: 71
End: 2021-04-27

## 2021-04-27 NOTE — CARE COORDINATION
Ambulatory Care Coordination Note  4/27/2021  CM Risk Score: 3  Charlson 10 Year Mortality Risk Score: 100%     ACC: Carlos Garcia RN    Summary Note: ACM attempted to reach patient for follow up call regarding recent right knee injection on 4/22. HIPAA compliant message left requesting a return phone call at patients convenience. Will continue to follow. Care Coordination Interventions    Program Enrollment: Rising Risk  Referral from Primary Care Provider: No  Suggested Interventions and Community Resources  Disease Specific Clinic: In Process (Comment: Rheumatology- Dr Natali Marr  Urology - Dr [address] . Hematology/Oncology ( [address])  )  Other Services or Interventions: referred to Dr [address] in [place]           Prior to Admission medications    Medication Sig Start Date End Date Taking? Authorizing Provider   furosemide (LASIX) 40 MG tablet Take 1 tablet by mouth 2 times daily 4/8/21 7/7/21  Rossy Singh MD   tamsulosin (FLOMAX) 0.4 MG capsule Take 0.4 mg by mouth daily    Historical Provider, MD   JANUVIA 100 MG tablet TAKE 1 TABLET BY MOUTH ONE TIME A DAY 2/4/21   Hampton Deal, DO   spironolactone (ALDACTONE) 50 MG tablet Take one tablet orally once daily 1/25/21   Domingo Deal, DO   ferrous sulfate (IRON 325) 325 (65 Fe) MG tablet Take 325 mg by mouth every other day    Historical Provider, MD   vitamin B-12 (CYANOCOBALAMIN) 500 MCG tablet Take 500 mcg by mouth every other day    Historical Provider, MD   lactulose (CHRONULAC) 10 GM/15ML solution TAKE 15 MLS BY MOUTH THREE TIMES A DAY 10/16/20   Kusum Salazar, DO   pregabalin (LYRICA) 100 MG capsule TAKE 1 CAPSULE BY MOUTH 3 TIMES A DAY  Patient taking differently: 100 mg 2 times daily.   7/1/20 3/25/21  Hampton Deal, DO   Cholecalciferol (VITAMIN D) 50 MCG (2000 UT) CAPS capsule Take 1 capsule by mouth daily 2/24/20   Hampton Deal, DO   omeprazole (PRILOSEC) 20 MG delayed release capsule Take 2 capsules by mouth daily 2/24/20 3/25/21  Domingo Deal, DO NIFEdipine (PROCARDIA XL) 60 MG extended release tablet Take 60 mg by mouth daily  6/12/19   Historical Provider, MD   allopurinol (ZYLOPRIM) 300 MG tablet Take 600 mg by mouth daily Takes 2 tabs (=600mg) daily 3/27/19   KELSY Lamar - CNP       Future Appointments   Date Time Provider Phuc Macias   5/26/2021  1:00 PM Eddie Campo MD [facility] exc [facility]   9/20/2021 11:10 AM Obdulia Boss MD [facility] None

## 2021-04-29 DIAGNOSIS — F41.9 ANXIETY: ICD-10-CM

## 2021-04-29 DIAGNOSIS — F43.21 GRIEF: ICD-10-CM

## 2021-04-29 RX ORDER — LORAZEPAM 0.5 MG/1
TABLET ORAL
Qty: 30 TABLET | Refills: 1 | Status: SHIPPED | OUTPATIENT
Start: 2021-04-29 | End: 2021-05-29

## 2021-05-11 ENCOUNTER — CARE COORDINATION (OUTPATIENT)
Dept: OTHER | Facility: CLINIC | Age: 71
End: 2021-05-11

## 2021-05-11 NOTE — CARE COORDINATION
Ambulatory Care Coordination Note  5/11/2021  CM Risk Score: 3  Charlson 10 Year Mortality Risk Score: 100%     ACC: Monse Fay RN    Summary Note: ACM attempted to reach patient for follow up call regarding follow up with Ortho. HIPAA compliant message left requesting a return phone call at patients convenience. Will continue to follow. This is second follow up attempt. Care Coordination Interventions    Program Enrollment: Rising Risk  Referral from Primary Care Provider: No  Suggested Interventions and Community Resources  Disease Specific Clinic: In Process (Comment: Rheumatology- Dr Palencia Davina  Urology - Dr Joe Scott . Hematology/Oncology ( [address])  )  Other Services or Interventions: referred to Dr Joe Scott in [place]           Prior to Admission medications    Medication Sig Start Date End Date Taking? Authorizing Provider   LORazepam (ATIVAN) 0.5 MG tablet TAKE ONE TABLET BY MOUTH EVERY EVENING AS NEEDED FOR ANXIETY FOR 30 DAYS 4/29/21 5/29/21  Adriana Rashid DO   furosemide (LASIX) 40 MG tablet Take 1 tablet by mouth 2 times daily 4/8/21 7/7/21  Dyan Connor MD   tamsulosin (FLOMAX) 0.4 MG capsule Take 0.4 mg by mouth daily    Historical Provider, MD   JANUVIA 100 MG tablet TAKE 1 TABLET BY MOUTH ONE TIME A DAY 2/4/21   Adriana Rashid DO   spironolactone (ALDACTONE) 50 MG tablet Take one tablet orally once daily 1/25/21   Adriana Rashid DO   ferrous sulfate (IRON 325) 325 (65 Fe) MG tablet Take 325 mg by mouth every other day    Historical Provider, MD   vitamin B-12 (CYANOCOBALAMIN) 500 MCG tablet Take 500 mcg by mouth every other day    Historical Provider, MD   lactulose (CHRONULAC) 10 GM/15ML solution TAKE 15 MLS BY MOUTH THREE TIMES A DAY 10/16/20   Kusum Caba DO   pregabalin (LYRICA) 100 MG capsule TAKE 1 CAPSULE BY MOUTH 3 TIMES A DAY  Patient taking differently: 100 mg 2 times daily.   7/1/20 3/25/21  Adriana Rashid DO   Cholecalciferol (VITAMIN D) 50 MCG (2000 UT) CAPS capsule Take 1 capsule by mouth daily 2/24/20   Lexy Gotti DO   omeprazole (PRILOSEC) 20 MG delayed release capsule Take 2 capsules by mouth daily 2/24/20 3/25/21  Lexy Gotti DO   NIFEdipine (PROCARDIA XL) 60 MG extended release tablet Take 60 mg by mouth daily  6/12/19   Historical Provider, MD   allopurinol (ZYLOPRIM) 300 MG tablet Take 600 mg by mouth daily Takes 2 tabs (=600mg) daily 3/27/19   Figueroa Bowers, APRN - CNP       Future Appointments   Date Time Provider Phuc Macias   5/26/2021  1:00 PM Clement Batpiste MD [facility]   9/20/2021 11:10 AM Sandralee Essex, MD [facility] None

## 2021-06-09 ENCOUNTER — CARE COORDINATION (OUTPATIENT)
Dept: OTHER | Facility: CLINIC | Age: 71
End: 2021-06-09

## 2021-06-09 NOTE — LETTER
Dear Taryn Cabral:      My name is Janet King  , Associate Care Manager for [facility] AT [place], and I have been trying to reach you. The Associate Care Management (ACM) program is a free-of-charge, confidential service provided to our employees and their family members covered by the [address] [facility]. I can help you with care transitions such as when you come home from the hospital, when help is needed to manage your disease, or when you need assistance coordinating services or appointments. As healthcare providers, we know that patients do better when they have close follow up with a primary care provider (PCP). I can help you find one that is convenient to you and covered by your insurance. I can also help you understand any after visit instructions, such as what symptoms to watch out for, or any new or changed medications. We can work together using your preferred communication -- telephone, email, Sh[employer]lerockhart. If you do not have a [employer] account, I can help you request access. Our program is designed to provide you with the opportunity to have a [facility] partner with you for your healthcare needs. Due to not being able to reach you, I am closing out the current program, but will remain available to you should you have any questions.      Please contact me at [address] [facility] [place], KATELYN

## 2021-06-09 NOTE — CARE COORDINATION
Ambulatory Care Coordination Note  6/9/2021  CM Risk Score: 3  Charlson 10 Year Mortality Risk Score: 100%     ACC: Pilar Trejo RN    Summary Note: ACM attempted 4th and final call to reach patient for follow Associate Care Management up call. HIPAA compliant message left requesting a return phone call at patients convenience. Lost to follow up letter sent via My Chart      Patient will be discharged if no return call. Care Coordination Interventions    Program Enrollment: Rising Risk  Referral from Primary Care Provider: No  Suggested Interventions and Community Resources  Disease Specific Clinic: In Process (Comment: Rheumatology- Dr Ludwin Maxwell  Urology - Dr Ju Hunt . Hematology/Oncology ( [address])  )  Other Services or Interventions: referred to Dr Ju Hunt in [facility]    None         Prior to Admission medications    Medication Sig Start Date End Date Taking? Authorizing Provider   omeprazole (PRILOSEC) 20 MG delayed release capsule TAKE 2 CAPSULES BY MOUTH DAILY 6/3/21 6/3/22  KELSY Lozano - CNP   furosemide (LASIX) 40 MG tablet Take 1 tablet by mouth 2 times daily 4/8/21 7/7/21  Solitario Aguilera MD   tamsulosin (FLOMAX) 0.4 MG capsule Take 0.4 mg by mouth daily    Historical Provider, MD ZAMORANOUVIA 100 MG tablet TAKE 1 TABLET BY MOUTH ONE TIME A DAY 2/4/21   Joshua Galan DO   spironolactone (ALDACTONE) 50 MG tablet Take one tablet orally once daily 1/25/21   Joshua Galan, DO   ferrous sulfate (IRON 325) 325 (65 Fe) MG tablet Take 325 mg by mouth every other day    Historical Provider, MD   vitamin B-12 (CYANOCOBALAMIN) 500 MCG tablet Take 500 mcg by mouth every other day    Historical Provider, MD   lactulose (CHRONULAC) 10 GM/15ML solution TAKE 15 MLS BY MOUTH THREE TIMES A DAY 10/16/20   Kusum Olson DO   pregabalin (LYRICA) 100 MG capsule TAKE 1 CAPSULE BY MOUTH 3 TIMES A DAY  Patient taking differently: 100 mg 2 times daily.   7/1/20 3/25/21  Joshua Galan, DO Cholecalciferol (VITAMIN D) 50 MCG (2000 UT) CAPS capsule Take 1 capsule by mouth daily 2/24/20   Tawanda Abide,    NIFEdipine (PROCARDIA XL) 60 MG extended release tablet Take 60 mg by mouth daily  6/12/19   Historical Provider, MD   allopurinol (ZYLOPRIM) 300 MG tablet Take 600 mg by mouth daily Takes 2 tabs (=600mg) daily 3/27/19   [place] CareKELSY CNP       Future Appointments   Date Time Provider Phuc Macias   6/14/2021  2:00 PM KELSY Rodriguez CNP   7/8/2021  1:30 PM Kenrick Shen MD [facility]   9/20/2021 11:10 AM Siddharth Kay MD [facility] None

## 2021-06-14 ENCOUNTER — OFFICE VISIT (OUTPATIENT)
Dept: FAMILY MEDICINE CLINIC | Age: 71
End: 2021-06-14
Payer: COMMERCIAL

## 2021-06-14 VITALS
SYSTOLIC BLOOD PRESSURE: 122 MMHG | RESPIRATION RATE: 18 BRPM | OXYGEN SATURATION: 97 % | HEIGHT: 72 IN | BODY MASS INDEX: 34.81 KG/M2 | WEIGHT: 257 LBS | DIASTOLIC BLOOD PRESSURE: 80 MMHG | HEART RATE: 52 BPM

## 2021-06-14 DIAGNOSIS — R73.03 PREDIABETES: Primary | ICD-10-CM

## 2021-06-14 DIAGNOSIS — N18.30 CHRONIC RENAL FAILURE SYNDROME, STAGE 3 (MODERATE) (HCC): ICD-10-CM

## 2021-06-14 DIAGNOSIS — Z13.29 THYROID DISORDER SCREEN: ICD-10-CM

## 2021-06-14 DIAGNOSIS — K74.60 CIRRHOSIS OF LIVER WITHOUT ASCITES, UNSPECIFIED HEPATIC CIRRHOSIS TYPE (HCC): ICD-10-CM

## 2021-06-14 DIAGNOSIS — I10 ESSENTIAL HYPERTENSION: ICD-10-CM

## 2021-06-14 DIAGNOSIS — Z13.220 LIPID SCREENING: ICD-10-CM

## 2021-06-14 DIAGNOSIS — K21.9 GASTROESOPHAGEAL REFLUX DISEASE WITHOUT ESOPHAGITIS: ICD-10-CM

## 2021-06-14 PROCEDURE — 99214 OFFICE O/P EST MOD 30 MIN: CPT | Performed by: NURSE PRACTITIONER

## 2021-06-14 RX ORDER — LORAZEPAM 0.5 MG/1
0.5 TABLET ORAL NIGHTLY PRN
COMMUNITY
Start: 2021-06-10 | End: 2021-08-05 | Stop reason: SDUPTHER

## 2021-06-14 RX ORDER — OMEPRAZOLE 20 MG/1
40 CAPSULE, DELAYED RELEASE ORAL DAILY
Qty: 60 CAPSULE | Refills: 5 | Status: SHIPPED | OUTPATIENT
Start: 2021-06-14 | End: 2021-09-30 | Stop reason: SDUPTHER

## 2021-06-14 ASSESSMENT — ENCOUNTER SYMPTOMS
NAUSEA: 0
SHORTNESS OF BREATH: 0
VOMITING: 0
EYE PAIN: 0
COUGH: 0
SORE THROAT: 0
BACK PAIN: 0
SINUS PAIN: 0
ABDOMINAL PAIN: 0
DIARRHEA: 0

## 2021-06-14 NOTE — PATIENT INSTRUCTIONS

## 2021-06-14 NOTE — PROGRESS NOTES
[address] [facility]  [address] [address]  Dept: [phone]  Dept Fax: [phone]    González Nj is a 73 y.o. male who presents today for his medicalconditions/complaints as noted below. González Nj is c/o of Established New Doctor, Medication Refill (takes ativan and uses gummies), Tinnitus (x 10 years), and Gout (thinks he has gout damage)      HPI:         68-year-old male patient presents with complaints of encounter to establish care. Difficult cardiac history including aortic valve replacement, hypertension, hyperlipidemia. Patient follows with [facility] medicine cardiology, cardiothoracic surgery. Currently takes nifedipine, Lasix, spironolactone. History of chronic liver disease, history of hep C, did see transplant team at [place] [facility] was not a surgical candidate, follows with GI Dr. Annelise Baldwin    History of chronic kidney disease follows with nephrology Dr. Leandro Camacho    History of chronic gout takes allopurinol, Lyrica follows with rheumatology    Had urinary retention, acute UTI requiring hospitalization. Did see urologist Dr. Lisette Casper, takes Flomax    History of prediabetes no recent A1c viewed, takes Januvia    History of anxiety takes Ativan as needed, significant history of the loss of a child due to drug overdose, wife having cancer along with chronic illness. Hx of gerd takes prilosec.       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  Portal venous hypertension (HCC) 07/15/2020    Sciatica     Splenomegaly 07/15/2020        Current Outpatient Medications   Medication Sig Dispense Refill    LORazepam (ATIVAN) 0.5 MG tablet 0.5 mg nightly as needed.  hydrocortisone 2.5 % cream       omeprazole (PRILOSEC) 20 MG delayed release capsule Take 2 capsules by mouth daily 60 capsule 5    furosemide (LASIX) 40 MG tablet Take 1 tablet by mouth 2 times daily 90 tablet 3    tamsulosin (FLOMAX) 0.4 MG capsule Take 0.4 mg by mouth daily      JANUVIA 100 MG tablet TAKE 1 TABLET BY MOUTH ONE TIME A DAY 90 tablet 2    spironolactone (ALDACTONE) 50 MG tablet Take one tablet orally once daily 90 tablet 1    ferrous sulfate (IRON 325) 325 (65 Fe) MG tablet Take 325 mg by mouth every other day      vitamin B-12 (CYANOCOBALAMIN) 500 MCG tablet Take 500 mcg by mouth every other day      lactulose (CHRONULAC) 10 GM/15ML solution TAKE 15 MLS BY MOUTH THREE TIMES A DAY 1992 mL 5    pregabalin (LYRICA) 100 MG capsule TAKE 1 CAPSULE BY MOUTH 3 TIMES A DAY (Patient taking differently: 100 mg 2 times daily. ) 180 capsule 5    Cholecalciferol (VITAMIN D) 50 MCG (2000 UT) CAPS capsule Take 1 capsule by mouth daily 90 capsule 5    NIFEdipine (PROCARDIA XL) 60 MG extended release tablet Take 60 mg by mouth daily       allopurinol (ZYLOPRIM) 300 MG tablet Take 600 mg by mouth daily Takes 2 tabs (=600mg) daily 90 tablet 1     No current facility-administered medications for this visit.      Allergies   Allergen Reactions    Nsaids      Because of Kidney issues        Subjective:      Review of Systems Constitutional: Negative for chills and fatigue. HENT: Negative for congestion, ear pain, sinus pain and sore throat. Eyes: Negative for pain and visual disturbance. Respiratory: Negative for cough and shortness of breath. Cardiovascular: Positive for leg swelling. Negative for chest pain and palpitations. Gastrointestinal: Negative for abdominal pain, diarrhea, nausea and vomiting. Genitourinary: Negative for penile pain and testicular pain. Musculoskeletal: Positive for arthralgias. Negative for back pain, joint swelling and neck pain. Skin: Negative for rash. Neurological: Negative for dizziness and light-headedness. Hematological: Does not bruise/bleed easily. All other systems reviewed and are negative.      :Objective     Physical Exam  Vitals and nursing note reviewed. Constitutional:       General: He is not in acute distress. Appearance: Normal appearance. He is not toxic-appearing. HENT:      Mouth/Throat:      Mouth: Mucous membranes are moist.   Cardiovascular:      Rate and Rhythm: Normal rate. Pulmonary:      Effort: Pulmonary effort is normal.      Breath sounds: Normal breath sounds. Musculoskeletal:      Right lower leg: Edema present. Left lower leg: Edema present. Neurological:      General: No focal deficit present. Mental Status: He is alert and oriented to person, place, and time.        /80   Pulse 52   Resp 18   Ht 6' (1.829 m)   Wt 257 lb (116.6 kg)   SpO2 97%   BMI 34.86 kg/m²     Lab Review   Hospital Outpatient Visit on 04/22/2021   Component Date Value    Glucose 04/22/2021 134*    BUN 04/22/2021 22     CREATININE 04/22/2021 1.12     Bun/Cre Ratio 04/22/2021 NOT REPORTED     Calcium 04/22/2021 9.3     Sodium 04/22/2021 137     Potassium 04/22/2021 4.3     Chloride 04/22/2021 101     CO2 04/22/2021 23     Anion Gap 04/22/2021 13     GFR Non- 04/22/2021 >60     GFR  04/22/2021 >60     GFR Comment 04/22/2021          GFR Staging 04/22/2021 NOT REPORTED     WBC 04/22/2021 11.0     RBC 04/22/2021 4.55     Hemoglobin 04/22/2021 14.8     Hematocrit 04/22/2021 45.1     MCV 04/22/2021 99.1     MCH 04/22/2021 32.5     MCHC 04/22/2021 32.8     RDW 04/22/2021 13.9     Platelets 10/43/2846 267     MPV 04/22/2021 10.8     NRBC Automated 04/22/2021 0.0     Pth Intact 04/22/2021 62.83     Calcium, Ion 04/22/2021 1.25     Vit D, 25-Hydroxy 04/22/2021 38.9    Hospital Outpatient Visit on 02/08/2021   Component Date Value    Albumin 02/08/2021 4.2     Alkaline Phosphatase 02/08/2021 74     ALT 02/08/2021 13     AST 02/08/2021 17     Total Bilirubin 02/08/2021 0.33     Bilirubin, Direct 02/08/2021 0.11     Bilirubin, Indirect 02/08/2021 0.22     Total Protein 02/08/2021 7.4     Globulin 02/08/2021 NOT REPORTED     Albumin/Globulin Ratio 02/08/2021 1.3     BUN 02/08/2021 18     CREATININE 02/08/2021 1.34*    GFR Non- 02/08/2021 53*    GFR  02/08/2021 >60     GFR Comment 02/08/2021          GFR Staging 02/08/2021 NOT REPORTED     Sodium 02/08/2021 140     Potassium 02/08/2021 4.5     Chloride 02/08/2021 102     CO2 02/08/2021 25     Anion Gap 02/08/2021 13     WBC 02/08/2021 9.5     RBC 02/08/2021 4.65     Hemoglobin 02/08/2021 14.5     Hematocrit 02/08/2021 45.9     MCV 02/08/2021 98.7     MCH 02/08/2021 31.2     MCHC 02/08/2021 31.6     RDW 02/08/2021 14.9*    Platelets 97/80/2044 226     MPV 02/08/2021 11.1     NRBC Automated 02/08/2021 0.0     Differential Type 02/08/2021 NOT REPORTED     Seg Neutrophils 02/08/2021 65     Lymphocytes 02/08/2021 24     Monocytes 02/08/2021 8     Eosinophils % 02/08/2021 2     Basophils 02/08/2021 1     Immature Granulocytes 02/08/2021 0     Segs Absolute 02/08/2021 6.18     Absolute Lymph # 02/08/2021 2.24     Absolute Mono # 02/08/2021 0.76     Absolute Eos # 02/08/2021 0.18     Basophils Absolute 02/08/2021 0.07     Absolute Immature Granul* 02/08/2021 0.03     WBC Morphology 02/08/2021 NOT REPORTED     RBC Morphology 02/08/2021 ANISOCYTOSIS PRESENT     Platelet Estimate 65/09/7056 NOT REPORTED    Hospital Outpatient Visit on 01/11/2021   Component Date Value    Glucose 01/11/2021 149*    BUN 01/11/2021 21     CREATININE 01/11/2021 1.45*    Bun/Cre Ratio 01/11/2021 NOT REPORTED     Calcium 01/11/2021 9.4     Sodium 01/11/2021 144     Potassium 01/11/2021 4.1     Chloride 01/11/2021 104     CO2 01/11/2021 24     Anion Gap 01/11/2021 16     Alkaline Phosphatase 01/11/2021 74     ALT 01/11/2021 12     AST 01/11/2021 23     Total Bilirubin 01/11/2021 0.25*    Total Protein 01/11/2021 7.6     Albumin 01/11/2021 4.4     Albumin/Globulin Ratio 01/11/2021 1.4     GFR Non- 01/11/2021 48*    GFR  01/11/2021 59*    GFR Comment 01/11/2021          GFR Staging 01/11/2021 NOT REPORTED     Uric Acid 01/11/2021 4.0    Hospital Outpatient Visit on 12/17/2020   Component Date Value    WBC 12/17/2020 10.0     RBC 12/17/2020 4.55     Hemoglobin 12/17/2020 13.6     Hematocrit 12/17/2020 44.3     MCV 12/17/2020 97.4     MCH 12/17/2020 29.9     MCHC 12/17/2020 30.7     RDW 12/17/2020 16.8*    Platelets 41/72/4029 217     MPV 12/17/2020 11.3     NRBC Automated 12/17/2020 0.0     Differential Type 12/17/2020 NOT REPORTED     Seg Neutrophils 12/17/2020 62     Lymphocytes 12/17/2020 26     Monocytes 12/17/2020 9     Eosinophils % 12/17/2020 2     Basophils 12/17/2020 1     Immature Granulocytes 12/17/2020 0     Segs Absolute 12/17/2020 6.16     Absolute Lymph # 12/17/2020 2.63     Absolute Mono # 12/17/2020 0.91     Absolute Eos # 12/17/2020 0.17     Basophils Absolute 12/17/2020 0.09     Absolute Immature Granul* 12/17/2020 0.04     WBC Morphology 12/17/2020 NOT REPORTED     RBC Morphology 12/17/2020 ANISOCYTOSIS PRESENT     Platelet Patient given educational materials - see patient instructions. Discussed use, benefit, and side effects of prescribed medications. All patientquestions answered. Pt voiced understanding. This note was transcribed using dictation with Dragon services. Efforts were made to correct any errors but some words may be misinterpreted.     Electronically signed by KELSY Adam CNP on 6/14/2021at 3:04 PM

## 2021-07-08 ENCOUNTER — HOSPITAL ENCOUNTER (OUTPATIENT)
Age: 71
Setting detail: OUTPATIENT SURGERY
End: 2021-07-08
Attending: INTERNAL MEDICINE | Admitting: INTERNAL MEDICINE
Payer: COMMERCIAL

## 2021-07-08 ENCOUNTER — OFFICE VISIT (OUTPATIENT)
Dept: GASTROENTEROLOGY | Age: 71
End: 2021-07-08
Payer: COMMERCIAL

## 2021-07-08 VITALS
HEIGHT: 73 IN | DIASTOLIC BLOOD PRESSURE: 76 MMHG | SYSTOLIC BLOOD PRESSURE: 121 MMHG | BODY MASS INDEX: 33.93 KG/M2 | WEIGHT: 256 LBS

## 2021-07-08 DIAGNOSIS — K21.9 GASTROESOPHAGEAL REFLUX DISEASE, UNSPECIFIED WHETHER ESOPHAGITIS PRESENT: ICD-10-CM

## 2021-07-08 DIAGNOSIS — K76.6 PORTAL VENOUS HYPERTENSION (HCC): ICD-10-CM

## 2021-07-08 DIAGNOSIS — E66.01 MORBID OBESITY (HCC): ICD-10-CM

## 2021-07-08 DIAGNOSIS — D12.2 ADENOMATOUS POLYP OF ASCENDING COLON: Primary | ICD-10-CM

## 2021-07-08 DIAGNOSIS — R16.1 SPLENOMEGALY: ICD-10-CM

## 2021-07-08 DIAGNOSIS — I86.8 ABDOMINAL VARICOSITIES: ICD-10-CM

## 2021-07-08 PROCEDURE — 99213 OFFICE O/P EST LOW 20 MIN: CPT | Performed by: INTERNAL MEDICINE

## 2021-07-08 ASSESSMENT — ENCOUNTER SYMPTOMS
DIARRHEA: 0
VOMITING: 0
ABDOMINAL DISTENTION: 1
CONSTIPATION: 0
BACK PAIN: 1
TROUBLE SWALLOWING: 0
RECTAL PAIN: 0
NAUSEA: 0
ABDOMINAL PAIN: 1
EYES NEGATIVE: 1
ALLERGIC/IMMUNOLOGIC NEGATIVE: 1
BLOOD IN STOOL: 0
SHORTNESS OF BREATH: 1
ANAL BLEEDING: 0

## 2021-07-08 NOTE — PROGRESS NOTES
GI OFFICE FOLLOW UP    Sabrina Wiley is a 73 y.o. male evaluated via on 7/8/2021. Consent:  He and/or health care decision maker is aware that that he may receive a bill for this telephone service, depending on his insurance coverage, and has provided verbal consent to proceed: YES      INTERVAL HISTORY:   No referring provider defined for this encounter. Chief Complaint   Patient presents with    Follow-up     Patient is a f/u for his cirrhosis. Patient notes doing well, having some discomfort on the right side. 1. Adenomatous polyp of ascending colon    2. Morbid obesity (HCC)    3. Splenomegaly    4. Abdominal varicosities    5. Portal venous hypertension (HCC)    6. Gastroesophageal reflux disease, unspecified whether esophagitis present      Seen in my office as a f/u after 6 months    Has been on diuretics per Renal     Has some renal insufficiency    Patient appears to have some abdominal distention    Denies any significant abdominal pain    Denies any rectal bleeding or melanotic stools    Has mild irritable bowel syndrome-like symptoms    Denies any current smoking alcohol abuse illicit drug usage    Under a lot of stress because of sickness of his wife    Previous records work-up was reviewed with him      HISTORY OF PRESENT ILLNESS: Adolfo Horton is a 73 y.o. male with a past history remarkable for , referred for evaluation of   Chief Complaint   Patient presents with    Follow-up     Patient is a f/u for his cirrhosis. Patient notes doing well, having some discomfort on the right side. .    Past Medical,Family, and Social History reviewed and does contribute to the patient presenting condition. Patient's PMH/PSH,SH,PSYCH Hx, MEDs, ALLERGIES, and ROS were all reviewed and updated in the appropriate sections.     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     Portal venous hypertension ([address] 07/15/2020    Sciatica     Splenomegaly 07/15/2020       Past Surgical History:   Procedure Laterality Date    [facility]PA[facility]  [facility]03/[facility]    [facility]    BACK SURGERY      L4-5    CARDIAC CATHETERIZATION  10.30/2019    Dr. Kessler Reusing COLONOSCOPY  12/05/2016    [facility], states due for another in 2  years    COLONOSCOPY N/A 10/07/2019    tubular adenoma x2; hyperplastic polyp    FINGER TRIGGER RELEASE Right 02/24/2021    pinky finger    FINGER TRIGGER RELEASE Right 2/24/2021    RIGHT SMALL FINGER TRIGGER RELEASE [facility]     OTHER SURGICAL HISTORY Right 01/25/2016    10 liters removed peracentesis    PARACENTESIS  11/04/2015    PARACENTESIS  12/28/2015    NE EGD TRANSORAL BIOPSY SINGLE/MULTIPLE N/A 08/30/2017    EGD BIOPSY performed [facility] at [facility]  06/2011    TONSILLECTOMY      UPPER GASTROINTESTINAL ENDOSCOPY  10/24/2013    small segment barretts    UPPER GASTROINTESTINAL ENDOSCOPY  08/30/2017    VEIN SURGERY Left 12/07/2016    leg vein stripping    VENTRAL HERNIA REPAIR  05/26/2015       CURRENT MEDICATIONS:    Current Outpatient Medications:     LORazepam (ATIVAN) 0.5 MG tablet, 0.5 mg nightly as needed. , Disp: , Rfl:     hydrocortisone 2.5 % cream, , Disp: , Rfl:     omeprazole (PRILOSEC) 20 MG delayed release capsule, Take 2 capsules by mouth daily, Disp: 60 capsule, Rfl: 5    tamsulosin (FLOMAX) 0.4 MG capsule, Take 0.4 mg by mouth daily, Disp: , Rfl:     JANUVIA 100 MG tablet, TAKE 1 TABLET BY MOUTH ONE TIME A DAY, Disp: 90 tablet, Rfl: 2    spironolactone (ALDACTONE) 50 MG tablet, Take one tablet orally once daily, Disp: 90 tablet, Rfl: 1    ferrous sulfate (IRON 325) 325 (65 Fe) MG tablet, Take 325 mg by mouth every other day, Disp: , Rfl:     vitamin B-12 (CYANOCOBALAMIN) 500 MCG tablet, Take 500 mcg by mouth every other day, Disp: , Rfl:     lactulose (CHRONULAC) 10 GM/15ML solution, TAKE 15 MLS BY MOUTH THREE TIMES A DAY, Disp: 1992 mL, Rfl: 5    Cholecalciferol (VITAMIN D) 50 MCG (2000 UT) CAPS capsule, Take 1 capsule by mouth daily, Disp: 90 capsule, Rfl: 5    NIFEdipine (PROCARDIA XL) 60 MG extended release tablet, Take 60 mg by mouth daily , Disp: , Rfl:     allopurinol (ZYLOPRIM) 300 MG tablet, Take 600 mg by mouth daily Takes 2 tabs (=600mg) daily, Disp: 90 tablet, Rfl: 1    furosemide (LASIX) 40 MG tablet, Take 1 tablet by mouth 2 times daily, Disp: 90 tablet, Rfl: 3    pregabalin (LYRICA) 100 MG capsule, TAKE 1 CAPSULE BY MOUTH 3 TIMES A DAY (Patient taking differently: 100 mg 2 times daily. ), Disp: 180 capsule, Rfl: 5    ALLERGIES:   Allergies   Allergen Reactions    Nsaids      Because of Kidney issues        FAMILY HISTORY:       Problem Relation Age of Onset    Prostate Cancer Father     Other Neg Hx         blood clots         SOCIAL HISTORY:   Social History     Socioeconomic History    Marital status:      Spouse name: Not on file    Number of children: Not on file    Years of education: Not on file    Highest education level: Not on file   Occupational History    Not on file   Tobacco Use    Smoking status: Former Smoker     Packs/day: 1.00     Years: 49.00     Pack years: 49.00     Types: Cigarettes     Start date: 1966     Quit date: 2015     Years since quittin.5    Smokeless tobacco: Never Used    Tobacco comment: maybe 1 cigarette per day, updated from progress note dated 21   Vaping Use    Vaping Use: Never used   Substance and Sexual Activity    Alcohol use: No     Alcohol/week: 0.0 standard drinks    Drug use: Yes     Types: Marijuana     Comment: edibles    Sexual activity: Not on file   Other Topics Concern    Not on file   Social History Narrative    Not on file     Social Determinants of Health     Financial Resource Strain: Low Risk     Difficulty of Paying Living Expenses: Not hard at all   Food Insecurity:     Worried About [address] in the Last Year:     [address] [employer] in the Last Year:    Transportation Needs: No Transportation Needs    Lack of Transportation (Medical): No    Lack of Transportation (Non-Medical): No   Physical Activity:     Days of Exercise per Week:     Minutes of Exercise per Session:    Stress:     Feeling of Stress :    Social Connections:     Frequency of Communication with Friends and Family:     Frequency of Social Gatherings with Friends and Family:     Attends Oriental orthodox Services:     Active Member of Clubs or Organizations:     Attends Club or Organization Meetings:     Marital Status:    Intimate Partner Violence:     Fear of Current or Ex-Partner:     Emotionally Abused:     Physically Abused:     Sexually Abused:          REVIEW OF SYSTEMS:         Review of Systems   Constitutional: Positive for fatigue. Negative for appetite change and unexpected weight change (weight gain). HENT: Negative for trouble swallowing. Eyes: Negative.     Respiratory: Positive for shortness of breath. Cardiovascular: Positive for leg swelling. Gastrointestinal: Positive for abdominal distention and abdominal pain (off and on at night). Negative for anal bleeding, blood in stool, constipation, diarrhea (off and on), nausea, rectal pain and vomiting. Endocrine: Negative. Gout     Genitourinary: Positive for decreased urine volume. Musculoskeletal: Positive for arthralgias and back pain. Skin: Negative. Allergic/Immunologic: Negative. Neurological: Negative. Negative for dizziness, weakness and light-headedness. Hematological: Negative. Psychiatric/Behavioral: Positive for sleep disturbance. Negative for agitation. The patient is not nervous/anxious. PHYSICAL EXAMINATION: Vital signs reviewed per the nursing documentation. /76   Ht 6' 1\" (1.854 m)   Wt 256 lb (116.1 kg)   BMI 33.78 kg/m²   Body mass index is 33.78 kg/m². Physical Exam  Nursing note reviewed. Constitutional:       Appearance: He is well-developed. Comments: Anxious   Obesity    HENT:      Head: Normocephalic and atraumatic. Eyes:      Conjunctiva/sclera: Conjunctivae normal.      Pupils: Pupils are equal, round, and reactive to light. Cardiovascular:      Rate and Rhythm: Normal rate and regular rhythm. Pulmonary:      Effort: Pulmonary effort is normal.      Breath sounds: Normal breath sounds. Abdominal:      General: Bowel sounds are normal. There is distension. Palpations: Abdomen is soft. Comments: Abdominal is distended appears to have some ascites? Bowel sounds are positive   Genitourinary:     Rectum: Normal.   Musculoskeletal:         General: Normal range of motion. Cervical back: Normal range of motion and neck supple. Skin:     General: Skin is warm. Neurological:      Mental Status: He is alert and oriented to person, place, and time. Deep Tendon Reflexes: Reflexes are normal and symmetric.            LABORATORY DATA: Reviewed  Lab Results   Component Value Date    WBC 11.0 04/22/2021    HGB 14.8 04/22/2021    HCT 45.1 04/22/2021    MCV 99.1 04/22/2021     04/22/2021     04/22/2021    K 4.3 04/22/2021     04/22/2021    CO2 23 04/22/2021    BUN 22 04/22/2021    CREATININE 1.12 04/22/2021    LABALBU 4.2 02/08/2021    BILITOT 0.33 02/08/2021    ALKPHOS 74 02/08/2021    AST 17 02/08/2021    ALT 13 02/08/2021    INR 1.3 07/15/2020         Lab Results   Component Value Date    RBC 4.55 04/22/2021    HGB 14.8 04/22/2021    MCV 99.1 04/22/2021    MCH 32.5 04/22/2021    MCHC 32.8 04/22/2021    RDW 13.9 04/22/2021    MPV 10.8 04/22/2021    BASOPCT 1 02/08/2021    LYMPHSABS 2.24 02/08/2021    MONOSABS 0.76 02/08/2021    NEUTROABS 6.18 02/08/2021    EOSABS 0.18 02/08/2021    BASOSABS 0.07 02/08/2021         DIAGNOSTIC TESTING:     No results found. Assessment  1. Adenomatous polyp of ascending colon    2. Morbid obesity (HCC)    3. Splenomegaly    4. Abdominal varicosities    5. Portal venous hypertension (HCC)    6. Gastroesophageal reflux disease, unspecified whether esophagitis present        Plan    EGD to evaluate for Varices his last EGD was done in 2017 without any varices at that time he likes to wait for his EGD until September fall this year    The Endoscopic procedure was explained to the patient in detail  The prep and NPO were explained  All the Risks, Benefits, and Alternatives were explained  Risk of Bleeding, Perforation and Cardio Respiratory risks were explained  his questions were answered  The procedure has been scheduled with the  in the office  Patient was asked to give us a call for any questions  The patient has verbalized understanding and agreement to this plan.      US of liver and abdomen to look for ascites any liver lesions    Blood tests    Diuretics per Renal    Low Na diet    His questions were answered    Pt seems to have signs and symptoms consistent with GERD, acid indigestion and heartburns. He was discussed  in detail about some possible life style and dietary modifications. He was stressed about the maintenance  of appropriate weight and effect of obesity contributing to reflux symptoms. Routine exercise was streesed. Avoidance of Caffeine, nicotine and chocolate were explained. Pt was asked to avoid spices grease and fried food. Advices were also given about avoidance of any kind of fast foods, soda pops and high energy drinks. Pt was advised to place two small block under the head end of the bed which may help with night time reflux. Was advised not to eat any thin at least 2-3 hrs before going to bed and walk especially after dinner    Pt has verbalized understanding and agreement to this plan. Pt was discussed in detail about the possible side effects of proton pump inhibiter therapy. He was explained about the possibility of calcium and magnesium malabsorption and was advised to start taking calcium supplements with Vit D. Some over the counter regimens were explained to patient. Some dietary advices were also given. He has verbalized understanding and agreement to this. Pt was advised in detail about some life style and dietary modifications. He was advised about avoidance of caffeine, nicotine and chocolate. Pt was also told to stay away from any kind of fast foods, soda pops. He was also advised to avoid lots of spices, grease and fried food etc.     Instructions were also given about trying to arrange the timing, quality and quantity of food. Instructions were given about using ample amount of fiber including dietary and supplemental fiber either metamucil, bennafiber or citrucell etc.  Pt was advised about drinking ample amount of water without any colors or chemicals. Stress was given about regular exercise. Pt has verbalized understanding and agreement to these modifications.       More than half of patient's clinic visit time was spent in counseling about lifestyle and dietary modifications  Patient's  questions were answered in this regard as well  The patient has verbalized understanding and agreement     I communicated with the patient and/or health care decision maker about   Details of this discussion including any medical advice provided:YES      I affirm this is a Patient Initiated Episode with an Established Patient who has not had a related appointment within my department in the past 7 days or scheduled within the next 24 hours. Total Time: minutes: 21-30 minutes    Note: not billable if this call serves to triage the patient into an appointment for the relevant concern      Thank you for allowing me to participate in the care of Mr. Suleman Decker. For any further questions please do not hesitate to contact me. I have reviewed and agree with the ROS entered by the MA/LPN.          Vera Kathleen MD, [facility]  Board Certified in Gastroenterology and [address] [place] [address] Gastroenterology  Office #: [phone]

## 2021-07-09 ENCOUNTER — TELEPHONE (OUTPATIENT)
Dept: GASTROENTEROLOGY | Age: 71
End: 2021-07-09

## 2021-07-09 DIAGNOSIS — R16.1 SPLENOMEGALY: ICD-10-CM

## 2021-07-09 DIAGNOSIS — K21.9 GASTROESOPHAGEAL REFLUX DISEASE, UNSPECIFIED WHETHER ESOPHAGITIS PRESENT: ICD-10-CM

## 2021-07-09 DIAGNOSIS — D12.2 ADENOMATOUS POLYP OF ASCENDING COLON: Primary | ICD-10-CM

## 2021-07-09 NOTE — TELEPHONE ENCOUNTER
Writer received call from central scheduling that the order needs to be changed to US [facility]domen [facility] - they do not do Complete. Writer re-entering order per .

## 2021-07-14 ENCOUNTER — HOSPITAL ENCOUNTER (OUTPATIENT)
Age: 71
Discharge: HOME | End: 2021-07-14
Payer: COMMERCIAL

## 2021-07-14 DIAGNOSIS — K76.6 PORTAL VENOUS HYPERTENSION (HCC): ICD-10-CM

## 2021-07-14 DIAGNOSIS — E66.01 MORBID OBESITY (HCC): ICD-10-CM

## 2021-07-14 DIAGNOSIS — Z13.220 LIPID SCREENING: ICD-10-CM

## 2021-07-14 DIAGNOSIS — R73.03 PREDIABETES: ICD-10-CM

## 2021-07-14 DIAGNOSIS — R16.1 SPLENOMEGALY: ICD-10-CM

## 2021-07-14 DIAGNOSIS — Z13.29 THYROID DISORDER SCREEN: ICD-10-CM

## 2021-07-14 DIAGNOSIS — N18.30 CHRONIC RENAL FAILURE SYNDROME, STAGE 3 (MODERATE) (HCC): ICD-10-CM

## 2021-07-14 DIAGNOSIS — I86.8 ABDOMINAL VARICOSITIES: ICD-10-CM

## 2021-07-14 DIAGNOSIS — D12.2 ADENOMATOUS POLYP OF ASCENDING COLON: ICD-10-CM

## 2021-07-14 DIAGNOSIS — I10 ESSENTIAL HYPERTENSION: ICD-10-CM

## 2021-07-14 DIAGNOSIS — K74.60 CIRRHOSIS OF LIVER WITHOUT ASCITES, UNSPECIFIED HEPATIC CIRRHOSIS TYPE (HCC): ICD-10-CM

## 2021-07-14 DIAGNOSIS — K21.9 GASTROESOPHAGEAL REFLUX DISEASE, UNSPECIFIED WHETHER ESOPHAGITIS PRESENT: ICD-10-CM

## 2021-07-14 LAB
ABSOLUTE EOS #: 0.23 K/UL (ref 0–0.44)
ABSOLUTE EOS #: 0.23 K/UL (ref 0–0.44)
ABSOLUTE IMMATURE GRANULOCYTE: 0.04 K/UL (ref 0–0.3)
ABSOLUTE IMMATURE GRANULOCYTE: 0.04 K/UL (ref 0–0.3)
ABSOLUTE LYMPH #: 2.11 K/UL (ref 1.1–3.7)
ABSOLUTE LYMPH #: 2.11 K/UL (ref 1.1–3.7)
ABSOLUTE MONO #: 0.74 K/UL (ref 0.1–1.2)
ABSOLUTE MONO #: 0.74 K/UL (ref 0.1–1.2)
ALBUMIN SERPL-MCNC: 4.3 G/DL (ref 3.5–5.2)
ALBUMIN SERPL-MCNC: 4.4 G/DL (ref 3.5–5.2)
ALBUMIN SERPL-MCNC: 4.4 G/DL (ref 3.5–5.2)
ALBUMIN/GLOBULIN RATIO: 1.4 (ref 1–2.5)
ALBUMIN/GLOBULIN RATIO: 1.5 (ref 1–2.5)
ALBUMIN/GLOBULIN RATIO: 1.5 (ref 1–2.5)
ALP BLD-CCNC: 80 U/L (ref 40–129)
ALP BLD-CCNC: 82 U/L (ref 40–129)
ALP BLD-CCNC: 82 U/L (ref 40–129)
ALT SERPL-CCNC: 18 U/L (ref 5–41)
ANION GAP SERPL CALCULATED.3IONS-SCNC: 12 MMOL/L (ref 9–17)
ANION GAP SERPL CALCULATED.3IONS-SCNC: 14 MMOL/L (ref 9–17)
ANION GAP SERPL CALCULATED.3IONS-SCNC: 15 MMOL/L (ref 9–17)
AST SERPL-CCNC: 18 U/L
AST SERPL-CCNC: 18 U/L
AST SERPL-CCNC: 19 U/L
BASOPHILS # BLD: 1 % (ref 0–2)
BASOPHILS # BLD: 1 % (ref 0–2)
BASOPHILS ABSOLUTE: 0.07 K/UL (ref 0–0.2)
BASOPHILS ABSOLUTE: 0.07 K/UL (ref 0–0.2)
BILIRUB SERPL-MCNC: 0.3 MG/DL (ref 0.3–1.2)
BILIRUB SERPL-MCNC: 0.31 MG/DL (ref 0.3–1.2)
BILIRUB SERPL-MCNC: 0.33 MG/DL (ref 0.3–1.2)
BILIRUBIN DIRECT: <0.08 MG/DL
BILIRUBIN, INDIRECT: NORMAL MG/DL (ref 0–1)
BUN BLDV-MCNC: 17 MG/DL (ref 8–23)
BUN/CREAT BLD: ABNORMAL (ref 9–20)
BUN/CREAT BLD: ABNORMAL (ref 9–20)
CALCIUM SERPL-MCNC: 9.4 MG/DL (ref 8.6–10.4)
CALCIUM SERPL-MCNC: 9.5 MG/DL (ref 8.6–10.4)
CHLORIDE BLD-SCNC: 102 MMOL/L (ref 98–107)
CHLORIDE BLD-SCNC: 104 MMOL/L (ref 98–107)
CHLORIDE BLD-SCNC: 104 MMOL/L (ref 98–107)
CHOLESTEROL/HDL RATIO: 7.2
CHOLESTEROL: 202 MG/DL
CO2: 21 MMOL/L (ref 20–31)
CO2: 21 MMOL/L (ref 20–31)
CO2: 23 MMOL/L (ref 20–31)
CREAT SERPL-MCNC: 1.27 MG/DL (ref 0.7–1.2)
CREAT SERPL-MCNC: 1.31 MG/DL (ref 0.7–1.2)
CREAT SERPL-MCNC: 1.38 MG/DL (ref 0.7–1.2)
DIFFERENTIAL TYPE: ABNORMAL
DIFFERENTIAL TYPE: ABNORMAL
EOSINOPHILS RELATIVE PERCENT: 2 % (ref 1–4)
EOSINOPHILS RELATIVE PERCENT: 2 % (ref 1–4)
ESTIMATED AVERAGE GLUCOSE: 128 MG/DL
GFR AFRICAN AMERICAN: >60 ML/MIN
GFR NON-AFRICAN AMERICAN: 51 ML/MIN
GFR NON-AFRICAN AMERICAN: 54 ML/MIN
GFR NON-AFRICAN AMERICAN: 56 ML/MIN
GFR SERPL CREATININE-BSD FRML MDRD: ABNORMAL ML/MIN/{1.73_M2}
GLOBULIN: NORMAL G/DL (ref 1.5–3.8)
GLUCOSE BLD-MCNC: 127 MG/DL (ref 70–99)
GLUCOSE BLD-MCNC: 128 MG/DL (ref 70–99)
HBA1C MFR BLD: 6.1 % (ref 4–6)
HCT VFR BLD CALC: 48.5 % (ref 40.7–50.3)
HCT VFR BLD CALC: 48.5 % (ref 40.7–50.3)
HDLC SERPL-MCNC: 28 MG/DL
HEMOGLOBIN: 15.8 G/DL (ref 13–17)
HEMOGLOBIN: 15.8 G/DL (ref 13–17)
IMMATURE GRANULOCYTES: 0 %
IMMATURE GRANULOCYTES: 0 %
LDL CHOLESTEROL: 137 MG/DL (ref 0–130)
LYMPHOCYTES # BLD: 22 % (ref 24–43)
LYMPHOCYTES # BLD: 22 % (ref 24–43)
MCH RBC QN AUTO: 32.6 PG (ref 25.2–33.5)
MCH RBC QN AUTO: 32.6 PG (ref 25.2–33.5)
MCHC RBC AUTO-ENTMCNC: 32.6 G/DL (ref 28.4–34.8)
MCHC RBC AUTO-ENTMCNC: 32.6 G/DL (ref 28.4–34.8)
MCV RBC AUTO: 100.2 FL (ref 82.6–102.9)
MCV RBC AUTO: 100.2 FL (ref 82.6–102.9)
MONOCYTES # BLD: 8 % (ref 3–12)
MONOCYTES # BLD: 8 % (ref 3–12)
NRBC AUTOMATED: 0 PER 100 WBC
NRBC AUTOMATED: 0 PER 100 WBC
PDW BLD-RTO: 13.9 % (ref 11.8–14.4)
PDW BLD-RTO: 13.9 % (ref 11.8–14.4)
PLATELET # BLD: 195 K/UL (ref 138–453)
PLATELET # BLD: 195 K/UL (ref 138–453)
PLATELET ESTIMATE: ABNORMAL
PLATELET ESTIMATE: ABNORMAL
PMV BLD AUTO: 11.2 FL (ref 8.1–13.5)
PMV BLD AUTO: 11.2 FL (ref 8.1–13.5)
POTASSIUM SERPL-SCNC: 4.4 MMOL/L (ref 3.7–5.3)
RBC # BLD: 4.84 M/UL (ref 4.21–5.77)
RBC # BLD: 4.84 M/UL (ref 4.21–5.77)
RBC # BLD: ABNORMAL 10*6/UL
RBC # BLD: ABNORMAL 10*6/UL
SEG NEUTROPHILS: 67 % (ref 36–65)
SEG NEUTROPHILS: 67 % (ref 36–65)
SEGMENTED NEUTROPHILS ABSOLUTE COUNT: 6.51 K/UL (ref 1.5–8.1)
SEGMENTED NEUTROPHILS ABSOLUTE COUNT: 6.51 K/UL (ref 1.5–8.1)
SODIUM BLD-SCNC: 137 MMOL/L (ref 135–144)
SODIUM BLD-SCNC: 139 MMOL/L (ref 135–144)
SODIUM BLD-SCNC: 140 MMOL/L (ref 135–144)
TOTAL PROTEIN: 7.4 G/DL (ref 6.4–8.3)
TRIGL SERPL-MCNC: 187 MG/DL
TSH SERPL DL<=0.05 MIU/L-ACNC: 3.31 MIU/L (ref 0.3–5)
URIC ACID: 4.3 MG/DL (ref 3.4–7)
VLDLC SERPL CALC-MCNC: ABNORMAL MG/DL (ref 1–30)
WBC # BLD: 9.7 K/UL (ref 3.5–11.3)
WBC # BLD: 9.7 K/UL (ref 3.5–11.3)
WBC # BLD: ABNORMAL 10*3/UL
WBC # BLD: ABNORMAL 10*3/UL

## 2021-07-14 PROCEDURE — 80061 LIPID PANEL: CPT

## 2021-07-14 PROCEDURE — 80051 ELECTROLYTE PANEL: CPT

## 2021-07-14 PROCEDURE — 82565 ASSAY OF CREATININE: CPT

## 2021-07-14 PROCEDURE — 84443 ASSAY THYROID STIM HORMONE: CPT

## 2021-07-14 PROCEDURE — 84520 ASSAY OF UREA NITROGEN: CPT

## 2021-07-14 PROCEDURE — 36415 COLL VENOUS BLD VENIPUNCTURE: CPT

## 2021-07-14 PROCEDURE — 85025 COMPLETE CBC W/AUTO DIFF WBC: CPT

## 2021-07-14 PROCEDURE — 80076 HEPATIC FUNCTION PANEL: CPT

## 2021-07-14 PROCEDURE — 80053 COMPREHEN METABOLIC PANEL: CPT

## 2021-07-14 PROCEDURE — 83036 HEMOGLOBIN GLYCOSYLATED A1C: CPT

## 2021-07-14 PROCEDURE — 84550 ASSAY OF BLOOD/URIC ACID: CPT

## 2021-07-15 ENCOUNTER — HOSPITAL ENCOUNTER (OUTPATIENT)
Dept: ULTRASOUND IMAGING | Age: 71
End: 2021-07-15
Payer: COMMERCIAL

## 2021-07-15 ENCOUNTER — HOSPITAL ENCOUNTER (OUTPATIENT)
Dept: ULTRASOUND IMAGING | Age: 71
Discharge: HOME | End: 2021-07-17
Payer: COMMERCIAL

## 2021-07-15 DIAGNOSIS — E66.01 MORBID OBESITY (HCC): ICD-10-CM

## 2021-07-15 DIAGNOSIS — I86.8 ABDOMINAL VARICOSITIES: ICD-10-CM

## 2021-07-15 DIAGNOSIS — K21.9 GASTROESOPHAGEAL REFLUX DISEASE, UNSPECIFIED WHETHER ESOPHAGITIS PRESENT: ICD-10-CM

## 2021-07-15 DIAGNOSIS — D12.2 ADENOMATOUS POLYP OF ASCENDING COLON: ICD-10-CM

## 2021-07-15 DIAGNOSIS — K76.6 PORTAL VENOUS HYPERTENSION (HCC): ICD-10-CM

## 2021-07-15 DIAGNOSIS — R16.1 SPLENOMEGALY: ICD-10-CM

## 2021-07-15 PROCEDURE — 76705 ECHO EXAM OF ABDOMEN: CPT

## 2021-07-30 ENCOUNTER — OFFICE VISIT (OUTPATIENT)
Dept: ORTHOPEDIC SURGERY | Age: 71
End: 2021-07-30
Payer: COMMERCIAL

## 2021-07-30 VITALS
RESPIRATION RATE: 12 BRPM | DIASTOLIC BLOOD PRESSURE: 86 MMHG | BODY MASS INDEX: 33.93 KG/M2 | HEIGHT: 73 IN | SYSTOLIC BLOOD PRESSURE: 128 MMHG | HEART RATE: 55 BPM | TEMPERATURE: 97.1 F | WEIGHT: 256 LBS

## 2021-07-30 DIAGNOSIS — M17.11 PRIMARY OSTEOARTHRITIS OF RIGHT KNEE: Primary | ICD-10-CM

## 2021-07-30 PROCEDURE — 99203 OFFICE O/P NEW LOW 30 MIN: CPT | Performed by: FAMILY MEDICINE

## 2021-07-30 PROCEDURE — 20610 DRAIN/INJ JOINT/BURSA W/O US: CPT | Performed by: FAMILY MEDICINE

## 2021-07-30 RX ORDER — TRIAMCINOLONE ACETONIDE 40 MG/ML
40 INJECTION, SUSPENSION INTRA-ARTICULAR; INTRAMUSCULAR ONCE
Status: COMPLETED | OUTPATIENT
Start: 2021-07-30 | End: 2021-07-30

## 2021-07-30 RX ORDER — BUPIVACAINE HYDROCHLORIDE 5 MG/ML
4 INJECTION, SOLUTION PERINEURAL ONCE
Status: COMPLETED | OUTPATIENT
Start: 2021-07-30 | End: 2021-07-30

## 2021-07-30 RX ADMIN — TRIAMCINOLONE ACETONIDE 40 MG: 40 INJECTION, SUSPENSION INTRA-ARTICULAR; INTRAMUSCULAR at 10:15

## 2021-07-30 RX ADMIN — BUPIVACAINE HYDROCHLORIDE 20 MG: 5 INJECTION, SOLUTION PERINEURAL at 10:15

## 2021-07-30 NOTE — PROGRESS NOTES
Sports Medicine Consultation     CHIEF COMPLAINT:  Knee Pain (right knee pain cortisone 4/22/21)      HPI:  Sabrina Wiley is a 73y.o. year old male who is a new patient being seen for regarding recurrence of a previously resolved problem right knee pain. The pain has been present for year(s). The patient recalls a stepped off a curb injury. The patient has tried cortisone, lidocaine without improvement. The pain is described as sharp. There is  pain on weightbearing. The knee does swell. There is is not painful popping and clicking. The knee does not catch or lock. It has not given out. It is  stiff upon arising from sitting. It is  painful to go up and down stairs and sit for a prolonged period of time. he has a past medical history of Abdominal varicosities, Anxiety state NEC, Arthritis, Sol esophagus, Chronic kidney disease, Cirrhosis ([address] Colon polyps, Diverticulosis of colon, Enlarged heart, Gout, Hepatic cirrhosis ([address] Hepatic cyst, Hepatitis C, chronic ([address] History of alcohol use, Hyperlipidemia, Hypertension, Lumbago, Lymphedema, Malignant neoplasm of prostate ([address] Otalgia of right ear, Portal venous hypertension ([address] Sciatica, and Splenomegaly. he has a past surgical history that includes Prostatectomy (06/2011); back surgery; Knee arthroscopy (Right); Tonsillectomy; ventral hernia repair (05/26/2015); Paracentesis (11/04/2015); hernia repair; Paracentesis (12/28/2015); other surgical history (Right, 01/25/2016); Vein Surgery (Left, 12/07/2016); Colonoscopy (12/05/2016); Upper gastrointestinal endoscopy (10/24/2013); Upper gastrointestinal endoscopy (08/30/2017); pr egd transoral biopsy single/multiple (N/A, 08/30/2017); Colonoscopy (N/A, 10/07/2019); Cardiac catheterization (10.30/2019); Aortic valve repair (06/03/2020); Finger trigger release (Right, 02/24/2021); and Finger trigger release (Right, 2/24/2021).     family history includes Prostate Cancer in his father. Social History     Socioeconomic History    Marital status:      Spouse name: Not on file    Number of children: Not on file    Years of education: Not on file    Highest education level: Not on file   Occupational History    Not on file   Tobacco Use    Smoking status: Former Smoker     Packs/day: 1.00     Years: 49.00     Pack years: 49.00     Types: Cigarettes     Start date: 1966     Quit date: 2015     Years since quittin.5    Smokeless tobacco: Never Used    Tobacco comment: maybe 1 cigarette per day, updated from progress note dated 21   Vaping Use    Vaping Use: Never used   Substance and Sexual Activity    Alcohol use: No     Alcohol/week: 0.0 standard drinks    Drug use: Yes     Types: Marijuana     Comment: edibles    Sexual activity: Not on file   Other Topics Concern    Not on file   Social History Narrative    Not on file     Social Determinants of Health     Financial Resource Strain: Low Risk     Difficulty of Paying Living Expenses: Not hard at all   Food Insecurity:     Worried About [address] in the Last Year:     [address] [employer] in the Last Year:    Transportation Needs: No Transportation Needs    Lack of Transportation (Medical): No    Lack of Transportation (Non-Medical):  No   Physical Activity:     Days of Exercise per Week:     Minutes of Exercise per Session:    Stress:     Feeling of Stress :    Social Connections:     Frequency of Communication with Friends and Family:     Frequency of Social Gatherings with Friends and Family:     Attends Worship Services:     Active Member of Clubs or Organizations:     Attends Club or Organization Meetings:     Marital Status:    Intimate Partner Violence:     Fear of Current or Ex-Partner:     Emotionally Abused:     Physically Abused:     Sexually Abused:        Current Outpatient Medications   Medication Sig Dispense Refill    LORazepam (ATIVAN) 0.5 MG tablet 0.5 mg nightly as needed.  hydrocortisone 2.5 % cream       omeprazole (PRILOSEC) 20 MG delayed release capsule Take 2 capsules by mouth daily 60 capsule 5    furosemide (LASIX) 40 MG tablet Take 1 tablet by mouth 2 times daily 90 tablet 3    tamsulosin (FLOMAX) 0.4 MG capsule Take 0.4 mg by mouth daily      JANUVIA 100 MG tablet TAKE 1 TABLET BY MOUTH ONE TIME A DAY 90 tablet 2    spironolactone (ALDACTONE) 50 MG tablet Take one tablet orally once daily 90 tablet 1    ferrous sulfate (IRON 325) 325 (65 Fe) MG tablet Take 325 mg by mouth every other day      vitamin B-12 (CYANOCOBALAMIN) 500 MCG tablet Take 500 mcg by mouth every other day      lactulose (CHRONULAC) 10 GM/15ML solution TAKE 15 MLS BY MOUTH THREE TIMES A DAY 1992 mL 5    pregabalin (LYRICA) 100 MG capsule TAKE 1 CAPSULE BY MOUTH 3 TIMES A DAY (Patient taking differently: 100 mg 2 times daily. ) 180 capsule 5    Cholecalciferol (VITAMIN D) 50 MCG (2000 UT) CAPS capsule Take 1 capsule by mouth daily 90 capsule 5    NIFEdipine (PROCARDIA XL) 60 MG extended release tablet Take 60 mg by mouth daily       allopurinol (ZYLOPRIM) 300 MG tablet Take 600 mg by mouth daily Takes 2 tabs (=600mg) daily 90 tablet 1     No current facility-administered medications for this visit. Allergies:  heis allergic to nsaids. ROS:  CV:  Denies chest pain; palpitations; shortness of breath; swelling of feet, ankles; and loss of consciousness. CON: Denies fever and dizziness. ENT:  Denies hearing loss / ringing, ear infections hoarseness, and swallowing problems. RESP:  Denies chronic cough, spitting up blood, and asthma/wheezing. GI: Denies abdominal pain, change in bowel habits, nausea or vomiting, and blood in stools. :  Denies frequent urination, burning or painful urination, blood in the urine, and bladder incontinence. NEURO:  Denies headache, memory loss, sleep disturbance, and tremor or movement disorder.     PHYSICAL EXAM:   /86 Pulse 55   Temp 97.1 °F (36.2 °C)   Resp 12   Ht 6' 1\" (1.854 m)   Wt 256 lb (116.1 kg)   BMI 33.78 kg/m²   GENERAL: Liset House is a 73 y.o. male who is alert and oriented and sitting comfortably in our office. SKIN:  Intact without rashes, lesions or ulcerations. NEURO: Sensation to the extremity is intact. VASC:  Capillary refill is less than 3 seconds. Distal pulses are palpable. There is no lymphadenopathy. Knee Exam  Musculoskeletal/Neurologic:  Inspection-Swelling: mild, Ecchymosis: no  Palpation-Tenderness:medial and pf joint  Pain with patellar grind: yes  ROM- 0-90  Strength- WNL  Sensation-normal to light touch    Special Tests-  Varus Laxity: negative   Valgus Laxity:  negative   Anterior Drawer: negative   Posterior Drawer: negative  Lachman's: negative  Martha's:negative    Gait: antalgic    PSYCH:  Good fund of knowledge and displays understanding of exam.    RADIOLOGY:     IMPRESSION:     1. Primary osteoarthritis of right knee          PLAN:   We discussed some of the etiologies and natural histories of     ICD-10-CM    1. Primary osteoarthritis of right knee  M17.11    . We discussed the various treatment alternatives including anti-inflammatory medications, physical therapy, injections, further imaging studies and as a last resort surgery. At this point patient has fairly annoying amount of arthritis in that right and I do think another cortisone injection is appropriate one was administered in the manners type chart. Patient tolerated procedure well. We will ask for insurance authorization for viscosupplementation and see him back in 6 to 8 weeks. Return to clinic in No follow-ups on file. Rodney Rob     Please be aware portions of this note were completed using voice recognition software and unforeseen errors may have occurred    Electronically signed by Isidoro Houston DO, FAOASM  on 7/30/21 at 8:56 AM EDT        No orders of the defined types were placed in this encounter. KNEE INJECTION PROCEDURE NOTE:  The patient was identified. The right knee was confirmed with the patient. Consent was obtained and a time out was performed. After a sterile prep with Betadine followed by an alcohol wipe the knee was injected using a bent knee lateral joint line approach with a mixture of 4 mL of 0.5% Marcaine and 40 mg of Kenalog. Patient tolerated the procedure well without post injection complications. I instructed the patient to call our office immediately if they have any swelling or increased pain at the injection site.

## 2021-08-05 DIAGNOSIS — Z76.0 MEDICATION REFILL: Primary | ICD-10-CM

## 2021-08-05 RX ORDER — LORAZEPAM 0.5 MG/1
0.5 TABLET ORAL NIGHTLY PRN
Qty: 30 TABLET | Refills: 0 | Status: SHIPPED | OUTPATIENT
Start: 2021-08-05 | End: 2021-10-06

## 2021-08-17 DIAGNOSIS — G62.9 NEUROPATHY: ICD-10-CM

## 2021-08-17 RX ORDER — PREGABALIN 100 MG/1
CAPSULE ORAL
Qty: 60 CAPSULE | Refills: 0 | Status: SHIPPED | OUTPATIENT
Start: 2021-08-17 | End: 2021-09-30

## 2021-08-30 DIAGNOSIS — K74.69 OTHER CIRRHOSIS OF LIVER (HCC): ICD-10-CM

## 2021-08-30 DIAGNOSIS — I10 ESSENTIAL HYPERTENSION: ICD-10-CM

## 2021-08-30 RX ORDER — SPIRONOLACTONE 50 MG/1
TABLET, FILM COATED ORAL
Qty: 90 TABLET | Refills: 1 | Status: SHIPPED | OUTPATIENT
Start: 2021-08-30 | End: 2022-03-03

## 2021-09-21 ENCOUNTER — TELEPHONE (OUTPATIENT)
Dept: GASTROENTEROLOGY | Age: 71
End: 2021-09-21

## 2021-09-21 NOTE — TELEPHONE ENCOUNTER
Pt called and lvm requesting to r/s procedure on 9/29 at [facility]. Writer returned call and pt reports he doesn't have a ride and he is having back surgery in October. Pt declined to r/s and stated he will call at a later date after his back surgery.

## 2021-09-24 ENCOUNTER — TELEPHONE (OUTPATIENT)
Dept: ORTHOPEDIC SURGERY | Age: 71
End: 2021-09-24

## 2021-09-24 ENCOUNTER — OFFICE VISIT (OUTPATIENT)
Dept: ORTHOPEDIC SURGERY | Age: 71
End: 2021-09-24
Payer: COMMERCIAL

## 2021-09-24 VITALS — DIASTOLIC BLOOD PRESSURE: 71 MMHG | SYSTOLIC BLOOD PRESSURE: 110 MMHG | HEART RATE: 56 BPM

## 2021-09-24 DIAGNOSIS — M17.11 PRIMARY OSTEOARTHRITIS OF RIGHT KNEE: Primary | ICD-10-CM

## 2021-09-24 PROCEDURE — 99213 OFFICE O/P EST LOW 20 MIN: CPT | Performed by: FAMILY MEDICINE

## 2021-09-24 RX ORDER — HYALURONATE SODIUM 10 MG/ML
20 SYRINGE (ML) INTRAARTICULAR WEEKLY
Qty: 8.12 ML | Refills: 0 | Status: ON HOLD | OUTPATIENT
Start: 2021-09-24 | End: 2022-04-23

## 2021-09-24 RX ORDER — HYALURONATE SODIUM 10 MG/ML
20 SYRINGE (ML) INTRAARTICULAR ONCE
Status: DISCONTINUED | OUTPATIENT
Start: 2021-09-24 | End: 2022-04-23

## 2021-09-24 NOTE — PROGRESS NOTES
Sports Medicine Consultation     CHIEF COMPLAINT:  Knee Pain (Rt knee f/u. been getting a little worse over last 2wks)      HPI:  Frankey Meeter is a 73y.o. year old male who is a  established patient being seen for regarding follow up of a pre-existing problem right knee pain. The pain has been present for  year(s). The patient recalls a no new injury. The patient has tried cortisone with improvement. The pain is described as achy while walking with occ sharp pain. There is  pain on weightbearing. The knee does not swell. There is is  painful popping and clicking. The knee does catch or lock. It has not given out. It is  stiff upon arising from sitting. It is  painful to go up and down stairs and sit for a prolonged period of time. he has a past medical history of Abdominal varicosities, Anxiety state NEC, Arthritis, Sol esophagus, Chronic kidney disease, Cirrhosis ([address] Colon polyps, Diverticulosis of colon, Enlarged heart, Gout, Hepatic cirrhosis ([address] Hepatic cyst, Hepatitis C, chronic ([address] History of alcohol use, Hyperlipidemia, Hypertension, Lumbago, Lymphedema, Malignant neoplasm of prostate ([address] Otalgia of right ear, Portal venous hypertension ([address] Sciatica, and Splenomegaly. he has a past surgical history that includes Prostatectomy (06/2011); back surgery; Knee arthroscopy (Right); Tonsillectomy; ventral hernia repair (05/26/2015); Paracentesis (11/04/2015); hernia repair; Paracentesis (12/28/2015); other surgical history (Right, 01/25/2016); Vein Surgery (Left, 12/07/2016); Colonoscopy (12/05/2016); Upper gastrointestinal endoscopy (10/24/2013); Upper gastrointestinal endoscopy (08/30/2017); pr egd transoral biopsy single/multiple (N/A, 08/30/2017); Colonoscopy (N/A, 10/07/2019); Cardiac catheterization (10.30/2019); Aortic valve repair (06/03/2020); Finger trigger release (Right, 02/24/2021); and Finger trigger release (Right, 2/24/2021).     family history includes Prostate Cancer in his father. Social History     Socioeconomic History    Marital status:      Spouse name: Not on file    Number of children: Not on file    Years of education: Not on file    Highest education level: Not on file   Occupational History    Not on file   Tobacco Use    Smoking status: Former Smoker     Packs/day: 1.00     Years: 49.00     Pack years: 49.00     Types: Cigarettes     Start date: 1966     Quit date: 2015     Years since quittin.7    Smokeless tobacco: Never Used    Tobacco comment: maybe 1 cigarette per day, updated from progress note dated 21   Vaping Use    Vaping Use: Never used   Substance and Sexual Activity    Alcohol use: No     Alcohol/week: 0.0 standard drinks    Drug use: Yes     Types: Marijuana     Comment: edibles    Sexual activity: Not on file   Other Topics Concern    Not on file   Social History Narrative    Not on file     Social Determinants of Health     Financial Resource Strain:     Difficulty of Paying Living Expenses:    Food Insecurity:     Worried About Running Out of Food in the Last Year:     Ran Out of Food in the Last Year:    Transportation Needs:     Lack of Transportation (Medical):      Lack of Transportation (Non-Medical):    Physical Activity:     Days of Exercise per Week:     Minutes of Exercise per Session:    Stress:     Feeling of Stress :    Social Connections:     Frequency of Communication with Friends and Family:     Frequency of Social Gatherings with Friends and Family:     Attends Mu-ism Services:     Active Member of Clubs or Organizations:     Attends Club or Organization Meetings:     Marital Status:    Intimate Partner Violence:     Fear of Current or Ex-Partner:     Emotionally Abused:     Physically Abused:     Sexually Abused:        Current Outpatient Medications   Medication Sig Dispense Refill    spironolactone (ALDACTONE) 50 MG tablet Take one tablet orally once daily 90 tablet 1    pregabalin (LYRICA) 100 MG capsule TAKE 1 CAPSULE BY MOUTH 2 TIMES A DAY 60 capsule 0    omeprazole (PRILOSEC) 20 MG delayed release capsule Take 2 capsules by mouth daily 60 capsule 5    furosemide (LASIX) 40 MG tablet Take 1 tablet by mouth 2 times daily 90 tablet 3    tamsulosin (FLOMAX) 0.4 MG capsule Take 0.4 mg by mouth daily      JANUVIA 100 MG tablet TAKE 1 TABLET BY MOUTH ONE TIME A DAY 90 tablet 2    ferrous sulfate (IRON 325) 325 (65 Fe) MG tablet Take 325 mg by mouth every other day      vitamin B-12 (CYANOCOBALAMIN) 500 MCG tablet Take 500 mcg by mouth every other day      lactulose (CHRONULAC) 10 GM/15ML solution TAKE 15 MLS BY MOUTH THREE TIMES A DAY 1992 mL 5    Cholecalciferol (VITAMIN D) 50 MCG (2000 UT) CAPS capsule Take 1 capsule by mouth daily 90 capsule 5    NIFEdipine (PROCARDIA XL) 60 MG extended release tablet Take 60 mg by mouth daily       allopurinol (ZYLOPRIM) 300 MG tablet Take 600 mg by mouth daily Takes 2 tabs (=600mg) daily 90 tablet 1     No current facility-administered medications for this visit. Allergies:  heis allergic to nsaids. ROS:  CV:  Denies chest pain; palpitations; shortness of breath; swelling of feet, ankles; and loss of consciousness. CON: Denies fever and dizziness. ENT:  Denies hearing loss / ringing, ear infections hoarseness, and swallowing problems. RESP:  Denies chronic cough, spitting up blood, and asthma/wheezing. GI: Denies abdominal pain, change in bowel habits, nausea or vomiting, and blood in stools. :  Denies frequent urination, burning or painful urination, blood in the urine, and bladder incontinence. NEURO:  Denies headache, memory loss, sleep disturbance, and tremor or movement disorder. PHYSICAL EXAM:   /71   Pulse 56   GENERAL: Dayna Mcclure is a 73 y.o. male who is alert and oriented and sitting comfortably in our office. SKIN:  Intact without rashes, lesions or ulcerations.

## 2021-09-27 ENCOUNTER — HOSPITAL ENCOUNTER (OUTPATIENT)
Dept: PREADMISSION TESTING | Age: 71
Discharge: HOME | End: 2021-10-01
Payer: COMMERCIAL

## 2021-09-27 VITALS
HEART RATE: 54 BPM | DIASTOLIC BLOOD PRESSURE: 79 MMHG | HEIGHT: 72 IN | SYSTOLIC BLOOD PRESSURE: 125 MMHG | WEIGHT: 249.9 LBS | RESPIRATION RATE: 16 BRPM | TEMPERATURE: 96.5 F | OXYGEN SATURATION: 96 % | BODY MASS INDEX: 33.85 KG/M2

## 2021-09-27 LAB
ABSOLUTE EOS #: 0.13 K/UL (ref 0–0.44)
ABSOLUTE IMMATURE GRANULOCYTE: 0.04 K/UL (ref 0–0.3)
ABSOLUTE LYMPH #: 2.26 K/UL (ref 1.1–3.7)
ABSOLUTE MONO #: 0.76 K/UL (ref 0.1–1.2)
ALBUMIN SERPL-MCNC: 4.4 G/DL (ref 3.5–5.2)
ALBUMIN/GLOBULIN RATIO: NORMAL (ref 1–2.5)
ALP BLD-CCNC: 83 U/L (ref 40–129)
ALT SERPL-CCNC: 19 U/L (ref 5–41)
ANION GAP SERPL CALCULATED.3IONS-SCNC: 13 MMOL/L (ref 9–17)
AST SERPL-CCNC: 20 U/L
BASOPHILS # BLD: 1 % (ref 0–2)
BASOPHILS ABSOLUTE: 0.06 K/UL (ref 0–0.2)
BILIRUB SERPL-MCNC: 0.34 MG/DL (ref 0.3–1.2)
BILIRUBIN DIRECT: 0.12 MG/DL
BILIRUBIN URINE: NEGATIVE
BILIRUBIN, INDIRECT: 0.22 MG/DL (ref 0–1)
BUN BLDV-MCNC: 15 MG/DL (ref 8–23)
BUN/CREAT BLD: 13 (ref 9–20)
CALCIUM SERPL-MCNC: 9.6 MG/DL (ref 8.6–10.4)
CHLORIDE BLD-SCNC: 102 MMOL/L (ref 98–107)
CO2: 22 MMOL/L (ref 20–31)
COLOR: YELLOW
COMMENT UA: NORMAL
CREAT SERPL-MCNC: 1.19 MG/DL (ref 0.7–1.2)
DIFFERENTIAL TYPE: ABNORMAL
EOSINOPHILS RELATIVE PERCENT: 1 % (ref 1–4)
ESTIMATED AVERAGE GLUCOSE: 126 MG/DL
GFR AFRICAN AMERICAN: >60 ML/MIN
GFR NON-AFRICAN AMERICAN: >60 ML/MIN
GFR SERPL CREATININE-BSD FRML MDRD: ABNORMAL ML/MIN/{1.73_M2}
GFR SERPL CREATININE-BSD FRML MDRD: ABNORMAL ML/MIN/{1.73_M2}
GLOBULIN: NORMAL G/DL (ref 1.5–3.8)
GLUCOSE BLD-MCNC: 119 MG/DL (ref 70–99)
GLUCOSE URINE: NEGATIVE
HBA1C MFR BLD: 6 % (ref 4–6)
HCT VFR BLD CALC: 47.5 % (ref 40.7–50.3)
HEMOGLOBIN: 15.9 G/DL (ref 13–17)
IMMATURE GRANULOCYTES: 0 %
INR BLD: 1
KETONES, URINE: NEGATIVE
LEUKOCYTE ESTERASE, URINE: NEGATIVE
LYMPHOCYTES # BLD: 22 % (ref 24–43)
MCH RBC QN AUTO: 33.3 PG (ref 25.2–33.5)
MCHC RBC AUTO-ENTMCNC: 33.5 G/DL (ref 28.4–34.8)
MCV RBC AUTO: 99.4 FL (ref 82.6–102.9)
MONOCYTES # BLD: 7 % (ref 3–12)
NITRITE, URINE: NEGATIVE
NRBC AUTOMATED: 0 PER 100 WBC
PARTIAL THROMBOPLASTIN TIME: 39 SEC (ref 23.9–33.8)
PDW BLD-RTO: 13.5 % (ref 11.8–14.4)
PH UA: 5.5 (ref 5–8)
PLATELET # BLD: 175 K/UL (ref 138–453)
PLATELET ESTIMATE: ABNORMAL
PMV BLD AUTO: 10.5 FL (ref 8.1–13.5)
POTASSIUM SERPL-SCNC: 4.6 MMOL/L (ref 3.7–5.3)
PROTEIN UA: NEGATIVE
PROTHROMBIN TIME: 12.7 SEC (ref 11.5–14.2)
RBC # BLD: 4.78 M/UL (ref 4.21–5.77)
RBC # BLD: ABNORMAL 10*6/UL
SEG NEUTROPHILS: 69 % (ref 36–65)
SEGMENTED NEUTROPHILS ABSOLUTE COUNT: 6.97 K/UL (ref 1.5–8.1)
SODIUM BLD-SCNC: 137 MMOL/L (ref 135–144)
SPECIFIC GRAVITY UA: 1.01 (ref 1–1.03)
TOTAL PROTEIN: 8 G/DL (ref 6.4–8.3)
TURBIDITY: CLEAR
URINE HGB: NEGATIVE
UROBILINOGEN, URINE: NORMAL
WBC # BLD: 10.2 K/UL (ref 3.5–11.3)
WBC # BLD: ABNORMAL 10*3/UL

## 2021-09-27 PROCEDURE — 87086 URINE CULTURE/COLONY COUNT: CPT

## 2021-09-27 PROCEDURE — 85610 PROTHROMBIN TIME: CPT

## 2021-09-27 PROCEDURE — 85025 COMPLETE CBC W/AUTO DIFF WBC: CPT

## 2021-09-27 PROCEDURE — 85730 THROMBOPLASTIN TIME PARTIAL: CPT

## 2021-09-27 PROCEDURE — 36415 COLL VENOUS BLD VENIPUNCTURE: CPT

## 2021-09-27 PROCEDURE — 80076 HEPATIC FUNCTION PANEL: CPT

## 2021-09-27 PROCEDURE — 80048 BASIC METABOLIC PNL TOTAL CA: CPT

## 2021-09-27 PROCEDURE — 83036 HEMOGLOBIN GLYCOSYLATED A1C: CPT

## 2021-09-27 PROCEDURE — 87641 MR-STAPH DNA AMP PROBE: CPT

## 2021-09-27 PROCEDURE — 81003 URINALYSIS AUTO W/O SCOPE: CPT

## 2021-09-27 RX ORDER — HYDROCODONE BITARTRATE AND ACETAMINOPHEN 10; 325 MG/1; MG/1
1 TABLET ORAL EVERY 6 HOURS PRN
COMMUNITY
End: 2021-09-30 | Stop reason: SDUPTHER

## 2021-09-27 ASSESSMENT — PAIN DESCRIPTION - DIRECTION: RADIATING_TOWARDS: DOWN RIGHT LEG TO FOOT

## 2021-09-27 ASSESSMENT — PAIN DESCRIPTION - LOCATION: LOCATION: HIP

## 2021-09-27 ASSESSMENT — PAIN DESCRIPTION - PROGRESSION: CLINICAL_PROGRESSION: GRADUALLY WORSENING

## 2021-09-27 ASSESSMENT — PAIN SCALES - GENERAL: PAINLEVEL_OUTOF10: 2

## 2021-09-27 ASSESSMENT — PAIN - FUNCTIONAL ASSESSMENT: PAIN_FUNCTIONAL_ASSESSMENT: PREVENTS OR INTERFERES SOME ACTIVE ACTIVITIES AND ADLS

## 2021-09-27 ASSESSMENT — PAIN DESCRIPTION - DESCRIPTORS: DESCRIPTORS: SHARP;ACHING

## 2021-09-27 ASSESSMENT — PAIN DESCRIPTION - ONSET: ONSET: ON-GOING

## 2021-09-27 ASSESSMENT — PAIN DESCRIPTION - ORIENTATION: ORIENTATION: RIGHT

## 2021-09-27 ASSESSMENT — PAIN DESCRIPTION - FREQUENCY: FREQUENCY: CONTINUOUS

## 2021-09-27 NOTE — PROGRESS NOTES
[address] [facility] ON Monday, 10/18/2021 at 0900 AM    Once you enter the hospital lobby, take the elevators to the second floor. Check-In is at the surgery registration desk. Continue to take your home medications as you normally do up to and including the night before surgery with the exception of any blood thinning medications. Please stop any blood thinning medications as directed by your surgeon or prescribing physician. Failure to stop certain medications may interfere with your scheduled surgery. These may include:  Aspirin, Warfarin (Coumadin), Clopidogrel (Plavix), Ibuprofen (Motrin, Advil), Naproxen (Aleve), Meloxicam (Mobic), Celecoxib (Celebrex), Eliquis, Pradaxa, Xarelto, Effient, Fish Oil, Herbal supplements. VITAMIN B12, VITAMIN D    If you are diabetic, do not take any of your diabetic medications by mouth the morning of surgery. If you are taking insulin contact the doctor that manages your diabetes for instructions about any changes to your insulin dosages the day before surgery. Do not inject insulin or other injectable diabetic medications the morning of surgery unless otherwise instructed by the doctor who manages your diabetes. DO NOT TAKE JANUVIA IVON DAY OF SURGERY     Please take the following medication(s) the day of surgery with a small sip of water:  PREGABALIN  PROCARDIA XL/ NIFEDIPINE   OMEPRAZOLE       PREPARING FOR YOUR SURGERY:     Before surgery, you can play an important role in your own health. Because skin is not sterile, we need to be sure that your skin is as free of germs as possible before surgery by carefully washing before surgery. Preparing or prepping skin before surgery can reduce the risk of a surgical site infection.   Do not shave the area of your body where your surgery will be performed unless you received specific permission from your physician.     You will need to shower at home the night before surgery and the morning of surgery with a special soap called chlorhexidine gluconate (CHG*). *Not to be used by people allergic to Chlorhexidine Gluconate (CHG). Following these instructions will help you be sure that your skin is clean before surgery. Instructions on cleaning your skin before surgery: The night before your surgery:      You will need to shower with warm water (not hot) and the CHG soap.  Use a clean wash cloth and a clean towel. Have clean clothes available to put on after the shower.   First wash your hair with regular shampoo. Rinse your hair and body thoroughly to remove the shampoo.  Wash your face and genital area (private parts) with your regular soap or water only. Thoroughly rinse your body with warm water from the neck down.  Turn water off to prevent rinsing the soap off too soon.  With a clean wet washcloth and half of the CHG soap in the bottle, lather your entire body from the neck down. Do not use CHG soap near your eyes or ears to avoid injury to those areas.  Wash thoroughly, paying special attention to the area where your surgery will be performed.  Wash your body gently for five (5) minutes. Avoid scrubbing your skin too hard.  Turn the water back on and rinse your body thoroughly.  Pat yourself dry with a clean, soft towel. Do not apply lotion, cream or powder.  Dress with clean freshly washed clothes. The morning of surgery:     Repeat shower following steps above - using remaining half of CHG soap in bottle. Patient Instructions:    [facility] If you are having any type of anesthesia you are to have nothing to eat or drink after midnight the night before your surgery. This includes gum, hard candy, mints, water or smoking or chewing tobacco.  The only exception to this is a small sip of water to take with any morning dose of heart, blood pressure, or seizure medications. No alcoholic beverages for 24 hours prior to surgery.      Brush your teeth but do not swallow water.  Bring your eye glasses and case with you. No contacts are to be worn the day of surgery. You also may bring your hearing aids. Most surgical procedures involving anesthesia will require that you remove your dentures prior to surgery.  If you are on C-PAP or Bi-PAP at home and plan on staying in the hospital overnight for your surgery please bring the machine with you. · Do not wear any jewelry or body piercings day of surgery. Also, NO lotion, perfume or deodorant to be used the day of surgery. No nail polish on the operative extremity (arm/leg surgeries)    · If you are staying overnight with us, please bring a small bag of necessary personal items.  Please wear loose, comfortable clothing. If you are potentially going to have a cast or brace bring clothing that will fit over them.  In case of illness - If you have cold or flu like symptoms (high fever, runny nose, sore throat, cough, etc.) rash, nausea, vomiting, loose stools, and/or recent contact with someone who has a contagious disease (chicken pox, measles, etc.) Please call your doctor before coming to the hospital.         Day of Surgery/Procedure:    As a patient at [facility] you can expect quality medical and nursing care that is centered on your individual needs. Our goal is to make your surgical experience as comfortable as possible    . Transportation After Your Surgery/Procedure: You will need a friend or family member to drive you home after your procedure. Your  must be 25years of age or older and able to sign off on your discharge instructions. A taxi cab or any other form of public transportation is not acceptable. Your friend or family member must stay at the hospital throughout your procedure.     Someone must remain with you for the first 24 hours after your surgery

## 2021-09-27 NOTE — H&P
History and Physical Service   [place] [id number]    HISTORY AND PHYSICAL EXAMINATION            Date of Evaluation: 9/27/2021  Patient name:  Holly Santoyo  MRN:   [id number]  YOB: 1950  PCP:    KELSY Hicks CNP    History Obtained From:     Patient, Medical records    History of Present Illness: This is Holly Santoyo a 73 y.o. male who presents for a pre-admission testing appointment for an upcoming right L4 to S1 TLIF by Dr. Meghna Hylton scheduled on 10/18/2021 at 1100 due to lumbar spinal stenosis. S/p back surgery in 1999. Pt denies recent back pain. The pt has 2-9/10 right hip and right foot pain. The pain has progressively worsened over the past 6 years. The hip pain is aggravated by activity; and is minimally relieved with Tylenol or Norco. The pt has a right hip labral tear and followed-up with Dr. Nayan Goff in 4/2021. The right hip occasionally feels like it is going to give out. The right foot pain improves with activity, but worsens with rest. The pt followed-up with pain management for 2 years without pain relief. The right lower extremity has numbness. Pt denies bowel or urinary incontinence and denies recent falls. The pt has a history of urinary retention. History of CKD, enlarged heart, hyperlipidemia, hypertension, cirrhosis, hepatitis C, portal venous hypertension, and splenomegaly. S/p aortic valve repair in 2020. BREE 10/30/2019 EF 55% (See Epic). Pt follows-up with Dr. Danay Pop from urology, Dr. Kedar Saunders from nephrology (please see 9/20/2021 note in Epic), and Dr. Yovani Fontana from gastroenterology. Pt states he followed-up with a cardiologist last week but cannot remember his name. Functional Capacity per pt:   1) Pt is able to walk 2 city blocks on level ground without SOB. 2) Pt is not able to climb 2 flights of stairs due to right hip pain.      Past Medical History:     Past Medical History:   Diagnosis Date    Abdominal varicosities 07/15/2020  Anxiety state NEC     Aortic valve defect     Arthritis     DJD    Sol esophagus 10/24/2013    small segment    Chronic kidney disease     Chronic kidney disease     Cirrhosis ([address]     had paracentesis Sept 2015    Colon polyps 10/07/2019    tubular adenoma x2; hyperplastic polyp    Diabetes mellitus ([address]     pre diabetic on januvia    Diverticulosis of colon     Enlarged heart 12/28/2015    HISTORY OF, is resolved at this time    Gout 12/28/2015    is resolved at this time    Hepatic cirrhosis ([address]     Hepatic cyst     Hepatitis C, chronic ([address]     Seeing Dr. 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     Portal venous hypertension ([address] 07/15/2020    S/P prostatectomy 2012    Sciatica     Splenomegaly 07/15/2020        Past Surgical History:     Past Surgical History:   Procedure Laterality Date    [facility]ORT[facility]PA[facility]  [facility]/03/2020    [facility]    BACK SURGERY      L4-5    CARDIAC CATHETERIZATION  10.30/2019    Dr. Kurtis Ojeda COLONOSCOPY  12/05/2016    [facility], states due for another in 2  years    COLONOSCOPY N/A 10/07/2019    tubular adenoma x2; hyperplastic polyp    FINGER TRIGGER RELEASE Right 02/24/2021    pinky finger    FINGER TRIGGER RELEASE Right 2/24/2021    RIGHT SMALL FINGER TRIGGER RELEASE performed [facility] Mariano Pack DO [facility] ARTHROSCOPY Right     OTHER SURGICAL HISTORY Right 01/25/2016    10 liters removed peracentesis    PARACENTESIS  11/04/2015    PARACENTESIS  12/28/2015    ME EGD TRANSORAL BIOPSY SINGLE/MULTIPLE N/A 08/30/2017    [facility]OPSY [facility]  06/2011    TONSILLECTOMY      UPPER GASTROINTESTINAL ENDOSCOPY  10/24/2013    small segment barretts    UPPER GASTROINTESTINAL ENDOSCOPY  08/30/2017    VEIN SURGERY Left 12/07/2016    leg vein stripping    VENTRAL HERNIA REPAIR  05/26/2015        Medications Prior to Admission:     Prior to Admission medications    Medication Sig Start Date End Date Taking? Authorizing Provider   HYDROcodone-acetaminophen (NORCO)  MG per tablet Take 1 tablet by mouth every 6 hours as needed for Pain.    Yes Historical Provider, MD   spironolactone (ALDACTONE) 50 MG tablet Take one tablet orally once daily 8/30/21  Yes KELSY Ann CNP   pregabalin (LYRICA) 100 MG capsule TAKE 1 CAPSULE BY MOUTH 2 TIMES A DAY 8/17/21 7/31/22 Yes KELSY Ann CNP   omeprazole (PRILOSEC) 20 MG delayed release capsule Take 2 capsules by mouth daily 6/14/21 6/14/22 Yes KELSY Ann CNP   furosemide (LASIX) 40 MG tablet Take 1 tablet by mouth 2 times daily  Patient taking differently: Take 80 mg by mouth daily  4/8/21 9/27/21 Yes Lulú Gao MD   tamsulosin (FLOMAX) 0.4 MG capsule Take 0.4 mg by mouth daily   Yes Historical Provider, MD   JANUVIA 100 MG tablet TAKE 1 TABLET BY MOUTH ONE TIME A DAY 2/4/21  Yes Davida Chin DO   ferrous sulfate (IRON 325) 325 (65 Fe) MG tablet Take 325 mg by mouth every other day   Yes Historical Provider, MD   vitamin B-12 (CYANOCOBALAMIN) 500 MCG tablet Take 500 mcg by mouth every other day   Yes Historical Provider, MD   NIFEdipine (PROCARDIA XL) 60 MG extended release tablet Take 60 mg by mouth daily  6/12/19  Yes Historical Provider, MD   allopurinol (ZYLOPRIM) 300 MG tablet Take 600 mg by mouth daily Takes 2 tabs (=600mg) daily 3/27/19  Yes KELSY Cisse CNP   sodium hyaluronate (EUFLEXXA) 20 MG/2ML SOSY injection Inject 2 mLs into the articular space once a week Series of three injections for the right knee 9/24/21   Sande Prader, DO   lactulose ([employer]) 10 GM/15ML solution TAKE 15 MLS BY MOUTH THREE TIMES A DAY  Patient taking differently: 10 g 2 times daily  10/16/20   Bri Davis DO   Cholecalciferol (VITAMIN D) 50 MCG (2000 UT) CAPS capsule Take 1 capsule by mouth daily 2/24/20   Bri Davis DO        Allergies:     Nsaids    Social History:     Tobacco:    reports that he quit smoking about 6 years ago. His smoking use included cigarettes. He started smoking about 55 years ago. He has a 49.00 pack-year smoking history. He has never used smokeless tobacco.  Alcohol:      reports previous alcohol use. Pt states he last drank alcohol in 2015. Drug Use:  reports current drug use. Drug: Marijuana. Instructed pt not to use marijuana prior to surgery. Pt voiced understanding to this instruction. Family History:     Family History   Problem Relation Age of Onset    Prostate Cancer Father     Other Neg Hx         blood clots       Review of Systems:     Positive and Negative as described in HPI. CONSTITUTIONAL: Negative for fevers, chills, sweats, fatigue, and weight loss. HEENT: Pt wears glasses. Tinnitus. Negative for rhinorrhea and throat pain. RESPIRATORY: Negative for shortness of breath, cough, congestion, and wheezing. CARDIOVASCULAR: Negative for chest pain, blood clot, irregular heartbeat, and palpitations. GASTROINTESTINAL: History of heavy alcohol use. Pt states he last drank alcohol in 2015. Advanced liver disease with portal hypertension. Hepatitis C. Cirrhosis. Pt takes lactulose and follows-up with Dr. Courtney Cabot. Pt states his last paracentesis was in 2017. Pt wears a \"girdle\". Acid reflux is treated with Prilosec. Negative for nausea, vomiting, diarrhea, constipation, change in bowel habits, and abdominal pain. GENITOURINARY: History of prostate cancer. S/p prostatectomy. Difficulty urinating and retention. Pt takes Flomax and follows-up with Dr. Yael Spain from urology. History of CKD. Pt follows-up with Dr. Myra Elizondo from nephrology.   INTEGUMENT: Negative for rash, skin lesions, and easy bruising. HEMATOLOGIC/LYMPHATIC: History of lymphedema. Pt takes Lasix. ALLERGIC/IMMUNOLOGIC: Negative for urticaria and itching. ENDOCRINE: Prediabetic. Last A1C was 6.1%. Negative for increase in thirst and heat or cold intolerance. MUSCULOSKELETAL: See HPI. Pt follows-up with Dr. Mariela Sanchez from rheumatology at the [facility] for chronic gout. The pt followed-up with Dr. Candelario for a right hip labral tear. NEUROLOGICAL: Right lower extremity numbness. Bilateral foot numbness and tingling. Pt follows-up with neurology. Negative for headaches, dizziness, and lightheadedness. BEHAVIOR/PSYCH: Negative for depression and anxiety. Physical Exam:   /79   Pulse 54   Temp 96.5 °F (35.8 °C) (Temporal)   Resp 16   Ht 6' (1.829 m)   Wt 249 lb 14.4 oz (113.4 kg)   SpO2 96%   BMI 33.89 kg/m²     No results for input(s): POCGLU in the last 72 hours. General Appearance:  Alert, well appearing, and in no acute distress. Obese. Mental status: Oriented to person, place, and time. Head: Normocephalic and atraumatic. Eye: No icterus, redness, pupils equal and reactive, extraocular eye movements intact, and conjunctiva clear. Ear: Hearing grossly intact. Nose: No drainage noted. Mouth: Mucous membranes moist.  Neck: Bilateral carotid bruits vs. cardiac murmur. Supple. Lungs: Bilateral equal air entry, clear to auscultation, no wheezing, rales or rhonchi, and normal effort. Cardiovascular: Murmur 2/6. Asymptomatic bradycardia. HR 54 BPM. Regular rhythm. No gallop or rub. Abdomen: Distended. Soft, nontender, and active bowel sounds. Neurologic: Normal speech and cranial nerves II through XII grossly intact. Strength 5/5 bilaterally. Skin: No gross lesions, rashes, bruising, or bleeding on exposed skin area. Extremities: Right lower extremity 2+ pitting edema. Left lower extremity 1+ pitting edema. Posterior tibial pulses are palpable bilaterally.  No calf tenderness with palpation. Psych: Normal affect. Investigations:      Laboratory Testing:  Recent Results (from the past 24 hour(s))   CBC with DIFF    Collection Time: 21 10:45 AM   Result Value Ref Range    WBC 10.2 3.5 - 11.3 k/uL    RBC 4.78 4.21 - 5.77 m/uL    Hemoglobin 15.9 13.0 - 17.0 g/dL    Hematocrit 47.5 40.7 - 50.3 %    MCV 99.4 82.6 - 102.9 fL    MCH 33.3 25.2 - 33.5 pg    MCHC 33.5 28.4 - 34.8 g/dL    RDW 13.5 11.8 - 14.4 %    Platelets 421 863 - 968 k/uL    MPV 10.5 8.1 - 13.5 fL    NRBC Automated 0.0 0.0 per 100 WBC    Differential Type NOT REPORTED     Seg Neutrophils 69 (H) 36 - 65 %    Lymphocytes 22 (L) 24 - 43 %    Monocytes 7 3 - 12 %    Eosinophils % 1 1 - 4 %    Basophils 1 0 - 2 %    Immature Granulocytes 0 0 %    Segs Absolute 6.97 1.50 - 8.10 k/uL    Absolute Lymph # 2.26 1.10 - 3.70 k/uL    Absolute Mono # 0.76 0.10 - 1.20 k/uL    Absolute Eos # 0.13 0.00 - 0.44 k/uL    Basophils Absolute 0.06 0.00 - 0.20 k/uL    Absolute Immature Granulocyte 0.04 0.00 - 0.30 k/uL    WBC Morphology NOT REPORTED     RBC Morphology NOT REPORTED     Platelet Estimate NOT REPORTED        Recent Labs     21  1045   HGB 15.9   HCT 47.5   WBC 10.2   MCV 99.4       No results for input(s): COVID19 in the last 720 hours. [id number]. See Epic. Diagnosis:      1. Lumbar spinal stenosis    Plans:     1.  Right L4 to S1 TLIF       KELSY Gibbons - CNP  2021  11:01 AM

## 2021-09-28 LAB
CULTURE: NORMAL
Lab: NORMAL
MRSA, DNA, NASAL: NORMAL
SPECIMEN DESCRIPTION: NORMAL
SPECIMEN DESCRIPTION: NORMAL

## 2021-09-30 ENCOUNTER — OFFICE VISIT (OUTPATIENT)
Dept: FAMILY MEDICINE CLINIC | Age: 71
End: 2021-09-30
Payer: COMMERCIAL

## 2021-09-30 VITALS
HEIGHT: 72 IN | DIASTOLIC BLOOD PRESSURE: 72 MMHG | HEART RATE: 75 BPM | BODY MASS INDEX: 33.86 KG/M2 | OXYGEN SATURATION: 94 % | SYSTOLIC BLOOD PRESSURE: 120 MMHG | TEMPERATURE: 97.2 F | WEIGHT: 250 LBS

## 2021-09-30 DIAGNOSIS — E11.8 CONTROLLED TYPE 2 DIABETES MELLITUS WITH COMPLICATION, WITHOUT LONG-TERM CURRENT USE OF INSULIN (HCC): ICD-10-CM

## 2021-09-30 DIAGNOSIS — M54.16 CHRONIC LUMBAR RADICULOPATHY: Primary | ICD-10-CM

## 2021-09-30 DIAGNOSIS — G62.9 NEUROPATHY: ICD-10-CM

## 2021-09-30 DIAGNOSIS — M17.0 PRIMARY OSTEOARTHRITIS OF BOTH KNEES: Chronic | ICD-10-CM

## 2021-09-30 DIAGNOSIS — K21.9 GASTROESOPHAGEAL REFLUX DISEASE WITHOUT ESOPHAGITIS: ICD-10-CM

## 2021-09-30 DIAGNOSIS — K74.60 CIRRHOSIS OF LIVER WITHOUT ASCITES, UNSPECIFIED HEPATIC CIRRHOSIS TYPE (HCC): ICD-10-CM

## 2021-09-30 DIAGNOSIS — N18.30 CHRONIC RENAL FAILURE SYNDROME, STAGE 3 (MODERATE) (HCC): ICD-10-CM

## 2021-09-30 DIAGNOSIS — I10 ESSENTIAL HYPERTENSION: ICD-10-CM

## 2021-09-30 PROCEDURE — 99213 OFFICE O/P EST LOW 20 MIN: CPT | Performed by: NURSE PRACTITIONER

## 2021-09-30 RX ORDER — HYDROCODONE BITARTRATE AND ACETAMINOPHEN 10; 325 MG/1; MG/1
1 TABLET ORAL EVERY 6 HOURS PRN
Qty: 30 TABLET | Refills: 0 | Status: SHIPPED | OUTPATIENT
Start: 2021-09-30 | End: 2021-10-26 | Stop reason: SDUPTHER

## 2021-09-30 RX ORDER — OMEPRAZOLE 20 MG/1
40 CAPSULE, DELAYED RELEASE ORAL DAILY
Qty: 180 CAPSULE | Refills: 1 | Status: SHIPPED | OUTPATIENT
Start: 2021-09-30 | End: 2022-01-10

## 2021-09-30 RX ORDER — PREGABALIN 100 MG/1
CAPSULE ORAL
Qty: 60 CAPSULE | Refills: 0 | Status: SHIPPED | OUTPATIENT
Start: 2021-09-30 | End: 2021-10-31

## 2021-09-30 ASSESSMENT — ENCOUNTER SYMPTOMS
SORE THROAT: 0
VOMITING: 0
ABDOMINAL PAIN: 0
COUGH: 0
EYE PAIN: 0
BACK PAIN: 1
SINUS PAIN: 0
SHORTNESS OF BREATH: 0
DIARRHEA: 0
NAUSEA: 0

## 2021-09-30 NOTE — PROGRESS NOTES
[address] [facility]  [address] [facility] [address] [address]  Dept: [phone]  Dept Fax: [phone]    Karen Hilliard is a 73 y.o. male who presents today for his medicalconditions/complaints as noted below. Karen Hilliard is c/o of Pre-op Exam (ekg,labs in chart. having back surgery 10/18,surgeon told him he needs to ask pcp for pain meds before surgery,limited on tylenol due to cirrosis of liver (no more than 500 mg Q6hs) and unable to take NSAIDS. req norco. taking care of wife that has cancer and needs to be functional)      HPI:       60-year-old male patient presents with complaints of chronic lower back pain. Patient has upcoming follow-ups with neurosurgery Dr. Norma Long, regarding his chronic lower back pain. Pain does radiate down the leg. Significant history of of chronic kidney disease, cirrhosis. Patient's most recent labs regarding liver and kidneys have improved. Continue to follow with GI and nephrology. Recently completed PAT testing. Interested in pain control, will be taking percocet following surgery.           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 Andres Boateng MD at [facility] for [facility]    History of alcohol use 9/18/2015    Hyperlipidemia     Hypertension     Lumbago     Lymphedema  Malignant neoplasm of prostate (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  Portal venous hypertension (HCC) 07/15/2020    S/P prostatectomy 2012    Sciatica     Splenomegaly 07/15/2020        Current Outpatient Medications   Medication Sig Dispense Refill    pregabalin (LYRICA) 100 MG capsule TAKE 1 CAPSULE BY MOUTH 2 TIMES A DAY 60 capsule 0    omeprazole (PRILOSEC) 20 MG delayed release capsule Take 2 capsules by mouth daily 180 capsule 1    SITagliptin (JANUVIA) 100 MG tablet TAKE 1 TABLET BY MOUTH ONE TIME A DAY 90 tablet 1    HYDROcodone-acetaminophen (NORCO)  MG per tablet Take 1 tablet by mouth every 6 hours as needed for Pain for up to 14 days.  30 tablet 0    sodium hyaluronate (EUFLEXXA) 20 MG/2ML SOSY injection Inject 2 mLs into the articular space once a week Series of three injections for the right knee 8.12 mL 0    spironolactone (ALDACTONE) 50 MG tablet Take one tablet orally once daily 90 tablet 1    tamsulosin (FLOMAX) 0.4 MG capsule Take 0.4 mg by mouth daily      ferrous sulfate (IRON 325) 325 (65 Fe) MG tablet Take 325 mg by mouth every other day      vitamin B-12 (CYANOCOBALAMIN) 500 MCG tablet Take 500 mcg by mouth every other day      lactulose (CHRONULAC) 10 GM/15ML solution TAKE 15 MLS BY MOUTH THREE TIMES A DAY (Patient taking differently: 10 g 2 times daily ) 1992 mL 5    Cholecalciferol (VITAMIN D) 50 MCG (2000 UT) CAPS capsule Take 1 capsule by mouth daily 90 capsule 5    NIFEdipine (PROCARDIA XL) 60 MG extended release tablet Take 60 mg by mouth daily       allopurinol (ZYLOPRIM) 300 MG tablet Take 600 mg by mouth daily Takes 2 tabs (=600mg) daily 90 tablet 1    furosemide (LASIX) 40 MG tablet Take 1 tablet by mouth 2 times daily (Patient taking differently: Take 80 mg by mouth daily ) 90 tablet 3     Current Facility-Administered Medications   Medication Dose Route Frequency Provider Last Rate Last Admin    sodium hyaluronate (EUFLEXXA, HYALGAN) injection 20 mg  20 mg Intra-articular Once Hanna Soto, DO         Allergies   Allergen Reactions    Nsaids      Because of Kidney issues        Subjective:      Review of Systems   Constitutional: Negative for chills and fatigue. HENT: Negative for congestion, ear pain, sinus pain and sore throat. Eyes: Negative for pain and visual disturbance. Respiratory: Negative for cough and shortness of breath. Cardiovascular: Negative for chest pain and palpitations. Gastrointestinal: Negative for abdominal pain, diarrhea, nausea and vomiting. Genitourinary: Negative for penile pain and testicular pain. Musculoskeletal: Positive for arthralgias and back pain. Negative for joint swelling and neck pain. Skin: Negative for rash. Neurological: Negative for dizziness and light-headedness. Hematological: Does not bruise/bleed easily. All other systems reviewed and are negative.      :Objective     Physical Exam  Vitals and nursing note reviewed. Cardiovascular:      Rate and Rhythm: Normal rate. Pulmonary:      Effort: Pulmonary effort is normal.      Breath sounds: Normal breath sounds. Musculoskeletal:      Lumbar back: Spasms and bony tenderness present. Skin:     General: Skin is warm and dry. Neurological:      General: No focal deficit present. Mental Status: He is oriented to person, place, and time.        /72 (Site: Right Upper Arm, Position: Sitting, Cuff Size: Medium Adult)   Pulse 75   Temp 97.2 °F (36.2 °C) (Tympanic)   Ht 6' (1.829 m)   Wt 250 lb (113.4 kg)   SpO2 94%   BMI 33.91 kg/m²     Lab Review   Hospital Outpatient Visit on 09/27/2021   Component Date Value    WBC 09/27/2021 10.2     RBC 09/27/2021 4.78     Hemoglobin 09/27/2021 15.9     Hematocrit 09/27/2021 47.5     MCV 09/27/2021 99.4     MCH 09/27/2021 33.3     MCHC 09/27/2021 33.5     RDW 09/27/2021 13.5     Platelets 98/81/4024 175     MPV 09/27/2021 10.5     NRBC Automated 09/27/2021 0.0     Differential Type 09/27/2021 NOT REPORTED     Seg Neutrophils 09/27/2021 69*    Lymphocytes 09/27/2021 22*    Monocytes 09/27/2021 7     Eosinophils % 09/27/2021 1     Basophils 09/27/2021 1     Immature Granulocytes 09/27/2021 0     Segs Absolute 09/27/2021 6.97     Absolute Lymph # 09/27/2021 2.26     Absolute Mono # 09/27/2021 0.76     Absolute Eos # 09/27/2021 0.13     Basophils Absolute 09/27/2021 0.06     Absolute Immature Granul* 09/27/2021 0.04     WBC Morphology 09/27/2021 NOT REPORTED     RBC Morphology 09/27/2021 NOT REPORTED     Platelet Estimate 76/84/3120 NOT REPORTED     PTT 09/27/2021 39.0*    Protime 09/27/2021 12.7     INR 09/27/2021 1.0     Glucose 09/27/2021 119*    BUN 09/27/2021 15     CREATININE 09/27/2021 1.19     Bun/Cre Ratio 09/27/2021 13     Calcium 09/27/2021 9.6     Sodium 09/27/2021 137     Potassium 09/27/2021 4.6     Chloride 09/27/2021 102     CO2 09/27/2021 22     Anion Gap 09/27/2021 13     GFR Non- 09/27/2021 >60     GFR  09/27/2021 >60     GFR Comment 09/27/2021          GFR Staging 09/27/2021 NOT REPORTED     Color, UA 09/27/2021 Yellow     Turbidity UA 09/27/2021 Clear     Glucose, Ur 09/27/2021 NEGATIVE     Bilirubin Urine 09/27/2021 NEGATIVE     Ketones, Urine 09/27/2021 NEGATIVE     Specific Soso, UA 09/27/2021 1.015     Urine Hgb 09/27/2021 NEGATIVE     pH, UA 09/27/2021 5.5     Protein, UA 09/27/2021 NEGATIVE     Urobilinogen, Urine 09/27/2021 Normal     Nitrite, Urine 09/27/2021 NEGATIVE     Leukocyte Esterase, Urine 09/27/2021 NEGATIVE     Urinalysis Comments 09/27/2021 Microscopic exam not performed based on chemical results unless requested in original order.  Specimen Description 09/27/2021 . [address] URINE     Special Requests 09/27/2021 NOT REPORTED     Culture 09/27/2021 NO SIGNIFICANT 07/14/2021 104     CO2 07/14/2021 21     Anion Gap 07/14/2021 15     Alkaline Phosphatase 07/14/2021 82     ALT 07/14/2021 18     AST 07/14/2021 18     Total Bilirubin 07/14/2021 0.30     Total Protein 07/14/2021 7.4     Albumin 07/14/2021 4.4     Albumin/Globulin Ratio 07/14/2021 1.5     GFR Non- 07/14/2021 54*    GFR  07/14/2021 >60     GFR Comment 07/14/2021          GFR Staging 07/14/2021 NOT REPORTED     WBC 07/14/2021 9.7     RBC 07/14/2021 4.84     Hemoglobin 07/14/2021 15.8     Hematocrit 07/14/2021 48.5     MCV 07/14/2021 100.2     MCH 07/14/2021 32.6     MCHC 07/14/2021 32.6     RDW 07/14/2021 13.9     Platelets 72/11/9144 195     MPV 07/14/2021 11.2     NRBC Automated 07/14/2021 0.0     Differential Type 07/14/2021 NOT REPORTED     Seg Neutrophils 07/14/2021 67*    Lymphocytes 07/14/2021 22*    Monocytes 07/14/2021 8     Eosinophils % 07/14/2021 2     Basophils 07/14/2021 1     Immature Granulocytes 07/14/2021 0     Segs Absolute 07/14/2021 6.51     Absolute Lymph # 07/14/2021 2.11     Absolute Mono # 07/14/2021 0.74     Absolute Eos # 07/14/2021 0.23     Basophils Absolute 07/14/2021 0.07     Absolute Immature Granul* 07/14/2021 0.04     WBC Morphology 07/14/2021 NOT REPORTED     RBC Morphology 07/14/2021 NOT REPORTED     Platelet Estimate 29/12/9196  [facility] Outpatient Visit on 07/14/2021   Component Date Value    Albumin 07/14/2021 4.3     Alkaline Phosphatase 07/14/2021 82     ALT 07/14/2021 18     AST 07/14/2021 19     Total Bilirubin 07/14/2021 0.31     Bilirubin, Direct 07/14/2021 <0.08     Bilirubin, Indirect 07/14/2021 CANNOT BE CALCULATED     Total Protein 07/14/2021 7.4     Globulin 07/14/2021 NOT REPORTED     Albumin/Globulin Ratio 07/14/2021 1.4     Sodium 07/14/2021 137     Potassium 07/14/2021 4.4     Chloride 07/14/2021 102     CO2 07/14/2021 23     Anion Gap 07/14/2021 12     BUN 07/14/2021 17     CREATININE 07/14/2021 1.27*    GFR Non- 07/14/2021 56*    GFR  07/14/2021 >60     GFR Comment 07/14/2021          GFR Staging 07/14/2021 NOT REPORTED     WBC 07/14/2021 9.7     RBC 07/14/2021 4.84     Hemoglobin 07/14/2021 15.8     Hematocrit 07/14/2021 48.5     MCV 07/14/2021 100.2     MCH 07/14/2021 32.6     MCHC 07/14/2021 32.6     RDW 07/14/2021 13.9     Platelets 18/73/6838 195     MPV 07/14/2021 11.2     NRBC Automated 07/14/2021 0.0     Differential Type 07/14/2021 NOT REPORTED     WBC Morphology 07/14/2021 NOT REPORTED     RBC Morphology 07/14/2021 NOT REPORTED     Platelet Estimate 13/63/1675 NOT REPORTED     Seg Neutrophils 07/14/2021 67*    Lymphocytes 07/14/2021 22*    Monocytes 07/14/2021 8     Eosinophils % 07/14/2021 2     Basophils 07/14/2021 1     Immature Granulocytes 07/14/2021 0     Segs Absolute 07/14/2021 6.51     Absolute Lymph # 07/14/2021 2.11     Absolute Mono # 07/14/2021 0.74     Absolute Eos # 07/14/2021 0.23     Basophils Absolute 07/14/2021 0.07     Absolute Immature Granul* 07/14/2021 0.04    Hospital Outpatient Visit on 04/22/2021   Component Date Value    Glucose 04/22/2021 134*    BUN 04/22/2021 22     CREATININE 04/22/2021 1.12     Bun/Cre Ratio 04/22/2021 NOT REPORTED     Calcium 04/22/2021 9.3     Sodium 04/22/2021 137     Potassium 04/22/2021 4.3     Chloride 04/22/2021 101     CO2 04/22/2021 23     Anion Gap 04/22/2021 13     GFR Non- 04/22/2021 >60     GFR  04/22/2021 >60     GFR Comment 04/22/2021          GFR Staging 04/22/2021 NOT REPORTED     WBC 04/22/2021 11.0     RBC 04/22/2021 4.55     Hemoglobin 04/22/2021 14.8     Hematocrit 04/22/2021 45.1     MCV 04/22/2021 99.1     MCH 04/22/2021 32.5     MCHC 04/22/2021 32.8     RDW 04/22/2021 13.9     Platelets 24/71/3304 267     MPV 04/22/2021 10.8     NRBC Automated 04/22/2021 0.0     Pth Intact 04/22/2021 62.83     Calcium, Ion 04/22/2021 1.25     Vit D, 25-Hydroxy 04/22/2021 38.9        EXAMINATION:   5 XRAY VIEWS OF THE LUMBAR SPINE       4/22/2021 12:03 pm       COMPARISON:   None.       HISTORY:   ORDERING SYSTEM PROVIDED HISTORY: Lumbar spondylolysis   TECHNOLOGIST PROVIDED HISTORY:   back pain       FINDINGS:   Frontal, lateral, bilateral oblique, and lumbosacral cone-down views of the   lumbar spine are submitted for review.  Advanced degenerative disease at   L4-L5 and severe facet arthropathy at L3 through S1.  No fracture or   malalignment.  Vascular calcifications are present within the overlying soft   tissues.           Impression   Multilevel facet arthropathy and degenerative disc disease of the lower   lumbar spine.  No acute osseous abnormality. HIP/PELVIS X-RAY       AP and standing AP of the pelvis and supine AP and frog leg lateral of the    right hip.  Radiographs were obtained today and demonstrate joint spacing    is within normal limits and visualized articular surfaces are intact.     Minimal degenerative changes noted.  Lumbar Spondylosis noted. There is no    evidence of fracture, dislocation or other significant abnormality.         Impression: minimal degenerative changes of the right hip             Assessment and Plan      1. Chronic lumbar radiculopathy  -     HYDROcodone-acetaminophen (NORCO)  MG per tablet; Take 1 tablet by mouth every 6 hours as needed for Pain for up to 14 days. , Disp-30 tablet, R-0Normal  2. Gastroesophageal reflux disease without esophagitis  -     omeprazole (PRILOSEC) 20 MG delayed release capsule; Take 2 capsules by mouth daily, Disp-180 capsule, R-1Normal  3. Essential hypertension  4. Cirrhosis of liver without ascites, unspecified hepatic cirrhosis type ([facility]  5.  Controlled type 2 diabetes mellitus with complication, without long-term current use of insulin (HCC)  -     SITagliptin (JANUVIA) 100 MG tablet; TAKE 1 TABLET BY MOUTH ONE TIME A DAY, Disp-90 tablet, R-1Normal  6. Primary osteoarthritis of both knees  -     HYDROcodone-acetaminophen (NORCO)  MG per tablet; Take 1 tablet by mouth every 6 hours as needed for Pain for up to 14 days. , Disp-30 tablet, R-0Normal  7. Chronic renal failure syndrome, stage 3 (moderate) (HCC)      Refills on januvia and prilosec    Course of norco pre surgery, pain meds following per neurosurgery    F/u after surgery               No results found for this visit on 09/30/21. Return if symptoms worsen or fail to improve. Orders Placed This Encounter   Medications    omeprazole (PRILOSEC) 20 MG delayed release capsule     Sig: Take 2 capsules by mouth daily     Dispense:  180 capsule     Refill:  1    SITagliptin (JANUVIA) 100 MG tablet     Sig: TAKE 1 TABLET BY MOUTH ONE TIME A DAY     Dispense:  90 tablet     Refill:  1    HYDROcodone-acetaminophen (NORCO)  MG per tablet     Sig: Take 1 tablet by mouth every 6 hours as needed for Pain for up to 14 days. Dispense:  30 tablet     Refill:  0     Reduce doses taken as pain becomes manageable        Patient given educational materials - see patient instructions. Discussed use, benefit, and side effects of prescribed medications. All patientquestions answered. Pt voiced understanding. Patient given educational materials - see patient instructions. Discussed use, benefit, and side effects of prescribed medications. All patientquestions answered. Pt voiced understanding. This note was transcribed using dictation with Dragon services. Efforts were made to correct any errors but some words may be misinterpreted.     Electronically signed by KELSY Brown CNP on 9/30/2021at 12:01 PM

## 2021-09-30 NOTE — PROGRESS NOTES
Visit Information    Have you changed or started any medications since your last visit including any over-the-counter medicines, vitamins, or herbal medicines? no   Have you stopped taking any of your medications? Is so, why? -  no  Are you having any side effects from any of your medications? - no    Have you seen any other physician or provider since your last visit?  no   Have you had any other diagnostic tests since your last visit?  no   Have you been seen in the emergency room and/or had an admission in a hospital since we last saw you?  no   Have you had your routine dental cleaning in the past 6 months?  no     Do you have an active MyChart account? If no, what is the barrier?   Yes    Patient Care Team:  KELSY Winter CNP as PCP - General (Certified Nurse Practitioner)  KELSY Winter CNP as PCP - [facility] Provider  Solange Pulido MD as Consulting Physician (Gastroenterology)  Allan Arevalo MD as Surgeon (Vascular Surgery)  Keisha John as Certified Registered Nurse (Gastroenterology)  Prem Price MD as Consulting Physician (Pain Management)  Dottie Vaughn MD as Consulting Physician (Rheumatology)  Geronimo Thornton RN as Nurse Navigator  Lito Quiroga DPM as Physician (Podiatry)    Medical History Review  Past Medical, Family, and Social History reviewed and  contribute to the patient presenting condition    Health Maintenance   Topic Date Due    Diabetic retinal exam  Never done    Hepatitis A vaccine (4 of 4 - Hep A Twinrix risk 4-dose series) 12/09/2010    Shingles Vaccine (2 of 3) 02/11/2016    Diabetic microalbuminuria test  02/25/2021    Low dose CT lung screening  04/24/2021    Diabetic foot exam  08/12/2021    Flu vaccine (1) 09/01/2021    PSA counseling  10/01/2021    Colon cancer screen colonoscopy  10/07/2021    Lipid screen  07/14/2022    TSH testing  07/14/2022    A1C test (Diabetic or Prediabetic)  09/27/2022    Potassium monitoring  09/27/2022   

## 2021-10-06 ENCOUNTER — TELEPHONE (OUTPATIENT)
Dept: ORTHOPEDIC SURGERY | Age: 71
End: 2021-10-06

## 2021-10-06 DIAGNOSIS — Z76.0 MEDICATION REFILL: ICD-10-CM

## 2021-10-06 RX ORDER — LORAZEPAM 0.5 MG/1
TABLET ORAL
Qty: 30 TABLET | Refills: 0 | Status: SHIPPED | OUTPATIENT
Start: 2021-10-06 | End: 2021-11-05

## 2021-10-11 ENCOUNTER — OFFICE VISIT (OUTPATIENT)
Dept: ORTHOPEDIC SURGERY | Age: 71
End: 2021-10-11
Payer: COMMERCIAL

## 2021-10-11 VITALS
BODY MASS INDEX: 33.66 KG/M2 | WEIGHT: 254 LBS | HEIGHT: 73 IN | SYSTOLIC BLOOD PRESSURE: 122 MMHG | DIASTOLIC BLOOD PRESSURE: 73 MMHG | HEART RATE: 55 BPM

## 2021-10-11 DIAGNOSIS — M17.11 PRIMARY OSTEOARTHRITIS OF RIGHT KNEE: Primary | ICD-10-CM

## 2021-10-11 PROCEDURE — 20610 DRAIN/INJ JOINT/BURSA W/O US: CPT | Performed by: FAMILY MEDICINE

## 2021-10-11 NOTE — PROGRESS NOTES
Assessment:   Encounter Diagnosis   Name Primary?  Primary osteoarthritis of right knee Yes       Plan: KNEE INJECTION PROCEDURE NOTE:  The patient was identified. The right knee was confirmed with the patient. Consent and time out was performed. After a sterile prep with Betadine followed by an alcohol wipe the knee was injected using a bent knee lateral joint line approach 2 mL of Euflexxa. Patient tolerated the procedure well without post injection complications. I instructed the patient to call our office immediately if they have any swelling or increased pain at the injection site.     This was injection 1 of 3 in the series    Follow-up 1wk

## 2021-10-25 ENCOUNTER — PROCEDURE VISIT (OUTPATIENT)
Dept: ORTHOPEDIC SURGERY | Age: 71
End: 2021-10-25
Payer: COMMERCIAL

## 2021-10-25 VITALS — SYSTOLIC BLOOD PRESSURE: 120 MMHG | DIASTOLIC BLOOD PRESSURE: 88 MMHG | HEART RATE: 77 BPM

## 2021-10-25 DIAGNOSIS — M17.11 PRIMARY OSTEOARTHRITIS OF RIGHT KNEE: Primary | ICD-10-CM

## 2021-10-25 PROCEDURE — 20610 DRAIN/INJ JOINT/BURSA W/O US: CPT | Performed by: FAMILY MEDICINE

## 2021-10-25 RX ORDER — HYALURONATE SODIUM 10 MG/ML
20 SYRINGE (ML) INTRAARTICULAR ONCE
Status: COMPLETED | OUTPATIENT
Start: 2021-10-25 | End: 2021-10-25

## 2021-10-25 RX ADMIN — Medication 20 MG: at 10:33

## 2021-10-25 NOTE — PROGRESS NOTES
Assessment:   Encounter Diagnosis   Name Primary?  Primary osteoarthritis of right knee Yes       Plan: KNEE INJECTION PROCEDURE NOTE:  The patient was identified. The right knee was confirmed with the patient. Consent and time out was performed. After a sterile prep with Betadine followed by an alcohol wipe the knee was injected using a bent knee lateral joint line approach 2 mL of Euflexxa. Patient tolerated the procedure well without post injection complications. I instructed the patient to call our office immediately if they have any swelling or increased pain at the injection site.     This was injection 2 of 3 in the series    Follow-up 2w

## 2021-10-26 DIAGNOSIS — M54.16 CHRONIC LUMBAR RADICULOPATHY: ICD-10-CM

## 2021-10-26 DIAGNOSIS — M17.0 PRIMARY OSTEOARTHRITIS OF BOTH KNEES: Chronic | ICD-10-CM

## 2021-10-26 RX ORDER — HYDROCODONE BITARTRATE AND ACETAMINOPHEN 10; 325 MG/1; MG/1
1 TABLET ORAL EVERY 6 HOURS PRN
Qty: 30 TABLET | Refills: 0 | Status: SHIPPED | OUTPATIENT
Start: 2021-10-26 | End: 2021-11-09

## 2021-10-26 NOTE — TELEPHONE ENCOUNTER
Patient called stating he was supposed to have back surgery on 10/18 but got postponed due to insurance reasons till the end ov November. Patient is asking for one refill of this.

## 2021-10-30 DIAGNOSIS — G62.9 NEUROPATHY: ICD-10-CM

## 2021-10-31 RX ORDER — PREGABALIN 100 MG/1
CAPSULE ORAL
Qty: 60 CAPSULE | Refills: 0 | Status: SHIPPED | OUTPATIENT
Start: 2021-10-31 | End: 2021-12-06

## 2021-11-15 DIAGNOSIS — F41.9 ANXIETY: Primary | ICD-10-CM

## 2021-11-16 ENCOUNTER — PROCEDURE VISIT (OUTPATIENT)
Dept: ORTHOPEDIC SURGERY | Age: 71
End: 2021-11-16
Payer: COMMERCIAL

## 2021-11-16 VITALS — HEART RATE: 58 BPM | DIASTOLIC BLOOD PRESSURE: 76 MMHG | SYSTOLIC BLOOD PRESSURE: 115 MMHG

## 2021-11-16 DIAGNOSIS — M17.11 PRIMARY OSTEOARTHRITIS OF RIGHT KNEE: Primary | ICD-10-CM

## 2021-11-16 DIAGNOSIS — E11.8 CONTROLLED TYPE 2 DIABETES MELLITUS WITH COMPLICATION, WITHOUT LONG-TERM CURRENT USE OF INSULIN (HCC): ICD-10-CM

## 2021-11-16 PROCEDURE — 20610 DRAIN/INJ JOINT/BURSA W/O US: CPT | Performed by: FAMILY MEDICINE

## 2021-11-16 RX ORDER — HYALURONATE SODIUM 10 MG/ML
20 SYRINGE (ML) INTRAARTICULAR ONCE
Status: COMPLETED | OUTPATIENT
Start: 2021-11-16 | End: 2021-11-16

## 2021-11-16 RX ORDER — LORAZEPAM 0.5 MG/1
TABLET ORAL
Qty: 30 TABLET | Refills: 0 | Status: SHIPPED | OUTPATIENT
Start: 2021-11-16 | End: 2021-12-16

## 2021-11-16 RX ADMIN — Medication 20 MG: at 10:41

## 2021-11-22 ENCOUNTER — ANESTHESIA (OUTPATIENT)
Dept: OPERATING ROOM | Age: 71
DRG: 454 | End: 2021-11-22
Payer: COMMERCIAL

## 2021-11-22 ENCOUNTER — ANESTHESIA EVENT (OUTPATIENT)
Dept: OPERATING ROOM | Age: 71
DRG: 454 | End: 2021-11-22
Payer: COMMERCIAL

## 2021-11-22 ENCOUNTER — HOSPITAL ENCOUNTER (INPATIENT)
Age: 71
LOS: 1 days | Discharge: HOME | DRG: 454 | End: 2021-11-23
Attending: ORTHOPAEDIC SURGERY | Admitting: ORTHOPAEDIC SURGERY
Payer: COMMERCIAL

## 2021-11-22 ENCOUNTER — APPOINTMENT (OUTPATIENT)
Dept: GENERAL RADIOLOGY | Age: 71
DRG: 454 | End: 2021-11-22
Attending: ORTHOPAEDIC SURGERY
Payer: COMMERCIAL

## 2021-11-22 VITALS
RESPIRATION RATE: 19 BRPM | TEMPERATURE: 98.6 F | SYSTOLIC BLOOD PRESSURE: 103 MMHG | OXYGEN SATURATION: 98 % | DIASTOLIC BLOOD PRESSURE: 75 MMHG

## 2021-11-22 DIAGNOSIS — M54.41 CHRONIC BILATERAL LOW BACK PAIN WITH RIGHT-SIDED SCIATICA: ICD-10-CM

## 2021-11-22 DIAGNOSIS — M43.10 SPONDYLOLISTHESIS, ACQUIRED: Primary | Chronic | ICD-10-CM

## 2021-11-22 DIAGNOSIS — G89.29 CHRONIC BILATERAL LOW BACK PAIN WITH RIGHT-SIDED SCIATICA: ICD-10-CM

## 2021-11-22 DIAGNOSIS — M17.11 PRIMARY OSTEOARTHRITIS OF RIGHT KNEE: ICD-10-CM

## 2021-11-22 PROBLEM — M48.061 FORAMINAL STENOSIS OF LUMBAR REGION: Chronic | Status: ACTIVE | Noted: 2021-11-22

## 2021-11-22 LAB
ABSOLUTE EOS #: 0.16 K/UL (ref 0–0.44)
ABSOLUTE IMMATURE GRANULOCYTE: 0.03 K/UL (ref 0–0.3)
ABSOLUTE LYMPH #: 2.18 K/UL (ref 1.1–3.7)
ABSOLUTE MONO #: 0.8 K/UL (ref 0.1–1.2)
ANION GAP SERPL CALCULATED.3IONS-SCNC: 14 MMOL/L (ref 9–17)
BASOPHILS # BLD: 1 % (ref 0–2)
BASOPHILS ABSOLUTE: 0.07 K/UL (ref 0–0.2)
BILIRUBIN URINE: NEGATIVE
BUN BLDV-MCNC: 20 MG/DL (ref 8–23)
BUN/CREAT BLD: 16 (ref 9–20)
CALCIUM SERPL-MCNC: 9.3 MG/DL (ref 8.6–10.4)
CHLORIDE BLD-SCNC: 102 MMOL/L (ref 98–107)
CO2: 24 MMOL/L (ref 20–31)
COLOR: YELLOW
COMMENT UA: NORMAL
CREAT SERPL-MCNC: 1.26 MG/DL (ref 0.7–1.2)
DIFFERENTIAL TYPE: ABNORMAL
EOSINOPHILS RELATIVE PERCENT: 2 % (ref 1–4)
GFR AFRICAN AMERICAN: >60 ML/MIN
GFR NON-AFRICAN AMERICAN: 57 ML/MIN
GFR SERPL CREATININE-BSD FRML MDRD: ABNORMAL ML/MIN/{1.73_M2}
GFR SERPL CREATININE-BSD FRML MDRD: ABNORMAL ML/MIN/{1.73_M2}
GLUCOSE BLD-MCNC: 117 MG/DL (ref 70–99)
GLUCOSE BLD-MCNC: 172 MG/DL (ref 75–110)
GLUCOSE BLD-MCNC: 202 MG/DL (ref 75–110)
GLUCOSE URINE: NEGATIVE
HCT VFR BLD CALC: 44.6 % (ref 40.7–50.3)
HEMOGLOBIN: 14.8 G/DL (ref 13–17)
IMMATURE GRANULOCYTES: 0 %
INR BLD: 1
KETONES, URINE: NEGATIVE
LEUKOCYTE ESTERASE, URINE: NEGATIVE
LYMPHOCYTES # BLD: 22 % (ref 24–43)
MCH RBC QN AUTO: 33.2 PG (ref 25.2–33.5)
MCHC RBC AUTO-ENTMCNC: 33.2 G/DL (ref 28.4–34.8)
MCV RBC AUTO: 100 FL (ref 82.6–102.9)
MONOCYTES # BLD: 8 % (ref 3–12)
NITRITE, URINE: NEGATIVE
NRBC AUTOMATED: 0 PER 100 WBC
PARTIAL THROMBOPLASTIN TIME: 33.1 SEC (ref 23.9–33.8)
PDW BLD-RTO: 13.4 % (ref 11.8–14.4)
PH UA: 6 (ref 5–8)
PLATELET # BLD: 202 K/UL (ref 138–453)
PLATELET ESTIMATE: ABNORMAL
PMV BLD AUTO: 10.7 FL (ref 8.1–13.5)
POTASSIUM SERPL-SCNC: 4.1 MMOL/L (ref 3.7–5.3)
PROTEIN UA: NEGATIVE
PROTHROMBIN TIME: 12.9 SEC (ref 11.5–14.2)
RBC # BLD: 4.46 M/UL (ref 4.21–5.77)
RBC # BLD: ABNORMAL 10*6/UL
SEG NEUTROPHILS: 67 % (ref 36–65)
SEGMENTED NEUTROPHILS ABSOLUTE COUNT: 6.9 K/UL (ref 1.5–8.1)
SODIUM BLD-SCNC: 140 MMOL/L (ref 135–144)
SPECIFIC GRAVITY UA: 1.02 (ref 1–1.03)
TURBIDITY: CLEAR
URINE HGB: NEGATIVE
UROBILINOGEN, URINE: NORMAL
WBC # BLD: 10.1 K/UL (ref 3.5–11.3)
WBC # BLD: ABNORMAL 10*3/UL

## 2021-11-22 PROCEDURE — 7100000000 HC PACU RECOVERY - FIRST 15 MIN: Performed by: ORTHOPAEDIC SURGERY

## 2021-11-22 PROCEDURE — 3600000014 HC SURGERY LEVEL 4 ADDTL 15MIN: Performed by: ORTHOPAEDIC SURGERY

## 2021-11-22 PROCEDURE — 3700000001 HC ADD 15 MINUTES (ANESTHESIA): Performed by: ORTHOPAEDIC SURGERY

## 2021-11-22 PROCEDURE — 99252 IP/OBS CONSLTJ NEW/EST SF 35: CPT | Performed by: NURSE PRACTITIONER

## 2021-11-22 PROCEDURE — 0SB40ZZ EXCISION OF LUMBOSACRAL DISC, OPEN APPROACH: ICD-10-PCS | Performed by: ORTHOPAEDIC SURGERY

## 2021-11-22 PROCEDURE — 2500000003 HC RX 250 WO HCPCS: Performed by: SPECIALIST

## 2021-11-22 PROCEDURE — 2500000003 HC RX 250 WO HCPCS: Performed by: ANESTHESIOLOGY

## 2021-11-22 PROCEDURE — 1200000000 HC SEMI PRIVATE

## 2021-11-22 PROCEDURE — 80048 BASIC METABOLIC PNL TOTAL CA: CPT

## 2021-11-22 PROCEDURE — 2580000003 HC RX 258: Performed by: ANESTHESIOLOGY

## 2021-11-22 PROCEDURE — 01NR0ZZ RELEASE SACRAL NERVE, OPEN APPROACH: ICD-10-PCS | Performed by: ORTHOPAEDIC SURGERY

## 2021-11-22 PROCEDURE — C1713 ANCHOR/SCREW BN/BN,TIS/BN: HCPCS | Performed by: ORTHOPAEDIC SURGERY

## 2021-11-22 PROCEDURE — 85610 PROTHROMBIN TIME: CPT

## 2021-11-22 PROCEDURE — 6370000000 HC RX 637 (ALT 250 FOR IP): Performed by: ORTHOPAEDIC SURGERY

## 2021-11-22 PROCEDURE — 3209999900 FLUORO FOR SURGICAL PROCEDURES

## 2021-11-22 PROCEDURE — 0SG0071 FUSION OF LUMBAR VERTEBRAL JOINT WITH AUTOLOGOUS TISSUE SUBSTITUTE, POSTERIOR APPROACH, POSTERIOR COLUMN, OPEN APPROACH: ICD-10-PCS | Performed by: ORTHOPAEDIC SURGERY

## 2021-11-22 PROCEDURE — 6360000002 HC RX W HCPCS: Performed by: SPECIALIST

## 2021-11-22 PROCEDURE — 87641 MR-STAPH DNA AMP PROBE: CPT

## 2021-11-22 PROCEDURE — 6360000002 HC RX W HCPCS: Performed by: ORTHOPAEDIC SURGERY

## 2021-11-22 PROCEDURE — 0SG3071 FUSION OF LUMBOSACRAL JOINT WITH AUTOLOGOUS TISSUE SUBSTITUTE, POSTERIOR APPROACH, POSTERIOR COLUMN, OPEN APPROACH: ICD-10-PCS | Performed by: ORTHOPAEDIC SURGERY

## 2021-11-22 PROCEDURE — 3700000000 HC ANESTHESIA ATTENDED CARE: Performed by: ORTHOPAEDIC SURGERY

## 2021-11-22 PROCEDURE — 2580000003 HC RX 258: Performed by: ORTHOPAEDIC SURGERY

## 2021-11-22 PROCEDURE — 93005 ELECTROCARDIOGRAM TRACING: CPT | Performed by: ORTHOPAEDIC SURGERY

## 2021-11-22 PROCEDURE — 2500000003 HC RX 250 WO HCPCS: Performed by: ORTHOPAEDIC SURGERY

## 2021-11-22 PROCEDURE — 82947 ASSAY GLUCOSE BLOOD QUANT: CPT

## 2021-11-22 PROCEDURE — 3600000004 HC SURGERY LEVEL 4 BASE: Performed by: ORTHOPAEDIC SURGERY

## 2021-11-22 PROCEDURE — 87086 URINE CULTURE/COLONY COUNT: CPT

## 2021-11-22 PROCEDURE — 01NB0ZZ RELEASE LUMBAR NERVE, OPEN APPROACH: ICD-10-PCS | Performed by: ORTHOPAEDIC SURGERY

## 2021-11-22 PROCEDURE — 85730 THROMBOPLASTIN TIME PARTIAL: CPT

## 2021-11-22 PROCEDURE — 6360000002 HC RX W HCPCS: Performed by: ANESTHESIOLOGY

## 2021-11-22 PROCEDURE — 81003 URINALYSIS AUTO W/O SCOPE: CPT

## 2021-11-22 PROCEDURE — 7100000001 HC PACU RECOVERY - ADDTL 15 MIN: Performed by: ORTHOPAEDIC SURGERY

## 2021-11-22 PROCEDURE — 0SG30AJ FUSION OF LUMBOSACRAL JOINT WITH INTERBODY FUSION DEVICE, POSTERIOR APPROACH, ANTERIOR COLUMN, OPEN APPROACH: ICD-10-PCS | Performed by: ORTHOPAEDIC SURGERY

## 2021-11-22 PROCEDURE — 85025 COMPLETE CBC W/AUTO DIFF WBC: CPT

## 2021-11-22 PROCEDURE — 2720000010 HC SURG SUPPLY STERILE: Performed by: ORTHOPAEDIC SURGERY

## 2021-11-22 PROCEDURE — 6360000002 HC RX W HCPCS

## 2021-11-22 PROCEDURE — 0SB20ZZ EXCISION OF LUMBAR VERTEBRAL DISC, OPEN APPROACH: ICD-10-PCS | Performed by: ORTHOPAEDIC SURGERY

## 2021-11-22 PROCEDURE — 2709999900 HC NON-CHARGEABLE SUPPLY: Performed by: ORTHOPAEDIC SURGERY

## 2021-11-22 PROCEDURE — 0SG00AJ FUSION OF LUMBAR VERTEBRAL JOINT WITH INTERBODY FUSION DEVICE, POSTERIOR APPROACH, ANTERIOR COLUMN, OPEN APPROACH: ICD-10-PCS | Performed by: ORTHOPAEDIC SURGERY

## 2021-11-22 PROCEDURE — 36415 COLL VENOUS BLD VENIPUNCTURE: CPT

## 2021-11-22 PROCEDURE — C1821 INTERSPINOUS IMPLANT: HCPCS | Performed by: ORTHOPAEDIC SURGERY

## 2021-11-22 PROCEDURE — 83036 HEMOGLOBIN GLYCOSYLATED A1C: CPT

## 2021-11-22 DEVICE — DBM T43105 5CC GRAFTON PUTTY
Type: IMPLANTABLE DEVICE | Site: SPINE LUMBAR | Status: FUNCTIONAL
Brand: GRAFTON®AND GRAFTON PLUS®DEMINERALIZED BONE MATRIX (DBM)

## 2021-11-22 DEVICE — SCREW SPNL L50MM DIA6.5MM 2C POLYAX RELINE MAS: Type: IMPLANTABLE DEVICE | Site: SPINE LUMBAR | Status: FUNCTIONAL

## 2021-11-22 DEVICE — SCREW SPNL DIA5.5MM OPN TULIP LOK RELINE: Type: IMPLANTABLE DEVICE | Site: SPINE LUMBAR | Status: FUNCTIONAL

## 2021-11-22 DEVICE — SCREW SPNL L45MM DIA6.5MM POST THORACOLUMBOSACRAL MOD SHANK: Type: IMPLANTABLE DEVICE | Site: SPINE LUMBAR | Status: FUNCTIONAL

## 2021-11-22 DEVICE — SCREW SPNL L50MM DIA6.5MM POST THORACOLUMBOSACRAL MOD SHANK: Type: IMPLANTABLE DEVICE | Site: SPINE LUMBAR | Status: FUNCTIONAL

## 2021-11-22 DEVICE — TIM-TISSUE CANCELLOUS 30.0CC CRUSHED: Type: IMPLANTABLE DEVICE | Site: SPINE LUMBAR | Status: FUNCTIONAL

## 2021-11-22 DEVICE — CAGE SPNL 4 DEG 30X10X12 MM TLIF-O MODULUS: Type: IMPLANTABLE DEVICE | Site: SPINE LUMBAR | Status: FUNCTIONAL

## 2021-11-22 DEVICE — SCREW SPNL MOD TULIP RELINE: Type: IMPLANTABLE DEVICE | Site: SPINE LUMBAR | Status: FUNCTIONAL

## 2021-11-22 DEVICE — K WIRE FIX NIT BVL BLNT TIP PRECEPT: Type: IMPLANTABLE DEVICE | Site: SPINE LUMBAR | Status: FUNCTIONAL

## 2021-11-22 DEVICE — SCREW SPNL L45MM DIA6.5MM POLYAX 2C RELINE MAS: Type: IMPLANTABLE DEVICE | Site: SPINE LUMBAR | Status: FUNCTIONAL

## 2021-11-22 DEVICE — ROD SPNL L70MM DIA5.5MM POST THORACOLUMBOSACRAL TI LORD: Type: IMPLANTABLE DEVICE | Site: SPINE LUMBAR | Status: FUNCTIONAL

## 2021-11-22 RX ORDER — ONDANSETRON 2 MG/ML
4 INJECTION INTRAMUSCULAR; INTRAVENOUS
Status: DISCONTINUED | OUTPATIENT
Start: 2021-11-22 | End: 2021-11-22 | Stop reason: HOSPADM

## 2021-11-22 RX ORDER — OXYCODONE HYDROCHLORIDE AND ACETAMINOPHEN 5; 325 MG/1; MG/1
1 TABLET ORAL EVERY 4 HOURS PRN
Status: DISCONTINUED | OUTPATIENT
Start: 2021-11-22 | End: 2021-11-23 | Stop reason: HOSPADM

## 2021-11-22 RX ORDER — SODIUM CHLORIDE 9 MG/ML
25 INJECTION, SOLUTION INTRAVENOUS PRN
Status: DISCONTINUED | OUTPATIENT
Start: 2021-11-22 | End: 2021-11-23 | Stop reason: HOSPADM

## 2021-11-22 RX ORDER — ONDANSETRON 4 MG/1
4 TABLET, ORALLY DISINTEGRATING ORAL EVERY 8 HOURS PRN
Status: DISCONTINUED | OUTPATIENT
Start: 2021-11-22 | End: 2021-11-23 | Stop reason: HOSPADM

## 2021-11-22 RX ORDER — SODIUM CHLORIDE 0.9 % (FLUSH) 0.9 %
10 SYRINGE (ML) INJECTION EVERY 12 HOURS SCHEDULED
Status: DISCONTINUED | OUTPATIENT
Start: 2021-11-22 | End: 2021-11-23 | Stop reason: HOSPADM

## 2021-11-22 RX ORDER — SUCCINYLCHOLINE CHLORIDE 20 MG/ML
INJECTION INTRAMUSCULAR; INTRAVENOUS PRN
Status: DISCONTINUED | OUTPATIENT
Start: 2021-11-22 | End: 2021-11-22 | Stop reason: SDUPTHER

## 2021-11-22 RX ORDER — LIDOCAINE HYDROCHLORIDE 20 MG/ML
INJECTION, SOLUTION EPIDURAL; INFILTRATION; INTRACAUDAL; PERINEURAL PRN
Status: DISCONTINUED | OUTPATIENT
Start: 2021-11-22 | End: 2021-11-22 | Stop reason: SDUPTHER

## 2021-11-22 RX ORDER — OXYCODONE HYDROCHLORIDE AND ACETAMINOPHEN 5; 325 MG/1; MG/1
2 TABLET ORAL EVERY 4 HOURS PRN
Status: DISCONTINUED | OUTPATIENT
Start: 2021-11-22 | End: 2021-11-23 | Stop reason: HOSPADM

## 2021-11-22 RX ORDER — FENTANYL CITRATE 50 UG/ML
INJECTION, SOLUTION INTRAMUSCULAR; INTRAVENOUS PRN
Status: DISCONTINUED | OUTPATIENT
Start: 2021-11-22 | End: 2021-11-22 | Stop reason: SDUPTHER

## 2021-11-22 RX ORDER — LORAZEPAM 0.5 MG/1
0.5 TABLET ORAL NIGHTLY PRN
Status: DISCONTINUED | OUTPATIENT
Start: 2021-11-22 | End: 2021-11-23 | Stop reason: HOSPADM

## 2021-11-22 RX ORDER — ONDANSETRON 2 MG/ML
4 INJECTION INTRAMUSCULAR; INTRAVENOUS EVERY 6 HOURS PRN
Status: DISCONTINUED | OUTPATIENT
Start: 2021-11-22 | End: 2021-11-23 | Stop reason: HOSPADM

## 2021-11-22 RX ORDER — SODIUM CHLORIDE 0.9 % (FLUSH) 0.9 %
10 SYRINGE (ML) INJECTION PRN
Status: DISCONTINUED | OUTPATIENT
Start: 2021-11-22 | End: 2021-11-23 | Stop reason: HOSPADM

## 2021-11-22 RX ORDER — MORPHINE SULFATE 15 MG/1
15 TABLET, FILM COATED, EXTENDED RELEASE ORAL EVERY 12 HOURS SCHEDULED
Status: DISCONTINUED | OUTPATIENT
Start: 2021-11-22 | End: 2021-11-23 | Stop reason: HOSPADM

## 2021-11-22 RX ORDER — LIDOCAINE HYDROCHLORIDE 10 MG/ML
1 INJECTION, SOLUTION EPIDURAL; INFILTRATION; INTRACAUDAL; PERINEURAL
Status: DISCONTINUED | OUTPATIENT
Start: 2021-11-23 | End: 2021-11-22 | Stop reason: HOSPADM

## 2021-11-22 RX ORDER — MORPHINE SULFATE 2 MG/ML
2 INJECTION, SOLUTION INTRAMUSCULAR; INTRAVENOUS
Status: DISCONTINUED | OUTPATIENT
Start: 2021-11-22 | End: 2021-11-23 | Stop reason: HOSPADM

## 2021-11-22 RX ORDER — SODIUM CHLORIDE, SODIUM LACTATE, POTASSIUM CHLORIDE, CALCIUM CHLORIDE 600; 310; 30; 20 MG/100ML; MG/100ML; MG/100ML; MG/100ML
INJECTION, SOLUTION INTRAVENOUS CONTINUOUS
Status: DISCONTINUED | OUTPATIENT
Start: 2021-11-23 | End: 2021-11-22

## 2021-11-22 RX ORDER — FENTANYL CITRATE 50 UG/ML
25 INJECTION, SOLUTION INTRAMUSCULAR; INTRAVENOUS EVERY 5 MIN PRN
Status: DISCONTINUED | OUTPATIENT
Start: 2021-11-22 | End: 2021-11-22 | Stop reason: HOSPADM

## 2021-11-22 RX ORDER — TAMSULOSIN HYDROCHLORIDE 0.4 MG/1
0.4 CAPSULE ORAL DAILY
Status: DISCONTINUED | OUTPATIENT
Start: 2021-11-23 | End: 2021-11-23 | Stop reason: HOSPADM

## 2021-11-22 RX ORDER — NICOTINE POLACRILEX 4 MG
15 LOZENGE BUCCAL PRN
Status: DISCONTINUED | OUTPATIENT
Start: 2021-11-22 | End: 2021-11-23 | Stop reason: HOSPADM

## 2021-11-22 RX ORDER — FUROSEMIDE 40 MG/1
40 TABLET ORAL 2 TIMES DAILY
Status: DISCONTINUED | OUTPATIENT
Start: 2021-11-22 | End: 2021-11-23 | Stop reason: HOSPADM

## 2021-11-22 RX ORDER — BUPIVACAINE HYDROCHLORIDE AND EPINEPHRINE 5; 5 MG/ML; UG/ML
INJECTION, SOLUTION EPIDURAL; INTRACAUDAL; PERINEURAL PRN
Status: DISCONTINUED | OUTPATIENT
Start: 2021-11-22 | End: 2021-11-22 | Stop reason: HOSPADM

## 2021-11-22 RX ORDER — EPHEDRINE SULFATE/0.9% NACL/PF 50 MG/5 ML
SYRINGE (ML) INTRAVENOUS PRN
Status: DISCONTINUED | OUTPATIENT
Start: 2021-11-22 | End: 2021-11-22 | Stop reason: SDUPTHER

## 2021-11-22 RX ORDER — SODIUM CHLORIDE 9 MG/ML
INJECTION, SOLUTION INTRAVENOUS CONTINUOUS
Status: DISCONTINUED | OUTPATIENT
Start: 2021-11-22 | End: 2021-11-23 | Stop reason: HOSPADM

## 2021-11-22 RX ORDER — LANOLIN ALCOHOL/MO/W.PET/CERES
325 CREAM (GRAM) TOPICAL EVERY OTHER DAY
Status: DISCONTINUED | OUTPATIENT
Start: 2021-11-24 | End: 2021-11-23 | Stop reason: HOSPADM

## 2021-11-22 RX ORDER — PHENYLEPHRINE HCL IN 0.9% NACL 1 MG/10 ML
SYRINGE (ML) INTRAVENOUS PRN
Status: DISCONTINUED | OUTPATIENT
Start: 2021-11-22 | End: 2021-11-22 | Stop reason: SDUPTHER

## 2021-11-22 RX ORDER — SENNA AND DOCUSATE SODIUM 50; 8.6 MG/1; MG/1
1 TABLET, FILM COATED ORAL 2 TIMES DAILY
Status: DISCONTINUED | OUTPATIENT
Start: 2021-11-22 | End: 2021-11-23 | Stop reason: HOSPADM

## 2021-11-22 RX ORDER — PREGABALIN 100 MG/1
100 CAPSULE ORAL 2 TIMES DAILY
Status: DISCONTINUED | OUTPATIENT
Start: 2021-11-22 | End: 2021-11-23 | Stop reason: HOSPADM

## 2021-11-22 RX ORDER — DEXTROSE MONOHYDRATE 50 MG/ML
100 INJECTION, SOLUTION INTRAVENOUS PRN
Status: DISCONTINUED | OUTPATIENT
Start: 2021-11-22 | End: 2021-11-23 | Stop reason: HOSPADM

## 2021-11-22 RX ORDER — POLYETHYLENE GLYCOL 3350 17 G/17G
17 POWDER, FOR SOLUTION ORAL DAILY
Status: DISCONTINUED | OUTPATIENT
Start: 2021-11-22 | End: 2021-11-23 | Stop reason: HOSPADM

## 2021-11-22 RX ORDER — DEXTROSE MONOHYDRATE 25 G/50ML
12.5 INJECTION, SOLUTION INTRAVENOUS PRN
Status: DISCONTINUED | OUTPATIENT
Start: 2021-11-22 | End: 2021-11-23 | Stop reason: HOSPADM

## 2021-11-22 RX ORDER — LACTULOSE 10 G/15ML
10 SOLUTION ORAL 2 TIMES DAILY
Status: DISCONTINUED | OUTPATIENT
Start: 2021-11-22 | End: 2021-11-23 | Stop reason: HOSPADM

## 2021-11-22 RX ORDER — MORPHINE SULFATE 4 MG/ML
4 INJECTION, SOLUTION INTRAMUSCULAR; INTRAVENOUS
Status: DISCONTINUED | OUTPATIENT
Start: 2021-11-22 | End: 2021-11-23 | Stop reason: HOSPADM

## 2021-11-22 RX ORDER — VANCOMYCIN HYDROCHLORIDE 1 G/20ML
INJECTION, POWDER, LYOPHILIZED, FOR SOLUTION INTRAVENOUS PRN
Status: DISCONTINUED | OUTPATIENT
Start: 2021-11-22 | End: 2021-11-22 | Stop reason: HOSPADM

## 2021-11-22 RX ORDER — ALOGLIPTIN 6.25 MG/1
6.25 TABLET, FILM COATED ORAL DAILY
Status: DISCONTINUED | OUTPATIENT
Start: 2021-11-22 | End: 2021-11-23 | Stop reason: HOSPADM

## 2021-11-22 RX ORDER — ALLOPURINOL 300 MG/1
600 TABLET ORAL DAILY
Status: DISCONTINUED | OUTPATIENT
Start: 2021-11-23 | End: 2021-11-23 | Stop reason: HOSPADM

## 2021-11-22 RX ORDER — DEXAMETHASONE SODIUM PHOSPHATE 10 MG/ML
INJECTION INTRAMUSCULAR; INTRAVENOUS PRN
Status: DISCONTINUED | OUTPATIENT
Start: 2021-11-22 | End: 2021-11-22 | Stop reason: SDUPTHER

## 2021-11-22 RX ORDER — NIFEDIPINE 30 MG/1
60 TABLET, EXTENDED RELEASE ORAL DAILY
Status: DISCONTINUED | OUTPATIENT
Start: 2021-11-23 | End: 2021-11-23 | Stop reason: HOSPADM

## 2021-11-22 RX ORDER — HYDROMORPHONE HYDROCHLORIDE 1 MG/ML
0.25 INJECTION, SOLUTION INTRAMUSCULAR; INTRAVENOUS; SUBCUTANEOUS EVERY 5 MIN PRN
Status: COMPLETED | OUTPATIENT
Start: 2021-11-22 | End: 2021-11-22

## 2021-11-22 RX ORDER — SPIRONOLACTONE 25 MG/1
25 TABLET ORAL DAILY
Status: DISCONTINUED | OUTPATIENT
Start: 2021-11-23 | End: 2021-11-23 | Stop reason: HOSPADM

## 2021-11-22 RX ORDER — CHOLECALCIFEROL (VITAMIN D3) 125 MCG
10 CAPSULE ORAL DAILY
COMMUNITY

## 2021-11-22 RX ORDER — PANTOPRAZOLE SODIUM 40 MG/1
40 TABLET, DELAYED RELEASE ORAL
Status: DISCONTINUED | OUTPATIENT
Start: 2021-11-23 | End: 2021-11-23 | Stop reason: HOSPADM

## 2021-11-22 RX ORDER — PROPOFOL 10 MG/ML
INJECTION, EMULSION INTRAVENOUS PRN
Status: DISCONTINUED | OUTPATIENT
Start: 2021-11-22 | End: 2021-11-22 | Stop reason: SDUPTHER

## 2021-11-22 RX ORDER — LANOLIN ALCOHOL/MO/W.PET/CERES
10 CREAM (GRAM) TOPICAL NIGHTLY PRN
Status: DISCONTINUED | OUTPATIENT
Start: 2021-11-23 | End: 2021-11-23 | Stop reason: HOSPADM

## 2021-11-22 RX ORDER — TIZANIDINE 4 MG/1
4 TABLET ORAL EVERY 8 HOURS PRN
Status: DISCONTINUED | OUTPATIENT
Start: 2021-11-22 | End: 2021-11-23 | Stop reason: HOSPADM

## 2021-11-22 RX ORDER — MIDAZOLAM HYDROCHLORIDE 1 MG/ML
INJECTION INTRAMUSCULAR; INTRAVENOUS PRN
Status: DISCONTINUED | OUTPATIENT
Start: 2021-11-22 | End: 2021-11-22 | Stop reason: SDUPTHER

## 2021-11-22 RX ORDER — HYDROMORPHONE HYDROCHLORIDE 1 MG/ML
0.5 INJECTION, SOLUTION INTRAMUSCULAR; INTRAVENOUS; SUBCUTANEOUS EVERY 5 MIN PRN
Status: COMPLETED | OUTPATIENT
Start: 2021-11-22 | End: 2021-11-22

## 2021-11-22 RX ORDER — KETAMINE HCL IN NACL, ISO-OSM 100MG/10ML
SYRINGE (ML) INJECTION PRN
Status: DISCONTINUED | OUTPATIENT
Start: 2021-11-22 | End: 2021-11-22 | Stop reason: SDUPTHER

## 2021-11-22 RX ADMIN — HYDROMORPHONE HYDROCHLORIDE 0.25 MG: 1 INJECTION, SOLUTION INTRAMUSCULAR; INTRAVENOUS; SUBCUTANEOUS at 17:53

## 2021-11-22 RX ADMIN — BISACODYL 5 MG: 5 TABLET, COATED ORAL at 19:29

## 2021-11-22 RX ADMIN — POLYETHYLENE GLYCOL 3350 17 G: 17 POWDER, FOR SOLUTION ORAL at 19:29

## 2021-11-22 RX ADMIN — CEFAZOLIN SODIUM 2000 MG: 10 INJECTION, POWDER, FOR SOLUTION INTRAVENOUS at 19:28

## 2021-11-22 RX ADMIN — HYDROMORPHONE HYDROCHLORIDE 0.5 MG: 1 INJECTION, SOLUTION INTRAMUSCULAR; INTRAVENOUS; SUBCUTANEOUS at 18:12

## 2021-11-22 RX ADMIN — HYDROMORPHONE HYDROCHLORIDE 0.25 MG: 1 INJECTION, SOLUTION INTRAMUSCULAR; INTRAVENOUS; SUBCUTANEOUS at 17:36

## 2021-11-22 RX ADMIN — Medication 100 MCG: at 14:13

## 2021-11-22 RX ADMIN — FENTANYL CITRATE 25 MCG: 0.05 INJECTION, SOLUTION INTRAMUSCULAR; INTRAVENOUS at 17:25

## 2021-11-22 RX ADMIN — TIZANIDINE 4 MG: 4 TABLET ORAL at 17:53

## 2021-11-22 RX ADMIN — HYDROMORPHONE HYDROCHLORIDE 0.25 MG: 1 INJECTION, SOLUTION INTRAMUSCULAR; INTRAVENOUS; SUBCUTANEOUS at 17:42

## 2021-11-22 RX ADMIN — ALOGLIPTIN 6.25 MG: 6.25 TABLET, FILM COATED ORAL at 19:29

## 2021-11-22 RX ADMIN — SODIUM CHLORIDE: 9 INJECTION, SOLUTION INTRAVENOUS at 19:05

## 2021-11-22 RX ADMIN — MIDAZOLAM 2 MG: 1 INJECTION INTRAMUSCULAR; INTRAVENOUS at 12:56

## 2021-11-22 RX ADMIN — FENTANYL CITRATE 25 MCG: 0.05 INJECTION, SOLUTION INTRAMUSCULAR; INTRAVENOUS at 17:30

## 2021-11-22 RX ADMIN — CEFAZOLIN SODIUM 2000 MG: 10 INJECTION, POWDER, FOR SOLUTION INTRAVENOUS at 13:05

## 2021-11-22 RX ADMIN — MORPHINE SULFATE 15 MG: 15 TABLET, FILM COATED, EXTENDED RELEASE ORAL at 19:29

## 2021-11-22 RX ADMIN — DOCUSATE SODIUM AND SENNOSIDES 1 TABLET: 8.6; 5 TABLET, FILM COATED ORAL at 19:36

## 2021-11-22 RX ADMIN — INSULIN LISPRO 2 UNITS: 100 INJECTION, SOLUTION INTRAVENOUS; SUBCUTANEOUS at 21:02

## 2021-11-22 RX ADMIN — OXYCODONE AND ACETAMINOPHEN 2 TABLET: 5; 325 TABLET ORAL at 21:02

## 2021-11-22 RX ADMIN — FENTANYL CITRATE 50 MCG: 50 INJECTION, SOLUTION INTRAMUSCULAR; INTRAVENOUS at 14:00

## 2021-11-22 RX ADMIN — HYDROMORPHONE HYDROCHLORIDE 0.25 MG: 1 INJECTION, SOLUTION INTRAMUSCULAR; INTRAVENOUS; SUBCUTANEOUS at 17:48

## 2021-11-22 RX ADMIN — HYDROMORPHONE HYDROCHLORIDE 0.5 MG: 1 INJECTION, SOLUTION INTRAMUSCULAR; INTRAVENOUS; SUBCUTANEOUS at 18:19

## 2021-11-22 RX ADMIN — DEXAMETHASONE SODIUM PHOSPHATE 10 MG: 10 INJECTION INTRAMUSCULAR; INTRAVENOUS at 13:08

## 2021-11-22 RX ADMIN — PROPOFOL 160 MG: 10 INJECTION, EMULSION INTRAVENOUS at 12:59

## 2021-11-22 RX ADMIN — PREGABALIN 100 MG: 100 CAPSULE ORAL at 19:29

## 2021-11-22 RX ADMIN — Medication 100 MCG: at 15:25

## 2021-11-22 RX ADMIN — Medication 25 MG: at 14:00

## 2021-11-22 RX ADMIN — LIDOCAINE HYDROCHLORIDE 60 MG: 20 INJECTION, SOLUTION EPIDURAL; INFILTRATION; INTRACAUDAL; PERINEURAL at 12:59

## 2021-11-22 RX ADMIN — Medication 100 MCG: at 15:31

## 2021-11-22 RX ADMIN — LACTULOSE 10 G: 20 SOLUTION ORAL at 19:29

## 2021-11-22 RX ADMIN — SODIUM CHLORIDE, POTASSIUM CHLORIDE, SODIUM LACTATE AND CALCIUM CHLORIDE: 600; 310; 30; 20 INJECTION, SOLUTION INTRAVENOUS at 14:54

## 2021-11-22 RX ADMIN — FENTANYL CITRATE 50 MCG: 50 INJECTION, SOLUTION INTRAMUSCULAR; INTRAVENOUS at 12:59

## 2021-11-22 RX ADMIN — FUROSEMIDE 40 MG: 40 TABLET ORAL at 19:29

## 2021-11-22 RX ADMIN — MORPHINE SULFATE 4 MG: 4 INJECTION INTRAVENOUS at 22:29

## 2021-11-22 RX ADMIN — SUCCINYLCHOLINE CHLORIDE 140 MG: 20 INJECTION, SOLUTION INTRAMUSCULAR; INTRAVENOUS at 12:59

## 2021-11-22 RX ADMIN — Medication 10 MG: at 14:21

## 2021-11-22 RX ADMIN — SODIUM CHLORIDE, POTASSIUM CHLORIDE, SODIUM LACTATE AND CALCIUM CHLORIDE: 600; 310; 30; 20 INJECTION, SOLUTION INTRAVENOUS at 10:59

## 2021-11-22 ASSESSMENT — PULMONARY FUNCTION TESTS
PIF_VALUE: 21
PIF_VALUE: 16
PIF_VALUE: 19
PIF_VALUE: 20
PIF_VALUE: 19
PIF_VALUE: 21
PIF_VALUE: 0
PIF_VALUE: 1
PIF_VALUE: 19
PIF_VALUE: 19
PIF_VALUE: 24
PIF_VALUE: 19
PIF_VALUE: 22
PIF_VALUE: 21
PIF_VALUE: 22
PIF_VALUE: 20
PIF_VALUE: 19
PIF_VALUE: 22
PIF_VALUE: 23
PIF_VALUE: 22
PIF_VALUE: 19
PIF_VALUE: 19
PIF_VALUE: 25
PIF_VALUE: 24
PIF_VALUE: 21
PIF_VALUE: 23
PIF_VALUE: 19
PIF_VALUE: 21
PIF_VALUE: 19
PIF_VALUE: 3
PIF_VALUE: 21
PIF_VALUE: 23
PIF_VALUE: 19
PIF_VALUE: 20
PIF_VALUE: 22
PIF_VALUE: 19
PIF_VALUE: 20
PIF_VALUE: 19
PIF_VALUE: 25
PIF_VALUE: 21
PIF_VALUE: 19
PIF_VALUE: 17
PIF_VALUE: 21
PIF_VALUE: 19
PIF_VALUE: 12
PIF_VALUE: 23
PIF_VALUE: 20
PIF_VALUE: 21
PIF_VALUE: 20
PIF_VALUE: 19
PIF_VALUE: 20
PIF_VALUE: 19
PIF_VALUE: 21
PIF_VALUE: 17
PIF_VALUE: 21
PIF_VALUE: 19
PIF_VALUE: 20
PIF_VALUE: 21
PIF_VALUE: 21
PIF_VALUE: 19
PIF_VALUE: 20
PIF_VALUE: 19
PIF_VALUE: 3
PIF_VALUE: 22
PIF_VALUE: 19
PIF_VALUE: 19
PIF_VALUE: 21
PIF_VALUE: 19
PIF_VALUE: 20
PIF_VALUE: 23
PIF_VALUE: 8
PIF_VALUE: 22
PIF_VALUE: 19
PIF_VALUE: 21
PIF_VALUE: 19
PIF_VALUE: 22
PIF_VALUE: 24
PIF_VALUE: 19
PIF_VALUE: 22
PIF_VALUE: 19
PIF_VALUE: 21
PIF_VALUE: 19
PIF_VALUE: 19
PIF_VALUE: 23
PIF_VALUE: 20
PIF_VALUE: 21
PIF_VALUE: 2
PIF_VALUE: 21
PIF_VALUE: 19
PIF_VALUE: 17
PIF_VALUE: 21
PIF_VALUE: 19
PIF_VALUE: 19
PIF_VALUE: 18
PIF_VALUE: 22
PIF_VALUE: 20
PIF_VALUE: 23
PIF_VALUE: 21
PIF_VALUE: 21
PIF_VALUE: 19
PIF_VALUE: 18
PIF_VALUE: 21
PIF_VALUE: 19
PIF_VALUE: 17
PIF_VALUE: 19
PIF_VALUE: 21
PIF_VALUE: 21
PIF_VALUE: 19
PIF_VALUE: 4
PIF_VALUE: 19
PIF_VALUE: 22
PIF_VALUE: 21
PIF_VALUE: 20
PIF_VALUE: 22
PIF_VALUE: 9
PIF_VALUE: 13
PIF_VALUE: 21
PIF_VALUE: 23
PIF_VALUE: 22
PIF_VALUE: 19
PIF_VALUE: 3
PIF_VALUE: 20
PIF_VALUE: 25
PIF_VALUE: 21
PIF_VALUE: 17
PIF_VALUE: 24
PIF_VALUE: 24
PIF_VALUE: 19
PIF_VALUE: 19
PIF_VALUE: 16
PIF_VALUE: 22
PIF_VALUE: 23
PIF_VALUE: 22
PIF_VALUE: 19
PIF_VALUE: 19
PIF_VALUE: 18
PIF_VALUE: 7
PIF_VALUE: 19
PIF_VALUE: 19
PIF_VALUE: 23
PIF_VALUE: 22
PIF_VALUE: 23
PIF_VALUE: 17
PIF_VALUE: 19
PIF_VALUE: 19
PIF_VALUE: 21
PIF_VALUE: 15
PIF_VALUE: 19
PIF_VALUE: 19
PIF_VALUE: 3
PIF_VALUE: 21
PIF_VALUE: 23
PIF_VALUE: 4
PIF_VALUE: 25
PIF_VALUE: 22
PIF_VALUE: 19
PIF_VALUE: 18
PIF_VALUE: 21
PIF_VALUE: 21
PIF_VALUE: 19
PIF_VALUE: 22
PIF_VALUE: 19
PIF_VALUE: 21
PIF_VALUE: 19
PIF_VALUE: 22
PIF_VALUE: 19
PIF_VALUE: 23
PIF_VALUE: 22
PIF_VALUE: 21
PIF_VALUE: 21
PIF_VALUE: 1
PIF_VALUE: 19
PIF_VALUE: 19
PIF_VALUE: 23
PIF_VALUE: 22
PIF_VALUE: 19
PIF_VALUE: 23
PIF_VALUE: 23
PIF_VALUE: 19
PIF_VALUE: 16
PIF_VALUE: 19
PIF_VALUE: 24
PIF_VALUE: 23
PIF_VALUE: 17
PIF_VALUE: 23
PIF_VALUE: 22
PIF_VALUE: 17
PIF_VALUE: 16
PIF_VALUE: 23
PIF_VALUE: 22
PIF_VALUE: 19
PIF_VALUE: 17
PIF_VALUE: 19
PIF_VALUE: 20
PIF_VALUE: 19
PIF_VALUE: 27
PIF_VALUE: 17
PIF_VALUE: 19
PIF_VALUE: 19
PIF_VALUE: 21
PIF_VALUE: 19
PIF_VALUE: 22
PIF_VALUE: 23
PIF_VALUE: 21
PIF_VALUE: 18
PIF_VALUE: 25
PIF_VALUE: 22
PIF_VALUE: 7
PIF_VALUE: 20
PIF_VALUE: 21
PIF_VALUE: 1
PIF_VALUE: 19
PIF_VALUE: 18
PIF_VALUE: 19
PIF_VALUE: 22
PIF_VALUE: 19
PIF_VALUE: 3
PIF_VALUE: 19
PIF_VALUE: 23
PIF_VALUE: 19
PIF_VALUE: 17
PIF_VALUE: 19
PIF_VALUE: 17

## 2021-11-22 ASSESSMENT — PAIN SCALES - GENERAL
PAINLEVEL_OUTOF10: 4
PAINLEVEL_OUTOF10: 8
PAINLEVEL_OUTOF10: 10
PAINLEVEL_OUTOF10: 7
PAINLEVEL_OUTOF10: 8
PAINLEVEL_OUTOF10: 10
PAINLEVEL_OUTOF10: 7
PAINLEVEL_OUTOF10: 6
PAINLEVEL_OUTOF10: 10
PAINLEVEL_OUTOF10: 7

## 2021-11-22 ASSESSMENT — PAIN DESCRIPTION - FREQUENCY
FREQUENCY: CONTINUOUS

## 2021-11-22 ASSESSMENT — PAIN DESCRIPTION - PROGRESSION
CLINICAL_PROGRESSION: NOT CHANGED
CLINICAL_PROGRESSION: NOT CHANGED
CLINICAL_PROGRESSION: GRADUALLY WORSENING
CLINICAL_PROGRESSION: GRADUALLY WORSENING
CLINICAL_PROGRESSION: NOT CHANGED

## 2021-11-22 ASSESSMENT — PAIN DESCRIPTION - PAIN TYPE
TYPE: SURGICAL PAIN

## 2021-11-22 ASSESSMENT — PAIN DESCRIPTION - LOCATION
LOCATION: BACK

## 2021-11-22 ASSESSMENT — PAIN DESCRIPTION - ONSET
ONSET: GRADUAL
ONSET: ON-GOING

## 2021-11-22 ASSESSMENT — PAIN DESCRIPTION - ORIENTATION
ORIENTATION: LOWER;MID
ORIENTATION: LOWER;MID
ORIENTATION: LOWER
ORIENTATION: LOWER;MID
ORIENTATION: LOWER;MID
ORIENTATION: LOWER
ORIENTATION: LOWER;MID
ORIENTATION: LOWER
ORIENTATION: LOWER;MID

## 2021-11-22 ASSESSMENT — PAIN - FUNCTIONAL ASSESSMENT
PAIN_FUNCTIONAL_ASSESSMENT: ACTIVITIES ARE NOT PREVENTED
PAIN_FUNCTIONAL_ASSESSMENT: ACTIVITIES ARE NOT PREVENTED
PAIN_FUNCTIONAL_ASSESSMENT: 0-10
PAIN_FUNCTIONAL_ASSESSMENT: ACTIVITIES ARE NOT PREVENTED
PAIN_FUNCTIONAL_ASSESSMENT: PREVENTS OR INTERFERES SOME ACTIVE ACTIVITIES AND ADLS

## 2021-11-22 ASSESSMENT — PAIN DESCRIPTION - DESCRIPTORS
DESCRIPTORS: ACHING
DESCRIPTORS: ACHING;STABBING
DESCRIPTORS: ACHING
DESCRIPTORS: ACHING
DESCRIPTORS: SHARP;DULL

## 2021-11-22 NOTE — H&P
History and Physical Service   [facility] [place] - INPATIENT    HISTORY AND PHYSICAL EXAMINATION            Date of Evaluation: 11/22/2021  Patient name:  Esteban Collins  MRN:   [id number]  YOB: 1950  PCP:    KELSY Lal CNP    History Obtained From:     Patient, medical records    History of Present Illness: This is Esteban Collins a 73 y.o. male with multiple comorbidities who presents today for a right L4-S1 TLIF by Dr. Kaylyn Garcia for lumbar spinal stenosis. Patient c/o dull aching to sharp right hip pain with radiation to posterior thigh/knee to right foot accompanied by numbness in foot  that has progressively worsened over the past 6 years. Hx L4 L5 surgery 1999. Pain is aggravated by activities , minimally relieved with rest, OTC Tylenol or Norco. Treated in past with pain management for 2 years w/o relief. Follows with Dr Efraín Kumar. Last lumbar MRI 7/21/21 showed \"\" previous right sided L4-L5 discectomy. Severe right sided foraminal stenosis L5-S1 and moderate right sided stenosis at L4-L5. Bulging disc at L3-L4 causes no stenosis\". Participated in PT w/o improved comfort. During his last visit with Dr Efraín Kumar on 10/7/21 he discussed surgical intervention to which he agreed and presents for his procedure. Today denies fever, chills, cough, wheezing, increased SOB or chest pain  Prediabetic with last A!C 6.0 9/27/21     Known Hx HTN, HLD, cirrhosis, Hep C, portal venous HTN and splenomegaly. S/p aortic valve repair 2020. Hx prostate cancer Follows with Urologist, Dr Mikal Augustin. Hx CDK follows with Nephrologist, Dr Rajesh Ellis (9/20/21 not in epic)  Follows with Gastroenterologist, Dr Kayce Rao for GI for advanced liver dx. Past paracentesis with last 2017.      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 Silke Gay [facility]    History of alcohol use 9/18/2015    Hyperlipidemia     Hypertension     Lumbago     Lymphedema     Malignant neoplasm of prostate ([address]     prostate    Otalgia of right ear     radiates down the jaw in the distribution of the third branch of the trigemminal nerve.     Portal venous hypertension ([address] 07/15/2020    S/P prostatectomy 2012    Sciatica     Splenomegaly 07/15/2020        Past Surgical History:     Past Surgical History:   Procedure Laterality Date    [facility]    BACK SURGERY      L4-5    CARDIAC CATHETERIZATION  10.30/2019    Dr. Alex Murphy COLONOSCOPY  12/05/2016    [facility], states due for another in 2  years    COLONOSCOPY N/A 10/07/2019    tubular adenoma x2; hyperplastic polyp    FINGER TRIGGER RELEASE Right 02/24/2021    pinky finger    FINGER TRIGGER RELEASE Right 2/24/2021    RIGHT SMALL FINGER TRIGGER RELEASE performed by James Harrington DO [facility] ARTHROSCOPY Right     OTHER SURGICAL HISTORY Right 01/25/2016    10 liters removed peracentesis    PARACENTESIS  11/04/2015    PARACENTESIS  12/28/2015    CO EGD TRANSORAL BIOPSY SINGLE/MULTIPLE N/A 08/30/2017    EGD BIOPSY performed by Caprice Jaramillo MD at [facility]  06/2011    TONSILLECTOMY      UPPER GASTROINTESTINAL ENDOSCOPY  10/24/2013    small segment barretts    UPPER GASTROINTESTINAL ENDOSCOPY  08/30/2017    VEIN SURGERY Left 12/07/2016    leg vein stripping    VENTRAL HERNIA REPAIR  05/26/2015        Medications Prior to Admission:     Prior to Admission medications    Medication Sig Start Date End Date Taking?  Authorizing Provider   LORazepam (ATIVAN) 0.5 MG tablet TAKE ONE TABLET BY MOUTH EVERY EVENING AS NEEDED FOR ANXIETY FOR UP TO 30 DAYS 11/16/21 12/16/21 Yes KELSY White CNP   SITagliptin (JANUVIA) 100 MG tablet TAKE 1 TABLET BY MOUTH ONE TIME A DAY 11/16/21  Yes KELSY White CNP   pregabalin (LYRICA) 100 MG capsule TAKE 1 CAPSULE BY MOUTH 2 TIMES A DAY 10/31/21 11/30/21 Yes KELSY White CNP   omeprazole (PRILOSEC) 20 MG delayed release capsule Take 2 capsules by mouth daily 9/30/21 9/30/22 Yes KELSY White CNP   spironolactone (ALDACTONE) 50 MG tablet Take one tablet orally once daily 8/30/21  Yes KELSY White CNP   furosemide (LASIX) 40 MG tablet Take 1 tablet by mouth 2 times daily  Patient taking differently: Take 80 mg by mouth daily  4/8/21 11/22/21 Yes Jhonny Casarez MD   tamsulosin (FLOMAX) 0.4 MG capsule Take 0.4 mg by mouth daily   Yes Historical Provider, MD   ferrous sulfate (IRON 325) 325 (65 Fe) MG tablet Take 325 mg by mouth every other day   Yes Historical Provider, MD   vitamin B-12 (CYANOCOBALAMIN) 500 MCG tablet Take 500 mcg by mouth every other day   Yes Historical Provider, MD   lactulose ([address] [facility]) 10 GM/15ML solution TAKE 15 MLS BY MOUTH THREE TIMES A DAY  Patient taking differently: 10 g 2 times daily  10/16/20  Yes Cr Jones DO   NIFEdipine (PROCARDIA XL) 60 MG extended release tablet Take 60 mg by mouth daily  6/12/19  Yes Historical Provider, MD   allopurinol (ZYLOPRIM) 300 MG tablet Take 600 mg by mouth daily Takes 2 tabs (=600mg) daily 3/27/19  Yes KELSY De Anda CNP   sodium hyaluronate (EUFLEXXA) 20 MG/2ML SOSY injection Inject 2 mLs into the articular space once a week Series of three injections for the right knee 9/24/21   Joe Calloway DO   Cholecalciferol (VITAMIN D) 50 MCG (2000 UT) CAPS capsule Take 1 capsule by mouth daily 2/24/20   Sean Mendoza DO        Allergies:     Nsaids    Social History:     Tobacco:    reports that he quit smoking about 6 years ago. His smoking use included cigarettes. He started smoking about 55 years ago. He has a 49.00 pack-year smoking history. He has never used smokeless tobacco.  Alcohol:      reports previous alcohol use. Drug Use:  reports current drug use. Drug: Marijuana Accumetrics). Family History:     Family History   Problem Relation Age of Onset    Prostate Cancer Father     Other Neg Hx         blood clots       Review of Systems:     Positive and Negative as described in HPI. CONSTITUTIONAL:  negative for fevers, chills, sweats, fatigue, weight loss  HEENT: Glasses  Hx tinnitus  negative for vision, hearing changes, runny nose, throat pain  RESPIRATORY:  negative for shortness of breath, cough, congestion, wheezing. CARDIOVASCULAR:  negative for chest pain, palpitations.   GASTROINTESTINAL:  negative for nausea, vomiting, diarrhea, constipation, change in bowel habits, abdominal pain   GENITOURINARY: Hx prostate ca S/p prostatectomy Has urinary retention On Flomax Follows with Dr Amber Moralez negative for difficulty of urination, burning with urination, frequency   INTEGUMENT:  negative for rash, skin lesions, easy bruising   HEMATOLOGIC/LYMPHATIC:  Swelling in leg Takes Lasix   ALLERGIC/IMMUNOLOGIC:  negative for urticaria , itching  ENDOCRINE:  negative increase in drinking, increase in urination, hot or cold intolerance  MUSCULOSKELETAL:  See HPI   Hx gout  Follows with rheumatologist Dr Osmany Yoon  negative joint pains, muscle aches, swelling of joints  NEUROLOGICAL:  negative for headaches, dizziness, lightheadedness, right foot numbness, pain, tingling extremities  BEHAVIOR/PSYCH:  negative for depression, anxiety    Physical Exam:   BP 43 %    Monocytes 8 3 - 12 %    Eosinophils % 2 1 - 4 %    Basophils 1 0 - 2 %    Immature Granulocytes 0 0 %    Segs Absolute 6.90 1.50 - 8.10 k/uL    Absolute Lymph # 2.18 1.10 - 3.70 k/uL    Absolute Mono # 0.80 0.10 - 1.20 k/uL    Absolute Eos # 0.16 0.00 - 0.44 k/uL    Basophils Absolute 0.07 0.00 - 0.20 k/uL    Absolute Immature Granulocyte 0.03 0.00 - 0.30 k/uL    WBC Morphology NOT REPORTED     RBC Morphology NOT REPORTED     Platelet Estimate NOT REPORTED    APTT    Collection Time: 11/22/21 10:53 AM   Result Value Ref Range    PTT 33.1 23.9 - 33.8 sec   Protime-INR    Collection Time: 11/22/21 10:53 AM   Result Value Ref Range    Protime 12.9 11.5 - 14.2 sec    INR 1.0    Basic Metabolic Panel    Collection Time: 11/22/21 10:53 AM   Result Value Ref Range    Glucose 117 (H) 70 - 99 mg/dL    BUN 20 8 - 23 mg/dL    CREATININE 1.26 (H) 0.70 - 1.20 mg/dL    Bun/Cre Ratio 16 9 - 20    Calcium 9.3 8.6 - 10.4 mg/dL    Sodium 140 135 - 144 mmol/L    Potassium 4.1 3.7 - 5.3 mmol/L    Chloride 102 98 - 107 mmol/L    CO2 24 20 - 31 mmol/L    Anion Gap 14 9 - 17 mmol/L    GFR Non-African American 57 (L) >60 mL/min    GFR African American >60 >60 mL/min    GFR Comment          GFR Staging NOT REPORTED        Recent Labs     11/22/21  1053   HGB 14.8   HCT 44.6   WBC 10.1   .0      K 4.1      CO2 24   BUN 20   CREATININE 1.26*   GLUCOSE 117*   INR 1.0   PROTIME 12.9   APTT 33.1       No results for input(s): COVID19 in the last 720 hours. Imaging/Diagnostics:    No results found. Diagnosis:      1. Lumbar spinal stenosis    Plans:     1.  Right L4-L5 TLIF       KELSY Finch CNP  11/22/2021  11:40 AM

## 2021-11-22 NOTE — ANESTHESIA PRE PROCEDURE
Department of Anesthesiology  Preprocedure Note       Name:  Esthela Han   Age:  73 y.o.  :  1950                                          MRN:  [id number]         Date:  2021      Surgeon: Efrain Gaspar):  Lisa Roca MD    Procedure: RIGHT L4 TO S1 T-LIF    2C-ARMS   Hsieh Negus (Right )    Medications prior to admission:   Prior to Admission medications    Medication Sig Start Date End Date Taking?  Authorizing Provider   LORazepam (ATIVAN) 0.5 MG tablet TAKE ONE TABLET BY MOUTH EVERY EVENING AS NEEDED FOR ANXIETY FOR UP TO 30 DAYS 21  KELSY Cohen CNP   SITagliptin (JANUVIA) 100 MG tablet TAKE 1 TABLET BY MOUTH ONE TIME A DAY 21   KELSY Cohen - CNP   pregabalin (LYRICA) 100 MG capsule TAKE 1 CAPSULE BY MOUTH 2 TIMES A DAY 10/31/21 11/30/21  KELSY Cohen CNP   omeprazole (PRILOSEC) 20 MG delayed release capsule Take 2 capsules by mouth daily 21  KELSY Cohen CNP   sodium hyaluronate (EUFLEXXA) 20 MG/2ML SOSY injection Inject 2 mLs into the articular space once a week Series of three injections for the right knee 21   Doc Cee DO   spironolactone (ALDACTONE) 50 MG tablet Take one tablet orally once daily 21   KELSY Cohen CNP   furosemide (LASIX) 40 MG tablet Take 1 tablet by mouth 2 times daily  Patient taking differently: Take 80 mg by mouth daily  21  Kumar Howard MD   tamsulosin (FLOMAX) 0.4 MG capsule Take 0.4 mg by mouth daily    Historical Provider, MD   ferrous sulfate (IRON 325) 325 (65 Fe) MG tablet Take 325 mg by mouth every other day    Historical Provider, MD   vitamin B-12 (CYANOCOBALAMIN) 500 MCG tablet Take 500 mcg by mouth every other day    Historical Provider, MD   lactulose ([address]) 10 GM/15ML solution TAKE 15 MLS BY MOUTH THREE TIMES A DAY  Patient taking differently: 10 g 2 times daily  10/16/20   Javier Gore DO   Cholecalciferol organ transplant status Z76.82    Varicose veins of left lower extremity I83.92    Anxiety F41.9    Colon polyps K63.5    History of hepatitis C Z86.19    Right lumbar radiculitis M54.16    Post laminectomy syndrome M96.1    Hepatic cyst K76.89    Severe comorbid illness R69    Primary osteoarthritis of both knees M17.0    Chronic lumbar radiculopathy M54.16    Sol esophagus K22.70    CKD (chronic kidney disease) stage 3, GFR 30-59 ml/min (HCC) N18.30    Chronic tophaceous gout M1A. 9XX1    Abdominal pain R10.9    Tear of right acetabular labrum S73.191A    Hepatic cirrhosis (HCC) K74.60    Tinnitus H93.19    Chronic renal failure syndrome, stage 3 (moderate) (HCC) N18.30    Nephropathy N28.9    Type 2 diabetes mellitus with kidney complication, without long-term current use of insulin (HCC) E11.29    Dyslipidemia E78.5    MULU (acute kidney injury) ([address] N17.9    Acute cystitis without hematuria N30.00    Elevated d-dimer R79.89    ESBL (extended spectrum beta-lactamase) producing bacteria infection A49.9, Z16.12    Portal venous hypertension (HCC) K76.6    Abdominal varicosities I86.8    Splenomegaly R16.1    Morbid obesity (HCC) E66.01    Adenomatous polyp of ascending colon D12.2    Gastroesophageal reflux disease K21.9       Past Medical History:        Diagnosis Date    Abdominal varicosities 07/15/2020    Anxiety state NEC     Aortic valve defect     Arthritis     DJD    Sol esophagus 10/24/2013    small segment    Chronic kidney disease     Chronic kidney disease     Cirrhosis ([address]     had paracentesis Sept 2015    Colon polyps 10/07/2019    tubular adenoma x2; hyperplastic polyp    Diabetes mellitus ([address]     pre diabetic on januvia    Diverticulosis of colon     Enlarged heart 12/28/2015    HISTORY OF, is resolved at this time    Gout 12/28/2015    is resolved at this time    Hepatic cirrhosis ([address]     Hepatic cyst     Hepatitis C, chronic ([address] Seeing Dr. Lucille Goldmann, MD at [facility] for [facility]    History of alcohol use 2015    Hyperlipidemia     Hypertension     Lumbago     Lymphedema     Malignant ne[facility] [address]     prostate    Otalgia of right ear     radiates down the jaw in the distribution of the third branch of the trigemminal nerve.     Portal venous hypertension ([address] 07/15/2020    S/P prostatectomy 2012    Sciatica     Splenomegaly 07/15/2020       Past Surgical History:        Procedure Laterality Date    AORTIC VALVE REPAIR  2020    [facility]    BACK SURGERY      L4-5    CARDIAC CATHETERIZATION  10.    Dr. Yang Taylor COLONOSCOPY  2016    [facility], states due for another in 2  years    COLONOSCOPY N/A 10/07/2019    tubular adenoma x2; hyperplastic polyp    FINGER TRIGGER RELEASE Right 2021    pinky finger    FINGER TRIGGER RELEASE Right 2021    RIGHT SMALL FINGER TRIGGER RELEASE performed [facility] Laura Ruelas DO [facility] ARTHROSCOPY Right     OTHER SURGICAL HISTORY Right 2016    10 liters removed peracentesis    PARACENTESIS  2015    PARACENTESIS  2015    MT EGD TRANSORAL BIOPSY SINGLE/MULTIPLE N/A 2017    [facility]SY [facility]  2011    TONSILLECTOMY      UPPER GASTROINTESTINAL ENDOSCOPY  10/24/2013    small segment barretts    UPPER GASTROINTESTINAL ENDOSCOPY  2017    VEIN SURGERY Left 2016    leg vein stripping    VENTRAL HERNIA REPAIR  2015       Social History:    Social History     Tobacco Use    Smoking status: Former Smoker     Packs/day: 1.00     Years: 49.00     Pack years: 49.00     Types: Cigarettes     Start date: 1966     Quit date: 2015     Years since quittin.8    Smokeless tobacco: Never Used    Tobacco comment: relapsed quit smiking again 2021   Substance Use Topics    Alcohol use: Not Currently     Alcohol/week: 0.0 standard drinks                                Counseling given: Not Answered  Comment: relapsed quit smiking again 09/2021      Vital Signs (Current): There were no vitals filed for this visit. BP Readings from Last 3 Encounters:   11/16/21 115/76   10/25/21 120/88   10/11/21 122/73       NPO Status:                                                                                 BMI:   Wt Readings from Last 3 Encounters:   11/22/21 250 lb (113.4 kg)   10/11/21 254 lb (115.2 kg)   09/27/21 249 lb 14.4 oz (113.4 kg)     There is no height or weight on file to calculate BMI.    CBC:   Lab Results   Component Value Date    WBC 10.2 09/27/2021    RBC 4.78 09/27/2021    HGB 15.9 09/27/2021    HCT 47.5 09/27/2021    MCV 99.4 09/27/2021    RDW 13.5 09/27/2021     09/27/2021       CMP:   Lab Results   Component Value Date     09/27/2021    K 4.6 09/27/2021     09/27/2021    CO2 22 09/27/2021    BUN 15 09/27/2021    CREATININE 1.19 09/27/2021    GFRAA >60 09/27/2021    LABGLOM >60 09/27/2021    GLUCOSE 119 09/27/2021    PROT 8.0 09/27/2021    CALCIUM 9.6 09/27/2021    BILITOT 0.34 09/27/2021    ALKPHOS 83 09/27/2021    AST 20 09/27/2021    ALT 19 09/27/2021       POC Tests:   No results for input(s): POCGLU, POCNA, POCK, POCCL, POCBUN, POCHEMO, POCHCT in the last 72 hours.     Coags:   Lab Results   Component Value Date    PROTIME 12.7 09/27/2021    INR 1.0 09/27/2021    APTT 39.0 09/27/2021       HCG (If Applicable): No results found for: PREGTESTUR, PREGSERUM, HCG, HCGQUANT     ABGs: No results found for: PHART, PO2ART, MXN1XYQ, GRJ0IVV, BEART, X9CAHPPX     Type & Screen (If Applicable):  No results found for: LABABO, [address]    Anesthesia Evaluation  Patient summary reviewed and Nursing notes reviewed no history of anesthetic complications:   Airway: Mallampati: II  TM distance: >3 FB   Neck ROM: full  Mouth opening: > = 3 FB Dental: normal exam         Pulmonary:normal exam        (-) COPD and asthma                           Cardiovascular:  Exercise tolerance: no interval change,   (+) hypertension:, valvular problems/murmurs (s/p aortic valve repair): AI, hyperlipidemia    (-) pacemaker, past MI and CAD        Rate: normal                 ROS comment: 2019 cardiac cath:  Conclusions      Procedure Summary      Mild CAD. Normal right sided, pulmonary and wedge pressure. Normal CO and CI. Severe aortic insufficiency by BERE. Recommendations      CT surgery evaluation for possible AVR. 2019 echo:  Summary  The study was performed by the Cardiologist, the Cardiology Fellow and the  sonographer, in the Cath Lab. The study was performed under conscious sedation. Normal left ventricular chamber dimension and function. Estimated EF 55%. Left atrium is normal in size. Left atrial appendage showed no evidence of clot. Inter-atrial septum is intact with no evidence for an atrial septal defect. Aortic valve is trileaflet, opens adequately. Severe aortic insufficiency, eccentric posteriorly directed jet. Neuro/Psych:   (+) neuromuscular disease (lumbar radiculopathy):,    (-) TIA and CVA           GI/Hepatic/Renal:   (+) GERD: well controlled, hepatitis:, liver disease:, renal disease: CRI,           Endo/Other:    (+) Diabetes, hypothyroidism::., malignancy/cancer (prostate). Abdominal:             Vascular: Other Findings:             Anesthesia Plan      general     ASA 4     (Ett  Cardiac cleared  Pt aware of high risk including hepatic dysfunction/failure and death)  Induction: intravenous. MIPS: Postoperative opioids intended and Prophylactic antiemetics administered. Anesthetic plan and risks discussed with patient. Use of blood products discussed with patient whom. Plan discussed with CRNA.     Attending anesthesiologist reviewed and agrees with Preprocedure content            Mariana Newton MD   11/22/2021

## 2021-11-22 NOTE — PROGRESS NOTES
Physical Therapy  . ptc        Physical Therapy Cancel Note      DATE: 2021    NAME: Stacey Holloway  MRN: [id number]   : 1950      Patient not seen this date for Physical Therapy due to:    Pt. Still in PACU at 5:15pm  Will evaluate in AM      Electronically signed by Connie Finney PT on 2021 at 5:15 PM

## 2021-11-22 NOTE — PROGRESS NOTES
Patient received from OR and oriented to room. No distress noted. Call light within reach, and pt educated on its use. Bed in lowest position, and locked. Side rails up x 2. Denied further questions or needs at this time. Will continue to monitor.

## 2021-11-23 VITALS
BODY MASS INDEX: 33.86 KG/M2 | WEIGHT: 250 LBS | OXYGEN SATURATION: 90 % | HEIGHT: 72 IN | RESPIRATION RATE: 16 BRPM | TEMPERATURE: 97.8 F | SYSTOLIC BLOOD PRESSURE: 105 MMHG | HEART RATE: 60 BPM | DIASTOLIC BLOOD PRESSURE: 62 MMHG

## 2021-11-23 LAB
ANION GAP SERPL CALCULATED.3IONS-SCNC: 12 MMOL/L (ref 9–17)
BUN BLDV-MCNC: 25 MG/DL (ref 8–23)
BUN/CREAT BLD: 16 (ref 9–20)
CALCIUM SERPL-MCNC: 7.9 MG/DL (ref 8.6–10.4)
CHLORIDE BLD-SCNC: 102 MMOL/L (ref 98–107)
CO2: 23 MMOL/L (ref 20–31)
CREAT SERPL-MCNC: 1.52 MG/DL (ref 0.7–1.2)
CULTURE: NO GROWTH
EKG ATRIAL RATE: 51 BPM
EKG P AXIS: 7 DEGREES
EKG P-R INTERVAL: 162 MS
EKG Q-T INTERVAL: 450 MS
EKG QRS DURATION: 102 MS
EKG QTC CALCULATION (BAZETT): 414 MS
EKG R AXIS: -52 DEGREES
EKG T AXIS: 134 DEGREES
EKG VENTRICULAR RATE: 51 BPM
ESTIMATED AVERAGE GLUCOSE: 126 MG/DL
GFR AFRICAN AMERICAN: 55 ML/MIN
GFR NON-AFRICAN AMERICAN: 46 ML/MIN
GFR SERPL CREATININE-BSD FRML MDRD: ABNORMAL ML/MIN/{1.73_M2}
GFR SERPL CREATININE-BSD FRML MDRD: ABNORMAL ML/MIN/{1.73_M2}
GLUCOSE BLD-MCNC: 139 MG/DL (ref 75–110)
GLUCOSE BLD-MCNC: 149 MG/DL (ref 70–99)
GLUCOSE BLD-MCNC: 163 MG/DL (ref 75–110)
HBA1C MFR BLD: 6 % (ref 4–6)
HCT VFR BLD CALC: 35 % (ref 40.7–50.3)
HEMOGLOBIN: 11.3 G/DL (ref 13–17)
Lab: NORMAL
MCH RBC QN AUTO: 32.8 PG (ref 25.2–33.5)
MCHC RBC AUTO-ENTMCNC: 32.3 G/DL (ref 28.4–34.8)
MCV RBC AUTO: 101.7 FL (ref 82.6–102.9)
MRSA, DNA, NASAL: NORMAL
NRBC AUTOMATED: 0 PER 100 WBC
PDW BLD-RTO: 13.4 % (ref 11.8–14.4)
PLATELET # BLD: 176 K/UL (ref 138–453)
PMV BLD AUTO: 11.1 FL (ref 8.1–13.5)
POTASSIUM SERPL-SCNC: 4.4 MMOL/L (ref 3.7–5.3)
RBC # BLD: 3.44 M/UL (ref 4.21–5.77)
SODIUM BLD-SCNC: 137 MMOL/L (ref 135–144)
SPECIMEN DESCRIPTION: NORMAL
SPECIMEN DESCRIPTION: NORMAL
WBC # BLD: 14.6 K/UL (ref 3.5–11.3)

## 2021-11-23 PROCEDURE — 85027 COMPLETE CBC AUTOMATED: CPT

## 2021-11-23 PROCEDURE — 80048 BASIC METABOLIC PNL TOTAL CA: CPT

## 2021-11-23 PROCEDURE — 99232 SBSQ HOSP IP/OBS MODERATE 35: CPT | Performed by: INTERNAL MEDICINE

## 2021-11-23 PROCEDURE — 93010 ELECTROCARDIOGRAM REPORT: CPT | Performed by: INTERNAL MEDICINE

## 2021-11-23 PROCEDURE — 97116 GAIT TRAINING THERAPY: CPT

## 2021-11-23 PROCEDURE — 82947 ASSAY GLUCOSE BLOOD QUANT: CPT

## 2021-11-23 PROCEDURE — 6370000000 HC RX 637 (ALT 250 FOR IP): Performed by: NURSE PRACTITIONER

## 2021-11-23 PROCEDURE — 2580000003 HC RX 258: Performed by: ORTHOPAEDIC SURGERY

## 2021-11-23 PROCEDURE — 97530 THERAPEUTIC ACTIVITIES: CPT

## 2021-11-23 PROCEDURE — 36415 COLL VENOUS BLD VENIPUNCTURE: CPT

## 2021-11-23 PROCEDURE — 6370000000 HC RX 637 (ALT 250 FOR IP): Performed by: ORTHOPAEDIC SURGERY

## 2021-11-23 PROCEDURE — 97163 PT EVAL HIGH COMPLEX 45 MIN: CPT

## 2021-11-23 PROCEDURE — 6360000002 HC RX W HCPCS: Performed by: ORTHOPAEDIC SURGERY

## 2021-11-23 RX ORDER — OXYCODONE HYDROCHLORIDE AND ACETAMINOPHEN 5; 325 MG/1; MG/1
1-2 TABLET ORAL EVERY 4 HOURS PRN
Qty: 60 TABLET | Refills: 0 | Status: SHIPPED | OUTPATIENT
Start: 2021-11-23 | End: 2021-11-30

## 2021-11-23 RX ORDER — LANOLIN ALCOHOL/MO/W.PET/CERES
10.5 CREAM (GRAM) TOPICAL ONCE
Status: COMPLETED | OUTPATIENT
Start: 2021-11-23 | End: 2021-11-23

## 2021-11-23 RX ORDER — SENNA AND DOCUSATE SODIUM 50; 8.6 MG/1; MG/1
1 TABLET, FILM COATED ORAL 2 TIMES DAILY
Qty: 60 TABLET | Refills: 0 | Status: ON HOLD | OUTPATIENT
Start: 2021-11-23 | End: 2022-04-23

## 2021-11-23 RX ORDER — TIZANIDINE 4 MG/1
4 TABLET ORAL EVERY 8 HOURS PRN
Qty: 50 TABLET | Refills: 0 | Status: SHIPPED | OUTPATIENT
Start: 2021-11-23 | End: 2022-02-22

## 2021-11-23 RX ORDER — MORPHINE SULFATE 15 MG/1
15 TABLET, FILM COATED, EXTENDED RELEASE ORAL EVERY 12 HOURS SCHEDULED
Qty: 6 TABLET | Refills: 0 | Status: SHIPPED | OUTPATIENT
Start: 2021-11-23 | End: 2021-11-26

## 2021-11-23 RX ADMIN — PANTOPRAZOLE SODIUM 40 MG: 40 TABLET, DELAYED RELEASE ORAL at 05:59

## 2021-11-23 RX ADMIN — ALOGLIPTIN 6.25 MG: 6.25 TABLET, FILM COATED ORAL at 09:24

## 2021-11-23 RX ADMIN — LACTULOSE 10 G: 20 SOLUTION ORAL at 09:24

## 2021-11-23 RX ADMIN — CEFAZOLIN SODIUM 2000 MG: 10 INJECTION, POWDER, FOR SOLUTION INTRAVENOUS at 03:35

## 2021-11-23 RX ADMIN — OXYCODONE AND ACETAMINOPHEN 2 TABLET: 5; 325 TABLET ORAL at 05:59

## 2021-11-23 RX ADMIN — SODIUM CHLORIDE: 9 INJECTION, SOLUTION INTRAVENOUS at 04:17

## 2021-11-23 RX ADMIN — LORAZEPAM 0.5 MG: 0.5 TABLET ORAL at 00:40

## 2021-11-23 RX ADMIN — TAMSULOSIN HYDROCHLORIDE 0.4 MG: 0.4 CAPSULE ORAL at 09:24

## 2021-11-23 RX ADMIN — OXYCODONE AND ACETAMINOPHEN 2 TABLET: 5; 325 TABLET ORAL at 01:53

## 2021-11-23 RX ADMIN — POLYETHYLENE GLYCOL 3350 17 G: 17 POWDER, FOR SOLUTION ORAL at 09:27

## 2021-11-23 RX ADMIN — ALLOPURINOL 600 MG: 300 TABLET ORAL at 09:24

## 2021-11-23 RX ADMIN — Medication 10.5 MG: at 00:40

## 2021-11-23 RX ADMIN — PREGABALIN 100 MG: 100 CAPSULE ORAL at 09:24

## 2021-11-23 RX ADMIN — MORPHINE SULFATE 15 MG: 15 TABLET, FILM COATED, EXTENDED RELEASE ORAL at 10:12

## 2021-11-23 RX ADMIN — TIZANIDINE 4 MG: 4 TABLET ORAL at 06:00

## 2021-11-23 ASSESSMENT — ENCOUNTER SYMPTOMS
VOMITING: 0
NAUSEA: 0
ABDOMINAL PAIN: 0
COUGH: 0
COLOR CHANGE: 0
SORE THROAT: 0
SHORTNESS OF BREATH: 0
BACK PAIN: 1
ROS SKIN COMMENTS: SURGICAL INCISION
SINUS PRESSURE: 0

## 2021-11-23 ASSESSMENT — PAIN DESCRIPTION - ONSET
ONSET: ON-GOING
ONSET: AWAKENED FROM SLEEP

## 2021-11-23 ASSESSMENT — PAIN DESCRIPTION - PAIN TYPE
TYPE: SURGICAL PAIN
TYPE: SURGICAL PAIN

## 2021-11-23 ASSESSMENT — PAIN DESCRIPTION - ORIENTATION
ORIENTATION: LOWER
ORIENTATION: LOWER;MID

## 2021-11-23 ASSESSMENT — PAIN - FUNCTIONAL ASSESSMENT
PAIN_FUNCTIONAL_ASSESSMENT: PREVENTS OR INTERFERES SOME ACTIVE ACTIVITIES AND ADLS
PAIN_FUNCTIONAL_ASSESSMENT: PREVENTS OR INTERFERES SOME ACTIVE ACTIVITIES AND ADLS

## 2021-11-23 ASSESSMENT — PAIN DESCRIPTION - LOCATION
LOCATION: BACK
LOCATION: BACK

## 2021-11-23 ASSESSMENT — PAIN DESCRIPTION - DESCRIPTORS
DESCRIPTORS: ACHING
DESCRIPTORS: ACHING

## 2021-11-23 ASSESSMENT — PAIN DESCRIPTION - FREQUENCY
FREQUENCY: CONTINUOUS
FREQUENCY: INTERMITTENT

## 2021-11-23 ASSESSMENT — PAIN SCALES - GENERAL
PAINLEVEL_OUTOF10: 2
PAINLEVEL_OUTOF10: 6
PAINLEVEL_OUTOF10: 7
PAINLEVEL_OUTOF10: 6

## 2021-11-23 ASSESSMENT — PAIN DESCRIPTION - PROGRESSION: CLINICAL_PROGRESSION: GRADUALLY WORSENING

## 2021-11-23 NOTE — DISCHARGE INSTR - COC
Continuity of Care Form    Patient Name: Zara Healy   :  1950  MRN:  [id number]    Admit date:  2021  Discharge date:  ***    Code Status Order: Full Code   Advance Directives:   Advance Care Flowsheet Documentation       Date/Time Healthcare Directive Type of Healthcare Directive Copy in [address] Agent's Name Healthcare Agent's Phone Number    [phone] Yes, patient has an advance directive for healthcare treatment Living will; Durable power of  for health care -- Spouse Babita Vang [phone]            Admitting Physician:  Jose Godoy MD  PCP: KELSY Krishnamurthy CNP    Discharging Nurse: [facility] Unit/Room#:   Discharging Unit Phone Number: ***    Emergency Contact:   Extended Emergency Contact Information  Primary Emergency Contact: Mishel Contreras  Address: [address], [facility] [place]  Home Phone: [phone]  Work Phone: [phone]  Mobile Phone: [phone]  Relation: Spouse    Past Surgical History:  Past Surgical History:   Procedure Laterality Date    AORTIC VALVE REPAIR  2020    [address]      L4-5    [facility]  10.    Dr. Jamila Ojeda    COLONOSCOPY  2016    [facility], states due for another in 2  years    COLONOSCOPY N/A 10/07/2019    tubular adenoma x2; hyperplastic polyp    FINGER TRIGGER RELEASE Right 2021    pinky finger    FINGER TRIGGER RELEASE Right 2021    RIGHT SMALL FINGER TRIGGER RELEASE performed by Tank Soliz DO at [facility] ARTHROSCOPY Right     LUMBAR FUSION Right 2021    RIGHT L4 TO S1 T-LIF    2C-ARMS   Genevieve Verma performed by Jose Godoy MD at [address] [facility] [address] Right 2016    10 liters removed peracentesis    PARACENTESIS  2015    PARACENTESIS  2015    ID EGD TRANSORAL BIOPSY SINGLE/MULTIPLE N/A 2017 EGD BIOPSY performed by Taniya Vallecillo MD at [address] [place] [address]  06/2011    TONSILLECTOMY      UPPER GASTROINTESTINAL ENDOSCOPY  10/24/2013    small segment barretts    UPPER GASTROINTESTINAL ENDOSCOPY  08/30/2017    VEIN SURGERY Left 12/07/2016    leg vein stripping    VENTRAL HERNIA REPAIR  05/26/2015       Immunization History:   Immunization History   Administered Date(s) Administered    COVID-19, Blakely Batch, Primary or Immunocompromised, PF, 100mcg/0.5mL 03/04/2021, 04/08/2021    Hepatitis A/Hepatitis B (Twinrix) 12/09/2009, 12/17/2015, 01/28/2016    Influenza 01/11/2013, 10/28/2013    Influenza A (W1A6-58) Vaccine IM 12/09/2009    Influenza Vaccine, unspecified formulation 11/25/2015    Influenza Virus Vaccine 09/07/2011, 10/20/2014, 11/29/2018    Influenza, High-dose, Davion Rodríguez, 65 yrs +, IM (Fluzone) 10/25/2020    Influenza, Quadv, IM, (6 mo and older Fluzone, Flulaval, Fluarix and 3 yrs and older Afluria) 12/04/2017    Influenza, Triv, 3 Years and older, IM, PF (Afluria 5yrs and older) 12/08/2016    Influenza, Triv, inactivated, subunit, adjuvanted, IM (Fluad 65 yrs and older) 09/30/2019    Pneumococcal Conjugate 13-valent ([id number]) 12/17/2015    Pneumococcal Polysaccharide (Egesgzaba63) 12/04/2017, 10/25/2020    Tdap (Boostrix, Adacel) 12/17/2015    Zoster Live (Zostavax) 12/17/2015       Active Problems:  Patient Active Problem List   Diagnosis Code    Disc displacement, lumbar M51.26    Chronic hepatitis C with cirrhosis (HCC) B18.2, K74.60    Dilated cardiomyopathy I42.0    Gout M10.9    Diabetes type 2, controlled E11.9    Prostate cancer ([facility] C61    Severe aortic insufficiency I35.1    Incisional hernia K43.2    Aortic atherosclerosis (HCC) I70.0    Cervicalgia M54.2    Sciatica M54.30    Diverticulosis of colon K57.30    Ascites due to alcoholic cirrhosis ([facility] W30.68    Essential hypertension I10    History of alcohol use Z87.898    Hypomagnesemia E83.42    Chronic hepatitis C virus infection ([facility] B18.2    Acquired hypothyroidism E03.9    Chondromalacia patellae M22.40    Awaiting organ transplant status Z76.82    Varicose veins of left lower extremity I83.92    Anxiety F41.9    Colon polyps K63.5    History of hepatitis C Z86.19    Right lumbar radiculitis M54.16    Post laminectomy syndrome M96.1    Hepatic cyst K76.89    Severe comorbid illness R69    Primary osteoarthritis of both knees M17.0    Chronic lumbar radiculopathy M54.16    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 9XX1    Abdominal pain R10.9    Tear of right acetabular labrum S73.191A    Hepatic cirrhosis (HCC) K74.60    Tinnitus H93.19    Chronic renal failure syndrome, stage 3 (moderate) ([facility]) N18.30    Nephropathy N28.9    Type 2 diabetes mellitus with kidney complication, without long-term current use of insulin ([facility]) E11.29    Dyslipidemia E78.5    MULU (acute kidney injury) ([facility] N17.9    Acute cystitis without hematuria N30.00    Elevated d-dimer R79.89    ESBL (extended spectrum beta-lactamase) producing bacteria infection A49.9, Z16.12    Portal venous hypertension (HCC) K76.6    Abdominal varicosities I86.8    Splenomegaly R16.1    Morbid obesity ([facility]) E66.01    Adenomatous polyp of ascending colon D12.2    Gastroesophageal reflux disease K21.9    Spondylolisthesis, acquired M43.10    Foraminal stenosis of lumbar region M48.061       Isolation/Infection:   Isolation            Contact          Patient Infection Status       Infection Onset Added Last Indicated Last Indicated By Review Planned Expiration Resolved Resolved By    ESBL (Extended Spectrum Beta Lactamase) 07/12/20 07/13/20 07/12/20 Culture, Urine        E.  Coli - urine 7/2020        Resolved    COVID-19 (Rule Out) 08/21/20 08/21/20 08/21/20 COVID-19 Ambulatory (Ordered)   08/23/20 Rule-Out Test Resulted            Nurse Assessment:  Last Vital Signs: BP (!) 98/53   Pulse 51   Temp 97.8 °F (36.6 °C) (Oral)   Resp 16   Ht 6' (1.829 m)   Wt 250 lb (113.4 kg)   SpO2 90%   BMI 33.91 kg/m²     Last documented pain score (0-10 scale): Pain Level: 2 (After pain meds)  Last Weight:   Wt Readings from Last 1 Encounters:   11/22/21 250 lb (113.4 kg)     Mental Status:  {IP PT MENTAL STATUS:56131}    IV Access:  { BRIAN IV ACCESS:578210651}    Nursing Mobility/ADLs:  Walking   {CHP DME HRJM:377586266}  Transfer  {CHP DME BTAD:875481856}  Bathing  {CHP DME OCUO:700319665}  Dressing  {CHP DME FUIA:384345479}  Toileting  {CHP DME NNET:743129878}  Feeding  {CHP DME LIBL:806627501}  Med Admin  {CHP DME OROI:983158143}  Med Delivery   { BRIAN MED Delivery:524298562}    Wound Care Documentation and Therapy:        Elimination:  Continence: Bowel: {YES / YP:13624}  Bladder: {YES / BW:69968}  Urinary Catheter: {Urinary Catheter:752725816}   Colostomy/Ileostomy/Ileal Conduit: {YES / EJ:62481}       Date of Last BM: ***    Intake/Output Summary (Last 24 hours) at 11/23/2021 1233  Last data filed at 11/23/2021 0720  Gross per 24 hour   Intake 3916.25 ml   Output 2425 ml   Net 1491.25 ml     I/O last 3 completed shifts: In: 2385 [P.O.:830;  I.V.:1180; Blood:375]  Out: 6667 [Urine:1850; Blood:575]    Safety Concerns:     [address] Safety Concerns:694280071}    Impairments/Disabilities:      [address] Impairments/Disabilities:285645990}    Nutrition Therapy:  Current Nutrition Therapy:   [address] Diet List:016647444}    Routes of Feeding: {CHP DME Other Feedings:896881842}  Liquids: {Slp liquid thickness:98974}  Daily Fluid Restriction: {CHP DME Yes amt example:139780991}  Last Modified Barium Swallow with Video (Video Swallowing Test): {Done Not Done FNQF:604191805}    Treatments at the Time of Hospital Discharge:   Respiratory Treatments: ***  Oxygen Therapy:  {Therapy; copd oxygen:17506}  Ventilator:    {MH CC Vent JYOP:898237858}    Rehab Therapies: {THERAPEUTIC INTERVENTION:1140421625}  Weight Bearing Status/Restrictions: [address] CC Weight Bearin}  Other Medical Equipment (for information only, NOT a DME order):  {EQUIPMENT:590687095}  Other Treatments: ***    Patient's personal belongings (please select all that are sent with patient):  {CHP DME Belongings:939471601}    RN SIGNATURE:  {Esignature:253205796}    CASE MANAGEMENT/SOCIAL WORK SECTION    Inpatient Status Date: ***    Readmission Risk Assessment Score:  Readmission Risk              Risk of Unplanned Readmission:  22           Discharging to Facility/ Agency   Name:   Address:  Phone:  Fax:    Dialysis Facility (if applicable)   Name:  Address:  Dialysis Schedule:  Phone:  Fax:    / signature: {Esignature:321734589}    PHYSICIAN SECTION    Prognosis: {Prognosis:9466940985}    Condition at Discharge: Corina Chase Patient Condition:837774791}    Rehab Potential (if transferring to Rehab): {Prognosis:1979807604}    Recommended Labs or Other Treatments After Discharge: ***    Physician Certification: I certify the above information and transfer of Hood Juarez  is necessary for the continuing treatment of the diagnosis listed and that he requires {Admit to Appropriate Level of Care:01107} for {GREATER/LESS:735669897} 30 days.      Update Admission H&P: {CHP DME Changes in DKIYA:118150461}    PHYSICIAN SIGNATURE:  {Esignature:688287314}

## 2021-11-23 NOTE — FLOWSHEET NOTE
11/23/21 0533 11/23/21 0542   Mobility   Activity Ambulate in szymanski Return to bed; In bed   Level of Assistance Independent after set-up Independent after set-up   Assistive Device None None   Distance Ambulated (ft) 150 ft  --    Ambulation Response Tolerated well  --    Repositioned  --  Turns self   Head of Bed Elevated   --  Self regulated   Heels/Feet  --  Foot of bed elevated   Range of Motion Active; All extremities Active;  All extremities

## 2021-11-23 NOTE — PLAN OF CARE
Problem: Pain:  Goal: Pain level will decrease  Description: Pain level will decrease  Outcome: Ongoing  Goal: Control of acute pain  Description: Control of acute pain  Outcome: Ongoing  Goal: Control of chronic pain  Description: Control of chronic pain  Outcome: Ongoing     Problem: Falls - Risk of:  Goal: Will remain free from falls  Description: Will remain free from falls  Outcome: Ongoing  Goal: Absence of physical injury  Description: Absence of physical injury  Outcome: Ongoing     Problem: Activity:  Goal: Ability to avoid complications of mobility impairment will improve  Description: Ability to avoid complications of mobility impairment will improve  Outcome: Ongoing  Goal: Ability to tolerate increased activity will improve  Description: Ability to tolerate increased activity will improve  Outcome: Ongoing     Problem:  Bowel/Gastric:  Goal: Gastrointestinal status for postoperative course will improve  Description: Gastrointestinal status for postoperative course will improve  Outcome: Ongoing     Problem: Fluid Volume:  Goal: Maintenance of adequate hydration will improve  Description: Maintenance of adequate hydration will improve  Outcome: Ongoing     Problem: Health Behavior:  Goal: Identification of resources available to assist in meeting health care needs will improve  Description: Identification of resources available to assist in meeting health care needs will improve  Outcome: Ongoing  Goal: Ability to state signs and symptoms to report to health care provider will improve  Description: Ability to state signs and symptoms to report to health care provider will improve  Outcome: Ongoing     Problem: Nutritional:  Goal: Ability to attain and maintain optimal nutritional status will improve  Description: Ability to attain and maintain optimal nutritional status will improve  Outcome: Ongoing     Problem: Physical Regulation:  Goal: Ability to maintain clinical measurements within normal limits

## 2021-11-23 NOTE — PROGRESS NOTES
[facility]  Attending Progress Note  11/23/2021  8:04 AM     Nnamdi Umair    1950   [id number]      SUBJECTIVE:  Doing well. Pain controlled. Has been up walking well. Ziegler out. Denies leg symptoms. OBJECTIVE      Physical      VITALS:  /65   Pulse 56   Temp 97.9 °F (36.6 °C) (Oral)   Resp 16   Ht 6' (1.829 m)   Wt 250 lb (113.4 kg)   SpO2 91%   BMI 33.91 kg/m²     Dressing C/D/I    NEUROLOGIC: Alert and Oriented x 3. Strength 5/5 HF, 5/5 Q, 5/5 TA, 5/5 EHL, 5/5 GS. 5/5 D, 5/5 B, 5/5 T, 5/5 WE, 5/5 WF, 5/5 I                                                                  Sensation intact.      Data  CBC:   Lab Results   Component Value Date    WBC 14.6 11/23/2021    RBC 3.44 11/23/2021    HGB 11.3 11/23/2021    HCT 35.0 11/23/2021    .7 11/23/2021    MCH 32.8 11/23/2021    MCHC 32.3 11/23/2021    RDW 13.4 11/23/2021     11/23/2021    MPV 11.1 11/23/2021     BMP:    Lab Results   Component Value Date     11/23/2021    K 4.4 11/23/2021     11/23/2021    CO2 23 11/23/2021    BUN 25 11/23/2021    LABALBU 4.4 09/27/2021    CREATININE 1.52 11/23/2021    CALCIUM 7.9 11/23/2021    GFRAA 55 11/23/2021    LABGLOM 46 11/23/2021    GLUCOSE 149 11/23/2021           Current Inpatient Medications    Current Facility-Administered Medications: allopurinol (ZYLOPRIM) tablet 600 mg, 600 mg, Oral, Daily  [START ON 11/24/2021] ferrous sulfate (FE TABS 325) EC tablet 325 mg, 325 mg, Oral, Every Other Day  furosemide (LASIX) tablet 40 mg, 40 mg, Oral, BID  lactulose (CHRONULAC) 10 GM/15ML solution 10 g, 10 g, Oral, BID  NIFEdipine (PROCARDIA XL) extended release tablet 60 mg, 60 mg, Oral, Daily  pantoprazole (PROTONIX) tablet 40 mg, 40 mg, Oral, QAM AC  pregabalin (LYRICA) capsule 100 mg, 100 mg, Oral, BID  alogliptin (NESINA) tablet 6.25 mg, 6.25 mg, Oral, Daily  spironolactone (ALDACTONE) tablet 25 mg, 25 mg, Oral, Daily  tamsulosin (FLOMAX) capsule 0.4 mg, 0.4 mg, Oral, Daily  glucose (GLUTOSE) 40 % oral gel 15 g, 15 g, Oral, PRN  dextrose 50 % IV solution, 12.5 g, IntraVENous, PRN  glucagon (rDNA) injection 1 mg, 1 mg, IntraMUSCular, PRN  dextrose 5 % solution, 100 mL/hr, IntraVENous, PRN  0.9 % sodium chloride infusion, , IntraVENous, Continuous  sodium chloride flush 0.9 % injection 10 mL, 10 mL, IntraVENous, 2 times per day  sodium chloride flush 0.9 % injection 10 mL, 10 mL, IntraVENous, PRN  0.9 % sodium chloride infusion, 25 mL, IntraVENous, PRN  morphine (PF) injection 2 mg, 2 mg, IntraVENous, Q2H PRN **OR** morphine sulfate (PF) injection 4 mg, 4 mg, IntraVENous, Q2H PRN  polyethylene glycol (GLYCOLAX) packet 17 g, 17 g, Oral, Daily  bisacodyl (DULCOLAX) EC tablet 5 mg, 5 mg, Oral, Daily  sennosides-docusate sodium (SENOKOT-S) 8.6-50 MG tablet 1 tablet, 1 tablet, Oral, BID  ondansetron (ZOFRAN-ODT) disintegrating tablet 4 mg, 4 mg, Oral, Q8H PRN **OR** ondansetron (ZOFRAN) injection 4 mg, 4 mg, IntraVENous, Q6H PRN  insulin lispro (HUMALOG) injection vial 0-12 Units, 0-12 Units, SubCUTAneous, TID WC  insulin lispro (HUMALOG) injection vial 0-6 Units, 0-6 Units, SubCUTAneous, Nightly  morphine (MS CONTIN) extended release tablet 15 mg, 15 mg, Oral, 2 times per day  oxyCODONE-acetaminophen (PERCOCET) 5-325 MG per tablet 1 tablet, 1 tablet, Oral, Q4H PRN **OR** oxyCODONE-acetaminophen (PERCOCET) 5-325 MG per tablet 2 tablet, 2 tablet, Oral, Q4H PRN  tiZANidine (ZANAFLEX) tablet 4 mg, 4 mg, Oral, Q8H PRN  melatonin tablet 10.5 mg, 10.5 mg, Oral, Nightly PRN  LORazepam (ATIVAN) tablet 0.5 mg, 0.5 mg, Oral, Nightly PRN    ASSESSMENT AND PLAN    73 y.o. male status post L4-S1 MIS TLIF/PSFI  post op day #  1    1. PT- WBAT  2. Pain control  3. EPC  4. D/C plan for home today late afternoon.   5. Has walker at home     [facility]  Spine Surgeon  [phone]

## 2021-11-23 NOTE — PROGRESS NOTES
CLINICAL PHARMACY NOTE: MEDS TO BEDS    Total # of Prescriptions Filled: 8   The following medications were delivered to the patient:  · PERCOCET 5-325  · MORPHINE SUL ER 15MG  · TIZANIDINE 4MG  · TAMSULOSIN 0.4MG  · PREDNISONE 5MG  · JANUVIA 100MG  · LORAZEPAM 05MG  · NIFEDIPINE ER 60MG    Additional Documentation:  PT DECLINED OTC SENNA PLUS 8.6-50MG, HE USES LACTULOSE ON A REGULAR BASIS

## 2021-11-23 NOTE — CONSULTS
[facility]  Office: [address], DO, Jeff Padron, DO, Nando Arriola, DO, Alaina Parish Eliza, DO, Gerri Espinoza MD, Victorino Velazquez MD, Richa Cox MD, Fidel Shah MD, Pamela Sue MD, Shekhar Woodward MD, Caitie Deleon MD, Briana Singh, DO, Vicki Rice DO, Alejo Mccarthy MD,  Allyssa Lewis DO, Jamel Schmidt MD, Torrey Steward MD, Leilani Degroot MD, Dagoberto Breen MD, Radha Lundberg MD, Kianna Boston MD, Lazaro Ervin MD, Natalee eJter, [facility], [facility], CNP, Louisa Quinn, CNP, Rashi Martinez, CNS, Maria M Shaffer, CNP, Marquez Vaughn, CNP, Yaneth Muniz, CNP, Apolonia Singh, CNP, Shay Antonio, CNP, Luis Vu PA-C, Anila Vogt [facility], Manolo Isaacs, CNP, Marcin Ashley, CNP, Monika Menjivar, CNP, Avery Florez, CNP, Samaria Ryan, CNP, Genet Pino, CNP, [facility], [address] [place] [address] / HISTORY AND PHYSICAL EXAMINATION            Date:   11/23/2021  Patient name:  Hood Juarez  Date of admission:  11/22/2021 10:02 AM  MRN:   [id number]  Account:  [de-identified]  YOB: 1950  PCP:    KELSY Jenkins CNP  Room:   2006/2006-02  Code Status:    Full Code    Physician Requesting Consult: Bernie Tello MD    Reason for Consult: Medical management    Chief Complaint:     Status post right L4-S1 TLIF    History Obtained From:     patient, electronic medical record    History of Present Illness:     79-year-old male status post right L4-S1 TLIF performed today by Dr. Jazmine Kumari for bar stenosis. He has been dealing with symptoms for the past 6 years including right low back pain that radiates to the right hip, thigh and knee. Patiently he has had some numbness to the right foot which has worsened over the past 6 years. Through L4-L5 surgery 1999. Additional history: Hypertension, hyperlipidemia, cirrhosis, hepatitis C and chronic kidney disease.   He follows with Dr. Joanna Rojas for prostate cancer and Dr. Morataya Askew for his liver disease. 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 Mone Hylton MD at [facility] for Liver Transplant    History of alcohol use 9/18/2015    Hyperlipidemia     Hypertension     Lumbago     Lymphedema     Malignant neoplasm of prostate ([address]     prostate    Otalgia of right ear     radiates down the jaw in the distribution of the third branch of the trigemminal nerve.     Portal venous hypertension ([address] 07/15/2020    S/P prostatectomy 2012    Sciatica     Splenomegaly 07/15/2020        Past Surgical History:     Past Surgical History:   Procedure Laterality Date    AORTIC VALVE REPAIR  06/03/2020    [facility]    BACK SURGERY      L4-5    CARDIAC CATHETERIZATION  10.30/2019    Dr. Helio Delgado COLONOSCOPY  12/05/2016    [facility], states due for another in 2  years    COLONOSCOPY N/A 10/07/2019    tubular adenoma x2; hyperplastic polyp    FINGER TRIGGER RELEASE Right 02/24/2021    pinky finger    FINGER TRIGGER RELEASE Right 2/24/2021    RIGHT SMALL FINGER TRIGGER RELEASE performed by Sada Mae DO at [facility] ARTHROSCOPY Right     OTHER SURGICAL HISTORY Right 01/25/2016    10 liters removed peracentesis    PARACENTESIS  11/04/2015    PARACENTESIS 12/28/2015    MO EGD TRANSORAL BIOPSY SINGLE/MULTIPLE N/A 08/30/2017    EGD BIOPSY performed by Bernice Painting MD at [facility]  06/2011    TONSILLECTOMY      UPPER GASTROINTESTINAL ENDOSCOPY  10/24/2013    small segment barretts    UPPER GASTROINTESTINAL ENDOSCOPY  08/30/2017    VEIN SURGERY Left 12/07/2016    leg vein stripping    VENTRAL HERNIA REPAIR  05/26/2015        Medications Prior to Admission:     Prior to Admission medications    Medication Sig Start Date End Date Taking?  Authorizing Provider   melatonin 3 MG TABS tablet Take 10 mg by mouth daily   Yes Historical Provider, MD   LORazepam (ATIVAN) 0.5 MG tablet TAKE ONE TABLET BY MOUTH EVERY EVENING AS NEEDED FOR ANXIETY FOR UP TO 30 DAYS 11/16/21 12/16/21 Yes KELSY Ca CNP   SITagliptin (JANUVIA) 100 MG tablet TAKE 1 TABLET BY MOUTH ONE TIME A DAY 11/16/21  Yes KELSY Ca CNP   pregabalin (LYRICA) 100 MG capsule TAKE 1 CAPSULE BY MOUTH 2 TIMES A DAY 10/31/21 11/30/21 Yes KELSY Ca CNP   omeprazole (PRILOSEC) 20 MG delayed release capsule Take 2 capsules by mouth daily 9/30/21 9/30/22 Yes KELSY Ca CNP   spironolactone (ALDACTONE) 50 MG tablet Take one tablet orally once daily 8/30/21  Yes KELSY Ca CNP   furosemide (LASIX) 40 MG tablet Take 1 tablet by mouth 2 times daily  Patient taking differently: Take 80 mg by mouth daily  4/8/21 11/22/21 Yes Lo John MD   tamsulosin (FLOMAX) 0.4 MG capsule Take 0.4 mg by mouth daily   Yes Historical Provider, MD   ferrous sulfate (IRON 325) 325 (65 Fe) MG tablet Take 325 mg by mouth every other day   Yes Historical Provider, MD   vitamin B-12 (CYANOCOBALAMIN) 500 MCG tablet Take 500 mcg by mouth every other day   Yes Historical Provider, MD   lactulose ([facility]) 10 GM/15ML solution TAKE 15 MLS BY MOUTH THREE TIMES A DAY  Patient taking differently: 10 g 2 times daily  10/16/20  Yes Catheline Rough, DO   NIFEdipine (PROCARDIA XL) 60 MG extended release tablet Take 60 mg by mouth daily  19  Yes Historical Provider, MD   allopurinol (ZYLOPRIM) 300 MG tablet Take 600 mg by mouth daily Takes 2 tabs (=600mg) daily 3/27/19  Yes KELSY De Anda CNP   sodium hyaluronate (EUFLEXXA) 20 MG/2ML SOSY injection Inject 2 mLs into the articular space once a week Series of three injections for the right knee 21   Dejon Vasquez DO   Cholecalciferol (VITAMIN D) 50 MCG (2000 UT) CAPS capsule Take 1 capsule by mouth daily 20   Cr Jones DO        Allergies:     Nsaids    Social History:     Tobacco:    reports that he quit smoking about 6 years ago. His smoking use included cigarettes. He started smoking about 55 years ago. He has a 49.00 pack-year smoking history. He has never used smokeless tobacco.  Alcohol:      reports previous alcohol use. Drug Use:  reports current drug use. Drug: Marijuana Jacques Blow). Family History:     Family History   Problem Relation Age of Onset    Prostate Cancer Father     Other Neg Hx         blood clots       Review of Systems:     Positive and Negative as described in HPI. Review of Systems   Constitutional: Negative for activity change and fever. HENT: Negative for congestion, sinus pressure and sore throat. Respiratory: Negative for cough and shortness of breath. Cardiovascular: Negative for chest pain and palpitations. Gastrointestinal: Negative for abdominal pain, nausea and vomiting. Musculoskeletal: Positive for arthralgias and back pain. Skin: Negative for color change. Surgical incision   Neurological: Positive for numbness. Negative for dizziness, weakness and headaches. Psychiatric/Behavioral: Negative for confusion and decreased concentration.        Physical Exam:     BP (!) 108/53   Pulse 51   Temp 98.9 °F (37.2 °C) (Oral)   Resp 17   Ht 6' (1.829 m)   Wt 250 lb (113.4 kg)   SpO2 95%   BMI 33.91 kg/m²   Temp (24hrs), Av.8 °F (37.1 °C), Min:86.7 °F (30.4 °C), Max:101.3 °F (38.5 °C)    Recent Labs     11/22/21  1821 11/22/21 2032   POCGLU 172* 202*       Intake/Output Summary (Last 24 hours) at 11/23/2021 0604  Last data filed at 11/23/2021 0531  Gross per 24 hour   Intake 2385 ml   Output 2425 ml   Net -40 ml       Physical Exam  Constitutional:       Appearance: He is not toxic-appearing. HENT:      Right Ear: External ear normal.      Left Ear: External ear normal.   Eyes:      General:         Right eye: No discharge. Left eye: No discharge. Conjunctiva/sclera: Conjunctivae normal.   Cardiovascular:      Rate and Rhythm: Regular rhythm. Bradycardia present. Pulmonary:      Effort: No respiratory distress. Breath sounds: Normal breath sounds. Abdominal:      General: There is no distension. Palpations: Abdomen is soft. Tenderness: There is no abdominal tenderness. Musculoskeletal:      Right lower leg: Edema present. Left lower leg: No edema. Skin:     General: Skin is warm and dry. Comments: Dressing is clean, dry and intact   Neurological:      General: No focal deficit present. Mental Status: He is alert and oriented to person, place, and time.    Psychiatric:         Mood and Affect: Mood normal.         Behavior: Behavior normal.         Investigations:      Laboratory Testing:  Recent Results (from the past 24 hour(s))   Urinalysis    Collection Time: 11/22/21 10:24 AM   Result Value Ref Range    Color, UA Yellow Yellow    Turbidity UA Clear Clear    Glucose, Ur NEGATIVE NEGATIVE    Bilirubin Urine NEGATIVE NEGATIVE    Ketones, Urine NEGATIVE NEGATIVE    Specific Gravity, UA 1.020 1.005 - 1.030    Urine Hgb NEGATIVE NEGATIVE    pH, UA 6.0 5.0 - 8.0    Protein, UA NEGATIVE NEGATIVE    Urobilinogen, Urine Normal Normal    Nitrite, Urine NEGATIVE NEGATIVE    Leukocyte Esterase, Urine NEGATIVE NEGATIVE    Urinalysis Comments       Microscopic exam not performed based on chemical results unless requested in original order.    CBC Auto Differential    Collection Time: 11/22/21 10:53 AM   Result Value Ref Range    WBC 10.1 3.5 - 11.3 k/uL    RBC 4.46 4.21 - 5.77 m/uL    Hemoglobin 14.8 13.0 - 17.0 g/dL    Hematocrit 44.6 40.7 - 50.3 %    .0 82.6 - 102.9 fL    MCH 33.2 25.2 - 33.5 pg    MCHC 33.2 28.4 - 34.8 g/dL    RDW 13.4 11.8 - 14.4 %    Platelets 558 949 - 951 k/uL    MPV 10.7 8.1 - 13.5 fL    NRBC Automated 0.0 0.0 per 100 WBC    Differential Type NOT REPORTED     Seg Neutrophils 67 (H) 36 - 65 %    Lymphocytes 22 (L) 24 - 43 %    Monocytes 8 3 - 12 %    Eosinophils % 2 1 - 4 %    Basophils 1 0 - 2 %    Immature Granulocytes 0 0 %    Segs Absolute 6.90 1.50 - 8.10 k/uL    Absolute Lymph # 2.18 1.10 - 3.70 k/uL    Absolute Mono # 0.80 0.10 - 1.20 k/uL    Absolute Eos # 0.16 0.00 - 0.44 k/uL    Basophils Absolute 0.07 0.00 - 0.20 k/uL    Absolute Immature Granulocyte 0.03 0.00 - 0.30 k/uL    WBC Morphology NOT REPORTED     RBC Morphology NOT REPORTED     Platelet Estimate NOT REPORTED    APTT    Collection Time: 11/22/21 10:53 AM   Result Value Ref Range    PTT 33.1 23.9 - 33.8 sec   Protime-INR    Collection Time: 11/22/21 10:53 AM   Result Value Ref Range    Protime 12.9 11.5 - 14.2 sec    INR 1.0    Basic Metabolic Panel    Collection Time: 11/22/21 10:53 AM   Result Value Ref Range    Glucose 117 (H) 70 - 99 mg/dL    BUN 20 8 - 23 mg/dL    CREATININE 1.26 (H) 0.70 - 1.20 mg/dL    Bun/Cre Ratio 16 9 - 20    Calcium 9.3 8.6 - 10.4 mg/dL    Sodium 140 135 - 144 mmol/L    Potassium 4.1 3.7 - 5.3 mmol/L    Chloride 102 98 - 107 mmol/L    CO2 24 20 - 31 mmol/L    Anion Gap 14 9 - 17 mmol/L    GFR Non-African American 57 (L) >60 mL/min    GFR African American >60 >60 mL/min    GFR Comment          GFR Staging NOT REPORTED    EKG 12 Lead    Collection Time: 11/22/21 10:59 AM   Result Value Ref Range    Ventricular Rate 51 BPM    Atrial Rate 51 BPM    P-R Interval 162 ms    QRS Duration 102 ms Q-T Interval 450 ms    QTc Calculation (Bazett) 414 ms    P Axis 7 degrees    R Axis -52 degrees    T Axis 134 degrees   POC Glucose Fingerstick    Collection Time: 11/22/21  6:21 PM   Result Value Ref Range    POC Glucose 172 (H) 75 - 110 mg/dL   POC Glucose Fingerstick    Collection Time: 11/22/21  8:32 PM   Result Value Ref Range    POC Glucose 202 (H) 75 - 110 mg/dL       Imaging/Diagonstics:  No results found. Assessment :      Hospital Problems           Last Modified POA    * (Principal) Spondylolisthesis, acquired (Chronic) 11/22/2021 Yes    Chronic hepatitis C with cirrhosis ([facility] 11/23/2021 Yes    Diabetes type 2, controlled 11/23/2021 Yes    Prostate cancer ([facility] 11/23/2021 Yes    Essential hypertension 11/23/2021 Yes    CKD (chronic kidney disease) stage 3, GFR 30-59 ml/min ([facility] 11/23/2021 Yes    Dyslipidemia 11/23/2021 Yes    Foraminal stenosis of lumbar region (Chronic) 11/22/2021 Yes          Plan:     1. Inpatient admission with telemetry  2. Neurovascular checks  3. Continue IV Ancef for 2 doses  4. Monitoring control pain  5. Continue home medications  6. Monitor and control blood pressure and blood sugar. Resume home meds and insulin sliding scale. 7. PT/OT  8.  DVT prophylaxis    Consultations:   IP CONSULT TO INTERNAL MEDICINE      KELSY Bernard CNP  11/23/2021  6:04 AM    Copy sent to KELSY Curry - MARIANELA

## 2021-11-23 NOTE — FLOWSHEET NOTE
11/23/21 1351   Encounter Summary   Services provided to: Patient   Referral/Consult From: Rounding   Continue Visiting   (11/23/21 anointed)   Sacraments   Sacrament of Sick-Anointing Anointed  (11/23/21 Fr. Sandhya Franco)

## 2021-11-23 NOTE — OP NOTE
Operative Note      Patient: Cat Bernardo  YOB: 1950  MRN: [id number]    Date of Procedure: 11/22/2021    SURGEON: Emma Harrison MD.    ANESTHESIA: General endotracheal anesthesia. PREOPERATIVE DIAGNOSIS: L4-S1 spondylolisthesis with stenosis and degenerative disc disease    POSTOPERATIVE DIAGNOSIS: L4-S1 spondylolisthesis with stenosis and degenerative disc disease    PROCEDURE:  1. L5-S1 minimally invasive transforaminal lumbar interbody  fusion with posterior spinal fusion. 2. L4-L5 minimally invasive transforaminal lumbar interbody  fusion with posterior spinal fusion. 3. Insertion of interbody cages for spinal fusion at L4-L5 and  L5-S1.  4. Right L4 laminectomy (53966 )for spinal decompression. 5. Right L5 laminectomy (69344 )for spinal decompression. 6. Insertion of posterior spinal instrumentation from L4 to S1,  which is a 2 level instrumentation utilizing NuVasive Reline  pedicle screws and rods. 7. [place] of local bone for spinal lumbar. 8. Use of allograft bone for spinal fusion to include Osteocel   and crushed cancellous allograft bone. 9. Intraoperative use of C-arm fluoroscopy. ESTIMATED BLOOD LOSS: 550 mL. Cell saver 375ml    FLUIDS: Per anesthesia record. COMPLICATIONS: None. INDICATIONS: This is a pleasant 73year-old male with a  longstanding history of significant back pain as well as pain  radiating down his bilateral lower extremities with right >left. He had done extensive  conservative management to physical therapy, medications, pain  management, all with minimal benefit. MRI and x-rays were  performed, which did show significant retrolisthesis at L4-L5  and L5-S1 consistent with his symptoms. Due to his failure of  conservative management, it was discussed with him the option of  performing a decompression and fusion and attempt to alleviate  his symptoms.  Risks were discussed including bleeding,  infection, injury to nerves, vessels, anesthetic risk, the need  for possible further future surgery, as well as the possibility  for continued pain, continued symptoms, possible nonunion, and  possible dural tear. He did understand all these risks, did  wish to proceed, and informed consent was obtained. DESCRIPTION OF PROCEDURE: The patient was taken to the operating  room and kept supine on the bed. He was intubated and placed  under general anesthesia by anesthesiologist. He was given  preoperative antibiotic prophylaxis. Neuromonitoring leads were  placed in his bilateral lower extremities and Ziegler catheter was  placed. He was then placed prone on the Jeff table. All  bony prominences were padded. Eyes were kept free of any  pressure and brachial plexus and elbows were padded as well. At  this point, the back was then prepped and draped in a sterile  fashion. Biplanar C-arm fluoroscopy was then brought in and  localized over the L4-S1 region. At this point, the pedicles  were then marked with a marking pen. The 0.5% Marcaine with  epinephrine was used to infiltrate the skin and subcutaneous  tissues. Approximately 4 cm incisions were made lateral to the  pedicles in a Derik approach bilateral L4-S1 region and  dissection was carried down to the lumbar fascia. At this point,  Jamshidi needles were used to place percutaneous screws in a  standard fashion with needles being placed under C-arm guidance  with neuromonitoring. Neuromonitoring did remain stable  throughout all placement. Screws were placed after guidewires  were placed at L4-L5 and S1 bilaterally. Guidewires were then  subsequently removed. At this point, the retractor was then  assembled at the right L5-S1 level and it was attached to the  table arm. At this point, the medial blade was inserted. The L5-  S1 facet joint was exposed and osteotome was used to remove the  inferior articular process of L5 and this bone was harvested for  later use.  The full Dominguez laminectomy and decompression was then  complete completed with a bur with a bone trap as well as the  Kerrisons and curettes to remove the remainder of the bone as  well as the ligamentum flavum for full laminectomy, and full  decompression to include the exiting L5 and traversing S1 nerve  Roots as well as contralateral side. Once this was done, attention was then turned to the disk space. The nerve root retractor was placed. Disk space was  incised. The disk was then partially removed. The sizing was  then performed, which showed that an 12 x 10 x 30 mm 4-degree cage  would be appropriate. The full disk space prep was completed,  including removing the cartilaginous endplates. Once this was  done, the bone graft was then placed, which was the local bone  mixed with the Osteocel allograft bone. This was packed into  the disk space as well as into the cage. The cage was then  inserted under C-arm guidance until it was fully seated at the L5-  S1 interspace. Once this was done, Bertell Spice was used to verify  that the nerve roots were completely freed which they were. Attention was then turned to the L4-L5 level. Retractor was then  placed at the L4-L5 level and attached to the table arm and the  exact same fashion as done before the laminectomy was  completed at the L4-L5 level exposing the dura and exiting L4 and  traversing L5 nerve roots until everything was fully  decompressed. Once decompression was complete, attention was  turned to the disk space. Disk was then incised. The disk was incised and partial diskectomy was performed. The sizers were then utilized and was found that a 12 x 10 x 30  mm. The cage would be appropriate with 4 degrees of lordosis. The remainder of the disk space was then fully prepped as well  removed with the cartilaginous endplates. The bone graft was  then placed into the disk space utilizing the Osteocel  allograft bone as well as the local bone, which was harvested.   Cage was then inserted in a standard fashion as well at the L4-L5  level and under C-arm guidance until it was fully seated. At  this point, the epidural hemostasis was achieved with Gel-Foam  with thrombin and neuro patties and bipolar. At this point, the  retractor was then reassembled from L4 through S1 and the  intervening blade at L5 was removed. The posterolateral  dissection was then performed exposing the transverse processes  of L4, L5 and the sacral ala. These areas were then burred. The  wound was then irrigated with sterile normal saline with  bacitracin. The remainder of the bone graft was then placed with  the crushed cancellous and remainder of the Osteocel into the  posterolateral gutter from L4-S1 for posterolateral fusion. Once  this was done, the Sharon Cables was used to verify that the  decompression was complete, which it was. The shims were removed  from the remainder of the screw heads. The screw heads with  tower devices were then placed at L4-L5 and S1 and the retractor  was then removed. At this point, the screwdriver was used to  fully seat the screws at L4-S1. The rods were then measured and  bent with lordosis, were then placed through the tower devices  bilaterally and reduction instrumentation was used to reduce the  rods as well as the spondylolisthesis and the set screws were  then placed and subsequently final tightened. Once this was  done, C-arm was brought in and confirmed the rods to be of  excellent length. The tower devices were removed as well as the  louise inserters and final pictures were taken, which showed  instrumentation from L4-S1 to be in excellent position with  partial reduction of the spondylolisthesis at L4-L5 and L5-S1. The wounds were then irrigated with sterile normal saline  followed by placement of vancomycin powder. Closure with 0  Vicryl, 2-0 Vicryl, and a running 4-0 Monocryl suture with  Dermabond glue was then applied followed by standard dressings.   He was then placed supine on his bed, extubated taken recovery  room in stable condition.     Electronically signed by Toni Vazquez MD on 11/22/2021 at 8:29 PM

## 2021-11-23 NOTE — PROGRESS NOTES
Physical Therapy    Facility/Department: [facility]  Initial Assessment    NAME: Gwendolyn Braun  : 1950  MRN: [id number]    Date of Service: 2021    Discharge Recommendations:  Patient would benefit from continued therapy after discharge        Assessment   Assessment: Pt is safer and more confident at this time ambulating w/ RW  Prognosis: Excellent  Decision Making: High Complexity  PT Education: PT Role; Plan of Care; General Safety  Patient Education: Handout: post op and body mechanics info  REQUIRES PT FOLLOW UP: Yes  Activity Tolerance  Activity Tolerance: Patient Tolerated treatment well       Patient Diagnosis(es): The primary encounter diagnosis was Spondylolisthesis, acquired. Diagnoses of Primary osteoarthritis of right knee and Chronic bilateral low back pain with right-sided sciatica were also pertinent to this visit. has a past medical history of Abdominal varicosities, Anxiety state NEC, Aortic valve defect, Arthritis, Sol esophagus, Chronic kidney disease, Chronic kidney disease, Cirrhosis ([address] Colon polyps, Diabetes mellitus ([address] Diverticulosis of colon, Enlarged heart, Gout, Hepatic cirrhosis ([address] Hepatic cyst, Hepatitis C, chronic ([address] History of alcohol use, Hyperlipidemia, Hypertension, Lumbago, Lymphedema, Malignant neoplasm of prostate ([address] Otalgia of right ear, Portal venous hypertension ([address] S/P prostatectomy, Sciatica, and Splenomegaly. has a past surgical history that includes Prostatectomy (2011); back surgery; Knee arthroscopy (Right); Tonsillectomy; ventral hernia repair (2015); Paracentesis (2015); hernia repair; Paracentesis (2015); other surgical history (Right, 2016); Vein Surgery (Left, 2016); Colonoscopy (2016); Upper gastrointestinal endoscopy (10/24/2013); Upper gastrointestinal endoscopy (2017); pr egd transoral biopsy single/multiple (N/A, 2017); Colonoscopy (N/A, 10/07/2019);  Cardiac catheterization (10.30/2019); Aortic valve repair (06/03/2020); Finger trigger release (Right, 02/24/2021); Finger trigger release (Right, 2/24/2021); and lumbar fusion (Right, 11/22/2021).     Restrictions  Restrictions/Precautions  Restrictions/Precautions: Surgical Protocols, General Precautions  Required Braces or Orthoses?: Yes  Required Braces or Orthoses  Spinal: Lumbar Corset  Vision/Hearing  Vision: Impaired  Vision Exceptions: Wears glasses for distance  Hearing: Within functional limits     Subjective  General  Chart Reviewed: Yes  Patient assessed for rehabilitation services?: Yes  Response To Previous Treatment: Not applicable  Family / Caregiver Present: No  Follows Commands: Within Functional Limits  General Comment  Comments: OK for PT per Lawson Peace RN  Pain Screening  Patient Currently in Pain: Yes  Pain Assessment  Pain Assessment: 0-10  Pain Level: 2 (After pain meds)  Vital Signs  Patient Currently in Pain: Yes       Orientation  Orientation  Overall Orientation Status: Within Normal Limits  Social/Functional History  Social/Functional History  Lives With: Spouse  Type of Home: House  Home Layout: Two level  Home Access: Stairs to enter with rails  Entrance Stairs - Number of Steps: 7  Entrance Stairs - Rails: Both  Bathroom Shower/Tub: Walk-in shower  Bathroom Toilet: Standard  Bathroom Accessibility: Accessible  Home Equipment: Standard walker (Has access to RW)  ADL Assistance: Independent  Homemaking Assistance: Independent  Homemaking Responsibilities: Yes  Ambulation Assistance: Independent  Transfer Assistance: Independent  Active : Yes  Occupation: Retired  Type of occupation: [address] [facility] [employer] [address] 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  Cognition   Cognition  Overall Cognitive Status: WNL    Objective     Observation/Palpation  Posture: Good  Observation: Abdominal hernia    AROM RLE (degrees)  RLE AROM: WNL  AROM LLE (degrees)  LLE AROM : WNL  AROM RUE (degrees)  RUE AROM : WNL  AROM LUE (degrees)  LUE AROM : WNL  Strength RLE  Strength RLE: WFL  Comment: 5/5 x 4 extremities  Strength LLE  Strength LLE: WFL  Strength RUE  Strength RUE: WFL  Strength LUE  Strength LUE: WFL  Strength Other  Other: B EHL 5/5  Tone RLE  RLE Tone: Normotonic  Tone LLE  LLE Tone: Normotonic  Motor Control  Gross Motor?: WNL  Sensation  Overall Sensation Status: Impaired (B foot neuropathy)  Bed mobility  Rolling to Left: Modified independent  Rolling to Right: Modified independent  Supine to Sit: Modified independent  Sit to Supine: Modified independent  Scooting: Modified independent  Transfers  Sit to Stand: Stand by assistance  Stand to sit: Stand by assistance  Stand Pivot Transfers: Stand by assistance  Ambulation  Ambulation?: Yes  Ambulation 1  Surface: level tile  Device: No Device; Rolling Walker  Assistance: Stand by assistance; Contact guard assistance  Gait Deviations: Slow Maranda  Distance: 35' x 1 without [address], 39' x 1 w/ RW SBA  Comments: Pt feels more secure at this time w/ RW, suggest RW until pt more confident w/ ambulation     Balance  Posture: Good  Sitting - Static: Good  Sitting - Dynamic: Good  Standing - Static: Good  Standing - Dynamic: Good        Plan   Plan  Times per week: 1-2x/day 5-6 days/week  Current Treatment Recommendations: Transfer Training, Gait Training, Stair training  Safety Devices  Type of devices: Left in bed, Call light within reach, Gait belt  Restraints  Initially in place: No    G-Code       OutComes Score  Balance Score: 12 (11/23/21 1031)  Gait Score: 8 (11/23/21 1031)        Tinetti Total Score: 20 (11/23/21 1031)                                   AM-PAC Score  AM-PAC Inpatient Mobility Raw Score : 20 (11/23/21 1031)  AM-PAC Inpatient T-Scale Score : 47.67 (11/23/21 1031)  Mobility Inpatient CMS 0-100% Score: 35.83 (11/23/21 1031)  Mobility Inpatient CMS G-Code Modifier : CJ (11/23/21 1031)          Goals  Short term goals  Time Frame for Short term goals: 6 treatments  Short term goal 1: Independent transfers  Short term goal 2: Independent ambulation w/ appropriate assistive device 200' x 1  Short term goal 3:  Independently ascend/descend 1 flight of stairs w/ 1 HR  Patient Goals   Patient goals : No back related pain       Therapy Time   Individual Concurrent Group Co-treatment   Time In 0837         Time Out 0924 (33 minutes treatment time)         Minutes [address] [facility] Anisha Nieves, JENNY

## 2021-11-23 NOTE — FLOWSHEET NOTE
[facility] NOTE    Room # 2006/2006-02   Name: Yi Ventura            Yazidism: [address]    Reason for visit: Routine    I visited the patient. Admit Date & Time: 11/22/2021 10:02 AM    Assessment:  Yi Ventura is a 73 y.o. male in the hospital because \"surgery. \" Upon entering the room patient was sitting on side of bed. Intervention:  I introduced myself and my title as  I offered space for patient  to express feelings, needs, and concerns and provided a ministry presence. Patient shared that spouse has cancer and is treated at [facility]. Amaris's bi-weekly. Patient stated, \"I had to have this surgery so I can get better and take care of her. \"  Engaged in active listening. Offered words of encouragement and prayer card. Outcome:  Patient expressed gratitude. Plan:  Chaplains will remain available to offer spiritual and emotional support as needed. Electronically signed by Torie Edgar on 11/23/2021 at 10:57 AM.  James         11/23/21 1053   Encounter Summary   Services provided to: Patient   Referral/Consult From: [facility]   Support System Spouse   Continue Visiting   (11/23/21 (possible discharge today))   Complexity of Encounter Moderate   Length of Encounter 15 minutes   Spiritual Assessment Completed Yes   Routine   Type Initial   Assessment Calm;  Approachable; Coping   Intervention Active listening; Explored feelings, thoughts, concerns; Explored coping resources; Nurtured hope; Sustaining presence/ Ministry of presence   Outcome Expressed gratitude

## 2021-11-23 NOTE — CARE COORDINATION
Case Management Initial Discharge Plan  Isac Ro,             Met with:patient to discuss discharge plans. Information verified: address, contacts, phone number, , insurance Yes  PCP: KELSY Mendez CNP  Date of last visit: 2021    Insurance Provider: [facility] [place]    Discharge Planning    Living Arrangements:  Spouse/Significant Other   Support Systems:  Spouse/Significant Other, Family Members    Home has 2 stories  7 stairs to climb to get into front door, 13 stairs to climb to reach second floor  Location of bedroom/bathroom in home 2nd Floor    Patient able to perform ADL's:Independent    Current Services (outpatient & in home) None  DME equipment: [employer]  DME provider: N/A    Pharmacy: [facility]    Potential Assistance Needed:  N/A    /Patient agreeable to home care: No  Sallis of choice provided:  n/a    Prior SNF/Rehab Placement and Facility: No  Agreeable to SNF/Rehab: No  Sallis of choice provided: n/a   Evaluation: n/a    Expected Discharge date:  21  Patient expects to be discharged to: Follow Up Appointment: Best Day/ Time: Monday PM    Transportation provider: Family  Transportation arrangements needed for discharge: No    Readmission Risk              Risk of Unplanned Readmission:  22             Does patient have a readmission risk score greater than 14?: Yes  If yes, follow-up appointment must be made within 7 days of discharge. Goal of Care:       Discharge Plan:   Met with patient at bedside to discuss d/c plans. Patient is POD #1 L4-S1 TLIF with Dr. Carly Monzon. Lives with spouse. Primary caregiver for wife who is undergoing tx for cancer. Independent with a cane. Drives. Retired. Denies any HC/DME needs at discharge. Family member to transport home.      F/U with Dr. Carly Monzon  at 1:45pm          Electronically signed by Jacqueline Cabrera RN on 21 at 10:55 AM EST

## 2021-11-23 NOTE — PROGRESS NOTES
[facility]  Office: [address], DO, Pancho Manuela, DO, Vicki Duane, DO, Darren Small [facility], DO, Cathie Glass MD, Derian Strong MD, Phoebe Henry MD, Jeffery Shah MD, Crys Vaughan MD, Carlos Mora MD, Bridger Ruelas MD, Suyapa Savage, DO, Rae Alatorre, DO, Archana Barry MD,  Sigrid Parmar, DO, Rahul Lincoln MD, Lazaro Addison MD, Samaria Mills MD, Kevin Jones MD, Tyra Powell MD, Dada Alexander MD, Nimo Whittaker MD, Corry Whittington [facility], [facility], CNP, Anderson Ellsworth, CNP, Tanda Lesches, CNS, Jeferson James, CNP, Tonie Broussard, CNP, Alix Winters, CNP, Priyanka Wood, CNP, Delbert Aase, CNP, Tejal Felix PA-C, Isabell Thomas, [facility], Abner Coffman, CNP, María Elena Morin, CNP, Daria Farley, CNP, Ishmael Jack, CNP, Kate Ashby, [facility], Queen Bingnce, [facility], Melodie James, [facility]    Progress Note    11/23/2021    6:49 AM    Name:   Cat Bernardo  MRN:     [id number]     [place]:      [de-identified]   Room:   2006/2006-02   Day:  1  Admit Date:  11/22/2021 10:02 AM    PCP:   KELSY Conway CNP  Code Status:  Full Code    Subjective:     C/C: Back pain, admitted for surgical intervention    Interval History Status: improved. Patient is resting calmly, postop pain reasonably controlled. Denies any chest pain, shortness of breath, nausea or vomiting, fevers or chills. Brief History: This is a 30-year-old male with chronic back pain as admitted after undergoing L4-S1 TLIF.   We are following for postoperative medical management of chronic kidney disease, diabetes, hypertension and dyslipidemia etc.    Review of Systems:     Constitutional:  negative for chills, fevers, sweats  Respiratory:  negative for cough, dyspnea on exertion, shortness of breath, wheezing  Cardiovascular:  negative for chest pain, chest pressure/discomfort, lower extremity edema, palpitations  Gastrointestinal:  negative smoking about 6 years ago. His smoking use included cigarettes. He started smoking about 55 years ago. He has a 49.00 pack-year smoking history. He has never used smokeless tobacco. He reports previous alcohol use. He reports current drug use. Drug: Marijuana Simmie Lang). Family History:   Family History   Problem Relation Age of Onset    Prostate Cancer Father     Other Neg Hx         blood clots       Vitals:  BP (!) 108/53   Pulse 51   Temp 98.9 °F (37.2 °C) (Oral)   Resp 17   Ht 6' (1.829 m)   Wt 250 lb (113.4 kg)   SpO2 95%   BMI 33.91 kg/m²   Temp (24hrs), Av.8 °F (37.1 °C), Min:86.7 °F (30.4 °C), Max:101.3 °F (38.5 °C)    Recent Labs     21  0611   POCGLU 172* 202* 139*       I/O (24Hr): Intake/Output Summary (Last 24 hours) at 2021 0649  Last data filed at 2021 0531  Gross per 24 hour   Intake 2385 ml   Output 2425 ml   Net -40 ml       Labs:  Hematology:  Recent Labs     21  1053   WBC 10.1   RBC 4.46   HGB 14.8   HCT 44.6   .0   MCH 33.2   MCHC 33.2   RDW 13.4      MPV 10.7   INR 1.0     Chemistry:  Recent Labs     21  1053      K 4.1      CO2 24   GLUCOSE 117*   BUN 20   CREATININE 1.26*   ANIONGAP 14   LABGLOM 57*   GFRAA >60   CALCIUM 9.3     Recent Labs     21  0611   POCGLU 172* 202* 139*     ABG:  Lab Results   Component Value Date    HCO3 27 2012     Lab Results   Component Value Date/Time    SPECIAL NOT REPORTED 2021 10:20 AM     Lab Results   Component Value Date/Time    CULTURE NO SIGNIFICANT GROWTH 2021 10:20 AM       Radiology:  No results found.     Physical Examination:        General appearance:  alert, cooperative and no distress  Mental Status:  oriented to person, place and time and normal affect  Lungs:  clear to auscultation bilaterally, normal effort  Heart:  regular rate and rhythm, no murmur  Abdomen:  soft, nontender, nondistended, normal bowel sounds, no masses, hepatomegaly, splenomegaly  Extremities:  no edema, redness, tenderness in the calves  Skin:  no gross lesions, rashes, induration    Assessment:        Hospital Problems           Last Modified POA    * (Principal) Spondylolisthesis, acquired (Chronic) 11/22/2021 Yes    Chronic hepatitis C with cirrhosis ([facility] 11/23/2021 Yes    Diabetes type 2, controlled 11/23/2021 Yes    Prostate cancer ([facility] 11/23/2021 Yes    Essential hypertension 11/23/2021 Yes    CKD (chronic kidney disease) stage 3, GFR 30-59 ml/min ([facility] 11/23/2021 Yes    Dyslipidemia 11/23/2021 Yes    Foraminal stenosis of lumbar region (Chronic) 11/22/2021 Yes          Plan:        1. Continue present pain medications per orthopedics  2. PT and OT  3. GI DVT prophylaxis  4. Insulin scale while hospitalized  5. Monitoring control blood pressure  6. Monitor renal function, avoid nephrotoxic agent  7. Home medications as ordered  8.  Discharge planning pending progress with therapy, medically stable    Johnanna Shone, DO  11/23/2021  6:49 AM

## 2021-11-23 NOTE — PLAN OF CARE
Problem: Pain:  Goal: Pain level will decrease  Description: Pain level will decrease  11/23/2021 1401 by Antonio Douglas RN  Outcome: Ongoing  11/23/2021 0411 by Christiano Stone RN  Outcome: Ongoing  Goal: Control of acute pain  Description: Control of acute pain  11/23/2021 1401 by Antonio Douglas RN  Outcome: Ongoing  11/23/2021 0411 by Christiano Stone RN  Outcome: Ongoing  Goal: Control of chronic pain  Description: Control of chronic pain  11/23/2021 1401 by Antonio Douglas RN  Outcome: Ongoing  11/23/2021 0411 by Christiano Stone RN  Outcome: Ongoing     Problem: Falls - Risk of:  Goal: Will remain free from falls  Description: Will remain free from falls  11/23/2021 1401 by Antonio Douglas RN  Outcome: Ongoing  11/23/2021 0411 by Christiano Stone RN  Outcome: Ongoing  Goal: Absence of physical injury  Description: Absence of physical injury  11/23/2021 1401 by Antonio Douglas RN  Outcome: Ongoing  11/23/2021 0411 by Christiano Stone RN  Outcome: Ongoing     Problem: Activity:  Goal: Ability to avoid complications of mobility impairment will improve  Description: Ability to avoid complications of mobility impairment will improve  11/23/2021 1401 by Antonio Douglas RN  Outcome: Ongoing  11/23/2021 0411 by Christiano Stone RN  Outcome: Ongoing  Goal: Ability to tolerate increased activity will improve  Description: Ability to tolerate increased activity will improve  11/23/2021 1401 by Antonio Douglas RN  Outcome: Ongoing  11/23/2021 0411 by Christiano Stone RN  Outcome: Ongoing     Problem:  Bowel/Gastric:  Goal: Gastrointestinal status for postoperative course will improve  Description: Gastrointestinal status for postoperative course will improve  11/23/2021 1401 by Antonio Douglas RN  Outcome: Ongoing  11/23/2021 0411 by Christiano Stone RN  Outcome: Ongoing     Problem: Fluid Volume:  Goal: Maintenance of adequate hydration will improve  Description: Maintenance of adequate hydration will improve  11/23/2021 1401 by Saw Mendez Ori Machado RN  Outcome: Ongoing  11/23/2021 0411 by Prince Ramirez RN  Outcome: Ongoing     Problem: Health Behavior:  Goal: Identification of resources available to assist in meeting health care needs will improve  Description: Identification of resources available to assist in meeting health care needs will improve  11/23/2021 1401 by Yeni Fabian RN  Outcome: Ongoing  11/23/2021 0411 by Prince Ramirez RN  Outcome: Ongoing  Goal: Ability to state signs and symptoms to report to health care provider will improve  Description: Ability to state signs and symptoms to report to health care provider will improve  11/23/2021 1401 by Yeni Fabian RN  Outcome: Ongoing  11/23/2021 0411 by Prince Ramirez RN  Outcome: Ongoing     Problem: Nutritional:  Goal: Ability to attain and maintain optimal nutritional status will improve  Description: Ability to attain and maintain optimal nutritional status will improve  11/23/2021 1401 by Yeni Fabian RN  Outcome: Ongoing  11/23/2021 0411 by Prince Ramirez RN  Outcome: Ongoing     Problem: Physical Regulation:  Goal: Ability to maintain clinical measurements within normal limits will improve  Description: Ability to maintain clinical measurements within normal limits will improve  11/23/2021 1401 by Yeni Fabian RN  Outcome: Ongoing  11/23/2021 0411 by Prince Ramirez RN  Outcome: Ongoing  Goal: Will remain free from infection  Description: Will remain free from infection  11/23/2021 1401 by Yeni Fabian RN  Outcome: Ongoing  11/23/2021 0411 by Prince Ramirez RN  Outcome: Ongoing     Problem: Respiratory:  Goal: Respiratory status will improve  Description: Respiratory status will improve  11/23/2021 1401 by Yeni Fabian RN  Outcome: Ongoing  11/23/2021 0411 by Prince Ramirez RN  Outcome: Ongoing     Problem: Sensory:  Goal: Pain level will decrease  Description: Pain level will decrease  11/23/2021 1401 by Yeni Fabian RN  Outcome: Ongoing  11/23/2021 0411 by Imani Aguilar KATELYN Obando  Outcome: Ongoing  Goal: Satisfaction with pain management regimen will improve  Description: Satisfaction with pain management regimen will improve  11/23/2021 1401 by Jorge L Augustin RN  Outcome: Ongoing  11/23/2021 0411 by Ivania Dela Cruz RN  Outcome: Ongoing     Problem: Skin Integrity:  Goal: Risk for impaired skin integrity will decrease  Description: Risk for impaired skin integrity will decrease  11/23/2021 1401 by Jorge L Augustin RN  Outcome: Ongoing  11/23/2021 0411 by Ivania Dela Cruz RN  Outcome: Ongoing     Problem: Urinary Elimination:  Goal: Ability to achieve and maintain adequate urine output will improve  Description: Ability to achieve and maintain adequate urine output will improve  11/23/2021 1401 by Jorge L Augustin RN  Outcome: Ongoing  11/23/2021 0411 by Ivania eDla Cruz RN  Outcome: Ongoing     Problem: Infection - Surgical Site:  Goal: Will show no infection signs and symptoms  Description: Will show no infection signs and symptoms  Outcome: Ongoing

## 2021-11-23 NOTE — ANESTHESIA POSTPROCEDURE EVALUATION
Department of Anesthesiology  Postprocedure Note    Patient: Thaddeus Peña  MRN: [id number]  YOB: 1950  Date of evaluation: 11/23/2021  Time:  3:01 PM     Procedure Summary     Date: 11/22/21 Room / Location: [facility] 01 / [facility]    Anesthesia Start: 6517 Anesthesia Stop: 6039    Procedure: RIGHT L4 TO S1 T-LIF    2C-ARMS   Cherrie La (Right Spine Lumbar) Diagnosis: (DX LUMBAR SPINAL STENOSIS)    Surgeons: Tamara Gaona MD Responsible Provider: Maria Antonia Pappas MD    Anesthesia Type: general ASA Status: 4          Anesthesia Type: general    Anni Phase I: Anni Score: 9    Anni Phase II:      Last vitals: Reviewed and per EMR flowsheets.        Anesthesia Post Evaluation    Patient location during evaluation: PACU  Patient participation: complete - patient participated  Level of consciousness: awake  Airway patency: patent  Nausea & Vomiting: no nausea  Complications: no  Cardiovascular status: blood pressure returned to baseline  Respiratory status: acceptable  Hydration status: euvolemic  Comments: Multimodal analgesia pain management as indicated by procedure  Multimodal analgesia pain management approach

## 2021-11-24 ENCOUNTER — CARE COORDINATION (OUTPATIENT)
Dept: OTHER | Facility: CLINIC | Age: 71
End: 2021-11-24

## 2021-11-24 NOTE — CARE COORDINATION
[facility] Transitions Initial Follow Up Call    Call within 2 business days of discharge: Yes    Patient: Holly Santoyo Patient : 1950   MRN: [id number]  Reason for Admission: back surgery   Discharge Date: 21 RARS: Readmission Risk Score: 13.4 ( )      Last Discharge [facility]       Complaint Diagnosis Description Type Department Provider    21  Spondylolisthesis, acquired . .. Admission (Discharged) Charm Romberg, MD               Spoke briefly with patients wife who said he was lying down, I had left a message on his phone prior to speaking with wife. Wife said he was doing ok, just resting, unable to review meds with wife today, she is not well, undergoing chemo for cancer.  Wife said she will tell him I called,  Pt has a follow up on  with surgeon  Dr Felipe Fletcher 24 Hour Call    Do you have support at home?: Partner/Spouse/SO  Are you an active caregiver in your home?: No  Care Transitions Interventions         Follow Up  Future Appointments   Date Time Provider Phuc Macias   3/21/2022 11:10 AM Jude Armijo MD [facility] None   2022 10:00 AM Cabrera Mark, [address] [facility] Linda Dyson RN

## 2021-11-26 ENCOUNTER — CARE COORDINATION (OUTPATIENT)
Dept: OTHER | Facility: CLINIC | Age: 71
End: 2021-11-26

## 2021-11-26 NOTE — CARE COORDINATION
Domen[facility] Transitions Follow Up Call    2021    Patient: Juan Novak  Patient : 1950   MRN: [id number]  Reason for Admission: L $-S1  Discharge Date: 21 RARS: Readmission Risk Score: 13.4 ( )       Care Transitions Follow Up Call    Needs to be reviewed by the provider   Additional needs identified to be addressed with provider: No  none             Method of communication with provider : none      Care Transition Nurse (CTN) contacted the patient by telephone to follow up after admission on 21. Verified name and  with patient as identifiers. Addressed changes since last contact: none  Discussed follow-up appointments. If no appointment was previously scheduled, appointment scheduling offered: n/a. Is follow up appointment scheduled within 7 days of discharge? No.    Advance Care Planning:   Does patient have an Advance Directive: not on file. CTN reviewed discharge instructions, medical action plan and red flags with patient and discussed any barriers to care and/or understanding of plan of care after discharge. Discussed appropriate site of care based on symptoms and resources available to patient including: Specialist and [address] [place] [address] Road. The patient agrees to contact the PCP office for questions related to their healthcare. Patients top risk factors for readmission: medical condition-diabetic, recent back surgery multiple health care providers  Interventions to address risk factors: verify ongoing follow up with physicians, pt is communicating with his physicians when symptoms are worsening       Non-[facility] follow up appointment(s): n/a    CTN provided contact information for future needs. Plan for follow-up call in 7-10 days based on severity of symptoms and risk factors.   Plan for next call: review status of incision, pain level, outcome from follow up visit on      Pt rates pain at rest a 4, a 7 or 8 with activity, says the pain medication really helps a lot, his wife will be changing the dressing today when she gets up from her nap, She is undergoing chemo therapy for cancer currently. Pt said he is eating and drinking, denies any nausea or vomiting, Bowels are moving normally, urinating without any difficulty. Pt is aware of his follow up visit with Dr Da Chi. Care Transitions Subsequent and Final Call    Subsequent and Final Calls  Do you have any ongoing symptoms?: No  Have your medications changed?: No  Do you have any questions related to your medications?: No  Do you currently have any active services?: No  Do you have any needs or concerns that I can assist you with?: No  Identified Barriers: None  Care Transitions Interventions  Other Interventions:            Follow Up  Future Appointments   Date Time Provider Phuc Macias   3/21/2022 11:10 AM Irasema Connor MD [facility] None   5/24/2022 10:00 AM Alexandra Aguirre, DO Sports Med Delaney Badillo RN

## 2021-11-26 NOTE — CARE COORDINATION
Care Transitions Outreach Attempt    Call within 2 business days of discharge: Yes   Attempted to reach patient for transitions of care follow up. Unable to reach patient. Patient: Justino Keller Patient : 1950 MRN: [id number]    Last Discharge [address] [address]       Complaint Diagnosis Description Type Department Provider    21  Spondylolisthesis, acquired . .. Admission (Discharged) Kenji Leon MD            Was this an external facility discharge?  No Discharge Facility: [facility] AND [facility]     Noted following upcoming appointments from discharge chart review:   [facility] follow up appointment(s):   Future Appointments   Date Time Provider Phuc Macias   3/21/2022 11:10 AM Paco Lyon MD [facility] None   2022 10:00 AM Shweta Guerra [facility]     [address] [address] follow up appointment(s): n/a    Zeina LANN, RN- [facility]  Associate Care Manager  [phone]

## 2021-11-28 NOTE — PROGRESS NOTES
Physician Progress Note      PATIENT:               Monet Day  [facility] #:                  [id number]  :                       1950  ADMIT DATE:       2021 10:02 AM  Gilbert David DATE:        2021 2:12 PM  RESPONDING  PROVIDER #:        Paris Castillo MD          QUERY TEXT:    Pt admitted following a TLIF. Pt noted to have decreasing hemoglobin levels. If possible, please document in the progress notes and discharge summary if   you are evaluating and/or treating any of the following: The medical record reflects the following:  Risk Factors: Post OR  Clinical Indicators: Pre=op Hgb of 14.8, Post-op Hgb of 11.3, EBL of 550  Treatment: 375 mL return via cell saver, IVF@ 125 mL/hr, labs, monitoring    Thank you,  Gonzales Santiago RN  Options provided:  -- Acute blood loss anemia  -- Postoperative acute blood loss anemia  -- Dilutional anemia  -- Other - I will add my own diagnosis  -- Disagree - Not applicable / Not valid  -- Disagree - Clinically unable to determine / Unknown  -- Refer to Clinical Documentation Reviewer    PROVIDER RESPONSE TEXT:    This patient has postoperative acute blood loss anemia. Query created by:  Joy Martinez on 2021 9:52 AM      Electronically signed by:  Paris Castillo MD 2021 10:20 PM

## 2021-12-05 DIAGNOSIS — G62.9 NEUROPATHY: ICD-10-CM

## 2021-12-06 RX ORDER — PREGABALIN 100 MG/1
CAPSULE ORAL
Qty: 60 CAPSULE | Refills: 0 | Status: SHIPPED | OUTPATIENT
Start: 2021-12-06 | End: 2022-01-10

## 2021-12-08 ENCOUNTER — CARE COORDINATION (OUTPATIENT)
Dept: OTHER | Facility: CLINIC | Age: 71
End: 2021-12-08

## 2021-12-08 NOTE — CARE COORDINATION
yesterday and he said x rays were done and everything looked ok, just takes time to heal. He said the doctor said the incision looks good and is closed and healed over. Pt has no questions or needs for me today. Care Transitions Subsequent and Final Call    Subsequent and Final Calls  Have your medications changed?: No  Do you have any questions related to your medications?: No  Do you currently have any active services?: No  Do you have any needs or concerns that I can assist you with?: No  Identified Barriers: None  Care Transitions Interventions  Other Interventions: Follow Up  Future Appointments   Date Time Provider Phuc Macias   3/21/2022 11:10 AM Juwan Cunningham MD [facility] None   5/24/2022 10:00 AM Clark Hansen, DO Sports Med [address]  MELISSA Ferguson RN.

## 2021-12-20 ENCOUNTER — TELEPHONE (OUTPATIENT)
Dept: FAMILY MEDICINE CLINIC | Age: 71
End: 2021-12-20

## 2021-12-20 DIAGNOSIS — Z76.0 MEDICATION REFILL: Primary | ICD-10-CM

## 2021-12-20 RX ORDER — LACTULOSE 10 G/15ML
10 SOLUTION ORAL 3 TIMES DAILY
Qty: 4050 ML | Refills: 3 | Status: SHIPPED | OUTPATIENT
Start: 2021-12-20 | End: 2022-01-19

## 2021-12-20 RX ORDER — LACTULOSE 10 G/15ML
10 SOLUTION ORAL 3 TIMES DAILY
Qty: 300 ML | Refills: 5 | Status: SHIPPED | OUTPATIENT
Start: 2021-12-20 | End: 2021-12-20 | Stop reason: SDUPTHER

## 2021-12-20 NOTE — TELEPHONE ENCOUNTER
Pharmacy called stating that the patient usually gets a 90 day supply of the lactulose and the amount sent over only covers 6 days. Could a 90 day supply be sent over? The pharmacist said it ends up being around 4,050ml. Please advise.

## 2021-12-22 ENCOUNTER — CARE COORDINATION (OUTPATIENT)
Dept: OTHER | Facility: CLINIC | Age: 71
End: 2021-12-22

## 2021-12-22 NOTE — CARE COORDINATION
[facility] Transitions Follow Up Call    2021    Patient: Thaddeus Peña  Patient : 1950   MRN: [id number]  Reason for Admission: RIGHT L4 TO S1 T-LIF    2C-ARMS   NUVASIVE    CELLSAVER  Discharge Date: 21 RARS: Readmission Risk Score: 13.4 ( )         Care Transitions Follow Up Call    Needs to be reviewed by the provider   Additional needs identified to be addressed with provider: No  none             Method of communication with provider : none      Care Transition Nurse (CTN) contacted the patient by telephone to follow up after admission on 2021. Verified name and  with patient as identifiers. Addressed changes since last contact: patient states incisions are healed with no issues. patient states there is some pain but it is tolerable. pateint states he is improving. patient states he has no questions or concerns at this time. Discussed follow-up appointments. If no appointment was previously scheduled, appointment scheduling offered: Yes. Is follow up appointment scheduled within 7 days of discharge? n/a. Advance Care Planning:   Does patient have an Advance Directive: not on file. CTN reviewed discharge instructions, medical action plan and red flags with patient and discussed any barriers to care and/or understanding of plan of care after discharge. Discussed appropriate site of care based on symptoms and resources available to patient including: PCP, Specialist, Benefits related nurse triage line, Urgent care clinics, Rick Piña, When to call 911 and [employer]. The patient agrees to contact the PCP office for questions related to their healthcare.      Patients top risk factors for readmission: medical condition-recent back surgery  Interventions to address risk factors: verify attendance of follow up appts, verify following the treatment plan of physician      [address] follow up appointment(s):     CTN provided contact information for future needs. Plan for follow-up call in 7-10 days based on severity of symptoms and risk factors. Plan for next call: review pain level            Care Transitions Subsequent and Final Call    Subsequent and Final Calls  Do you have any ongoing symptoms?: No  Have your medications changed?: No  Do you have any questions related to your medications?: No  Do you currently have any active services?: No  Do you have any needs or concerns that I can assist you with?: No  Identified Barriers: None  Care Transitions Interventions  Other Interventions:            Follow Up  Future Appointments   Date Time Provider Phuc Macias   3/21/2022 11:10 AM Paco Lyon MD [facility] None   5/24/2022 10:00 AM Shweta Guerra DO Sports Med Kylie Espinoza RN

## 2021-12-23 ENCOUNTER — APPOINTMENT (OUTPATIENT)
Dept: CT IMAGING | Facility: CLINIC | Age: 71
End: 2021-12-23
Payer: COMMERCIAL

## 2021-12-23 ENCOUNTER — HOSPITAL ENCOUNTER (EMERGENCY)
Facility: CLINIC | Age: 71
Discharge: HOME | End: 2021-12-23
Attending: EMERGENCY MEDICINE
Payer: COMMERCIAL

## 2021-12-23 VITALS
HEIGHT: 72 IN | RESPIRATION RATE: 17 BRPM | BODY MASS INDEX: 32.51 KG/M2 | TEMPERATURE: 98.3 F | HEART RATE: 80 BPM | OXYGEN SATURATION: 94 % | SYSTOLIC BLOOD PRESSURE: 103 MMHG | WEIGHT: 240 LBS | DIASTOLIC BLOOD PRESSURE: 94 MMHG

## 2021-12-23 DIAGNOSIS — R35.0 URINARY FREQUENCY: ICD-10-CM

## 2021-12-23 DIAGNOSIS — R30.0 DYSURIA: Primary | ICD-10-CM

## 2021-12-23 LAB
ABSOLUTE EOS #: 0.1 K/UL (ref 0–0.4)
ABSOLUTE IMMATURE GRANULOCYTE: ABNORMAL K/UL (ref 0–0.3)
ABSOLUTE LYMPH #: 1.9 K/UL (ref 1–4.8)
ABSOLUTE MONO #: 0.7 K/UL (ref 0.1–1.2)
ANION GAP SERPL CALCULATED.3IONS-SCNC: 12 MMOL/L (ref 9–17)
BASOPHILS # BLD: 1 % (ref 0–2)
BASOPHILS ABSOLUTE: 0 K/UL (ref 0–0.2)
BILIRUBIN URINE: NEGATIVE
BUN BLDV-MCNC: 14 MG/DL (ref 8–23)
BUN/CREAT BLD: ABNORMAL (ref 9–20)
CALCIUM SERPL-MCNC: 9.6 MG/DL (ref 8.6–10.4)
CHLORIDE BLD-SCNC: 102 MMOL/L (ref 98–107)
CO2: 23 MMOL/L (ref 20–31)
COLOR: YELLOW
COMMENT UA: ABNORMAL
CREAT SERPL-MCNC: 1.3 MG/DL (ref 0.7–1.2)
DIFFERENTIAL TYPE: ABNORMAL
EOSINOPHILS RELATIVE PERCENT: 2 % (ref 1–4)
GFR AFRICAN AMERICAN: >60 ML/MIN
GFR NON-AFRICAN AMERICAN: 55 ML/MIN
GFR SERPL CREATININE-BSD FRML MDRD: ABNORMAL ML/MIN/{1.73_M2}
GFR SERPL CREATININE-BSD FRML MDRD: ABNORMAL ML/MIN/{1.73_M2}
GLUCOSE BLD-MCNC: 89 MG/DL (ref 70–99)
GLUCOSE URINE: NEGATIVE
HCT VFR BLD CALC: 37.9 % (ref 41–53)
HEMOGLOBIN: 13 G/DL (ref 13.5–17.5)
IMMATURE GRANULOCYTES: ABNORMAL %
KETONES, URINE: ABNORMAL
LEUKOCYTE ESTERASE, URINE: NEGATIVE
LYMPHOCYTES # BLD: 23 % (ref 24–44)
MCH RBC QN AUTO: 33.8 PG (ref 26–34)
MCHC RBC AUTO-ENTMCNC: 34.2 G/DL (ref 31–37)
MCV RBC AUTO: 98.7 FL (ref 80–100)
MONOCYTES # BLD: 9 % (ref 2–11)
NITRITE, URINE: NEGATIVE
NRBC AUTOMATED: ABNORMAL PER 100 WBC
PDW BLD-RTO: 14.7 % (ref 12.5–15.4)
PH UA: 5 (ref 5–8)
PLATELET # BLD: 173 K/UL (ref 140–450)
PLATELET ESTIMATE: ABNORMAL
PMV BLD AUTO: 8.2 FL (ref 6–12)
POTASSIUM SERPL-SCNC: 4.3 MMOL/L (ref 3.7–5.3)
PROTEIN UA: NEGATIVE
RBC # BLD: 3.84 M/UL (ref 4.5–5.9)
RBC # BLD: ABNORMAL 10*6/UL
SEG NEUTROPHILS: 65 % (ref 36–66)
SEGMENTED NEUTROPHILS ABSOLUTE COUNT: 5.4 K/UL (ref 1.8–7.7)
SODIUM BLD-SCNC: 137 MMOL/L (ref 135–144)
SPECIFIC GRAVITY UA: 1.02 (ref 1–1.03)
TURBIDITY: CLEAR
URINE HGB: NEGATIVE
UROBILINOGEN, URINE: NORMAL
WBC # BLD: 8.2 K/UL (ref 3.5–11)
WBC # BLD: ABNORMAL 10*3/UL

## 2021-12-23 PROCEDURE — 36415 COLL VENOUS BLD VENIPUNCTURE: CPT

## 2021-12-23 PROCEDURE — 99282 EMERGENCY DEPT VISIT SF MDM: CPT

## 2021-12-23 PROCEDURE — 81003 URINALYSIS AUTO W/O SCOPE: CPT

## 2021-12-23 PROCEDURE — 85025 COMPLETE CBC W/AUTO DIFF WBC: CPT

## 2021-12-23 PROCEDURE — 74176 CT ABD & PELVIS W/O CONTRAST: CPT

## 2021-12-23 PROCEDURE — 80048 BASIC METABOLIC PNL TOTAL CA: CPT

## 2021-12-23 RX ORDER — HYOSCYAMINE SULFATE 0.125 MG
125 TABLET ORAL 3 TIMES DAILY
Qty: 21 TABLET | Refills: 0 | Status: SHIPPED | OUTPATIENT
Start: 2021-12-23 | End: 2022-02-22

## 2021-12-23 ASSESSMENT — ENCOUNTER SYMPTOMS: SHORTNESS OF BREATH: 0

## 2021-12-23 NOTE — ED PROVIDER NOTES
[address] ED  EMERGENCY DEPARTMENT ENCOUNTER      Pt Name: Stacey Holloway  MRN: [id number]  [place] 1950  Date of evaluation: 12/23/2021  Provider: Enoc Lawton MD    CHIEF COMPLAINT     Chief Complaint   Patient presents with    Urinary Tract Infection         HISTORY OF PRESENT ILLNESS   (Location/Symptom, Timing/Onset, Context/Setting,Quality, Duration, Modifying Factors, Severity)  Note limiting factors. Stacey Holloway is a 73 y.o. male who presents to the emergency department with chief complaint of suprapubic pain in the early hours of this morning that is starting to subside somewhat. Patient states he has had prior prostatectomy but has difficulty emptying his bladder all the time. As a result of this he always has some degree of urinary frequency and urgency. He has a history of back surgeries. Patient has undergone aortic valve replacement with a bioprosthetic valve several years ago. He is concerned about possible urinary tract infection. The history is provided by the patient and medical records. Nursing Notes werereviewed. REVIEW OF SYSTEMS    (2-9 systems for level 4, 10 or more for level 5)     Review of Systems   Constitutional: Negative for fever. Respiratory: Negative for shortness of breath. Cardiovascular: Negative for chest pain. Genitourinary: Positive for difficulty urinating and frequency. All other systems reviewed and are negative. Except as noted above the remainder of the review of systems was reviewed and negative.        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 Farhat Scherer MD at [facility]    History of alcohol use 9/18/2015    Hyperlipidemia     Hypertension     Lumbago     Lymphedema     Malignant neoplasm of prostate ([address]     prostate    Otalgia of right ear     radiates down the jaw in the distribution of the third branch of the trigemminal nerve.     Portal venous hypertension ([address] 07/15/2020    S/P prostatectomy 2012    Sciatica     Splenomegaly 07/15/2020         SURGICALHISTORY       Past Surgical History:   Procedure Laterality Date    AORTIC VALVE REPAIR  06/03/2020    [facility]    BACK SURGERY      L4-5    CARDIAC CATHETERIZATION  10.30/2019    Dr. Dwayne Wilson COLONOSCOPY  12/05/2016    [facility], states due for another in 2  years    COLONOSCOPY N/A 10/07/2019    tubular adenoma x2; hyperplastic polyp    FINGER TRIGGER RELEASE Right 02/24/2021    pinky finger    FINGER TRIGGER RELEASE Right 2/24/2021    RIGHT SMALL FINGER TRIGGER RELEASE performed by Audi Byrd DO at [facility] ARTHROSCOPY Right     LUMBAR FUSION Right 11/22/2021    RIGHT L4 TO S1 T-LIF    2C-ARMS   Dorisann Brine performed by Reagan Mattosn MD at [place] [id number] Right 01/25/2016    10 liters removed peracentesis    PARACENTESIS  11/04/2015    PARACENTESIS  12/28/2015    [place] EGD TRANSORAL BIOPSY SINGLE/MULTIPLE N/A 08/30/2017    EGD BIOPSY performed by Isaias Morse MD at [address] [place] [address]  06/2011    TONSILLECTOMY      UPPER GASTROINTESTINAL ENDOSCOPY  10/24/2013    small segment barretts    UPPER GASTROINTESTINAL ENDOSCOPY  08/30/2017    VEIN SURGERY Left 12/07/2016    leg vein stripping    VENTRAL HERNIA REPAIR  05/26/2015         CURRENT MEDICATIONS       Previous Medications    ALLOPURINOL (ZYLOPRIM) 300 MG TABLET    Take 600 mg by mouth daily Takes 2 tabs (=600mg) daily    CHOLECALCIFEROL (VITAMIN D) 50 MCG (2000 UT) CAPS CAPSULE    Take 1 capsule by mouth daily    FERROUS SULFATE (IRON 325) 325 (65 FE) MG TABLET    Take 325 mg by mouth every other day    FUROSEMIDE (LASIX) 40 MG TABLET    Take 1 tablet by mouth 2 times daily    LACTULOSE (CHRONULAC) 10 GM/15ML SOLUTION    Take 15 mLs by mouth 3 times daily TAKE 15 MLS BY MOUTH THREE TIMES A DAY    MELATONIN 3 MG TABS TABLET    Take 10 mg by mouth daily    NIFEDIPINE (PROCARDIA XL) 60 MG EXTENDED RELEASE TABLET    Take 60 mg by mouth daily     OMEPRAZOLE (PRILOSEC) 20 MG DELAYED RELEASE CAPSULE    Take 2 capsules by mouth daily    PREGABALIN (LYRICA) 100 MG CAPSULE    TAKE 1 CAPSULE BY MOUTH 2 TIMES A DAY    SENNOSIDES-DOCUSATE SODIUM (SENOKOT-S) 8.6-50 MG TABLET    Take 1 tablet by mouth 2 times daily    SITAGLIPTIN (JANUVIA) 100 MG TABLET    TAKE 1 TABLET BY MOUTH ONE TIME A DAY    SODIUM HYALURONATE (EUFLEXXA) 20 MG/2ML SOSY INJECTION    Inject 2 mLs into the articular space once a week Series of three injections for the right knee    SPIRONOLACTONE (ALDACTONE) 50 MG TABLET    Take one tablet orally once daily    TAMSULOSIN (FLOMAX) 0.4 MG CAPSULE    Take 0.4 mg by mouth daily    TIZANIDINE (ZANAFLEX) 4 MG TABLET    Take 1 tablet by mouth every 8 hours as needed (spasm)    VITAMIN B-12 (CYANOCOBALAMIN) 500 MCG TABLET    Take 500 mcg by mouth every other day       ALLERGIES     Nsaids    FAMILY HISTORY       Family History   Problem Relation Age of Onset    Prostate Cancer Father     Other Neg Hx         blood clots          SOCIAL HISTORY       Social History     Socioeconomic History    Marital status:      Spouse name: None    Number of children: None    Years of education: None    Highest education level: None Occupational History    None   Tobacco Use    Smoking status: Former Smoker     Packs/day: 1.00     Years: 49.00     Pack years: 49.00     Types: Cigarettes     Start date: 1966     Quit date: 2015     Years since quittin.9    Smokeless tobacco: Never Used    Tobacco comment: relapsed quit smiking again 2021   Vaping Use    Vaping Use: Never used   Substance and Sexual Activity    Alcohol use: Not Currently     Alcohol/week: 0.0 standard drinks    Drug use: Yes     Types: Marijuana Lissy Amble)     Comment: edibles 21 at 2000    Sexual activity: None   Other Topics Concern    None   Social History Narrative    None     Social Determinants of Health     Financial Resource Strain:     Difficulty of Paying Living Expenses: Not on file   Food Insecurity:     Worried About Running Out of Food in the Last Year: Not on file    Jerry of Food in the Last Year: Not on file   Transportation Needs:     Lack of Transportation (Medical): Not on file    Lack of Transportation (Non-Medical):  Not on file   Physical Activity:     Days of Exercise per Week: Not on file    Minutes of Exercise per Session: Not on file   Stress:     Feeling of Stress : Not on file   Social Connections:     Frequency of Communication with Friends and Family: Not on file    Frequency of Social Gatherings with Friends and Family: Not on file    Attends Mandaeism Services: Not on file    Active Member of [address] [place] [address] or Organizations: Not on file    Attends Club or Organization Meetings: Not on file    Marital Status: Not on file   Intimate Partner Violence:     Fear of Current or Ex-Partner: Not on file    Emotionally Abused: Not on file    Physically Abused: Not on file    Sexually Abused: Not on file   Housing Stability:     Unable to Pay for Housing in the Last Year: Not on file    Number of [place] in the Last Year: Not on file    Unstable Housing in the Last Year: Not on file       SCREENINGS PHYSICAL EXAM    (up to 7 for level 4, 8 or more for level 5)     ED Triage Vitals [12/23/21 1051]   BP Temp Temp src Pulse Resp SpO2 Height Weight   (!) 103/94 98.3 °F (36.8 °C) -- 80 17 94 % 6' (1.829 m) 240 lb (108.9 kg)       Physical Exam  Vitals reviewed. Constitutional:       General: He is not in acute distress. Appearance: He is obese. He is not ill-appearing. HENT:      Head: Normocephalic. Right Ear: External ear normal.      Left Ear: External ear normal.      Nose: Nose normal.   Eyes:      Extraocular Movements: Extraocular movements intact. Cardiovascular:      Rate and Rhythm: Normal rate and regular rhythm. Heart sounds: Normal heart sounds. Pulmonary:      Effort: Pulmonary effort is normal. No respiratory distress. Breath sounds: Normal breath sounds. Abdominal:      Palpations: Abdomen is soft. There is no mass. Tenderness: There is no abdominal tenderness. Musculoskeletal:         General: Normal range of motion. Cervical back: Neck supple. Skin:     General: Skin is warm and dry. Coloration: Skin is not pale. Neurological:      General: No focal deficit present. Mental Status: He is alert and oriented to person, place, and time. DIAGNOSTIC RESULTS     EKG: All EKG's are interpreted by the Emergency Department Physician who either signs orCo-signs this chart in the absence of a cardiologist.    RADIOLOGY:     Interpretation per the Radiologist below, ifavailable at the time of this note:    CT ABDOMEN PELVIS WO CONTRAST Additional Contrast? None   Final Result   Urinary bladder is nearly empty which limits CT assessment. No notable   perivesicular fat stranding. Hepatic cirrhosis and stigmata of portal hypertension with mild splenomegaly. Cholelithiasis without CT evidence to suggest cholecystitis. Recent postoperative changes of the lower lumbar spine.                ED BEDSIDE ULTRASOUND:   Performed by ED Physician - none    LABS:  Labs Reviewed   URINE RT REFLEX TO CULTURE - Abnormal; Notable for the following components:       Result Value    Ketones, Urine TRACE (*)     All other components within normal limits   CBC WITH AUTO DIFFERENTIAL - Abnormal; Notable for the following components:    RBC 3.84 (*)     Hemoglobin 13.0 (*)     Hematocrit 37.9 (*)     Lymphocytes 23 (*)     All other components within normal limits   BASIC METABOLIC PANEL W/ REFLEX TO MG FOR LOW K - Abnormal; Notable for the following components:    CREATININE 1.30 (*)     GFR Non- 55 (*)     All other components within normal limits       All other labs were within normal range ornot returned as of this dictation. EMERGENCY DEPARTMENT COURSE and DIFFERENTIAL DIAGNOSIS/MDM:   Vitals:    Vitals:    12/23/21 1051   BP: (!) 103/94   Pulse: 80   Resp: 17   Temp: 98.3 °F (36.8 °C)   SpO2: 94%   Weight: 108.9 kg (240 lb)   Height: 6' (1.829 m)            CT scan of the abdomen shows a pretty empty bladder and I suspect the patient's urinary frequency may have something to do with an overactive bladder. Other incidental findings are noted and discussed with the patient. He is placed on Levsin 0.125 mg 3 times a day for a week to help with possible bladder spasms even though he is already on Flomax. He is advised to contact his urologist for early follow-up for further management. At this time there is no evidence to suggest urinary tract infection. MDM    CONSULTS:  None    PROCEDURES:  Unlessotherwise noted below, none     Procedures    FINAL IMPRESSION      1. Dysuria    2. Urinary frequency          DISPOSITION/PLAN   DISPOSITION Decision To Discharge 12/23/2021 12:52:34 PM      PATIENT REFERRED TO:  Reyes Dillon, KELSY - CNP  [address]  [facility] [address] [facility], [address] [facility] [address] [place], [address] [facility]            DISCHARGE MEDICATIONS: Problem List:  Patient Active Problem List   Diagnosis Code    Disc displacement, lumbar M51.26    Chronic hepatitis C with cirrhosis (HCC) B18.2, K74.60    Dilated cardiomyopathy I42.0    Gout M10.9    Diabetes type 2, controlled E11.9    Prostate cancer ([facility] [facility] C61    Severe aortic insufficiency I35.1    Incisional hernia K43.2    Aortic atherosclerosis ([facility] [facility] I70.0    Cervicalgia M54.2    Sciatica M54.30    Diverticulosis of colon K57.30    Ascites due to alcoholic cirrhosis ([facility] [facility] S04.98    Essential hypertension I10    History of alcohol use Z87.898    Hypomagnesemia E83.42    Chronic hepatitis C virus infection ([facility] [facility] B18.2    Acquired hypothyroidism E03.9    Chondromalacia patellae M22.40    Awaiting organ transplant status Z76.82    Varicose veins of left lower extremity I83.92    Anxiety F41.9    Colon polyps K63.5    History of hepatitis C Z86.19    Right lumbar radiculitis M54.16    Post laminectomy syndrome M96.1    Hepatic cyst K76.89    Severe comorbid illness R69    Primary osteoarthritis of both knees M17.0    Chronic lumbar radiculopathy M54.16    Sol esophagus K22.70    CKD (chronic kidney disease) stage 3, GFR 30-59 ml/min (HCC) N18.30    Chronic tophaceous gout M1A. 9XX1    Abdominal pain R10.9    Tear of right acetabular labrum S73.191A    Hepatic cirrhosis (HCC) K74.60    Tinnitus H93.19    Chronic renal failure syndrome, stage 3 (moderate) (HCC) N18.30    Nephropathy N28.9    Type 2 diabetes mellitus with kidney complication, without long-term current use of insulin (HCC) E11.29    Dyslipidemia E78.5    MULU (acute kidney injury) ([facility] [facility] N17.9    Acute cystitis without hematuria N30.00    Elevated d-dimer R79.89    ESBL (extended spectrum beta-lactamase) producing bacteria infection A49.9, Z16.12    Portal venous hypertension (HCC) K76.6    Abdominal varicosities I86.8    Splenomegaly R16.1    Morbid obesity (HCC) E66.01    Adenomatous polyp of ascending colon D12.2    Gastroesophageal reflux disease K21.9    Spondylolisthesis, acquired M43.10    Foraminal stenosis of lumbar region M48.061           Summation      Patient Course:   Discharged    ED Medicationsadministered this visit:  Medications - No data to display    New Prescriptions from this visit:    New Prescriptions    HYOSCYAMINE (LEVSIN) 125 MCG TABLET    Take 1 tablet by mouth 3 times daily for 7 days       Follow-up:  KELSY Gifford - CNP  [address], [address] [facility] [address] [place], [address] [facility]              Final Impression:   1. Dysuria    2.  Urinary frequency               (Please note that portions of this note were completed with a voice recognitionprogram.  Efforts were made to edit the dictations but occasionally words are mis-transcribed.)    Truong Moreno MD (electronically signed)  Attending Emergency Physician            Truong Moreno MD  12/23/21 1300

## 2021-12-27 ENCOUNTER — CARE COORDINATION (OUTPATIENT)
Dept: OTHER | Facility: CLINIC | Age: 71
End: 2021-12-27

## 2021-12-29 ENCOUNTER — CARE COORDINATION (OUTPATIENT)
Dept: OTHER | Facility: CLINIC | Age: 71
End: 2021-12-29

## 2021-12-29 DIAGNOSIS — F41.9 ANXIETY: Primary | ICD-10-CM

## 2021-12-29 RX ORDER — LORAZEPAM 0.5 MG/1
TABLET ORAL
Qty: 20 TABLET | Refills: 0 | Status: SHIPPED | OUTPATIENT
Start: 2021-12-29 | End: 2022-01-28

## 2021-12-29 NOTE — CARE COORDINATION
[facility] Transitions Follow Up Call    2021    Patient: Hair Sarah  Patient : 1950   MRN: [id number]  Reason for Admission: Spondylolishesis  L4- S1  Discharge Date: 21 RARS: Readmission Risk Score: 13.4 ( )         Care Transitions Follow Up Call    Needs to be reviewed by the provider   Additional needs identified to be addressed with provider: No  none             Method of communication with provider : none      Care Transition Nurse (CTN) contacted the patient by telephone to follow up after admission on 21. Verified name and  with patient as identifiers. Addressed changes since last contact: Pt had ED visit for urinary frequency that has sinc resolved  Discussed follow-up appointments. If no appointment was previously scheduled, appointment scheduling offered: n/a. Is follow up appointment scheduled within 7 days of discharge? No.    CTN reviewed discharge instructions, medical action plan and red flags with patient and discussed any barriers to care and/or understanding of plan of care after discharge. Discussed appropriate site of care based on symptoms and resources available to patient including: Specialist. The patient agrees to contact the PCP office for questions related to their healthcare. Patients top risk factors for readmission: medical condition-recent back surgery, history or urinary issues due to prostatectomy. Interventions to address risk factors: verify attendance to follow up appts, medication adherence and notifying physician early on of any unusual symptoms   Pt said he is emptying his bladder with some work and focus, but does feel he is emptying ok, denies pain. Non-[facility] follow up appointment(s): n/a    CTN provided contact information for future needs. Plan for follow-up call in 7-10 days based on severity of symptoms and risk factors.   Plan for next call: discuss any further care needs for care mtg and status of back surgery

## 2022-01-07 ENCOUNTER — CARE COORDINATION (OUTPATIENT)
Dept: OTHER | Facility: CLINIC | Age: 72
End: 2022-01-07

## 2022-01-10 ENCOUNTER — CARE COORDINATION (OUTPATIENT)
Dept: OTHER | Facility: CLINIC | Age: 72
End: 2022-01-10

## 2022-01-10 DIAGNOSIS — K21.9 GASTROESOPHAGEAL REFLUX DISEASE WITHOUT ESOPHAGITIS: ICD-10-CM

## 2022-01-10 DIAGNOSIS — G62.9 NEUROPATHY: ICD-10-CM

## 2022-01-10 RX ORDER — PREGABALIN 100 MG/1
CAPSULE ORAL
Qty: 60 CAPSULE | Refills: 0 | Status: SHIPPED | OUTPATIENT
Start: 2022-01-10 | End: 2022-02-10

## 2022-01-10 RX ORDER — OMEPRAZOLE 20 MG/1
40 CAPSULE, DELAYED RELEASE ORAL DAILY
Qty: 60 CAPSULE | Refills: 5 | Status: SHIPPED | OUTPATIENT
Start: 2022-01-10 | End: 2023-01-10

## 2022-01-10 NOTE — CARE COORDINATION
[facility] Transitions Follow Up Call    1/10/2022    Patient: Silvia Rosado  Patient : 1950   MRN: [id number]  Reason for Admission: Spondylolisthesis  Discharge Date: 21 RARS: Readmission Risk Score: 13.4 ( )       Care Transitions Follow Up Call    Needs to be reviewed by the provider   Additional needs identified to be addressed with provider: No  none             Method of communication with provider : none      Care Transition Nurse (CTN) contacted the patient by telephone to follow up after admission on 22  Verified name and  with patient as identifiers. Addressed changes since last contact: none  Discussed follow-up appointments. If no appointment was previously scheduled, appointment scheduling offered: n/a. Is follow up appointment scheduled within 7 days of discharge? No.    Advance Care Planning:   Does patient have an Advance Directive: not on file. CTN reviewed discharge instructions, medical action plan and red flags with patient and discussed any barriers to care and/or understanding of plan of care after discharge. Discussed appropriate site of care based on symptoms and resources available to patient including: PCP, Specialist, Benefits related nurse triage line and [employer] Messaging. The patient agrees to contact the PCP office for questions related to their healthcare. Patients top risk factors for readmission: multiple health system providers  Interventions to address risk factors: verified ongoing follow up attendence to appts and communiation between patient and physicians with any unusual symptoms or concerns      Non-[facility] follow up appointment(s): n/a    CTN provided contact information for future needs. No further follow-up call indicated based on severity of symptoms and risk factors. Pt has no further identified care mtg needs as of today. He states he may end up going to see his urologist he isn't sure.  Pt has recovered well from back surgery in November, said he is not having any issues with that. No change in urinary symptoms today, nothing new he said. Pt has my contact information if needed in the future. ACM will sign off at this time         Care Transitions Subsequent and Final Call    Subsequent and Final Calls  Have your medications changed?: No  Do you have any questions related to your medications?: No  Do you currently have any active services?: No  Do you have any needs or concerns that I can assist you with?: No  Identified Barriers: None  Care Transitions Interventions  Other Interventions:            Follow Up  Future Appointments   Date Time Provider Phuc Macias   3/21/2022 11:10 AM John Paul Soliz MD [facility] None   5/24/2022 10:00 AM Nolan Paez, DO Sports Med Zain Nick RN

## 2022-02-09 ENCOUNTER — HOSPITAL ENCOUNTER (OUTPATIENT)
Age: 72
Setting detail: SPECIMEN
Discharge: HOME | End: 2022-02-09

## 2022-02-09 DIAGNOSIS — N18.2 CKD (CHRONIC KIDNEY DISEASE), STAGE II: ICD-10-CM

## 2022-02-09 DIAGNOSIS — I10 ESSENTIAL HYPERTENSION: ICD-10-CM

## 2022-02-09 LAB
ALBUMIN SERPL-MCNC: 4.2 G/DL (ref 3.5–5.2)
ALBUMIN/GLOBULIN RATIO: 1.4 (ref 1–2.5)
ALP BLD-CCNC: 101 U/L (ref 40–129)
ALT SERPL-CCNC: 12 U/L (ref 5–41)
ANION GAP SERPL CALCULATED.3IONS-SCNC: 13 MMOL/L (ref 9–17)
AST SERPL-CCNC: 17 U/L
BILIRUB SERPL-MCNC: 0.26 MG/DL (ref 0.3–1.2)
BILIRUBIN DIRECT: 0.1 MG/DL
BILIRUBIN, INDIRECT: 0.16 MG/DL (ref 0–1)
BUN BLDV-MCNC: 18 MG/DL (ref 8–23)
BUN/CREAT BLD: ABNORMAL (ref 9–20)
CALCIUM SERPL-MCNC: 9.3 MG/DL (ref 8.6–10.4)
CHLORIDE BLD-SCNC: 103 MMOL/L (ref 98–107)
CO2: 22 MMOL/L (ref 20–31)
CREAT SERPL-MCNC: 1.42 MG/DL (ref 0.7–1.2)
CREAT SERPL-MCNC: 1.49 MG/DL (ref 0.7–1.2)
CREATININE URINE: 69.7 MG/DL (ref 39–259)
GFR AFRICAN AMERICAN: 57 ML/MIN
GFR AFRICAN AMERICAN: 60 ML/MIN
GFR NON-AFRICAN AMERICAN: 47 ML/MIN
GFR NON-AFRICAN AMERICAN: 49 ML/MIN
GFR SERPL CREATININE-BSD FRML MDRD: ABNORMAL ML/MIN/{1.73_M2}
GLOBULIN: ABNORMAL G/DL (ref 1.5–3.8)
GLUCOSE BLD-MCNC: 128 MG/DL (ref 70–99)
HCT VFR BLD CALC: 42.7 % (ref 40.7–50.3)
HEMOGLOBIN: 14 G/DL (ref 13–17)
MCH RBC QN AUTO: 32.7 PG (ref 25.2–33.5)
MCHC RBC AUTO-ENTMCNC: 32.8 G/DL (ref 28.4–34.8)
MCV RBC AUTO: 99.8 FL (ref 82.6–102.9)
NRBC AUTOMATED: 0 PER 100 WBC
PDW BLD-RTO: 14 % (ref 11.8–14.4)
PLATELET # BLD: 231 K/UL (ref 138–453)
PMV BLD AUTO: 11.2 FL (ref 8.1–13.5)
POTASSIUM SERPL-SCNC: 4.6 MMOL/L (ref 3.7–5.3)
RBC # BLD: 4.28 M/UL (ref 4.21–5.77)
SODIUM BLD-SCNC: 138 MMOL/L (ref 135–144)
TOTAL PROTEIN, URINE: 6 MG/DL
TOTAL PROTEIN: 7.2 G/DL (ref 6.4–8.3)
URIC ACID: 4.2 MG/DL (ref 3.4–7)
URINE TOTAL PROTEIN CREATININE RATIO: 0.09 (ref 0–0.2)
WBC # BLD: 11 K/UL (ref 3.5–11.3)

## 2022-02-10 DIAGNOSIS — G62.9 NEUROPATHY: ICD-10-CM

## 2022-02-10 RX ORDER — PREGABALIN 100 MG/1
CAPSULE ORAL
Qty: 60 CAPSULE | Refills: 0 | Status: SHIPPED | OUTPATIENT
Start: 2022-02-10 | End: 2022-03-10

## 2022-02-22 ENCOUNTER — OFFICE VISIT (OUTPATIENT)
Dept: FAMILY MEDICINE CLINIC | Age: 72
End: 2022-02-22
Payer: COMMERCIAL

## 2022-02-22 VITALS
TEMPERATURE: 97.9 F | DIASTOLIC BLOOD PRESSURE: 60 MMHG | HEART RATE: 59 BPM | SYSTOLIC BLOOD PRESSURE: 126 MMHG | WEIGHT: 243 LBS | BODY MASS INDEX: 32.91 KG/M2 | HEIGHT: 72 IN | OXYGEN SATURATION: 95 %

## 2022-02-22 DIAGNOSIS — Z91.81 AT HIGH RISK FOR FALLS: ICD-10-CM

## 2022-02-22 DIAGNOSIS — F41.9 ANXIETY: Primary | ICD-10-CM

## 2022-02-22 DIAGNOSIS — G62.9 NEUROPATHY: ICD-10-CM

## 2022-02-22 PROCEDURE — 99214 OFFICE O/P EST MOD 30 MIN: CPT | Performed by: NURSE PRACTITIONER

## 2022-02-22 ASSESSMENT — ENCOUNTER SYMPTOMS
NAUSEA: 0
BACK PAIN: 1
COUGH: 0
EYE PAIN: 0
VOMITING: 0
SINUS PAIN: 0
SHORTNESS OF BREATH: 0
SORE THROAT: 0
DIARRHEA: 0
ABDOMINAL PAIN: 0

## 2022-02-22 ASSESSMENT — PATIENT HEALTH QUESTIONNAIRE - PHQ9
1. LITTLE INTEREST OR PLEASURE IN DOING THINGS: 0
SUM OF ALL RESPONSES TO PHQ QUESTIONS 1-9: 0
2. FEELING DOWN, DEPRESSED OR HOPELESS: 0
SUM OF ALL RESPONSES TO PHQ9 QUESTIONS 1 & 2: 0
SUM OF ALL RESPONSES TO PHQ QUESTIONS 1-9: 0

## 2022-02-22 NOTE — PATIENT INSTRUCTIONS

## 2022-02-22 NOTE — PROGRESS NOTES
[address] [facility]  [address] [facility] [address] [address]  Dept: [phone]  Dept Fax: [phone]    Salima Miranda is a 73 y.o. male who presents today for his medicalconditions/complaints as noted below. Salima Miranda is c/o of Medication Check (would like lyrica 2 times daily ), Foot Pain, Other (pt is having trouble relaxing with urinating ), and Other (muscle cramps in legs. )      HPI:         73 y.o male presents for follow up. Significant cardiac history including aortic valve replacement, hypertension, hyperlipidemia, cardiomyopathy. Patient follows with [facility] cardiology from valve replacement, cardiothoracic surgery. Locally following with [facility] cardiology consultants, Last seen for cardiac clearance. Currently takes nifedipine 60, Lasix 80, spironolactone 50.      History of chronic liver disease, history of hep C, did see transplant team at [facility] was not a surgical candidate, follows with GI Dr. Ashlyn Callejas     History of chronic kidney disease follows with nephrology Dr. Chato Serra, currently managed with lasix 80, last creat 1.49, gfr 47.     History of chronic gout takes allopurinol 600, Lyrica 100 bid follows with rheumatology [facility]. No recent flares, last uric acid 4.2. Pt feels as though his years of gout flares have led to neuropathy, Reports the lyrica is only taking the edge off and would like to try dose adjustment. Chronic back pain, had fusion L4-S1, doing well postoperatively pain wise. Feels this was a success.      Had urinary retention, acute UTI requiring hospitalization. Did see urologist Dr. Cristina Redman, takes Flomax 0.4 bid. Experiences some difficulty voiding and nocturia, reports he is nearing the point of straight cath.  Reports when he wakes up in the middle of the night he has trouble going back to sleep and also cant relax to urinate without the ativan.      History of diabetes last a1c 6.0, takes Januvia 100 mg daily.      History of anxiety takes Ativan daily, significant history of the loss of a child due to drug overdose, wife recently passed away.      Hx of GI including barretts, hep c, gerd, cirrhosis, colon polyp, diverticulosis, portal hypertension. Currently managed with omeprazole 40. 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     Portal venous hypertension (HCC) 07/15/2020    S/P prostatectomy 2012    Sciatica     Splenomegaly 07/15/2020        Current Outpatient Medications   Medication Sig Dispense Refill    pregabalin (LYRICA) 100 MG capsule TAKE 1 CAPSULE BY MOUTH 2 TIMES A DAY 60 capsule 0    LORazepam (ATIVAN) 0.5 MG tablet TAKE ONE TABLET BY MOUTH EVERY EVENING AS NEEDED FOR ANXIETY FOR UP TO 30 DAYS 20 tablet 0    omeprazole (PRILOSEC) 20 MG delayed release capsule TAKE 2 CAPSULES BY MOUTH DAILY 60 capsule 5    sennosides-docusate sodium (SENOKOT-S) 8.6-50 MG tablet Take 1 tablet by mouth 2 times daily 60 tablet 0 light-headedness. Hematological: Does not bruise/bleed easily. Psychiatric/Behavioral: Positive for sleep disturbance. All other systems reviewed and are negative.      :Objective     Physical Exam  Vitals and nursing note reviewed. Constitutional:       General: He is not in acute distress. Appearance: Normal appearance. He is not toxic-appearing. Cardiovascular:      Rate and Rhythm: Normal rate. Pulmonary:      Effort: Pulmonary effort is normal.      Breath sounds: Normal breath sounds. Skin:     General: Skin is warm and dry. Neurological:      General: No focal deficit present. Mental Status: He is alert and oriented to person, place, and time.        /60 (Site: Left Upper Arm, Position: Sitting, Cuff Size: Large Adult)   Pulse 59   Temp 97.9 °F (36.6 °C) (Tympanic)   Ht 6' (1.829 m)   Wt 243 lb (110.2 kg)   SpO2 95%   BMI 32.96 kg/m²     Lab Review   Hospital Outpatient Visit on 02/09/2022   Component Date Value    CREATININE 02/09/2022 1.42*    GFR Non- 02/09/2022 49*    GFR  02/09/2022 60*    GFR Comment 02/09/2022          GFR Staging 02/09/2022 NOT REPORTED     Albumin 02/09/2022 4.2     Alkaline Phosphatase 02/09/2022 101     ALT 02/09/2022 12     AST 02/09/2022 17     Total Bilirubin 02/09/2022 0.26*    Bilirubin, Direct 02/09/2022 0.10     Bilirubin, Indirect 02/09/2022 0.16     Total Protein 02/09/2022 7.2     Globulin 02/09/2022 NOT REPORTED     Albumin/Globulin Ratio 02/09/2022 1.4     Uric Acid 02/09/2022 4.2    Hospital Outpatient Visit on 02/09/2022   Component Date Value    Total Protein, Urine 02/09/2022 6     Creatinine, Ur 02/09/2022 69.7     Urine Total Protein Crea* 02/09/2022 0.09     WBC 02/09/2022 11.0     RBC 02/09/2022 4.28     Hemoglobin 02/09/2022 14.0     Hematocrit 02/09/2022 42.7     MCV 02/09/2022 99.8     MCH 02/09/2022 32.7     MCHC 02/09/2022 32.8     RDW 02/09/2022 14.0     Platelets 94/77/4171 231     MPV 02/09/2022 11.2     NRBC Automated 02/09/2022 0.0     Glucose 02/09/2022 128*    BUN 02/09/2022 18     CREATININE 02/09/2022 1.49*    Bun/Cre Ratio 02/09/2022 NOT REPORTED     Calcium 02/09/2022 9.3     Sodium 02/09/2022 138     Potassium 02/09/2022 4.6     Chloride 02/09/2022 103     CO2 02/09/2022 22     Anion Gap 02/09/2022 13     GFR Non- 02/09/2022 47*    GFR  02/09/2022 57*    GFR Comment 02/09/2022          GFR Staging 02/09/2022 NOT REPORTED    Admission on 12/23/2021, Discharged on 12/23/2021   Component Date Value    Color, UA 12/23/2021 Yellow     Turbidity UA 12/23/2021 Clear     Glucose, Ur 12/23/2021 NEGATIVE     Bilirubin Urine 12/23/2021 NEGATIVE     Ketones, Urine 12/23/2021 TRACE*    Specific Gravity, UA 12/23/2021 1.020     Urine Hgb 12/23/2021 NEGATIVE     pH, UA 12/23/2021 5.0     Protein, UA 12/23/2021 NEGATIVE     Urobilinogen, Urine 12/23/2021 Normal     Nitrite, Urine 12/23/2021 NEGATIVE     Leukocyte Esterase, Urine 12/23/2021 NEGATIVE     Urinalysis Comments 12/23/2021 Microscopic exam not performed based on chemical results unless requested in original order.  Urinalysis Comments 12/23/2021          Urinalysis Comments 12/23/2021 Utilizing a urinalysis as the only screening method to exclude a potential uropathogen can be unreliable in many patient populations. Rapid screening tests are less sensitive than culture and if UTI is a clinical possibility, culture should be considered despite a negative urinalysis.      WBC 12/23/2021 8.2     RBC 12/23/2021 3.84*    Hemoglobin 12/23/2021 13.0*    Hematocrit 12/23/2021 37.9*    MCV 12/23/2021 98.7     MCH 12/23/2021 33.8     MCHC 12/23/2021 34.2     RDW 12/23/2021 14.7     Platelets 92/53/0134 173     MPV 12/23/2021 8.2     NRBC Automated 12/23/2021 NOT REPORTED     Differential Type 12/23/2021 NOT REPORTED     Seg Neutrophils 12/23/2021 65     Lymphocytes 12/23/2021 23*    Monocytes 12/23/2021 9     Eosinophils % 12/23/2021 2     Basophils 12/23/2021 1     Immature Granulocytes 12/23/2021 NOT REPORTED     Segs Absolute 12/23/2021 5.40     Absolute Lymph # 12/23/2021 1.90     Absolute Mono # 12/23/2021 0.70     Absolute Eos # 12/23/2021 0.10     Basophils Absolute 12/23/2021 0.00     Absolute Immature Granul* 12/23/2021 NOT REPORTED     WBC Morphology 12/23/2021 NOT REPORTED     RBC Morphology 12/23/2021 NOT REPORTED     Platelet Estimate 03/90/2988 NOT REPORTED     Glucose 12/23/2021 89     BUN 12/23/2021 14     CREATININE 12/23/2021 1.30*    Bun/Cre Ratio 12/23/2021 NOT REPORTED     Calcium 12/23/2021 9.6     Sodium 12/23/2021 137     Potassium 12/23/2021 4.3     Chloride 12/23/2021 102     CO2 12/23/2021 23     Anion Gap 12/23/2021 12     GFR Non- 12/23/2021 55*    GFR  12/23/2021 >60     GFR Comment 12/23/2021          GFR Staging 12/23/2021 NOT REPORTED    Admission on 11/22/2021, Discharged on 11/23/2021   Component Date Value    Ventricular Rate 11/22/2021 51     Atrial Rate 11/22/2021 51     P-R Interval 11/22/2021 162     QRS Duration 11/22/2021 102     Q-T Interval 11/22/2021 450     QTc Calculation (Bazett) 11/22/2021 414     P Axis 11/22/2021 7     R Axis 11/22/2021 -52     T Axis 11/22/2021 134     WBC 11/22/2021 10.1     RBC 11/22/2021 4.46     Hemoglobin 11/22/2021 14.8     Hematocrit 11/22/2021 44.6     MCV 11/22/2021 100.0     MCH 11/22/2021 33.2     MCHC 11/22/2021 33.2     RDW 11/22/2021 13.4     Platelets 05/27/6235 202     MPV 11/22/2021 10.7     NRBC Automated 11/22/2021 0.0     Differential Type 11/22/2021 NOT REPORTED     Seg Neutrophils 11/22/2021 67*    Lymphocytes 11/22/2021 22*    Monocytes 11/22/2021 8     Eosinophils % 11/22/2021 2     Basophils 11/22/2021 1     Immature Granulocytes 11/22/2021 0     Segs Absolute 11/22/2021 6.90     Absolute Lymph # 11/22/2021 2.18     Absolute Mono # 11/22/2021 0.80     Absolute Eos # 11/22/2021 0.16     Basophils Absolute 11/22/2021 0.07     Absolute Immature Granul* 11/22/2021 0.03     WBC Morphology 11/22/2021 NOT REPORTED     RBC Morphology 11/22/2021 NOT REPORTED     Platelet Estimate 11/52/1363 NOT REPORTED     PTT 11/22/2021 33.1     Protime 11/22/2021 12.9     INR 11/22/2021 1.0     Glucose 11/22/2021 117*    BUN 11/22/2021 20     CREATININE 11/22/2021 1.26*    Bun/Cre Ratio 11/22/2021 16     Calcium 11/22/2021 9.3     Sodium 11/22/2021 140     Potassium 11/22/2021 4.1     Chloride 11/22/2021 102     CO2 11/22/2021 24     Anion Gap 11/22/2021 14     GFR Non- 11/22/2021 57*    GFR  11/22/2021 >60     GFR Comment 11/22/2021          GFR Staging 11/22/2021 NOT REPORTED     Color, UA 11/22/2021 Yellow     Turbidity UA 11/22/2021 Clear     Glucose, Ur 11/22/2021 NEGATIVE     Bilirubin Urine 11/22/2021 NEGATIVE     Ketones, Urine 11/22/2021 NEGATIVE     Specific [place], UA 11/22/2021 1.020     Urine Hgb 11/22/2021 NEGATIVE     pH, UA 11/22/2021 6.0     Protein, UA 11/22/2021 NEGATIVE     Urobilinogen, Urine 11/22/2021 Normal     Nitrite, Urine 11/22/2021 NEGATIVE     Leukocyte Esterase, Urine 11/22/2021 NEGATIVE     Urinalysis Comments 11/22/2021 Microscopic exam not performed based on chemical results unless requested in original order.  Specimen Description 11/22/2021 . CLEAN CATCH URINE     Special Requests 11/22/2021 NOT REPORTED     Culture 11/22/2021 NO GROWTH     Specimen Description 11/22/2021 . NASAL SWAB     MRSA, DNA, Nasal 11/22/2021 NEGATIVE:  MRSA DNA not detected by nucleic acid amplification.      POC Glucose 11/22/2021 172*    Hemoglobin A1C 11/22/2021 6.0     Estimated Avg Glucose 11/22/2021 126     Glucose 11/23/2021 149*    BUN 11/23/2021 25*    CREATININE 11/23/2021 1.52*    Bun/Cre Ratio 11/23/2021 16     Calcium 11/23/2021 7.9*    Sodium 11/23/2021 137     Potassium 11/23/2021 4.4     Chloride 11/23/2021 102     CO2 11/23/2021 23     Anion Gap 11/23/2021 12     GFR Non- 11/23/2021 46*    GFR  11/23/2021 55*    GFR Comment 11/23/2021          GFR Staging 11/23/2021 NOT REPORTED     WBC 11/23/2021 14.6*    RBC 11/23/2021 3.44*    Hemoglobin 11/23/2021 11.3*    Hematocrit 11/23/2021 35.0*    MCV 11/23/2021 101.7     MCH 11/23/2021 32.8     MCHC 11/23/2021 32.3     RDW 11/23/2021 13.4     Platelets 79/61/6710 176     MPV 11/23/2021 11.1     NRBC Automated 11/23/2021 0.0     POC Glucose 11/22/2021 202*    POC Glucose 11/23/2021 139*    POC Glucose 11/23/2021 163*   Hospital Outpatient Visit on 09/27/2021   Component Date Value    WBC 09/27/2021 10.2     RBC 09/27/2021 4.78     Hemoglobin 09/27/2021 15.9     Hematocrit 09/27/2021 47.5     MCV 09/27/2021 99.4     MCH 09/27/2021 33.3     MCHC 09/27/2021 33.5     RDW 09/27/2021 13.5     Platelets 20/29/8288 175     MPV 09/27/2021 10.5     NRBC Automated 09/27/2021 0.0     Differential Type 09/27/2021 NOT REPORTED     Seg Neutrophils 09/27/2021 69*    Lymphocytes 09/27/2021 22*    Monocytes 09/27/2021 7     Eosinophils % 09/27/2021 1     Basophils 09/27/2021 1     Immature Granulocytes 09/27/2021 0     Segs Absolute 09/27/2021 6.97     Absolute Lymph # 09/27/2021 2.26     Absolute Mono # 09/27/2021 0.76     Absolute Eos # 09/27/2021 0.13     Basophils Absolute 09/27/2021 0.06     Absolute Immature Granul* 09/27/2021 0.04     WBC Morphology 09/27/2021 NOT REPORTED     RBC Morphology 09/27/2021 NOT REPORTED     Platelet Estimate 19/47/1815 NOT REPORTED     PTT 09/27/2021 39.0*    Protime 09/27/2021 12.7     INR 09/27/2021 1.0     Glucose 09/27/2021 119*    BUN 09/27/2021 15     CREATININE 09/27/2021 1.19     Bun/Cre Ratio 09/27/2021 13     Calcium 09/27/2021 9.6  Sodium 09/27/2021 137     Potassium 09/27/2021 4.6     Chloride 09/27/2021 102     CO2 09/27/2021 22     Anion Gap 09/27/2021 13     GFR Non- 09/27/2021 >60     GFR  09/27/2021 >60     GFR Comment 09/27/2021          GFR Staging 09/27/2021 NOT REPORTED     Color, UA 09/27/2021 Yellow     Turbidity UA 09/27/2021 Clear     Glucose, Ur 09/27/2021 NEGATIVE     Bilirubin Urine 09/27/2021 NEGATIVE     Ketones, Urine 09/27/2021 NEGATIVE     Specific [place], UA 09/27/2021 1.015     Urine Hgb 09/27/2021 NEGATIVE     pH, UA 09/27/2021 5.5     Protein, UA 09/27/2021 NEGATIVE     Urobilinogen, Urine 09/27/2021 Normal     Nitrite, Urine 09/27/2021 NEGATIVE     Leukocyte Esterase, Urine 09/27/2021 NEGATIVE     Urinalysis Comments 09/27/2021 Microscopic exam not performed based on chemical results unless requested in original order.  Specimen Description 09/27/2021 . [address] URINE     Special Requests 09/27/2021 NOT REPORTED     Culture 09/27/2021 NO SIGNIFICANT GROWTH     Specimen Description 09/27/2021 . NASAL SWAB     MRSA, DNA, Nasal 09/27/2021 NEGATIVE:  MRSA DNA not detected by nucleic acid amplification.  Albumin 09/27/2021 4.4     Alkaline Phosphatase 09/27/2021 83     ALT 09/27/2021 19     AST 09/27/2021 20     Total Bilirubin 09/27/2021 0.34     Bilirubin, Direct 09/27/2021 0.12     Bilirubin, Indirect 09/27/2021 0.22     Total Protein 09/27/2021 8.0     Globulin 09/27/2021 NOT REPORTED     Albumin/Globulin Ratio 09/27/2021 NOT REPORTED     Hemoglobin A1C 09/27/2021 6.0     Estimated Avg Glucose 09/27/2021 126        Assessment and Plan      1. Anxiety  2. Neuropathy  3.  At high risk for falls     Labs up-to-date  We'll increase Lyrica from 100 twice daily to 150 twice daily at next refill  Will return Ativan to 30 tabs monthly due to his wife passed away recently  Follow-up in 3 months sooner as needed            No results found for this visit on 02/22/22. Return in about 4 months (around 6/22/2022), or if symptoms worsen or fail to improve. No orders of the defined types were placed in this encounter. Patient given educational materials - see patient instructions. Discussed use, benefit, and side effects of prescribed medications. All patientquestions answered. Pt voiced understanding. Patient given educational materials - see patient instructions. Discussed use, benefit, and side effects of prescribed medications. All patientquestions answered. Pt voiced understanding. This note was transcribed using dictation with Dragon services. Efforts were made to correct any errors but some words may be misinterpreted. Patient assumes risks associated with failure to complete recommended testing and treatments in a timely manner    Electronically signed by KELSY Ubrano CNP on 2/22/2022at 5:19 PM          On the basis of positive falls risk screening, assessment and plan is as follows: home safety tips provided.

## 2022-03-03 DIAGNOSIS — I10 ESSENTIAL HYPERTENSION: ICD-10-CM

## 2022-03-03 DIAGNOSIS — K74.69 OTHER CIRRHOSIS OF LIVER (HCC): ICD-10-CM

## 2022-03-03 RX ORDER — SPIRONOLACTONE 50 MG/1
TABLET, FILM COATED ORAL
Qty: 90 TABLET | Refills: 1 | Status: SHIPPED | OUTPATIENT
Start: 2022-03-03

## 2022-03-04 ENCOUNTER — TELEPHONE (OUTPATIENT)
Dept: FAMILY MEDICINE CLINIC | Age: 72
End: 2022-03-04

## 2022-03-04 DIAGNOSIS — E11.22 TYPE 2 DIABETES MELLITUS WITH CHRONIC KIDNEY DISEASE, WITHOUT LONG-TERM CURRENT USE OF INSULIN, UNSPECIFIED CKD STAGE (HCC): Primary | ICD-10-CM

## 2022-03-04 NOTE — TELEPHONE ENCOUNTER
Patient called stating that since his wife passed, he is no longer on her insurance as of March 1st and that his Diaz Rasp is now $500 for him. The patient states that he did apply for the [facility] program but for now, he was hoping that a generic alternative could be sent over to [facility] for him. Please advise.

## 2022-03-06 RX ORDER — GLIMEPIRIDE 2 MG/1
2 TABLET ORAL
Qty: 90 TABLET | Refills: 1 | Status: ON HOLD | OUTPATIENT
Start: 2022-03-06 | End: 2022-04-23

## 2022-03-10 DIAGNOSIS — G62.9 NEUROPATHY: Primary | ICD-10-CM

## 2022-03-10 DIAGNOSIS — M54.16 CHRONIC LUMBAR RADICULOPATHY: ICD-10-CM

## 2022-03-10 RX ORDER — PREGABALIN 100 MG/1
100 CAPSULE ORAL 2 TIMES DAILY
Qty: 60 CAPSULE | Refills: 2 | Status: CANCELLED | OUTPATIENT
Start: 2022-03-10 | End: 2022-04-09

## 2022-03-10 RX ORDER — PREGABALIN 100 MG/1
100 CAPSULE ORAL 2 TIMES DAILY
COMMUNITY
End: 2022-03-10

## 2022-03-10 RX ORDER — PREGABALIN 150 MG/1
150 CAPSULE ORAL 2 TIMES DAILY
Qty: 60 CAPSULE | Refills: 0 | Status: SHIPPED | OUTPATIENT
Start: 2022-03-10 | End: 2022-04-12 | Stop reason: SDUPTHER

## 2022-03-28 ENCOUNTER — OFFICE VISIT (OUTPATIENT)
Dept: PODIATRY | Age: 72
End: 2022-03-28
Payer: COMMERCIAL

## 2022-03-28 VITALS — BODY MASS INDEX: 32.91 KG/M2 | RESPIRATION RATE: 17 BRPM | HEIGHT: 72 IN | WEIGHT: 243 LBS

## 2022-03-28 DIAGNOSIS — I73.9 PVD (PERIPHERAL VASCULAR DISEASE) (HCC): Primary | ICD-10-CM

## 2022-03-28 DIAGNOSIS — M79.605 PAIN IN BOTH LOWER EXTREMITIES: ICD-10-CM

## 2022-03-28 DIAGNOSIS — M79.2 NEURITIS: ICD-10-CM

## 2022-03-28 DIAGNOSIS — M79.604 PAIN IN BOTH LOWER EXTREMITIES: ICD-10-CM

## 2022-03-28 PROCEDURE — 99213 OFFICE O/P EST LOW 20 MIN: CPT | Performed by: PODIATRIST

## 2022-03-28 RX ORDER — HYDROCODONE BITARTRATE AND ACETAMINOPHEN 5; 325 MG/1; MG/1
1 TABLET ORAL EVERY 6 HOURS PRN
Qty: 28 TABLET | Refills: 0 | Status: SHIPPED | OUTPATIENT
Start: 2022-03-28 | End: 2022-04-04

## 2022-03-28 NOTE — PROGRESS NOTES
[address] [facility] PODIATRY [facility]  [address] [place] [address]  Dept: [phone]  Dept Fax: [phone]    RETURN PATIENT PROGRESS NOTE  Date of patient's visit: 3/28/2022  Patient's Name:  Karen Hilliard YOB: 1950            Patient Care Team:  KELSY Allen CNP as PCP - General (Certified Nurse Practitioner)  KELSY Allen CNP as PCP - [facility]  Della Calhoun MD as Consulting Physician (Gastroenterology)  Easton Jimenez MD as Surgeon (Vascular Surgery)  Sharon Dumont as Certified Registered Nurse (Gastroenterology)  Nata Lopez MD as Consulting Physician (Pain Management)  Nellie Anderson MD as Consulting Physician (Rheumatology)  Jennie Rowland RN as Nurse Navigator  Tila Burns DPM as Physician (Podiatry)       Karen Hilliard 81 y.o. male that presents for follow-up of   Chief Complaint   Patient presents with    Foot Pain     Complains of bilateral foot pain right foot is worse onset x3 years ago      Pt's primary care physician is KELSY Allen CNP last seen 02/22/2022  Symptoms began 3 year(s) ago and are unchanged . Patient relates pain is Present to right foot. States pain radiate up his leg. Pain is rated 5 out of 10 and is described as constant, moderate. Treatments prior to today's visit include: medication. Currently denies F/C/N/V.      Allergies   Allergen Reactions    Nsaids      Because of Kidney issues        Past Medical History:   Diagnosis Date    Abdominal varicosities 07/15/2020    Anxiety state NEC     Aortic valve defect     Arthritis     DJD    Sol esophagus 10/24/2013    small segment    Chronic kidney disease     Chronic kidney disease     Cirrhosis ([facility] [address]     had paracentesis Sept 2015    Colon polyps 10/07/2019    tubular adenoma x2; hyperplastic polyp    Diabetes mellitus ([address]     pre diabetic on januvia    Diverticulosis of colon     Enlarged heart 12/28/2015    HISTORY OF, is resolved at this time    Gout 12/28/2015    is resolved at this time    Hepatic cirrhosis ([facility]     Hepatic cyst     Hepatitis C, chronic (HCC)     Seeing Dr. Andrey Friedman MD at [facility] for Liver Transplant    History of alcohol use 9/18/2015    Hyperlipidemia     Hypertension     Lumbago     Lymphedema     Malignant neoplasm of prostate ([facility]     prostate    Otalgia of right ear     radiates down the jaw in the distribution of the third branch of the trigemminal nerve.  Portal venous hypertension ([facility] 07/15/2020    S/P prostatectomy 2012    Sciatica     Splenomegaly 07/15/2020       Prior to Admission medications    Medication Sig Start Date End Date Taking? Authorizing Provider   HYDROcodone-acetaminophen (NORCO) 5-325 MG per tablet Take 1 tablet by mouth every 6 hours as needed for Pain for up to 7 days. Intended supply: 7 days. Take lowest dose possible to manage pain 3/28/22 4/4/22 Yes Stella Alvarez DPM   pregabalin (LYRICA) 150 MG capsule Take 1 capsule by mouth 2 times daily for 30 days.  3/10/22 4/9/22 Yes KELSY Castillo CNP   glimepiride (AMARYL) 2 MG tablet Take 1 tablet by mouth every morning (before breakfast) 3/6/22  Yes KELSY Castillo CNP   spironolactone (ALDACTONE) 50 MG tablet TAKE 1 TABLET BY MOUTH ONE TIME A DAY 3/3/22  Yes KELSY Castillo CNP   omeprazole (PRILOSEC) 20 MG delayed release capsule TAKE 2 CAPSULES BY MOUTH DAILY 1/10/22 1/10/23 Yes KELSY Castillo CNP   sennosides-docusate sodium (SENOKOT-S) 8.6-50 MG tablet Take 1 tablet by mouth 2 times daily 11/23/21  Yes Yaa Reyes MD   melatonin 3 MG TABS tablet Take 10 mg by mouth daily   Yes Historical Provider, MD   sodium hyaluronate (EUFLEXXA) 20 MG/2ML SOSY injection Inject 2 mLs into the articular space once a week Series of three injections for the right knee 9/24/21  Yes Carol Cote DO tamsulosin (FLOMAX) 0.4 MG capsule Take 0.4 mg by mouth daily   Yes Historical Provider, MD   vitamin B-12 (CYANOCOBALAMIN) 500 MCG tablet Take 500 mcg by mouth every other day   Yes Historical Provider, MD   Cholecalciferol (VITAMIN D) 50 MCG (2000 UT) CAPS capsule Take 1 capsule by mouth daily 2/24/20  Yes Kusum Khan Alis,    NIFEdipine (PROCARDIA XL) 60 MG extended release tablet Take 60 mg by mouth daily  6/12/19  Yes Historical Provider, MD   allopurinol (ZYLOPRIM) 300 MG tablet Take 600 mg by mouth daily Takes 2 tabs (=600mg) daily 3/27/19  Yes Veto English, APRN - CNP   furosemide (LASIX) 40 MG tablet Take 1 tablet by mouth 2 times daily  Patient taking differently: Take 80 mg by mouth daily  4/8/21 11/22/21  Yashira Stover MD       Review of Systems    Review of Systems:  History obtained from chart review and the patient  General ROS: negative for - chills, fatigue, fever, night sweats or weight gain  Constitutional: Negative for chills, diaphoresis, fatigue, fever and unexpected weight change. Musculoskeletal: Positive for arthralgias, gait problem and joint swelling. Neurological ROS: negative for - behavioral changes, confusion, headaches or seizures. Negative for weakness and numbness. Dermatological ROS: negative for - mole changes, rash  Cardiovascular: Negative for leg swelling. Gastrointestinal: Negative for constipation, diarrhea, nausea and vomiting. Lower Extremity Physical Examination:     Vitals:   Vitals:    03/28/22 1449   Resp: 17     General: AAO x 3 in NAD. Dermatologic Exam:  Skin lesion/ulceration Absent . Skin No rashes or nodules noted. .       Musculoskeletal:     1st MPJ ROM decreased, Bilateral.  Muscle strength 5/5, Bilateral.  Pain present upon palpation of right foot along lateral foot. Medial longitudinal arch, Bilateral WNL.   Ankle ROM WNL,Bilateral.    Dorsally contracted digits absent digits 1-5 Bilateral.     Vascular: DP and PT pulses palpable 0/4, Bilateral.  CFT <3 seconds, Bilateral.  Hair growth present to the level of the digits, Bilateral.  Edema absent, Bilateral.  Varicosities absent, Bilateral. Erythema absent, Bilateral    Neurological: Sensation intact to light touch to level of digits, Bilateral.  Protective sensation intact 10/10 sites via 5.07/10g [place]Amy Monofilament, Bilateral.  negative Tinel's, Bilateral.  negative Valleix sign, Bilateral.      Integument: Warm, dry, supple, Bilateral.  Open lesion absent, Bilateral.  Interdigital maceration absent to web spaces 1-4, Bilateral.  Nails are normal in length, thickness and color 1-5 bilateral.  Fissures absent, Bilateral.         Asessment: Patient is a 81 y.o. male with:   1. PVD (peripheral vascular disease) (HCC)    2. Pain in both lower extremities    3. Neuritis        Plan: Patient examined and evaluated. Current condition and treatment options discussed in detail. Advised pt to get vascular studies for further evaluation and treatment. Recommend pt to f/u with his neurologist. All labs were reviewed and all imagining including the above findings were reviewed PRIOR to the patients arrival and with the patient today. Previous patient encounter was reviewed. Encounters from the patients other medical providers were reviewed and noted. Time was spent educating the patient on proper care of the feet and ankles. All the above diagnosis were addressed at todays visit and all questions were answered. A total of 25 minutes was spent with this patients encounter which included charting after the patients visit    . Verbal and written instructions given to patient. Contact office with any questions/problems/concerns.      Orders Placed This Encounter   Procedures    VL LOWER EXTREMITY ARTERIAL SEGMENTAL PRESSURES W PPG     Standing Status:   Future     Standing Expiration Date:   6/28/2022     Order Specific Question:   Reason for exam:     Answer:   cool distal extremities Orders Placed This Encounter   Medications    HYDROcodone-acetaminophen (NORCO) 5-325 MG per tablet     Sig: Take 1 tablet by mouth every 6 hours as needed for Pain for up to 7 days. Intended supply: 7 days.  Take lowest dose possible to manage pain     Dispense:  28 tablet     Refill:  0     Reduce doses taken as pain becomes manageable        RTC in 2month(s).    3/28/2022      Electronically signed by Lisa Chaney DPM on 3/28/2022 at 3:23 PM  3/28/2022

## 2022-04-09 ENCOUNTER — APPOINTMENT (OUTPATIENT)
Dept: GENERAL RADIOLOGY | Age: 72
End: 2022-04-09
Payer: MEDICARE

## 2022-04-09 ENCOUNTER — HOSPITAL ENCOUNTER (EMERGENCY)
Age: 72
Discharge: HOME | End: 2022-04-09
Attending: EMERGENCY MEDICINE
Payer: MEDICARE

## 2022-04-09 VITALS
RESPIRATION RATE: 16 BRPM | HEIGHT: 72 IN | DIASTOLIC BLOOD PRESSURE: 73 MMHG | TEMPERATURE: 97.8 F | WEIGHT: 250 LBS | OXYGEN SATURATION: 91 % | SYSTOLIC BLOOD PRESSURE: 118 MMHG | HEART RATE: 51 BPM | BODY MASS INDEX: 33.86 KG/M2

## 2022-04-09 DIAGNOSIS — R10.9 RIGHT-SIDED ABDOMINAL PAIN OF UNKNOWN CAUSE: Primary | ICD-10-CM

## 2022-04-09 LAB
ABSOLUTE EOS #: 0.12 K/UL (ref 0–0.44)
ABSOLUTE IMMATURE GRANULOCYTE: 0.02 K/UL (ref 0–0.3)
ABSOLUTE LYMPH #: 2.46 K/UL (ref 1.1–3.7)
ABSOLUTE MONO #: 0.78 K/UL (ref 0.1–1.2)
ALBUMIN SERPL-MCNC: 4 G/DL (ref 3.5–5.2)
ALP BLD-CCNC: 85 U/L (ref 40–129)
ALT SERPL-CCNC: 9 U/L (ref 5–41)
ANION GAP SERPL CALCULATED.3IONS-SCNC: 12 MMOL/L (ref 9–17)
AST SERPL-CCNC: 12 U/L
BASOPHILS # BLD: 1 % (ref 0–2)
BASOPHILS ABSOLUTE: 0.05 K/UL (ref 0–0.2)
BILIRUB SERPL-MCNC: 0.15 MG/DL (ref 0.3–1.2)
BILIRUBIN DIRECT: <0.08 MG/DL
BILIRUBIN URINE: NEGATIVE
BILIRUBIN, INDIRECT: ABNORMAL MG/DL (ref 0–1)
BUN BLDV-MCNC: 19 MG/DL (ref 8–23)
BUN/CREAT BLD: 16 (ref 9–20)
CALCIUM SERPL-MCNC: 8.6 MG/DL (ref 8.6–10.4)
CHLORIDE BLD-SCNC: 104 MMOL/L (ref 98–107)
CO2: 23 MMOL/L (ref 20–31)
COLOR: YELLOW
COMMENT UA: NORMAL
CREAT SERPL-MCNC: 1.22 MG/DL (ref 0.7–1.2)
EOSINOPHILS RELATIVE PERCENT: 1 % (ref 1–4)
GFR AFRICAN AMERICAN: >60 ML/MIN
GFR NON-AFRICAN AMERICAN: 59 ML/MIN
GFR SERPL CREATININE-BSD FRML MDRD: ABNORMAL ML/MIN/{1.73_M2}
GLUCOSE BLD-MCNC: 132 MG/DL (ref 70–99)
GLUCOSE URINE: NEGATIVE
HCT VFR BLD CALC: 41.4 % (ref 40.7–50.3)
HEMOGLOBIN: 13.3 G/DL (ref 13–17)
IMMATURE GRANULOCYTES: 0 %
KETONES, URINE: NEGATIVE
LACTIC ACID, SEPSIS: 0.9 MMOL/L (ref 0.5–1.9)
LACTIC ACID, SEPSIS: 1.2 MMOL/L (ref 0.5–1.9)
LEUKOCYTE ESTERASE, URINE: NEGATIVE
LIPASE: 45 U/L (ref 13–60)
LYMPHOCYTES # BLD: 26 % (ref 24–43)
MCH RBC QN AUTO: 32.1 PG (ref 25.2–33.5)
MCHC RBC AUTO-ENTMCNC: 32.1 G/DL (ref 28.4–34.8)
MCV RBC AUTO: 100 FL (ref 82.6–102.9)
MONOCYTES # BLD: 8 % (ref 3–12)
NITRITE, URINE: NEGATIVE
NRBC AUTOMATED: 0 PER 100 WBC
PDW BLD-RTO: 14.3 % (ref 11.8–14.4)
PH UA: 6 (ref 5–8)
PLATELET # BLD: 194 K/UL (ref 138–453)
PMV BLD AUTO: 10.4 FL (ref 8.1–13.5)
POTASSIUM SERPL-SCNC: 4.1 MMOL/L (ref 3.7–5.3)
PROTEIN UA: NEGATIVE
RBC # BLD: 4.14 M/UL (ref 4.21–5.77)
SEG NEUTROPHILS: 64 % (ref 36–65)
SEGMENTED NEUTROPHILS ABSOLUTE COUNT: 6.15 K/UL (ref 1.5–8.1)
SODIUM BLD-SCNC: 139 MMOL/L (ref 135–144)
SPECIFIC GRAVITY UA: 1.02 (ref 1–1.03)
TOTAL PROTEIN: 7 G/DL (ref 6.4–8.3)
TROPONIN, HIGH SENSITIVITY: 18 NG/L (ref 0–22)
TROPONIN, HIGH SENSITIVITY: 19 NG/L (ref 0–22)
TURBIDITY: CLEAR
URINE HGB: NEGATIVE
UROBILINOGEN, URINE: NORMAL
WBC # BLD: 9.6 K/UL (ref 3.5–11.3)

## 2022-04-09 PROCEDURE — 80076 HEPATIC FUNCTION PANEL: CPT

## 2022-04-09 PROCEDURE — 71045 X-RAY EXAM CHEST 1 VIEW: CPT

## 2022-04-09 PROCEDURE — 81003 URINALYSIS AUTO W/O SCOPE: CPT

## 2022-04-09 PROCEDURE — 80048 BASIC METABOLIC PNL TOTAL CA: CPT

## 2022-04-09 PROCEDURE — 83605 ASSAY OF LACTIC ACID: CPT

## 2022-04-09 PROCEDURE — 96374 THER/PROPH/DIAG INJ IV PUSH: CPT

## 2022-04-09 PROCEDURE — 93005 ELECTROCARDIOGRAM TRACING: CPT | Performed by: EMERGENCY MEDICINE

## 2022-04-09 PROCEDURE — 36415 COLL VENOUS BLD VENIPUNCTURE: CPT

## 2022-04-09 PROCEDURE — 2580000003 HC RX 258: Performed by: EMERGENCY MEDICINE

## 2022-04-09 PROCEDURE — 84484 ASSAY OF TROPONIN QUANT: CPT

## 2022-04-09 PROCEDURE — 99283 EMERGENCY DEPT VISIT LOW MDM: CPT

## 2022-04-09 PROCEDURE — 83690 ASSAY OF LIPASE: CPT

## 2022-04-09 PROCEDURE — 85025 COMPLETE CBC W/AUTO DIFF WBC: CPT

## 2022-04-09 PROCEDURE — 6360000002 HC RX W HCPCS: Performed by: EMERGENCY MEDICINE

## 2022-04-09 RX ORDER — ONDANSETRON 2 MG/ML
4 INJECTION INTRAMUSCULAR; INTRAVENOUS ONCE
Status: COMPLETED | OUTPATIENT
Start: 2022-04-09 | End: 2022-04-09

## 2022-04-09 RX ORDER — HYDROMORPHONE HYDROCHLORIDE 1 MG/ML
1 INJECTION, SOLUTION INTRAMUSCULAR; INTRAVENOUS; SUBCUTANEOUS ONCE
Status: COMPLETED | OUTPATIENT
Start: 2022-04-09 | End: 2022-04-09

## 2022-04-09 RX ORDER — 0.9 % SODIUM CHLORIDE 0.9 %
1000 INTRAVENOUS SOLUTION INTRAVENOUS ONCE
Status: COMPLETED | OUTPATIENT
Start: 2022-04-09 | End: 2022-04-09

## 2022-04-09 RX ORDER — OXYCODONE HYDROCHLORIDE AND ACETAMINOPHEN 5; 325 MG/1; MG/1
1 TABLET ORAL EVERY 6 HOURS PRN
Qty: 10 TABLET | Refills: 0 | Status: SHIPPED | OUTPATIENT
Start: 2022-04-09 | End: 2022-04-12

## 2022-04-09 RX ADMIN — HYDROMORPHONE HYDROCHLORIDE 1 MG: 1 INJECTION, SOLUTION INTRAMUSCULAR; INTRAVENOUS; SUBCUTANEOUS at 07:49

## 2022-04-09 RX ADMIN — ONDANSETRON 4 MG: 2 INJECTION INTRAMUSCULAR; INTRAVENOUS at 07:49

## 2022-04-09 RX ADMIN — SODIUM CHLORIDE 1000 ML: 9 INJECTION, SOLUTION INTRAVENOUS at 07:53

## 2022-04-09 ASSESSMENT — ENCOUNTER SYMPTOMS
VOMITING: 0
COLOR CHANGE: 0
DIARRHEA: 0
SORE THROAT: 0
EYE REDNESS: 0
COUGH: 0
NAUSEA: 0
EYE DISCHARGE: 0
SHORTNESS OF BREATH: 0
ABDOMINAL PAIN: 1
RHINORRHEA: 0

## 2022-04-09 ASSESSMENT — PAIN - FUNCTIONAL ASSESSMENT: PAIN_FUNCTIONAL_ASSESSMENT: 0-10

## 2022-04-09 ASSESSMENT — PAIN SCALES - GENERAL
PAINLEVEL_OUTOF10: 9
PAINLEVEL_OUTOF10: 9

## 2022-04-09 ASSESSMENT — PAIN DESCRIPTION - ORIENTATION: ORIENTATION: RIGHT

## 2022-04-09 ASSESSMENT — PAIN DESCRIPTION - FREQUENCY: FREQUENCY: CONTINUOUS

## 2022-04-09 ASSESSMENT — PAIN DESCRIPTION - PAIN TYPE: TYPE: ACUTE PAIN

## 2022-04-09 NOTE — ED PROVIDER NOTES
EMERGENCY DEPARTMENT ENCOUNTER    Pt Name: Kulwant Friedman  MRN: [id number]  [place] 1950  Date of evaluation: 4/9/22  CHIEF COMPLAINT       Chief Complaint   Patient presents with    Back Pain     onset early this morning, CKD and cirrosis. pamcreatitis    Flank Pain     right side     HISTORY OF PRESENT ILLNESS   This is a 68-year-old male with a history of alcoholic pancreatitis and alcohol-related cirrhosis of the liver that presents with complaints of pain and discomfort in his epigastrium radiating to his right side and behind his right shoulder. Patient states that around 3:00 this morning he began having some pain and discomfort, this woke him from sleeping. The patient states that he has had similar symptoms previously related to pancreatitis. Patient states that he does not have any shortness of breath, he denies nausea or vomiting. He states that he does not have any coronary artery disease but does have a previous history of a bovine valve replacement. REVIEW OF SYSTEMS     Review of Systems   Constitutional: Negative for chills and fever. HENT: Negative for rhinorrhea and sore throat. Eyes: Negative for discharge, redness and visual disturbance. Respiratory: Negative for cough and shortness of breath. Cardiovascular: Positive for chest pain. Negative for palpitations and leg swelling. Gastrointestinal: Positive for abdominal pain. Negative for diarrhea, nausea and vomiting. Genitourinary: Negative for dysuria and hematuria. Musculoskeletal: Negative for arthralgias, myalgias and neck pain. Skin: Negative for color change and rash. Neurological: Negative for seizures, weakness and headaches. Psychiatric/Behavioral: Negative for hallucinations, self-injury and suicidal ideas.      PASTMEDICAL HISTORY     Past Medical History:   Diagnosis Date    Abdominal varicosities 07/15/2020    Anxiety state NEC     Aortic valve defect     Arthritis     DJD    Sol esophagus 10/24/2013    small segment    Chronic kidney disease     Chronic kidney disease     Cirrhosis ([address]     had paracentesis Sept 2015    Colon polyps 10/07/2019    tubular adenoma x2; hyperplastic polyp    Diabetes mellitus ([address]     pre diabetic on januvia    Diverticulosis of colon     Enlarged heart 12/28/2015    HISTORY OF, is resolved at this time    Gout 12/28/2015    is resolved at this time    Hepatic cirrhosis ([address]     Hepatic cyst     Hepatitis C, chronic ([address]     Seeing Dr. Stacey Fang MD at [facility] for [facility] Trans[facility]    History of alcohol use 9/18/2015    Hyperlipidemia     Hypertension     Lumbago     Lymphedema     Malignant neoplasm of prostate ([address]     prostate    Otalgia of right ear     radiates down the jaw in the distribution of the third branch of the trigemminal nerve.     Portal venous hypertension ([address] 07/15/2020    S/P prostatectomy 2012    Sciatica     Splenomegaly 07/15/2020     Past Problem List  Patient Active Problem List   Diagnosis Code    Disc displacement, lumbar M51.26    Chronic hepatitis C with cirrhosis ([address] B18.2, K74.60    Dilated cardiomyopathy I42.0    Gout M10.9    Diabetes type 2, controlled E11.9    Prostate cancer ([address] C61    Severe aortic insufficiency I35.1    Incisional hernia K43.2    Aortic atherosclerosis ([address] I70.0    Cervicalgia M54.2    Sciatica M54.30    Diverticulosis of colon K57.30    Ascites due to alcoholic cirrhosis ([address] X45.09    Essential hypertension I10    History of alcohol use Z87.898    Hypomagnesemia E83.42    Chronic hepatitis C virus infection ([address] B18.2    Acquired hypothyroidism E03.9    Chondromalacia patellae M22.40    Awaiting organ transplant status Z76.82    Varicose veins of left lower extremity I83.92    Anxiety F41.9    Colon polyps K63.5    History of hepatitis C Z86.19    Right lumbar radiculitis M54.16    Post laminectomy syndrome M96.1    Hepatic cyst K76.89    Severe comorbid illness R69    Primary osteoarthritis of both knees M17.0    Chronic lumbar radiculopathy M54.16    Sol esophagus K22.70    CKD (chronic kidney disease) stage 3, GFR 30-59 ml/min (HCC) N18.30    Chronic tophaceous gout M1A. 9XX1    Abdominal pain R10.9    Tear of right acetabular labrum S73.191A    Hepatic cirrhosis (HCC) K74.60    Tinnitus H93.19    Chronic renal failure syndrome, stage 3 (moderate) (HCC) N18.30    Nephropathy N28.9    Type 2 diabetes mellitus with kidney complication, without long-term current use of insulin (HCC) E11.29    Dyslipidemia E78.5    MULU (acute kidney injury) ([address] N17.9    Acute cystitis without hematuria N30.00    Elevated d-dimer R79.89    ESBL (extended spectrum beta-lactamase) producing bacteria infection A49.9, Z16.12    Portal venous hypertension (HCC) K76.6    Abdominal varicosities I86.8    Splenomegaly R16.1    Morbid obesity (HCC) E66.01    Adenomatous polyp of ascending colon D12.2    Gastroesophageal reflux disease K21.9    Spondylolisthesis, acquired M43.10    Foraminal stenosis of lumbar region M48.061     SURGICAL HISTORY       Past Surgical History:   Procedure Laterality Date    AORTIC VALVE REPAIR  06/03/2020    [address] SURGERY      L4-5    CARDIAC CATHETERIZATION  10.30/2019    Dr. Candice Myles COLONOSCOPY  12/05/2016    [facility], states due for another in 2  years    COLONOSCOPY N/A 10/07/2019    tubular adenoma x2; hyperplastic polyp    FINGER TRIGGER RELEASE Right 02/24/2021    pinky finger    FINGER TRIGGER RELEASE Right 2/24/2021    RIGHT SMALL FINGER TRIGGER RELEASE performed by Brandin Toribio DO at [facility] ARTHROSCOPY Right     LUMBAR FUSION Right 11/22/2021    RIGHT L4 TO S1 T-LIF    2C-ARMS   Blanquita Moscoso performed by Mary Alva MD at [address] Right 01/25/2016    10 liters removed peracentesis    PARACENTESIS  2015    PARACENTESIS  2015    MT EGD TRANSORAL BIOPSY SINGLE/MULTIPLE N/A 2017    EGD BIOPSY performed by Shann Spurling, [facility] at [facility]  2011    TONSILLECTOMY      UPPER GASTROINTESTINAL ENDOSCOPY  10/24/2013    small segment barretts    UPPER GASTROINTESTINAL ENDOSCOPY  2017    VEIN SURGERY Left 2016    leg vein stripping    VENTRAL HERNIA REPAIR  2015     CURRENT MEDICATIONS       Previous Medications    ALLOPURINOL (ZYLOPRIM) 300 MG TABLET    Take 600 mg by mouth daily Takes 2 tabs (=600mg) daily    CHOLECALCIFEROL (VITAMIN D) 50 MCG (2000 UT) CAPS CAPSULE    Take 1 capsule by mouth daily    FUROSEMIDE (LASIX) 40 MG TABLET    Take 1 tablet by mouth 2 times daily    GLIMEPIRIDE (AMARYL) 2 MG TABLET    Take 1 tablet by mouth every morning (before breakfast)    MELATONIN 3 MG TABS TABLET    Take 10 mg by mouth daily    NIFEDIPINE (PROCARDIA XL) 60 MG EXTENDED RELEASE TABLET    Take 60 mg by mouth daily     OMEPRAZOLE (PRILOSEC) 20 MG DELAYED RELEASE CAPSULE    TAKE 2 CAPSULES BY MOUTH DAILY    PREGABALIN (LYRICA) 150 MG CAPSULE    Take 1 capsule by mouth 2 times daily for 30 days. SENNOSIDES-DOCUSATE SODIUM (SENOKOT-S) 8.6-50 MG TABLET    Take 1 tablet by mouth 2 times daily    SODIUM HYALURONATE (EUFLEXXA) 20 MG/2ML SOSY INJECTION    Inject 2 mLs into the articular space once a week Series of three injections for the right knee    SPIRONOLACTONE (ALDACTONE) 50 MG TABLET    TAKE 1 TABLET BY MOUTH ONE TIME A DAY    TAMSULOSIN (FLOMAX) 0.4 MG CAPSULE    Take 0.4 mg by mouth daily    VITAMIN B-12 (CYANOCOBALAMIN) 500 MCG TABLET    Take 500 mcg by mouth every other day     ALLERGIES     is allergic to nsaids. FAMILY HISTORY     He indicated that his mother is alive. He indicated that his father is . He indicated that the status of his neg hx is unknown.      SOCIAL HISTORY       Social History     Tobacco Use    Smoking status: Former Smoker     Packs/day: 1.00     Years: 49.00     Pack years: 49.00     Types: Cigarettes     Start date: 1966     Quit date: 2015     Years since quittin.2    Smokeless tobacco: Never Used    Tobacco comment: relapsed quit smiking again 2021   Vaping Use    Vaping Use: Never used   Substance Use Topics    Alcohol use: Not Currently     Alcohol/week: 0.0 standard drinks    Drug use: Yes     Types: Marijuana (Weed)     Comment: edibles [facility] at [facility]: BP (!) 141/90   Pulse 50   Temp 97.8 °F (36.6 °C) (Oral)   Resp 18   Ht 6' (1.829 m)   Wt 250 lb (113.4 kg)   SpO2 95%   BMI 33.91 kg/m²    Physical Exam  Constitutional:       Appearance: Normal appearance. He is well-developed. He is not ill-appearing or toxic-appearing. HENT:      Head: Normocephalic and atraumatic. Eyes:      Conjunctiva/sclera: Conjunctivae normal.      Pupils: Pupils are equal, round, and reactive to light. Neck:      Trachea: Trachea normal.   Cardiovascular:      Rate and Rhythm: Normal rate and regular rhythm. Heart sounds: S1 normal and S2 normal. No murmur heard. Pulmonary:      Effort: Pulmonary effort is normal. No accessory muscle usage or respiratory distress. Breath sounds: Normal breath sounds. Chest:      Chest wall: No deformity or tenderness. Abdominal:      General: Bowel sounds are normal. There is no distension or abdominal bruit. Palpations: Abdomen is not rigid. Tenderness: There is no abdominal tenderness. There is no guarding or rebound. Musculoskeletal:      Cervical back: Normal range of motion and neck supple. Skin:     General: Skin is warm. Findings: No rash. Neurological:      Mental Status: He is alert and oriented to person, place, and time. GCS: GCS eye subscore is 4. GCS verbal subscore is 5. GCS motor subscore is 6.    Psychiatric:         Speech: Speech normal.         MEDICAL DECISION MAKING: 11year-old male presents with complaints of epigastric pain radiating to the right flank, patient states it is similar to previous episodes of pancreatitis however we have to consider some acute coronary syndrome versus other. Plan is basic labs cardiac enzymes abdominal labs pain control and reevaluation. 10:25 AM EDT  Patient's laboratory studies unremarkable, patient's x-rays negative. The patient has had complete resolution of his symptoms. I do not believe that this is an aortic dissection, there is no evidence of ACS or an acute surgical abdomen. Repeat abdominal exam was completely benign. Plan is discharge, we will order short course of pain control, ultrasound of the right upper quadrant as an outpatient and follow-up with PCP. CRITICAL CARE:       PROCEDURES:    Procedures    DIAGNOSTIC RESULTS   EKG:All EKG's are interpreted by the Emergency Department Physician who either signs or Co-signs this chart in the absence of a cardiologist.    Patient's EKG shows sinus bradycardia with a first-degree AV block, ventricular to 51 NV interval 216, QRS QTC intervals unremarkable left axis deviation no ST elevations, nonspecific T wave changes. Nonspecific EKG. RADIOLOGY:All plain film, CT, MRI, and formal ultrasound images (except ED bedside ultrasound) are read by the radiologist, see reports below, unless otherwisenoted in MDM or here. XR CHEST PORTABLE   Preliminary Result   Cardiomegaly and postthoracotomy changes as well as COPD. No evidence of   focal infiltrates or pulmonary edema. US GALLBLADDER RUQ    (Results Pending)     LABS: All lab results were reviewed by myself, and all abnormals are listed below.   Labs Reviewed   BASIC METABOLIC PANEL - Abnormal; Notable for the following components:       Result Value    Glucose 132 (*)     CREATININE 1.22 (*)     GFR Non- 59 (*)     All other components within normal limits   CBC WITH AUTO DIFFERENTIAL - Abnormal; Notable for the following components:    RBC 4.14 (*)     All other components within normal limits   HEPATIC FUNCTION PANEL - Abnormal; Notable for the following components: Total Bilirubin 0.15 (*)     All other components within normal limits   LIPASE   URINALYSIS   TROPONIN   TROPONIN   LACTATE, SEPSIS   LACTATE, SEPSIS       EMERGENCY DEPARTMENTCOURSE:         Vitals:    Vitals:    04/09/22 0651   BP: (!) 141/90   Pulse: 50   Resp: 18   Temp: 97.8 °F (36.6 °C)   TempSrc: Oral   SpO2: 95%   Weight: 250 lb (113.4 kg)   Height: 6' (1.829 m)       The patient was given the following medications while in the emergency department:  Orders Placed This Encounter   Medications    HYDROmorphone HCl PF (DILAUDID) injection 1 mg    ondansetron (ZOFRAN) injection 4 mg    0.9 % sodium chloride bolus    oxyCODONE-acetaminophen (PERCOCET) 5-325 MG per tablet     Sig: Take 1 tablet by mouth every 6 hours as needed for Pain for up to 3 days. WARNING:  May cause drowsiness. May impair ability to operate vehicles or machinery. Do not use in combination with alcohol. Dispense:  10 tablet     Refill:  0     CONSULTS:  None    FINAL IMPRESSION      1. Right-sided abdominal pain of unknown cause          DISPOSITION/PLAN   DISPOSITION Decision To Discharge 04/09/2022 10:23:18 AM      PATIENT REFERRED TO:  KELSY Dumont - [facility]  [address] [address]  [phone]    Schedule an appointment as soon as possible for a visit in 2 days      DISCHARGE MEDICATIONS:  New Prescriptions    OXYCODONE-ACETAMINOPHEN (PERCOCET) 5-325 MG PER TABLET    Take 1 tablet by mouth every 6 hours as needed for Pain for up to 3 days. WARNING:  May cause drowsiness. May impair ability to operate vehicles or machinery. Do not use in combination with alcohol. The care is provided during an unprecedented national emergency due to the novel coronavirus, COVID 19.   MD Jacoby Allison T Radhika Ellison MD  04/09/22 1020

## 2022-04-09 NOTE — ED TRIAGE NOTES
Pt to ED c/o R chest discomfort with radiation throught to R back. Pt AOx4. H/o pancreatitis 10 yrs ago. Started 0300 today. Denies nausea. Took Norco 5/325 x2 today with minimal relief. Patent airway, no dyspnea or cyanosis noted. Bed to lowest position, side rails up x2, call light within reach.

## 2022-04-10 LAB
EKG ATRIAL RATE: 51 BPM
EKG P AXIS: 48 DEGREES
EKG P-R INTERVAL: 216 MS
EKG Q-T INTERVAL: 468 MS
EKG QRS DURATION: 122 MS
EKG QTC CALCULATION (BAZETT): 431 MS
EKG R AXIS: -55 DEGREES
EKG T AXIS: 85 DEGREES
EKG VENTRICULAR RATE: 51 BPM

## 2022-04-10 PROCEDURE — 93010 ELECTROCARDIOGRAM REPORT: CPT | Performed by: INTERNAL MEDICINE

## 2022-04-12 DIAGNOSIS — G62.9 NEUROPATHY: ICD-10-CM

## 2022-04-12 DIAGNOSIS — F41.9 ANXIETY: ICD-10-CM

## 2022-04-12 DIAGNOSIS — M54.16 CHRONIC LUMBAR RADICULOPATHY: ICD-10-CM

## 2022-04-12 RX ORDER — LORAZEPAM 0.5 MG/1
TABLET ORAL
Qty: 20 TABLET | Refills: 0 | Status: SHIPPED | OUTPATIENT
Start: 2022-04-12 | End: 2022-05-12

## 2022-04-12 RX ORDER — PREGABALIN 150 MG/1
150 CAPSULE ORAL 2 TIMES DAILY
Qty: 180 CAPSULE | Refills: 0 | Status: SHIPPED | OUTPATIENT
Start: 2022-04-12 | End: 2022-07-11

## 2022-04-12 NOTE — TELEPHONE ENCOUNTER
Lov 2/22/2022    Patient is requesting that the Lyrica be a 90 day supply. Patient also states that he wants ALL of his prescriptions from here on out to be sent to the [facility] on [address] [place] [address].

## 2022-04-13 ENCOUNTER — HOSPITAL ENCOUNTER (OUTPATIENT)
Dept: ULTRASOUND IMAGING | Age: 72
Discharge: HOME | End: 2022-04-15
Payer: MEDICARE

## 2022-04-13 DIAGNOSIS — R10.9 RIGHT-SIDED ABDOMINAL PAIN OF UNKNOWN CAUSE: ICD-10-CM

## 2022-04-13 PROCEDURE — 76705 ECHO EXAM OF ABDOMEN: CPT

## 2022-04-19 ENCOUNTER — HOSPITAL ENCOUNTER (OUTPATIENT)
Dept: VASCULAR LAB | Age: 72
Discharge: HOME | End: 2022-04-19
Payer: MEDICARE

## 2022-04-19 DIAGNOSIS — I73.9 PVD (PERIPHERAL VASCULAR DISEASE) (HCC): ICD-10-CM

## 2022-04-19 PROCEDURE — 93923 UPR/LXTR ART STDY 3+ LVLS: CPT

## 2022-04-23 ENCOUNTER — APPOINTMENT (OUTPATIENT)
Dept: GENERAL RADIOLOGY | Age: 72
DRG: 389 | End: 2022-04-23
Payer: MEDICARE

## 2022-04-23 ENCOUNTER — APPOINTMENT (OUTPATIENT)
Dept: CT IMAGING | Age: 72
DRG: 389 | End: 2022-04-23
Payer: MEDICARE

## 2022-04-23 ENCOUNTER — HOSPITAL ENCOUNTER (INPATIENT)
Age: 72
LOS: 1 days | Discharge: HOME | DRG: 389 | End: 2022-04-24
Attending: EMERGENCY MEDICINE | Admitting: FAMILY MEDICINE
Payer: MEDICARE

## 2022-04-23 DIAGNOSIS — K56.609 SBO (SMALL BOWEL OBSTRUCTION) (HCC): Primary | ICD-10-CM

## 2022-04-23 DIAGNOSIS — N18.9 ACUTE KIDNEY INJURY SUPERIMPOSED ON CKD (HCC): ICD-10-CM

## 2022-04-23 DIAGNOSIS — N17.9 ACUTE KIDNEY INJURY SUPERIMPOSED ON CKD (HCC): ICD-10-CM

## 2022-04-23 PROBLEM — K70.30 ALCOHOLIC CIRRHOSIS OF LIVER WITHOUT ASCITES (HCC): Status: ACTIVE | Noted: 2022-04-23

## 2022-04-23 LAB
-: NORMAL
ABSOLUTE EOS #: 0.16 K/UL (ref 0–0.44)
ABSOLUTE IMMATURE GRANULOCYTE: 0.15 K/UL (ref 0–0.3)
ABSOLUTE LYMPH #: 2.09 K/UL (ref 1.1–3.7)
ABSOLUTE MONO #: 1.04 K/UL (ref 0.1–1.2)
ALBUMIN SERPL-MCNC: 4.8 G/DL (ref 3.5–5.2)
ALP BLD-CCNC: 139 U/L (ref 40–129)
ALT SERPL-CCNC: 39 U/L (ref 5–41)
AMYLASE: 71 U/L (ref 28–100)
ANION GAP SERPL CALCULATED.3IONS-SCNC: 14 MMOL/L (ref 9–17)
AST SERPL-CCNC: 35 U/L
BASOPHILS # BLD: 1 % (ref 0–2)
BASOPHILS ABSOLUTE: 0.07 K/UL (ref 0–0.2)
BILIRUB SERPL-MCNC: 0.3 MG/DL (ref 0.3–1.2)
BILIRUBIN DIRECT: <0.08 MG/DL
BILIRUBIN URINE: NEGATIVE
BILIRUBIN, INDIRECT: ABNORMAL MG/DL (ref 0–1)
BUN BLDV-MCNC: 25 MG/DL (ref 8–23)
BUN/CREAT BLD: 15 (ref 9–20)
CALCIUM SERPL-MCNC: 9.7 MG/DL (ref 8.6–10.4)
CASTS UA: NORMAL /LPF
CASTS UA: NORMAL /LPF
CHLORIDE BLD-SCNC: 100 MMOL/L (ref 98–107)
CO2: 25 MMOL/L (ref 20–31)
COLOR: YELLOW
CREAT SERPL-MCNC: 1.67 MG/DL (ref 0.7–1.2)
EKG ATRIAL RATE: 63 BPM
EKG P AXIS: -15 DEGREES
EKG P-R INTERVAL: 162 MS
EKG Q-T INTERVAL: 438 MS
EKG QRS DURATION: 112 MS
EKG QTC CALCULATION (BAZETT): 448 MS
EKG R AXIS: -58 DEGREES
EKG T AXIS: 89 DEGREES
EKG VENTRICULAR RATE: 63 BPM
EOSINOPHILS RELATIVE PERCENT: 1 % (ref 1–4)
EPITHELIAL CELLS UA: NORMAL /HPF (ref 0–5)
ETHANOL PERCENT: <0.01 %
ETHANOL: <10 MG/DL
GFR AFRICAN AMERICAN: 50 ML/MIN
GFR NON-AFRICAN AMERICAN: 41 ML/MIN
GFR SERPL CREATININE-BSD FRML MDRD: ABNORMAL ML/MIN/{1.73_M2}
GLUCOSE BLD-MCNC: 117 MG/DL (ref 75–110)
GLUCOSE BLD-MCNC: 128 MG/DL (ref 75–110)
GLUCOSE BLD-MCNC: 134 MG/DL (ref 70–99)
GLUCOSE BLD-MCNC: 149 MG/DL (ref 75–110)
GLUCOSE URINE: NEGATIVE
HCT VFR BLD CALC: 47.6 % (ref 40.7–50.3)
HEMOGLOBIN: 15.5 G/DL (ref 13–17)
IMMATURE GRANULOCYTES: 1 %
KETONES, URINE: NEGATIVE
LEUKOCYTE ESTERASE, URINE: NEGATIVE
LIPASE: 42 U/L (ref 13–60)
LYMPHOCYTES # BLD: 14 % (ref 24–43)
MCH RBC QN AUTO: 32.4 PG (ref 25.2–33.5)
MCHC RBC AUTO-ENTMCNC: 32.6 G/DL (ref 28.4–34.8)
MCV RBC AUTO: 99.6 FL (ref 82.6–102.9)
MONOCYTES # BLD: 7 % (ref 3–12)
NITRITE, URINE: NEGATIVE
NRBC AUTOMATED: 0 PER 100 WBC
PDW BLD-RTO: 14 % (ref 11.8–14.4)
PH UA: 5.5 (ref 5–8)
PLATELET # BLD: 237 K/UL (ref 138–453)
PMV BLD AUTO: 10.4 FL (ref 8.1–13.5)
POTASSIUM SERPL-SCNC: 4 MMOL/L (ref 3.7–5.3)
PROTEIN UA: NEGATIVE
RBC # BLD: 4.78 M/UL (ref 4.21–5.77)
RBC UA: NORMAL /HPF (ref 0–2)
SEG NEUTROPHILS: 76 % (ref 36–65)
SEGMENTED NEUTROPHILS ABSOLUTE COUNT: 11.68 K/UL (ref 1.5–8.1)
SODIUM BLD-SCNC: 139 MMOL/L (ref 135–144)
SPECIFIC GRAVITY UA: 1.02 (ref 1–1.03)
TOTAL PROTEIN: 8.3 G/DL (ref 6.4–8.3)
TROPONIN, HIGH SENSITIVITY: 19 NG/L (ref 0–22)
TROPONIN, HIGH SENSITIVITY: 21 NG/L (ref 0–22)
TURBIDITY: CLEAR
URINE HGB: NEGATIVE
UROBILINOGEN, URINE: NORMAL
WBC # BLD: 15.2 K/UL (ref 3.5–11.3)
WBC UA: NORMAL /HPF (ref 0–5)

## 2022-04-23 PROCEDURE — 96376 TX/PRO/DX INJ SAME DRUG ADON: CPT

## 2022-04-23 PROCEDURE — A4216 STERILE WATER/SALINE, 10 ML: HCPCS | Performed by: SURGERY

## 2022-04-23 PROCEDURE — 80048 BASIC METABOLIC PNL TOTAL CA: CPT

## 2022-04-23 PROCEDURE — 99285 EMERGENCY DEPT VISIT HI MDM: CPT

## 2022-04-23 PROCEDURE — 2580000003 HC RX 258: Performed by: SURGERY

## 2022-04-23 PROCEDURE — 93005 ELECTROCARDIOGRAM TRACING: CPT | Performed by: EMERGENCY MEDICINE

## 2022-04-23 PROCEDURE — 83690 ASSAY OF LIPASE: CPT

## 2022-04-23 PROCEDURE — 99223 1ST HOSP IP/OBS HIGH 75: CPT | Performed by: NURSE PRACTITIONER

## 2022-04-23 PROCEDURE — 96374 THER/PROPH/DIAG INJ IV PUSH: CPT

## 2022-04-23 PROCEDURE — 6370000000 HC RX 637 (ALT 250 FOR IP): Performed by: FAMILY MEDICINE

## 2022-04-23 PROCEDURE — 2500000003 HC RX 250 WO HCPCS: Performed by: SURGERY

## 2022-04-23 PROCEDURE — 74176 CT ABD & PELVIS W/O CONTRAST: CPT

## 2022-04-23 PROCEDURE — 71045 X-RAY EXAM CHEST 1 VIEW: CPT

## 2022-04-23 PROCEDURE — 82150 ASSAY OF AMYLASE: CPT

## 2022-04-23 PROCEDURE — 93010 ELECTROCARDIOGRAM REPORT: CPT | Performed by: INTERNAL MEDICINE

## 2022-04-23 PROCEDURE — 2500000003 HC RX 250 WO HCPCS: Performed by: FAMILY MEDICINE

## 2022-04-23 PROCEDURE — 85025 COMPLETE CBC W/AUTO DIFF WBC: CPT

## 2022-04-23 PROCEDURE — 80076 HEPATIC FUNCTION PANEL: CPT

## 2022-04-23 PROCEDURE — 2580000003 HC RX 258: Performed by: EMERGENCY MEDICINE

## 2022-04-23 PROCEDURE — 1200000000 HC SEMI PRIVATE

## 2022-04-23 PROCEDURE — 84484 ASSAY OF TROPONIN QUANT: CPT

## 2022-04-23 PROCEDURE — 6370000000 HC RX 637 (ALT 250 FOR IP): Performed by: NURSE PRACTITIONER

## 2022-04-23 PROCEDURE — 6370000000 HC RX 637 (ALT 250 FOR IP): Performed by: EMERGENCY MEDICINE

## 2022-04-23 PROCEDURE — 6360000002 HC RX W HCPCS: Performed by: NURSE PRACTITIONER

## 2022-04-23 PROCEDURE — G0480 DRUG TEST DEF 1-7 CLASSES: HCPCS

## 2022-04-23 PROCEDURE — 6360000002 HC RX W HCPCS: Performed by: FAMILY MEDICINE

## 2022-04-23 PROCEDURE — 6360000002 HC RX W HCPCS: Performed by: EMERGENCY MEDICINE

## 2022-04-23 PROCEDURE — 82947 ASSAY GLUCOSE BLOOD QUANT: CPT

## 2022-04-23 PROCEDURE — 81001 URINALYSIS AUTO W/SCOPE: CPT

## 2022-04-23 PROCEDURE — 36415 COLL VENOUS BLD VENIPUNCTURE: CPT

## 2022-04-23 RX ORDER — 0.9 % SODIUM CHLORIDE 0.9 %
1000 INTRAVENOUS SOLUTION INTRAVENOUS ONCE
Status: COMPLETED | OUTPATIENT
Start: 2022-04-23 | End: 2022-04-23

## 2022-04-23 RX ORDER — SODIUM CHLORIDE 0.9 % (FLUSH) 0.9 %
5-40 SYRINGE (ML) INJECTION EVERY 12 HOURS SCHEDULED
Status: DISCONTINUED | OUTPATIENT
Start: 2022-04-23 | End: 2022-04-24 | Stop reason: HOSPADM

## 2022-04-23 RX ORDER — ONDANSETRON 4 MG/1
4 TABLET, ORALLY DISINTEGRATING ORAL EVERY 8 HOURS PRN
Status: DISCONTINUED | OUTPATIENT
Start: 2022-04-23 | End: 2022-04-24 | Stop reason: HOSPADM

## 2022-04-23 RX ORDER — MAGNESIUM SULFATE 1 G/100ML
1000 INJECTION INTRAVENOUS PRN
Status: DISCONTINUED | OUTPATIENT
Start: 2022-04-23 | End: 2022-04-24 | Stop reason: HOSPADM

## 2022-04-23 RX ORDER — MORPHINE SULFATE 4 MG/ML
4 INJECTION, SOLUTION INTRAMUSCULAR; INTRAVENOUS ONCE
Status: COMPLETED | OUTPATIENT
Start: 2022-04-23 | End: 2022-04-23

## 2022-04-23 RX ORDER — DEXTROSE, SODIUM CHLORIDE, SODIUM LACTATE, POTASSIUM CHLORIDE, AND CALCIUM CHLORIDE 5; .6; .31; .03; .02 G/100ML; G/100ML; G/100ML; G/100ML; G/100ML
INJECTION, SOLUTION INTRAVENOUS CONTINUOUS
Status: DISCONTINUED | OUTPATIENT
Start: 2022-04-23 | End: 2022-04-23

## 2022-04-23 RX ORDER — HYDRALAZINE HYDROCHLORIDE 20 MG/ML
10 INJECTION INTRAMUSCULAR; INTRAVENOUS EVERY 6 HOURS PRN
Status: DISCONTINUED | OUTPATIENT
Start: 2022-04-23 | End: 2022-04-24 | Stop reason: HOSPADM

## 2022-04-23 RX ORDER — INSULIN LISPRO 100 [IU]/ML
0-6 INJECTION, SOLUTION INTRAVENOUS; SUBCUTANEOUS
Status: DISCONTINUED | OUTPATIENT
Start: 2022-04-23 | End: 2022-04-24 | Stop reason: HOSPADM

## 2022-04-23 RX ORDER — HYDROCODONE BITARTRATE AND ACETAMINOPHEN 5; 325 MG/1; MG/1
1 TABLET ORAL EVERY 6 HOURS PRN
Status: ON HOLD | COMMUNITY
End: 2022-04-24 | Stop reason: HOSPADM

## 2022-04-23 RX ORDER — AMOXICILLIN 500 MG/1
500 CAPSULE ORAL 3 TIMES DAILY
Status: ON HOLD | COMMUNITY
End: 2022-04-24 | Stop reason: HOSPADM

## 2022-04-23 RX ORDER — DEXTROSE, SODIUM CHLORIDE, AND POTASSIUM CHLORIDE 5; .45; .15 G/100ML; G/100ML; G/100ML
INJECTION INTRAVENOUS CONTINUOUS
Status: DISCONTINUED | OUTPATIENT
Start: 2022-04-23 | End: 2022-04-24

## 2022-04-23 RX ORDER — INSULIN LISPRO 100 [IU]/ML
0-3 INJECTION, SOLUTION INTRAVENOUS; SUBCUTANEOUS NIGHTLY
Status: DISCONTINUED | OUTPATIENT
Start: 2022-04-23 | End: 2022-04-24 | Stop reason: HOSPADM

## 2022-04-23 RX ORDER — SODIUM CHLORIDE 0.9 % (FLUSH) 0.9 %
10 SYRINGE (ML) INJECTION PRN
Status: DISCONTINUED | OUTPATIENT
Start: 2022-04-23 | End: 2022-04-24 | Stop reason: HOSPADM

## 2022-04-23 RX ORDER — SODIUM CHLORIDE 9 MG/ML
INJECTION, SOLUTION INTRAVENOUS PRN
Status: DISCONTINUED | OUTPATIENT
Start: 2022-04-23 | End: 2022-04-24 | Stop reason: HOSPADM

## 2022-04-23 RX ORDER — NICOTINE POLACRILEX 4 MG
15 LOZENGE BUCCAL PRN
Status: DISCONTINUED | OUTPATIENT
Start: 2022-04-23 | End: 2022-04-23 | Stop reason: RX

## 2022-04-23 RX ORDER — DEXTROSE MONOHYDRATE 50 MG/ML
100 INJECTION, SOLUTION INTRAVENOUS PRN
Status: DISCONTINUED | OUTPATIENT
Start: 2022-04-23 | End: 2022-04-24 | Stop reason: HOSPADM

## 2022-04-23 RX ORDER — ACETAMINOPHEN 650 MG/1
650 SUPPOSITORY RECTAL EVERY 6 HOURS PRN
Status: DISCONTINUED | OUTPATIENT
Start: 2022-04-23 | End: 2022-04-24 | Stop reason: HOSPADM

## 2022-04-23 RX ORDER — DEXTROSE MONOHYDRATE 25 G/50ML
12.5 INJECTION, SOLUTION INTRAVENOUS PRN
Status: DISCONTINUED | OUTPATIENT
Start: 2022-04-23 | End: 2022-04-23 | Stop reason: RX

## 2022-04-23 RX ORDER — POTASSIUM CHLORIDE 20 MEQ/1
40 TABLET, EXTENDED RELEASE ORAL PRN
Status: DISCONTINUED | OUTPATIENT
Start: 2022-04-23 | End: 2022-04-24 | Stop reason: HOSPADM

## 2022-04-23 RX ORDER — ACETAMINOPHEN 325 MG/1
650 TABLET ORAL EVERY 6 HOURS PRN
Status: DISCONTINUED | OUTPATIENT
Start: 2022-04-23 | End: 2022-04-24 | Stop reason: HOSPADM

## 2022-04-23 RX ORDER — ENOXAPARIN SODIUM 100 MG/ML
30 INJECTION SUBCUTANEOUS 2 TIMES DAILY
Status: DISCONTINUED | OUTPATIENT
Start: 2022-04-23 | End: 2022-04-24 | Stop reason: HOSPADM

## 2022-04-23 RX ORDER — ONDANSETRON 2 MG/ML
4 INJECTION INTRAMUSCULAR; INTRAVENOUS EVERY 6 HOURS PRN
Status: DISCONTINUED | OUTPATIENT
Start: 2022-04-23 | End: 2022-04-24 | Stop reason: HOSPADM

## 2022-04-23 RX ORDER — POLYETHYLENE GLYCOL 3350 17 G/17G
17 POWDER, FOR SOLUTION ORAL DAILY PRN
Status: DISCONTINUED | OUTPATIENT
Start: 2022-04-23 | End: 2022-04-24 | Stop reason: HOSPADM

## 2022-04-23 RX ORDER — POTASSIUM CHLORIDE 7.45 MG/ML
10 INJECTION INTRAVENOUS PRN
Status: DISCONTINUED | OUTPATIENT
Start: 2022-04-23 | End: 2022-04-24 | Stop reason: HOSPADM

## 2022-04-23 RX ADMIN — INSULIN LISPRO 1 UNITS: 100 INJECTION, SOLUTION INTRAVENOUS; SUBCUTANEOUS at 21:27

## 2022-04-23 RX ADMIN — BENZOCAINE, BUTAMBEN, AND TETRACAINE HYDROCHLORIDE 1 SPRAY: .028; .004; .004 AEROSOL, SPRAY TOPICAL at 09:06

## 2022-04-23 RX ADMIN — SODIUM CHLORIDE, PRESERVATIVE FREE 20 MG: 5 INJECTION INTRAVENOUS at 21:27

## 2022-04-23 RX ADMIN — MORPHINE SULFATE 4 MG: 4 INJECTION, SOLUTION INTRAMUSCULAR; INTRAVENOUS at 07:36

## 2022-04-23 RX ADMIN — SODIUM CHLORIDE, PRESERVATIVE FREE 20 MG: 5 INJECTION INTRAVENOUS at 09:19

## 2022-04-23 RX ADMIN — Medication 1 SPRAY: at 14:34

## 2022-04-23 RX ADMIN — SODIUM CHLORIDE, SODIUM LACTATE, POTASSIUM CHLORIDE, CALCIUM CHLORIDE AND DEXTROSE MONOHYDRATE: 5; 600; 310; 30; 20 INJECTION, SOLUTION INTRAVENOUS at 09:19

## 2022-04-23 RX ADMIN — HYDROMORPHONE HYDROCHLORIDE 0.25 MG: 1 INJECTION, SOLUTION INTRAMUSCULAR; INTRAVENOUS; SUBCUTANEOUS at 13:57

## 2022-04-23 RX ADMIN — HYDROMORPHONE HYDROCHLORIDE 0.5 MG: 1 INJECTION, SOLUTION INTRAMUSCULAR; INTRAVENOUS; SUBCUTANEOUS at 09:34

## 2022-04-23 RX ADMIN — POTASSIUM CHLORIDE, DEXTROSE MONOHYDRATE AND SODIUM CHLORIDE: 150; 5; 450 INJECTION, SOLUTION INTRAVENOUS at 12:08

## 2022-04-23 RX ADMIN — MORPHINE SULFATE 4 MG: 4 INJECTION, SOLUTION INTRAMUSCULAR; INTRAVENOUS at 08:01

## 2022-04-23 RX ADMIN — HYDROMORPHONE HYDROCHLORIDE 0.5 MG: 1 INJECTION, SOLUTION INTRAMUSCULAR; INTRAVENOUS; SUBCUTANEOUS at 20:07

## 2022-04-23 RX ADMIN — SODIUM CHLORIDE 1000 ML: 9 INJECTION, SOLUTION INTRAVENOUS at 07:36

## 2022-04-23 RX ADMIN — ENOXAPARIN SODIUM 30 MG: 30 INJECTION SUBCUTANEOUS at 21:26

## 2022-04-23 ASSESSMENT — PAIN DESCRIPTION - ORIENTATION
ORIENTATION: MID
ORIENTATION: POSTERIOR
ORIENTATION: UPPER;RIGHT;LEFT

## 2022-04-23 ASSESSMENT — PAIN SCALES - GENERAL
PAINLEVEL_OUTOF10: 5
PAINLEVEL_OUTOF10: 7
PAINLEVEL_OUTOF10: 9
PAINLEVEL_OUTOF10: 7
PAINLEVEL_OUTOF10: 5
PAINLEVEL_OUTOF10: 4
PAINLEVEL_OUTOF10: 5
PAINLEVEL_OUTOF10: 9

## 2022-04-23 ASSESSMENT — ENCOUNTER SYMPTOMS
BLOOD IN STOOL: 0
STRIDOR: 0
DIARRHEA: 1
WHEEZING: 0
BACK PAIN: 1
CONSTIPATION: 0
ABDOMINAL PAIN: 1
SHORTNESS OF BREATH: 0
VOMITING: 0
ABDOMINAL DISTENTION: 1
COUGH: 0
DIARRHEA: 0
NAUSEA: 0

## 2022-04-23 ASSESSMENT — PAIN DESCRIPTION - ONSET: ONSET: ON-GOING

## 2022-04-23 ASSESSMENT — PAIN DESCRIPTION - LOCATION
LOCATION: BACK;THROAT
LOCATION: THROAT
LOCATION: BACK

## 2022-04-23 ASSESSMENT — LIFESTYLE VARIABLES: HOW OFTEN DO YOU HAVE A DRINK CONTAINING ALCOHOL: NEVER

## 2022-04-23 ASSESSMENT — PAIN DESCRIPTION - DESCRIPTORS
DESCRIPTORS: SORE
DESCRIPTORS: ACHING;DISCOMFORT;SORE
DESCRIPTORS: SHOOTING;BURNING

## 2022-04-23 ASSESSMENT — PAIN - FUNCTIONAL ASSESSMENT: PAIN_FUNCTIONAL_ASSESSMENT: ACTIVITIES ARE NOT PREVENTED

## 2022-04-23 ASSESSMENT — PAIN DESCRIPTION - PAIN TYPE: TYPE: ACUTE PAIN

## 2022-04-23 ASSESSMENT — PAIN DESCRIPTION - FREQUENCY: FREQUENCY: INTERMITTENT

## 2022-04-23 NOTE — PROGRESS NOTES
Transitions of Care Pharmacy Service   Medication Review    The patient's list of current home medications has been reviewed. Source(s) of information: patient, luis    Based on information provided by the above source(s), I have updated the patient's home med list as described below. Please review the ACTION REQUESTED section of this note below for any discrepancies on current hospital orders. I changed or updated the following medications on the patient's home medication list:  Removed Cholecalciferol 2000units daily (list cleanup)  Glimepiride (list cleanup)  Docusate-sennosides (list cleanup)  Euflexxa (list cleanup)  Cyanocobalamin (list cleanup)     Added Amoxicillin 500mg Q8H- instructed to take by dentist due to tooth pain     Adjusted   Allopurinol 600mg daily  Hydrocodone/APAP 1 tablet Q6H prn pain  Furosemide 80mg daily     Other Notes None           Please feel free to call me with any questions about this encounter. Thank you. Kamaljit Germain [facility]   Transitions of Care Pharmacy Service  Phone:  [phone]  Fax: [phone]      Electronically signed by Kamaljit Germain [facility] on 4/23/2022 at 5:54 PM         Medications Prior to Admission: HYDROcodone-acetaminophen ([place]) 5-325 MG per tablet, Take 1 tablet by mouth every 6 hours as needed for Pain. SITagliptin (JANUVIA) 100 MG tablet, Take 100 mg by mouth daily  pregabalin (LYRICA) 150 MG capsule, Take 1 capsule by mouth 2 times daily for 90 days.   LORazepam (ATIVAN) 0.5 MG tablet, TAKE ONE TABLET BY MOUTH EVERY EVENING AS NEEDED FOR ANXIETY FOR UP TO 30 DAYS  spironolactone (ALDACTONE) 50 MG tablet, TAKE 1 TABLET BY MOUTH ONE TIME A DAY  omeprazole (PRILOSEC) 20 MG delayed release capsule, TAKE 2 CAPSULES BY MOUTH DAILY  melatonin 5 MG TABS tablet, Take 10 mg by mouth daily   furosemide (LASIX) 40 MG tablet, Take 1 tablet by mouth 2 times daily (Patient taking differently: Take 80 mg by mouth daily )  tamsulosin (FLOMAX) 0.4 MG capsule, Take 0.4 mg by mouth daily  NIFEdipine (PROCARDIA XL) 60 MG extended release tablet, Take 60 mg by mouth daily   allopurinol (ZYLOPRIM) 300 MG tablet, Take 600 mg by mouth daily

## 2022-04-23 NOTE — ED PROVIDER NOTES
[facility]  Emergency Department Encounter     Pt Name: Armando Jensen  MRN: [id number]  [place] 1950  Date of evaluation: 4/23/22  PCP:  KELSY Guerin CNP    CHIEF COMPLAINT       Chief Complaint   Patient presents with    Abdominal Pain     epigastric to back        HISTORY OF PRESENT ILLNESS  (Location/Symptom, Timing/Onset, Context/Setting, Quality, Duration, Modifying Factors, Severity.)    Armando Jensen is a 81 y.o. male who has a past medical history of alcoholic liver cirrhosis, chronic hepatitis C who presents emergency department with epigastric abdominal pain that radiates around into his back as well as up into his chest.  This woke him up out of his sleep around 430 this morning. No nausea or vomiting, however he still feels like he is \"burping up his breakfast from yesterday \". Had a bowel movement this morning which was loose. Not grossly bloody. Had a similar episode a couple weeks ago when he was evaluated emergency department at that time when he had a ultrasound that showed gallstones. States that about 10 years ago he had an issue of what they told him was pancreatitis. He does currently follow with GI, has upcoming appointment. He has had a prostatectomy in the past as well as a ventral hernia repair due to the prior surgery done back in 2015, however he cannot recall the name of his surgeon.     PAST MEDICAL / SURGICAL / SOCIAL / FAMILY HISTORY    has a past medical history of Abdominal varicosities, Anxiety state NEC, Aortic valve defect, Arthritis, Sol esophagus, Chronic kidney disease, Chronic kidney disease, Cirrhosis ([address] Colon polyps, Diabetes mellitus ([address] Diverticulosis of colon, Enlarged heart, Gout, Hepatic cirrhosis ([address] Hepatic cyst, Hepatitis C, chronic ([address] History of alcohol use, Hyperlipidemia, Hypertension, Lumbago, Lymphedema, Malignant neoplasm of prostate ([address] Otalgia of right ear, Portal venous hypertension ([facility] S/P prostatectomy, Sciatica, and Splenomegaly. has a past surgical history that includes Prostatectomy (2011); back surgery; Knee arthroscopy (Right); Tonsillectomy; ventral hernia repair (2015); Paracentesis (2015); hernia repair; Paracentesis (2015); other surgical history (Right, 2016); Vein Surgery (Left, 2016); Colonoscopy (2016); Upper gastrointestinal endoscopy (10/24/2013); Upper gastrointestinal endoscopy (2017); pr egd transoral biopsy single/multiple (N/A, 2017); Colonoscopy (N/A, 10/07/2019); Cardiac catheterization (10.); Aortic valve repair (2020); Finger trigger release (Right, 2021); Finger trigger release (Right, 2021); and lumbar fusion (Right, 2021). Social History     Socioeconomic History    Marital status:       Spouse name: Not on file    Number of children: Not on file    Years of education: Not on file    Highest education level: Not on file   Occupational History    Not on file   Tobacco Use    Smoking status: Former Smoker     Packs/day: 1.00     Years: 49.00     Pack years: 49.00     Types: Cigarettes     Start date: 1966     Quit date: 2015     Years since quittin.3    Smokeless tobacco: Never Used    Tobacco comment: relapsed quit smiking again 2021   Vaping Use    Vaping Use: Never used   Substance and Sexual Activity    Alcohol use: Not Currently     Alcohol/week: 0.0 standard drinks    Drug use: Yes     Types: Marijuana [place])     Comment: edibles 21 at 2000    Sexual activity: Not on file   Other Topics Concern    Not on file   Social History Narrative    Not on file     Social Determinants of Health     Financial Resource Strain:     Difficulty of Paying Living Expenses: Not on file   Food Insecurity:     Worried About Running Out of Food in the Last Year: Not on file    Jerry of Food in the Last Year: Not on file Transportation Needs:     Lack of Transportation (Medical): Not on file    Lack of Transportation (Non-Medical): Not on file   Physical Activity:     Days of Exercise per Week: Not on file    Minutes of Exercise per Session: Not on file   Stress:     Feeling of Stress : Not on file   Social Connections:     Frequency of Communication with Friends and Family: Not on file    Frequency of Social Gatherings with Friends and Family: Not on file    Attends Latter day Services: Not on file    Active Member of [address] [place] [address] Jack Erwin or Organizations: Not on file    Attends Club or Organization Meetings: Not on file    Marital Status: Not on file   Intimate Partner Violence:     Fear of Current or Ex-Partner: Not on file    Emotionally Abused: Not on file    Physically Abused: Not on file    Sexually Abused: Not on file   Housing Stability:     Unable to Pay for Housing in the Last Year: Not on file    Number of [place] in the Last Year: Not on file    Unstable Housing in the Last Year: Not on file       Family History   Problem Relation Age of Onset    Prostate Cancer Father     Other Neg Hx         blood clots       Allergies:    Nsaids    Home Medications:  Prior to Admission medications    Medication Sig Start Date End Date Taking? Authorizing Provider   HYDROcodone-acetaminophen (NORCO) 5-325 MG per tablet Take 2 tablets by mouth every 6 hours as needed for Pain. Yes Historical Provider, MD   pregabalin (LYRICA) 150 MG capsule Take 1 capsule by mouth 2 times daily for 90 days.  4/12/22 7/11/22  KELSY Ram CNP   LORazepam (ATIVAN) 0.5 MG tablet TAKE ONE TABLET BY MOUTH EVERY EVENING AS NEEDED FOR ANXIETY FOR UP TO 30 DAYS 4/12/22 5/12/22  KELSY Ram CNP   glimepiride (AMARYL) 2 MG tablet Take 1 tablet by mouth every morning (before breakfast) 3/6/22   KELSY Ram CNP   spironolactone (ALDACTONE) 50 MG tablet TAKE 1 TABLET BY MOUTH ONE TIME A DAY 3/3/22   Retia Paget KELSY Tanner CNP   omeprazole (PRILOSEC) 20 MG delayed release capsule TAKE 2 CAPSULES BY MOUTH DAILY 1/10/22 1/10/23  KELSY Voss CNP   sennosides-docusate sodium (SENOKOT-S) 8.6-50 MG tablet Take 1 tablet by mouth 2 times daily 11/23/21   Freda Hinkle MD   melatonin 3 MG TABS tablet Take 10 mg by mouth daily    Historical Provider, MD   sodium hyaluronate (EUFLEXXA) 20 MG/2ML SOSY injection Inject 2 mLs into the articular space once a week Series of three injections for the right knee 9/24/21   Gilbert Fox DO   furosemide (LASIX) 40 MG tablet Take 1 tablet by mouth 2 times daily  Patient taking differently: Take 80 mg by mouth daily  4/8/21 11/22/21  Jacoby Aldrich MD   tamsulosin (FLOMAX) 0.4 MG capsule Take 0.4 mg by mouth daily    Historical Provider, MD   vitamin B-12 (CYANOCOBALAMIN) 500 MCG tablet Take 500 mcg by mouth every other day    Historical Provider, MD   Cholecalciferol (VITAMIN D) 50 MCG (2000 UT) CAPS capsule Take 1 capsule by mouth daily 2/24/20   Genna Chen DO   NIFEdipine (PROCARDIA XL) 60 MG extended release tablet Take 60 mg by mouth daily  6/12/19   Historical Provider, MD   allopurinol (ZYLOPRIM) 300 MG tablet Take 600 mg by mouth daily Takes 2 tabs (=600mg) daily 3/27/19   KELSY Edward - CNP       REVIEW OF SYSTEMS    (2-9 systems for level 4, 10 or more for level 5)    Review of Systems   Constitutional: Negative for chills, diaphoresis and fever. Respiratory: Negative for shortness of breath. Cardiovascular: Positive for chest pain. Gastrointestinal: Positive for abdominal pain and diarrhea. Negative for constipation, nausea and vomiting. Endocrine: Negative for polyuria. Genitourinary: Negative for decreased urine volume, difficulty urinating, dysuria, flank pain, hematuria and urgency. Musculoskeletal: Positive for back pain. Neurological: Negative for dizziness and light-headedness.        PHYSICAL EXAM   (up to 7 for level 4, 8 or more for level 5)    VITALS:   Vitals:    04/23/22 0722   BP: 131/83   Pulse: 69   Resp: 18   Temp: 97.2 °F (36.2 °C)   SpO2: 96%   Weight: 246 lb (111.6 kg)   Height: 6' (1.829 m)       Physical Exam  Vitals and nursing note reviewed. Constitutional:       General: He is not in acute distress. Appearance: He is well-developed. He is obese. He is not diaphoretic. HENT:      Head: Normocephalic and atraumatic. Eyes:      Conjunctiva/sclera: Conjunctivae normal.   Cardiovascular:      Rate and Rhythm: Normal rate and regular rhythm. Pulmonary:      Effort: Pulmonary effort is normal. No respiratory distress. Breath sounds: Normal breath sounds. No wheezing, rhonchi or rales. Abdominal:      General: Abdomen is protuberant. A surgical scar is present. There is distension. Palpations: Abdomen is soft. Tenderness: There is abdominal tenderness in the right upper quadrant and epigastric area. There is no right CVA tenderness, left CVA tenderness, guarding or rebound. Musculoskeletal:         General: Normal range of motion. Cervical back: Normal range of motion. Skin:     General: Skin is warm and dry. Neurological:      General: No focal deficit present. Mental Status: He is alert.    Psychiatric:         Behavior: Behavior normal.         DIFFERENTIAL  DIAGNOSIS   PLAN (LABS / IMAGING / EKG):  Orders Placed This Encounter   Procedures    XR CHEST 1 VIEW    CT ABDOMEN PELVIS WO CONTRAST Additional Contrast? None    CBC with Auto Differential    Basic Metabolic Panel    Hepatic Function Panel    Ethanol    Lipase    Amylase    Troponin    Troponin    Urinalysis with Microscopic    Nasogastric tube insertion    Nasogastric tube maintenance    Tube insertion    Inpatient consult to General Surgery    Inpatient consult to Hospitalist    EKG 12 Lead    Insert peripheral IV    ADMIT TO INPATIENT       MEDICATIONS ORDERED:  Orders Placed This Encounter   Medications    morphine sulfate (PF) injection 4 mg    0.9 % sodium chloride bolus    morphine sulfate (PF) injection 4 mg    dextrose 5 % in lactated ringers infusion    famotidine (PEPCID) 20 mg in sodium chloride (PF) 10 mL injection    DISCONTD: benzocaine (HURRICAINE) 20 % oral spray    butamben-tetracaine-benzocaine (CETACAINE) spray 1 spray     DIAGNOSTIC RESULTS / EMERGENCYDEPARTMENT COURSE / MDM   LABS:  Labs Reviewed   CBC WITH AUTO DIFFERENTIAL - Abnormal; Notable for the following components:       Result Value    WBC 15.2 (*)     Seg Neutrophils 76 (*)     Lymphocytes 14 (*)     Immature Granulocytes 1 (*)     Segs Absolute 11.68 (*)     All other components within normal limits   BASIC METABOLIC PANEL - Abnormal; Notable for the following components:    Glucose 134 (*)     BUN 25 (*)     CREATININE 1.67 (*)     GFR Non- 41 (*)     GFR  50 (*)     All other components within normal limits   HEPATIC FUNCTION PANEL - Abnormal; Notable for the following components:    Alkaline Phosphatase 139 (*)     All other components within normal limits   ETHANOL   LIPASE   AMYLASE   TROPONIN   TROPONIN   URINALYSIS WITH MICROSCOPIC       RADIOLOGY:  CT ABDOMEN PELVIS WO CONTRAST Additional Contrast? None    Result Date: 4/23/2022  EXAMINATION: CT OF THE ABDOMEN AND PELVIS WITHOUT CONTRAST 4/23/2022 7:40 am TECHNIQUE: CT of the abdomen and pelvis was performed without the administration of intravenous contrast. Multiplanar reformatted images are provided for review. Dose modulation, iterative reconstruction, and/or weight based adjustment of the mA/kV was utilized to reduce the radiation dose to as low as reasonably achievable.  COMPARISON: 12/23/2021 HISTORY: ORDERING SYSTEM PROVIDED HISTORY: epigastric abd pain radiates through into upper back BL TECHNOLOGIST PROVIDED HISTORY: epigastric abd pain radiates through into upper back BL Decision Support Exception - unselect if not a suspected or confirmed emergency medical condition->Emergency Medical Condition (MA) Reason for Exam: Pt c/o epigastric pain radiating to back x weeks. FINDINGS: Lower Chest: The visualized heart and lungs show no acute abnormalities. Organs: 8 mm cyst in the posteroinferior right lobe of liver. No follow-up imaging recommended. Mild nodularity of the liver contour and somewhat atrophic caudate lobe consistent with cirrhosis. Cholelithiasis. No splenomegaly. Pancreas, adrenal glands and kidneys show no significant abnormalities. GI/Bowel: There is limited evaluation due to absence of oral contrast.  There is some gastric distension. Multiple dilated small bowel loops with collapsed distal and terminal ileum. Ileocecal junction is seen in the anterior right mid abdomen. Findings compatible with small-bowel obstruction which appears to be distal.  There is some fecalization of the contents of some of the small bowel loops. The colon is tortuous otherwise unremarkable. Moderate stool present. Pelvis: Urinary bladder unremarkable. No suspicious pelvic mass. Peritoneum/Retroperitoneum: No free intraperitoneal fluid. No significant lymphadenopathy. Atherosclerotic disease. Bones/Soft Tissues: Diastasis of rectus muscles. No acute bony abnormality. Postsurgical changes of L4-S1 fusion. 1. Findings consistent with distal small-bowel obstruction. 2. Cirrhotic morphology of the liver. 3. 8 mm cyst right lobe of liver unchanged. 4. Cholelithiasis. XR CHEST 1 VIEW    Result Date: 4/23/2022  EXAMINATION: ONE X-RAY VIEW OF THE CHEST 4/23/2022 7:54 am COMPARISON: 04/09/2022 HISTORY: ORDERING SYSTEM PROVIDED HISTORY: epigastric abd pain radiates into chest TECHNOLOGIST PROVIDED HISTORY: epigastric abd pain radiates into chest Reason for Exam: Port upright AP CXR - epigastric abd pain that radiates into chest FINDINGS: The heart size is enlarged. The patient is status post sternotomy.   There is no pneumothorax, edema or focal consolidation. There is mild right basilar atelectasis or scarring. No significant interval change. Mild right basilar atelectasis or scarring. Cardiac silhouette enlargement, as before. EKG    EKG Interpretation    Interpreted by emergency department physician    Rhythm: normal sinus   Rate: normal  Axis: normal  Ectopy: none  Conduction: left anterior fasciclar block  ST Segments: no acute change  T Waves: inversion in  I and aVL  Q Waves: aVr    Clinical Impression: non-specific EKG    All EKG's are interpreted by the Emergency Department Physician whoeither signs or Co-signs this chart in the absence of a cardiologist.    EMERGENCY DEPARTMENT COURSE:  ED Course as of 04/23/22 0924   Sat Apr 23, 2022   0746 WBC(!): 15.2 [AO]   0759 Patient stating his pain went from 9 to 5 after first dose of morphine. Requesting dilaudid. I do not believe that is warranted at this time.  Second dose of morphine ordered  [AO]   0759 Troponin:    Troponin, High Sensitivity 21 [AO]   0800 ETHANOL,ETHA: <10 [AO]   0800 AMYLASE,AMYP: 71 [AO]   0800 Lipase: 42 [AO]   0800 Hepatic Function Panel(!):    Albumin 4.8   Alk Phos 139(!)   ALT 39   AST 35   Bilirubin 0.30   Bilirubin, Direct <0.08   Bilirubin, Indirect Can not be calculated   Total Protein 8.3 [AO]   2832 Basic Metabolic Panel(!):    GLUCOSE, FASTING,(!)   BUN,BUNPL 25(!)   Creatinine 1.67(!)   Bun/Cre Ratio 15   CALCIUM, SERUM, 625487 9.7   Sodium 139   Potassium 4.0   Chloride 100   CO2 25   Anion Gap 14   GFR Non- 41(!)   GFR  50(!)   GFR Comment       MULU on CKD [AO]   0807 CT ABDOMEN PELVIS WO CONTRAST Additional Contrast? None [AO]   0807 XR CHEST 1 VIEW [AO]   0821 Pt updated  [AO]   3960 Insert NGT [AO]   0848 Intermed accepted  [AO]      ED Course User Index  [AO] Graeme Goncalves 1721, DO       MDM  Number of Diagnoses or Management Options     Amount and/or Complexity of Data Reviewed  Clinical lab tests: ordered and reviewed  Tests in the radiology section of CPT®: ordered and reviewed  Review and summarize past medical records: yes  Independent visualization of images, tracings, or specimens: yes    Patient Progress  Patient progress: stable      PROCEDURES:  Procedures     CONSULTS:  [address] TO HOSPITALIST  IP CONSULT TO GENERAL SURGERY    CRITICAL CARE:  NONE    FINAL IMPRESSION     1. SBO (small bowel obstruction) ([facility]    2. Acute kidney injury superimposed on CKD ([facility]       DISPOSITION / PLAN   DISPOSITION Admitted 04/23/2022 08:50:41 AM    Linda Fernandez DO  Emergency Medicine Physician    (Please note that portions of this note were completed with a voice recognition program.  Efforts were made to edit the dictations but occasionally words are mis-transcribed.)        Regulo Barajas DO  04/23/22 6668

## 2022-04-23 NOTE — CONSULTS
General Surgery:    ER Consult Note      PATIENT NAME: Vaibhav Patricia OF BIRTH: 1950    ADMISSION DATE: 4/23/2022  7:19 AM     Admitting Provider: Dr. William Ramirez Physician: Dr. Nataly Murillo: 4/23/2022    Chief Complaint:  Epigastric abdominal pain   Consult Regarding:  SBO     HISTORY OF PRESENT ILLNESS:  The patient is a 81 y.o. male who presents to the [facility] emergency department where he is seen and evaluated part due to concerns for epigastric abdominal pain. Pain started around 2:30 AM this morning has been sharp in nature and persistent since that time. Pain radiates to his back. Patient states that he had a similar episode 2 weeks ago and was found to have cholelithiasis. Patient also states that he previously had a prior episode of similar pain when he was diagnosed with pancreatitis. He denies any associated nausea or emesis. States he does continue to pass some flatus and had 2 bowel movements this morning. States that yesterday he was able to eat a regular diet without issues. He does say that since arrival to the emergency department his abdomen feels softer. No fevers or chills at home. Surgical history includes a robotic prostatectomy and subsequent ventral hernia repair with mesh. Consultation placed due to concerns for small bowel obstruction by appearance of abnormal CAT scan. Patient is afebrile and hemodynamically stable. Comfortable on exam other than recent NG tube placement. Abdomen is softly distended with minimal epigastric tenderness to palpation. NG tube with approximately 800 cc of green output since placement. Patient does have a leukocytosis of 15 and a creatinine of 1.67. Amylase/lipase within normal limits. Electrolytes within normal limits.   CT abdomen pelvis was obtained and somewhat limited due to lack of contrast.  There does appear to be small bowel dilation concerning for a degree of small bowel obstruction, as well as, gastric distention. No significant free fluid or free air. Evidence of cirrhosis, splenomegaly. 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 Juliana Rosales MD at [facility] Trans[facility]    History of alcohol use 9/18/2015    Hyperlipidemia     Hypertension     Lumbago     Lymphedema     Malignant neoplasm of prostate ([address]     prostate    Otalgia of right ear     radiates down the jaw in the distribution of the third branch of the trigemminal nerve.     Portal venous hypertension ([address] 07/15/2020    S/P prostatectomy 2012    Sciatica     Splenomegaly 07/15/2020       Past Surgical History:        Procedure Laterality Date    [facility]    BACK SURGERY      L4-5    CARDIAC CATHETERIZATION  10.30/2019    Dr. Gallito Liang COLONOSCOPY  12/05/2016    [facility], states due for another in 2  years    COLONOSCOPY N/A 10/07/2019    tubular adenoma x2; hyperplastic polyp    FINGER TRIGGER RELEASE Right 02/24/2021    pinky finger    FINGER TRIGGER RELEASE Right 2/24/2021    RIGHT SMALL FINGER TRIGGER RELEASE [facility] N[facility] Gonzales DO [facility] ARTHROSCOPY Right     LUMBAR FUSION Right 11/22/2021    RIGHT L4 TO S1 T-LIF    2C-ARMS   Jagdeep Breen performed by [facility] Purvis [facility] at [facility] OR    OTHER SURGICAL HISTORY Right 2016    10 liters removed peracentesis    PARACENTESIS  2015    PARACENTESIS  2015    SC EGD TRANSORAL BIOPSY SINGLE/MULTIPLE N/A 2017    EGD BIOPSY performed by Serafin Turner MD at [facility]  2011    TONSILLECTOMY      UPPER GASTROINTESTINAL ENDOSCOPY  10/24/2013    small segment barretts    UPPER GASTROINTESTINAL ENDOSCOPY  2017    VEIN SURGERY Left 2016    leg vein stripping    VENTRAL HERNIA REPAIR  2015       Medications Prior to Admission: See epic list  Not in a hospital admission. Allergies:  Nsaids    Social History:   Social History     Socioeconomic History    Marital status:      Spouse name: Not on file    Number of children: Not on file    Years of education: Not on file    Highest education level: Not on file   Occupational History    Not on file   Tobacco Use    Smoking status: Former Smoker     Packs/day: 1.00     Years: 49.00     Pack years: 49.00     Types: Cigarettes     Start date: 1966     Quit date: 2015     Years since quittin.3    Smokeless tobacco: Never Used    Tobacco comment: relapsed quit smiking again 2021   Vaping Use    Vaping Use: Never used   Substance and Sexual Activity    Alcohol use: Not Currently     Alcohol/week: 0.0 standard drinks    Drug use: Yes     Types: Marijuana Donell Bilberry)     Comment: edibles 21 at 2000    Sexual activity: Not on file   Other Topics Concern    Not on file   Social History Narrative    Not on file     Social Determinants of Health     Financial Resource Strain:     Difficulty of Paying Living Expenses: Not on file   Food Insecurity:     Worried About Running Out of Food in the Last Year: Not on file    Jerry of Food in the Last Year: Not on file   Transportation Needs:     Lack of Transportation (Medical): Not on file    Lack of Transportation (Non-Medical):  Not on file   Physical Activity:     Days of Exercise per Week: Not on file    Minutes of Exercise per Session: Not on file   Stress:     Feeling of Stress : Not on file   Social Connections:     Frequency of Communication with Friends and Family: Not on file    Frequency of Social Gatherings with Friends and Family: Not on file    Attends Latter day Services: Not on file    Active Member of [address] [place] [address] or Organizations: Not on file    Attends Club or Organization Meetings: Not on file    Marital Status: Not on file   Intimate Partner Violence:     Fear of Current or Ex-Partner: Not on file    Emotionally Abused: Not on file    Physically Abused: Not on file    Sexually Abused: Not on file   Housing Stability:     Unable to Pay for Housing in the Last Year: Not on file    Number of [place] in the Last Year: Not on file    Unstable Housing in the Last Year: Not on file       Family History:       Problem Relation Age of Onset    Prostate Cancer Father     Other Neg Hx         blood clots       REVIEW OF SYSTEMS:    CONSTITUTIONAL: No fevers, chills, no weight changes  HEENT: Endorses left-sided tooth pain, no vision or hearing changes  CARDIOVASCULAR: No chest pain, palpitations  GASTROINTESTINAL: Endorses epigastric abdominal pain. No nausea or emesis. No obstipation.   GENITOURINARY: No dysuria, no increased or decreased frequency of urination  HEMATOLOGIC/LYMPHATIC: No easy bruising or bleeding  ENDOCRINE: No polydipsia, no heat or cold intolerance    PHYSICAL EXAM:    VITALS:  /83   Pulse 69   Temp 97.2 °F (36.2 °C)   Resp 18   Ht 6' (1.829 m)   Wt 246 lb (111.6 kg)   SpO2 96%   BMI 33.36 kg/m²   INTAKE/OUTPUT:   No intake or output data in the 24 hours ending 04/23/22 0931    CONSTITUTIONAL: Awake, alert, not acutely distressed  ENT: NCAT, EOMI  NECK:  supple, symmetrical, trachea midline   LUNGS: Normal effort, no respiratory distress, no accessory muscle usage, no wheezing  CARDIOVASCULAR: Regular rate and rhythm  ABDOMEN: Softly distended, minimal tenderness to palpation in the epigastric region without guarding or peritoneal signs. Evidence of diastases. Prior surgical scars noted. SKIN:  No drainage, induration, or erythema noted. MUSCULOSKELETAL:  No joint swelling, deformity, or tenderness. NEUROLOGIC: Alert and oriented. Cranial nerves grossly intact, no focal deficits    CBC:   Lab Results   Component Value Date    WBC 15.2 04/23/2022    RBC 4.78 04/23/2022    HGB 15.5 04/23/2022    HCT 47.6 04/23/2022    MCV 99.6 04/23/2022    MCH 32.4 04/23/2022    MCHC 32.6 04/23/2022    RDW 14.0 04/23/2022     04/23/2022    MPV 10.4 04/23/2022     BMP:    Lab Results   Component Value Date     04/23/2022    K 4.0 04/23/2022     04/23/2022    CO2 25 04/23/2022    BUN 25 04/23/2022    LABALBU 4.8 04/23/2022    CREATININE 1.67 04/23/2022    CALCIUM 9.7 04/23/2022    GFRAA 50 04/23/2022    LABGLOM 41 04/23/2022    GLUCOSE 134 04/23/2022       Pertinent Radiology:   CT ABDOMEN PELVIS WO CONTRAST Additional Contrast? None    Result Date: 4/23/2022  EXAMINATION: CT OF THE ABDOMEN AND PELVIS WITHOUT CONTRAST 4/23/2022 7:40 am TECHNIQUE: CT of the abdomen and pelvis was performed without the administration of intravenous contrast. Multiplanar reformatted images are provided for review. Dose modulation, iterative reconstruction, and/or weight based adjustment of the mA/kV was utilized to reduce the radiation dose to as low as reasonably achievable. COMPARISON: 12/23/2021 HISTORY: ORDERING SYSTEM PROVIDED HISTORY: epigastric abd pain radiates through into upper back BL TECHNOLOGIST PROVIDED HISTORY: epigastric abd pain radiates through into upper back BL Decision Support Exception - unselect if not a suspected or confirmed emergency medical condition->Emergency Medical Condition (MA) Reason for Exam: Pt c/o epigastric pain radiating to back x weeks.  FINDINGS: Lower Chest: The visualized heart and lungs show no acute abnormalities. Organs: 8 mm cyst in the posteroinferior right lobe of liver. No follow-up imaging recommended. Mild nodularity of the liver contour and somewhat atrophic caudate lobe consistent with cirrhosis. Cholelithiasis. No splenomegaly. Pancreas, adrenal glands and kidneys show no significant abnormalities. GI/Bowel: There is limited evaluation due to absence of oral contrast.  There is some gastric distension. Multiple dilated small bowel loops with collapsed distal and terminal ileum. Ileocecal junction is seen in the anterior right mid abdomen. Findings compatible with small-bowel obstruction which appears to be distal.  There is some fecalization of the contents of some of the small bowel loops. The colon is tortuous otherwise unremarkable. Moderate stool present. Pelvis: Urinary bladder unremarkable. No suspicious pelvic mass. Peritoneum/Retroperitoneum: No free intraperitoneal fluid. No significant lymphadenopathy. Atherosclerotic disease. Bones/Soft Tissues: Diastasis of rectus muscles. No acute bony abnormality. Postsurgical changes of L4-S1 fusion. 1. Findings consistent with distal small-bowel obstruction. 2. Cirrhotic morphology of the liver. 3. 8 mm cyst right lobe of liver unchanged. 4. Cholelithiasis. XR CHEST 1 VIEW    Result Date: 4/23/2022  EXAMINATION: ONE X-RAY VIEW OF THE CHEST 4/23/2022 7:54 am COMPARISON: 04/09/2022 HISTORY: ORDERING SYSTEM PROVIDED HISTORY: epigastric abd pain radiates into chest TECHNOLOGIST PROVIDED HISTORY: epigastric abd pain radiates into chest Reason for Exam: Port upright AP CXR - epigastric abd pain that radiates into chest FINDINGS: The heart size is enlarged. The patient is status post sternotomy. There is no pneumothorax, edema or focal consolidation. There is mild right basilar atelectasis or scarring. No significant interval change. Mild right basilar atelectasis or scarring. Cardiac silhouette enlargement, as before. ASSESSMENT:  Active Hospital Problems    Diagnosis Date Noted    SBO (small bowel obstruction) ([facility] [K56.609] 04/23/2022     Priority: Medium    Alcoholic cirrhosis of liver without ascites ([facility] [K70.30] 04/23/2022     Priority: Medium    Small bowel obstruction ([address] [K56.609] 04/23/2022     Priority: Medium    MULU (acute kidney injury) ([facility] [N17.9]     Type 2 diabetes mellitus with kidney complication, without long-term current use of insulin ([facility] [E11.29]     History of hepatitis C [Z86.19] 01/26/2017    Essential hypertension [I10]        81 y.o. male with epigastric abdominal pain radiating to his back  CT with small bowel dilation consistent with partial small bowel obstruction    Plan:  1. Continue medical mgmt and supportive care per primary  2. Diet: NPO, mIVF   3. Continue NG tube to low intermittent wall suction, monitor and record output  4. Monitor bowel function  5. Strict I's and O's in the setting of elevated creatinine  6. Repeat labs in AM.  Trend leukocytosis. Replace electrolytes PRN. 7. We will attempt to manage patient's small bowel obstruction nonoperatively with NG tube decompression. Will continue to monitor closely for any changes in exam or clinical status that would indicate need for surgical intervention. Félix Hoffman DO  General Surgery PGY-3   I attest that I was present in the emergency department [facility] with the resident during the patient's evaluation and agree with the description of findings and plans as dictated above.   Nicolás Stanley MD

## 2022-04-23 NOTE — H&P
[facility]  Office: [address], DO, Fishjanak Reji, DO, Shayybranden Mera, DO, Jen Kay, DO, Aldo Lee MD, Alize Martinez MD, Pete Davidson MD, Hema Daly MD, Geovanni Marie MD, Maribel Perdomo MD, Darcy Garcia MD, Nelly De La Paz, DO, Ke Coles, DO, Raina Donahue MD,  Jolly Mccoy, DO, Pravin Payne MD, Mor Hernandez MD, Lea Reynolds MD, Dakota Thomas DO, Ileana Crum MD, Peace Nelson MD, Jerardo Forman [facility], [facility], CNP, Emma Frey CNP, Micaela Sahni, CNP, Boo Sauer, [facility], Fawn Toribio, CNP, Layla Casper PA-C, Zoraida Solis [facility], Mike Morales, CNP, Zuri Call, CNP, Riana Fay, CNP, Bhaskar Pinto, CNS, Marnie Goss [facility], Richard Willams, CNP, Linda De La Torre, CNP, Mae Sifuentes, [facility]    HISTORY AND PHYSICAL EXAMINATION            Date:   4/23/2022  Patient name:  Dayna Mcclure  Date of admission:  4/23/2022  7:19 AM  MRN:   [id number]  Account:  [de-identified]  YOB: 1950  PCP:    KELSY Trejo CNP  Room:   2004/2004-02  Code Status:    Prior    Chief Complaint:     Chief Complaint   Patient presents with    Abdominal Pain     epigastric to back        History Obtained From:     patient    History of Present Illness:     Dayna Mcclure is a 81 y.o. Non- / non  male who presents with Abdominal Pain (epigastric to back )   and is admitted to the hospital for the management of SBO (small bowel obstruction) ([facility] [address] Patient presented to the ER with complaints of epigastric abdominal pain primarily in his epigastric area that radiates to his back. He states it woke him out of his sleep around 430 this morning. He denied nausea or vomiting however he still feels like he is burping up yesterday's breakfast.  He reports a loose, nonbloody BM this morning prior to coming to the hospital and another one a few minutes ago. Patient states he was in the Er a couple weeks ago for for similar symptoms and was told he had gallstones. He has an up coming appt with Dr Elma Conteh. He has had a prostatectomy, a bovine valve replacement. and a ventral hernia repair. His pertinent history includes Sol's esophagus, CKD, cirrhosis, diabetes, diverticulosis, CHF, hepatic cirrhosis, hep C, alcohol abuse, pancreatitis and hypertension. Patient has been followed by podiatry related to chronic foot pain and was referred to vascular lab to evaluate for PVD. CXR showed  No significant interval change.  Mild right basilar atelectasis or scarring. Cardiac silhouette enlargement, as before. CT abd/pelvis revealed Findings consistent with distal small-bowel obstruction. Cirrhotic morphology of the liver, 8 mm cyst right lobe of liver unchanged, and Cholelithiasis    Patient denies history of bowel obstructions in the past.    Labs reveal MULU superimposed on CKD. Normal creatinine ranges around 1.35 today his BUN and creatinine are 25/1. 67. Alk phos was 139 but otherwise his liver enzymes are unremarkable. White count 15.2. Ethanol less than 10.  UA is unremarkable.   EKG showed Normal sinus rhythm      Past Medical History:     Past Medical History:   Diagnosis Date    Abdominal varicosities 07/15/2020    Anxiety state NEC     Aortic valve defect     Arthritis     DJD    Sol esophagus 10/24/2013    small segment    Chronic kidney disease     Chronic kidney disease     Cirrhosis ([address]     had paracentesis Sept 2015    Colon polyps 10/07/2019    tubular adenoma x2; hyperplastic polyp    Diabetes mellitus ([address]     pre diabetic on januvia    Diverticulosis of colon     Enlarged heart 12/28/2015    HISTORY OF, is resolved at this time    Gout 12/28/2015    is resolved at this time    Hepatic cirrhosis ([address]     Hepatic cyst     Hepatitis C, chronic (HCC)     [facility]. 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  Portal venous hypertension ([address] 07/15/2020    S/P prostatectomy 2012    Sciatica     Splenomegaly 07/15/2020        Past Surgical History:     Past Surgical History:   Procedure Laterality Date    AORTIC VALVE REPA[facility]  06/03/2020    [facility]    BACK SURGERY      L4-5    CARDIAC CATHETERIZATION  10.30/2019    Dr. Emilia Call COLONOSCOPY  12/05/2016    [facility], states due for another in 2  years    COLONOSCOPY N/A 10/07/2019    tubular adenoma x2; hyperplastic polyp    FINGER TRIGGER RELEASE Right 02/24/2021    pinky finger    FINGER TRIGGER RELEASE Right 2/24/2021    RIGHT SMALL FINGER TRIGGER RELEASE performed by William Paulino DO at [facility] ARTHROSCOPY Right     LUMBAR FUSION Right 11/22/2021    RIGHT L4 TO S1 T-LIF    2C-ARMS   Jenet Sax performed by Nataly Orozco MD at [address] [place] [address] Right 01/25/2016    10 liters removed peracentesis    PARACENTESIS  11/04/2015    PARACENTESIS  12/28/2015    DC EGD TRANSORAL BIOPSY SINGLE/MULTIPLE N/A 08/30/2017    EGD BIOPSY [facility]allos [facility] at [facility]  06/2011    TONSILLECTOMY      UPPER GASTROINTESTINAL ENDOSCOPY  10/24/2013    small segment barretts    UPPER GASTROINTESTINAL ENDOSCOPY  08/30/2017    VEIN SURGERY Left 12/07/2016    leg vein stripping    VENTRAL HERNIA REPAIR  05/26/2015        Medications Prior to Admission:     Prior to Admission medications    Medication Sig Start Date End Date Taking? Authorizing Provider   HYDROcodone-acetaminophen (NORCO) 5-325 MG per tablet Take 2 tablets by mouth every 6 hours as needed for Pain.    Yes Historical Provider, MD pregabalin (LYRICA) 150 MG capsule Take 1 capsule by mouth 2 times daily for 90 days. 4/12/22 7/11/22  KELSY Fajardo CNP   LORazepam (ATIVAN) 0.5 MG tablet TAKE ONE TABLET BY MOUTH EVERY EVENING AS NEEDED FOR ANXIETY FOR UP TO 30 DAYS 4/12/22 5/12/22  KELSY Fajardo CNP   glimepiride (AMARYL) 2 MG tablet Take 1 tablet by mouth every morning (before breakfast) 3/6/22   KELSY Fajardo CNP   spironolactone (ALDACTONE) 50 MG tablet TAKE 1 TABLET BY MOUTH ONE TIME A DAY 3/3/22   KELSY Fajardo CNP   omeprazole (PRILOSEC) 20 MG delayed release capsule TAKE 2 CAPSULES BY MOUTH DAILY 1/10/22 1/10/23  KELSY Fajardo CNP   sennosides-docusate sodium (SENOKOT-S) 8.6-50 MG tablet Take 1 tablet by mouth 2 times daily 11/23/21   Kirill Gregory MD   melatonin 3 MG TABS tablet Take 10 mg by mouth daily    Historical Provider, MD   sodium hyaluronate (EUFLEXXA) 20 MG/2ML SOSY injection Inject 2 mLs into the articular space once a week Series of three injections for the right knee 9/24/21   Mary Beth Sandoval DO   furosemide (LASIX) 40 MG tablet Take 1 tablet by mouth 2 times daily  Patient taking differently: Take 80 mg by mouth daily  4/8/21 11/22/21  Hans Nguyen MD   tamsulosin (FLOMAX) 0.4 MG capsule Take 0.4 mg by mouth daily    Historical Provider, MD   vitamin B-12 (CYANOCOBALAMIN) 500 MCG tablet Take 500 mcg by mouth every other day    Historical Provider, MD   Cholecalciferol (VITAMIN D) 50 MCG (2000 UT) CAPS capsule Take 1 capsule by mouth daily 2/24/20   Mariana Vogel DO   NIFEdipine (PROCARDIA XL) 60 MG extended release tablet Take 60 mg by mouth daily  6/12/19   Historical Provider, MD   allopurinol (ZYLOPRIM) 300 MG tablet Take 600 mg by mouth daily Takes 2 tabs (=600mg) daily 3/27/19   KELSY Ernst CNP        Allergies:     Nsaids    Social History:     Tobacco:    reports that he quit smoking about 7 years ago. His smoking use included cigarettes.  He started smoking about 55 years ago. He has a 49.00 pack-year smoking history. He has never used smokeless tobacco.  Alcohol:      reports previous alcohol use. Drug Use:  reports current drug use. Drug: Marijuana Veryl Calvillo). Family History:     Family History   Problem Relation Age of Onset    Prostate Cancer Father     Other Neg Hx         blood clots       Review of Systems:     Positive and Negative as described in HPI. Review of Systems   Constitutional: Negative for chills, diaphoresis and fever. HENT: Negative for congestion and hearing loss. Complaining of pain to his left nare and left sinus related to recent placement of NG   Respiratory: Negative for cough, shortness of breath, wheezing and stridor. Cardiovascular: Negative for chest pain, palpitations and leg swelling. Gastrointestinal: Positive for abdominal distention and abdominal pain. Negative for blood in stool, constipation, diarrhea, nausea and vomiting. He reports a BM last night and again this morning. He said both times has been a decent amount with some formed stool that he describes as soft/loose but not necessarily diarrhea   Genitourinary: Negative for dysuria and frequency. Musculoskeletal: Negative for myalgias. Skin: Negative for rash. Neurological: Negative for dizziness, seizures and headaches. Psychiatric/Behavioral: The patient is not nervous/anxious. Physical Exam:   /83   Pulse 69   Temp 97.2 °F (36.2 °C)   Resp 18   Ht 6' (1.829 m)   Wt 246 lb (111.6 kg)   SpO2 96%   BMI 33.36 kg/m²   Temp (24hrs), Av.2 °F (36.2 °C), Min:97.2 °F (36.2 °C), Max:97.2 °F (36.2 °C)    No results for input(s): POCGLU in the last 72 hours. Intake/Output Summary (Last 24 hours) at 2022 0974  Last data filed at 2022 8610  Gross per 24 hour   Intake --   Output 900 ml   Net -900 ml       Physical Exam  Vitals and nursing note reviewed.    Constitutional:       Appearance: Normal appearance. HENT:      Mouth/Throat:      Mouth: Mucous membranes are dry. Eyes:      Extraocular Movements: Extraocular movements intact. Cardiovascular:      Rate and Rhythm: Normal rate and regular rhythm. Pulses: Normal pulses. Heart sounds: Normal heart sounds. Pulmonary:      Effort: Pulmonary effort is normal.      Breath sounds: Examination of the right-upper field reveals decreased breath sounds. Examination of the left-upper field reveals decreased breath sounds. Examination of the right-middle field reveals decreased breath sounds. Examination of the left-middle field reveals decreased breath sounds. Examination of the right-lower field reveals decreased breath sounds. Examination of the left-lower field reveals decreased breath sounds. Decreased breath sounds present. Abdominal:      General: Bowel sounds are normal. There is distension (patient states the distention has improved since the NG was placed). Tenderness: There is abdominal tenderness in the epigastric area. Hernia: A hernia is present. Comments: NGT left nare to low intermittent wall suction. Thick green secretions in canister   Musculoskeletal:      Cervical back: Neck supple. Right lower le+ Edema present. Left lower le+ Edema present. Neurological:      Mental Status: He is alert.          Investigations:      Laboratory Testing:  Recent Results (from the past 24 hour(s))   CBC with Auto Differential    Collection Time: 22  7:30 AM   Result Value Ref Range    WBC 15.2 (H) 3.5 - 11.3 k/uL    RBC 4.78 4.21 - 5.77 m/uL    Hemoglobin 15.5 13.0 - 17.0 g/dL    Hematocrit 47.6 40.7 - 50.3 %    MCV 99.6 82.6 - 102.9 fL    MCH 32.4 25.2 - 33.5 pg    MCHC 32.6 28.4 - 34.8 g/dL    RDW 14.0 11.8 - 14.4 %    Platelets 232 164 - 405 k/uL    MPV 10.4 8.1 - 13.5 fL    NRBC Automated 0.0 0.0 per 100 WBC    Seg Neutrophils 76 (H) 36 - 65 %    Lymphocytes 14 (L) 24 - 43 %    Monocytes 7 3 - 12 % Eosinophils % 1 1 - 4 %    Basophils 1 0 - 2 %    Immature Granulocytes 1 (H) 0 %    Segs Absolute 11.68 (H) 1.50 - 8.10 k/uL    Absolute Lymph # 2.09 1.10 - 3.70 k/uL    Absolute Mono # 1.04 0.10 - 1.20 k/uL    Absolute Eos # 0.16 0.00 - 0.44 k/uL    Basophils Absolute 0.07 0.00 - 0.20 k/uL    Absolute Immature Granulocyte 0.15 0.00 - 0.30 k/uL   Basic Metabolic Panel    Collection Time: 04/23/22  7:30 AM   Result Value Ref Range    Glucose 134 (H) 70 - 99 mg/dL    BUN 25 (H) 8 - 23 mg/dL    CREATININE 1.67 (H) 0.70 - 1.20 mg/dL    Bun/Cre Ratio 15 9 - 20    Calcium 9.7 8.6 - 10.4 mg/dL    Sodium 139 135 - 144 mmol/L    Potassium 4.0 3.7 - 5.3 mmol/L    Chloride 100 98 - 107 mmol/L    CO2 25 20 - 31 mmol/L    Anion Gap 14 9 - 17 mmol/L    GFR Non-African American 41 (L) >60 mL/min    GFR African American 50 (L) >60 mL/min    GFR Comment         Hepatic Function Panel    Collection Time: 04/23/22  7:30 AM   Result Value Ref Range    Albumin 4.8 3.5 - 5.2 g/dL    Alkaline Phosphatase 139 (H) 40 - 129 U/L    ALT 39 5 - 41 U/L    AST 35 <40 U/L    Total Bilirubin 0.30 0.3 - 1.2 mg/dL    Bilirubin, Direct <0.08 <0.31 mg/dL    Bilirubin, Indirect Can not be calculated 0.00 - 1.00 mg/dL    Total Protein 8.3 6.4 - 8.3 g/dL   Ethanol    Collection Time: 04/23/22  7:30 AM   Result Value Ref Range    Ethanol <10 <10 mg/dL    Ethanol percent <0.010 <0.010 %   Lipase    Collection Time: 04/23/22  7:30 AM   Result Value Ref Range    Lipase 42 13 - 60 U/L   Amylase    Collection Time: 04/23/22  7:30 AM   Result Value Ref Range    Amylase 71 28 - 100 U/L   Troponin    Collection Time: 04/23/22  7:30 AM   Result Value Ref Range    Troponin, High Sensitivity 21 0 - 22 ng/L   EKG 12 Lead    Collection Time: 04/23/22  7:57 AM   Result Value Ref Range    Ventricular Rate 63 BPM    Atrial Rate 63 BPM    P-R Interval 162 ms    QRS Duration 112 ms    Q-T Interval 438 ms    QTc Calculation (Bazett) 448 ms    P Axis -15 degrees    R Axis -58 degrees    T Axis 89 degrees   Urinalysis with Microscopic    Collection Time: 04/23/22  9:00 AM   Result Value Ref Range    Color, UA Yellow Yellow    Turbidity UA Clear Clear    Glucose, Ur NEGATIVE NEGATIVE    Bilirubin Urine NEGATIVE NEGATIVE    Ketones, Urine NEGATIVE NEGATIVE    Specific Gravity, UA 1.020 1.005 - 1.030    Urine Hgb NEGATIVE NEGATIVE    pH, UA 5.5 5.0 - 8.0    Protein, UA NEGATIVE NEGATIVE    Urobilinogen, Urine Normal Normal    Nitrite, Urine NEGATIVE NEGATIVE    Leukocyte Esterase, Urine NEGATIVE NEGATIVE    -          WBC, UA None 0 - 5 /HPF    RBC, UA 0 TO 2 0 - 2 /HPF    Casts UA 10 TO 20 /LPF    Casts UA HYALINE /LPF    Epithelial Cells UA 0 TO 2 0 - 5 /HPF       Imaging/Diagnostics:  CT ABDOMEN PELVIS WO CONTRAST Additional Contrast? None    Result Date: 4/23/2022  1. Findings consistent with distal small-bowel obstruction. 2. Cirrhotic morphology of the liver. 3. 8 mm cyst right lobe of liver unchanged. 4. Cholelithiasis. XR CHEST 1 VIEW    Result Date: 4/23/2022  No significant interval change. Mild right basilar atelectasis or scarring. Cardiac silhouette enlargement, as before.        Assessment :      Hospital Problems           Last Modified POA    * (Principal) SBO (small bowel obstruction) ([facility] [address] 6/61/9368 Yes    Alcoholic cirrhosis of liver without ascites ([address] 4/23/2022 Yes    Essential hypertension 4/23/2022 Yes    History of hepatitis C 4/23/2022 Yes    Type 2 diabetes mellitus with kidney complication, without long-term current use of insulin ([address] 4/23/2022 Yes    MULU (acute kidney injury) ([address] 4/23/2022 Yes          Plan:     Patient status inpatient in the  Med/Surge    SBO  N.p.o. for now  IV hydration with D5 0.45 with 20 KCl at 75 mils an hour  PPI  NGT LIWS  Pain meds as needed  Consulted general surgery    DVT prophylaxis    Monitor and replace electrolytes as needed    MULU superimposed on CKD  Continue with IV hydration  Avoid nephrotoxic agents    Type 2 diabetes  Initiate insulin sliding scale with low correction algorithm  Hold Amaryl while n.p.o.    Essential hypertension  Hydralazine as needed for hypertension  Hold Procardia while n.p.o. but monitor telemetry closely    Alcoholic cirrhosis of the liver without ascites with history of hepatitis C  Repeat amylase, phosphorus and lipase in a.m. Hold Aldactone and Lasix while n.p.o. Consultations:   IP CONSULT TO GENERAL SURGERY  IP CONSULT TO HOSPITALIST  IP CONSULT TO GENERAL SURGERY     Patient is admitted as inpatient status because of co-morbidities listed above, severity of signs and symptoms as outlined, requirement for current medical therapies and most importantly because of direct risk to patient if care not provided in a hospital setting. Expected length of stay > 48 hours.     KELSY Noble CNP  4/23/2022  9:58 AM    Copy sent to KELSY Porter - CNP

## 2022-04-23 NOTE — ED NOTES
ED to inpatient nurses report    Chief Complaint   Patient presents with    Abdominal Pain     epigastric to back       Present to ED from Home  LOC: alert and orientated to name, place, date  Vital signs   Vitals:    04/23/22 0722   BP: 131/83   Pulse: 69   Resp: 18   Temp: 97.2 °F (36.2 °C)   SpO2: 96%   Weight: 246 lb (111.6 kg)   Height: 6' (1.829 m)      Oxygen Baseline RA    Current needs required none   LDAs:   Peripheral IV 04/23/22 Right Forearm (Active)   Site Assessment Clean, dry & intact 04/23/22 0736   Line Status Blood return noted 04/23/22 0736     Mobility: Independent  Pending ED orders:   Present condition: Code Status: [unfilled]   Consults:  []  Hospitalist  Completed  [] yes [] no  []  Medicine  Completed  [x] yes [] No  []  Cardiology  Completed  [] yes [] No  []  GI   Completed  [x] yes [] No  []  Neurology  Completed  [] yes [] No  []  Nephrology Completed  [] yes [] No  []  Vascular  Completed  [] yes [] No   []  Surgery  Completed  [] yes [] No   []  Urology  Completed  [] yes [] No   []  Plastics  Completed  [] yes [] No   []  ENT  Completed  [] yes [] No   []  Other   Completed  [] yes [] No  Pertinent event(s) SBO on CT. NG placed Left nares with immediate return of 600 cc gastric contents. Pertinent event(s) Pt. States he was here 2 weeks for same complaints and discharged to follow with PCP. Gallbladder US completed and gallstones noted. Has not had further follow up. Had 2 doses of MS in ER.    Electronically signed by Erasto Alcala RN on 4/23/2022 at 9:23 AM     Florencia Verdugo RN  04/23/22 5301

## 2022-04-24 ENCOUNTER — APPOINTMENT (OUTPATIENT)
Dept: GENERAL RADIOLOGY | Age: 72
DRG: 389 | End: 2022-04-24
Payer: MEDICARE

## 2022-04-24 VITALS
HEART RATE: 61 BPM | DIASTOLIC BLOOD PRESSURE: 69 MMHG | WEIGHT: 235 LBS | SYSTOLIC BLOOD PRESSURE: 114 MMHG | HEIGHT: 72 IN | TEMPERATURE: 98.1 F | BODY MASS INDEX: 31.83 KG/M2 | OXYGEN SATURATION: 94 % | RESPIRATION RATE: 16 BRPM

## 2022-04-24 LAB
ABSOLUTE EOS #: 0.17 K/UL (ref 0–0.44)
ABSOLUTE IMMATURE GRANULOCYTE: 0.03 K/UL (ref 0–0.3)
ABSOLUTE LYMPH #: 2.28 K/UL (ref 1.1–3.7)
ABSOLUTE MONO #: 0.86 K/UL (ref 0.1–1.2)
ALBUMIN SERPL-MCNC: 4 G/DL (ref 3.5–5.2)
ALP BLD-CCNC: 114 U/L (ref 40–129)
ALT SERPL-CCNC: 23 U/L (ref 5–41)
AMYLASE: 64 U/L (ref 28–100)
ANION GAP SERPL CALCULATED.3IONS-SCNC: 13 MMOL/L (ref 9–17)
AST SERPL-CCNC: 19 U/L
BASOPHILS # BLD: 1 % (ref 0–2)
BASOPHILS ABSOLUTE: 0.05 K/UL (ref 0–0.2)
BILIRUB SERPL-MCNC: 0.38 MG/DL (ref 0.3–1.2)
BUN BLDV-MCNC: 19 MG/DL (ref 8–23)
BUN/CREAT BLD: 15 (ref 9–20)
CALCIUM SERPL-MCNC: 8.3 MG/DL (ref 8.6–10.4)
CHLORIDE BLD-SCNC: 105 MMOL/L (ref 98–107)
CO2: 23 MMOL/L (ref 20–31)
CREAT SERPL-MCNC: 1.24 MG/DL (ref 0.7–1.2)
EOSINOPHILS RELATIVE PERCENT: 2 % (ref 1–4)
GFR AFRICAN AMERICAN: >60 ML/MIN
GFR NON-AFRICAN AMERICAN: 58 ML/MIN
GFR SERPL CREATININE-BSD FRML MDRD: ABNORMAL ML/MIN/{1.73_M2}
GLUCOSE BLD-MCNC: 104 MG/DL (ref 70–99)
GLUCOSE BLD-MCNC: 105 MG/DL (ref 75–110)
GLUCOSE BLD-MCNC: 83 MG/DL (ref 75–110)
HCT VFR BLD CALC: 43.3 % (ref 40.7–50.3)
HEMOGLOBIN: 13.9 G/DL (ref 13–17)
IMMATURE GRANULOCYTES: 0 %
LIPASE: 38 U/L (ref 13–60)
LYMPHOCYTES # BLD: 24 % (ref 24–43)
MAGNESIUM: 2.1 MG/DL (ref 1.6–2.6)
MCH RBC QN AUTO: 32.2 PG (ref 25.2–33.5)
MCHC RBC AUTO-ENTMCNC: 32.1 G/DL (ref 28.4–34.8)
MCV RBC AUTO: 100.2 FL (ref 82.6–102.9)
MONOCYTES # BLD: 9 % (ref 3–12)
NRBC AUTOMATED: 0 PER 100 WBC
PDW BLD-RTO: 14.1 % (ref 11.8–14.4)
PHOSPHORUS: 3.3 MG/DL (ref 2.5–4.5)
PLATELET # BLD: 208 K/UL (ref 138–453)
PMV BLD AUTO: 10.1 FL (ref 8.1–13.5)
POTASSIUM SERPL-SCNC: 3.8 MMOL/L (ref 3.7–5.3)
RBC # BLD: 4.32 M/UL (ref 4.21–5.77)
SEG NEUTROPHILS: 64 % (ref 36–65)
SEGMENTED NEUTROPHILS ABSOLUTE COUNT: 6.1 K/UL (ref 1.5–8.1)
SODIUM BLD-SCNC: 141 MMOL/L (ref 135–144)
TOTAL PROTEIN: 7 G/DL (ref 6.4–8.3)
WBC # BLD: 9.5 K/UL (ref 3.5–11.3)

## 2022-04-24 PROCEDURE — 83690 ASSAY OF LIPASE: CPT

## 2022-04-24 PROCEDURE — 36415 COLL VENOUS BLD VENIPUNCTURE: CPT

## 2022-04-24 PROCEDURE — 2500000003 HC RX 250 WO HCPCS: Performed by: SURGERY

## 2022-04-24 PROCEDURE — 83735 ASSAY OF MAGNESIUM: CPT

## 2022-04-24 PROCEDURE — 74018 RADEX ABDOMEN 1 VIEW: CPT

## 2022-04-24 PROCEDURE — 82150 ASSAY OF AMYLASE: CPT

## 2022-04-24 PROCEDURE — 85025 COMPLETE CBC W/AUTO DIFF WBC: CPT

## 2022-04-24 PROCEDURE — 6370000000 HC RX 637 (ALT 250 FOR IP): Performed by: STUDENT IN AN ORGANIZED HEALTH CARE EDUCATION/TRAINING PROGRAM

## 2022-04-24 PROCEDURE — 6360000002 HC RX W HCPCS: Performed by: FAMILY MEDICINE

## 2022-04-24 PROCEDURE — 2580000003 HC RX 258: Performed by: SURGERY

## 2022-04-24 PROCEDURE — 99238 HOSP IP/OBS DSCHRG MGMT 30/<: CPT | Performed by: FAMILY MEDICINE

## 2022-04-24 PROCEDURE — 80053 COMPREHEN METABOLIC PANEL: CPT

## 2022-04-24 PROCEDURE — 84100 ASSAY OF PHOSPHORUS: CPT

## 2022-04-24 PROCEDURE — 82947 ASSAY GLUCOSE BLOOD QUANT: CPT

## 2022-04-24 RX ORDER — LORAZEPAM 0.5 MG/1
0.5 TABLET ORAL EVERY 6 HOURS PRN
Status: DISCONTINUED | OUTPATIENT
Start: 2022-04-24 | End: 2022-04-24 | Stop reason: HOSPADM

## 2022-04-24 RX ORDER — ALLOPURINOL 300 MG/1
600 TABLET ORAL DAILY
Status: DISCONTINUED | OUTPATIENT
Start: 2022-04-24 | End: 2022-04-24 | Stop reason: HOSPADM

## 2022-04-24 RX ORDER — PANTOPRAZOLE SODIUM 40 MG/1
40 TABLET, DELAYED RELEASE ORAL
Status: DISCONTINUED | OUTPATIENT
Start: 2022-04-25 | End: 2022-04-24 | Stop reason: HOSPADM

## 2022-04-24 RX ORDER — DICYCLOMINE HYDROCHLORIDE 10 MG/1
10 CAPSULE ORAL
Qty: 120 CAPSULE | Refills: 0 | Status: SHIPPED | OUTPATIENT
Start: 2022-04-24 | End: 2022-04-24

## 2022-04-24 RX ORDER — LACTULOSE 10 G/15ML
20 SOLUTION ORAL 3 TIMES DAILY
Qty: 1000 ML | Refills: 1 | Status: SHIPPED | OUTPATIENT
Start: 2022-04-24

## 2022-04-24 RX ORDER — PREGABALIN 75 MG/1
150 CAPSULE ORAL 2 TIMES DAILY
Status: DISCONTINUED | OUTPATIENT
Start: 2022-04-24 | End: 2022-04-24 | Stop reason: HOSPADM

## 2022-04-24 RX ORDER — DICYCLOMINE HYDROCHLORIDE 10 MG/1
10 CAPSULE ORAL
Status: DISCONTINUED | OUTPATIENT
Start: 2022-04-24 | End: 2022-04-24 | Stop reason: HOSPADM

## 2022-04-24 RX ORDER — TAMSULOSIN HYDROCHLORIDE 0.4 MG/1
0.4 CAPSULE ORAL DAILY
Status: DISCONTINUED | OUTPATIENT
Start: 2022-04-24 | End: 2022-04-24 | Stop reason: HOSPADM

## 2022-04-24 RX ORDER — NIFEDIPINE 30 MG/1
60 TABLET, EXTENDED RELEASE ORAL DAILY
Status: DISCONTINUED | OUTPATIENT
Start: 2022-04-24 | End: 2022-04-24 | Stop reason: HOSPADM

## 2022-04-24 RX ORDER — DICYCLOMINE HYDROCHLORIDE 10 MG/1
10 CAPSULE ORAL
Qty: 50 CAPSULE | Refills: 0 | Status: SHIPPED | OUTPATIENT
Start: 2022-04-24

## 2022-04-24 RX ORDER — LACTULOSE 10 G/15ML
20 SOLUTION ORAL 3 TIMES DAILY
Status: DISCONTINUED | OUTPATIENT
Start: 2022-04-24 | End: 2022-04-24 | Stop reason: HOSPADM

## 2022-04-24 RX ORDER — FUROSEMIDE 80 MG
80 TABLET ORAL DAILY
Status: DISCONTINUED | OUTPATIENT
Start: 2022-04-24 | End: 2022-04-24 | Stop reason: HOSPADM

## 2022-04-24 RX ORDER — SPIRONOLACTONE 25 MG/1
25 TABLET ORAL DAILY
Status: DISCONTINUED | OUTPATIENT
Start: 2022-04-24 | End: 2022-04-24 | Stop reason: HOSPADM

## 2022-04-24 RX ORDER — ALOGLIPTIN 6.25 MG/1
6.25 TABLET, FILM COATED ORAL DAILY
Status: DISCONTINUED | OUTPATIENT
Start: 2022-04-24 | End: 2022-04-24 | Stop reason: HOSPADM

## 2022-04-24 RX ORDER — LANOLIN ALCOHOL/MO/W.PET/CERES
9 CREAM (GRAM) TOPICAL NIGHTLY
Status: DISCONTINUED | OUTPATIENT
Start: 2022-04-24 | End: 2022-04-24 | Stop reason: HOSPADM

## 2022-04-24 RX ADMIN — SODIUM CHLORIDE, PRESERVATIVE FREE 20 MG: 5 INJECTION INTRAVENOUS at 08:39

## 2022-04-24 RX ADMIN — DICYCLOMINE HYDROCHLORIDE 10 MG: 10 CAPSULE ORAL at 10:58

## 2022-04-24 RX ADMIN — ENOXAPARIN SODIUM 30 MG: 30 INJECTION SUBCUTANEOUS at 08:39

## 2022-04-24 ASSESSMENT — ENCOUNTER SYMPTOMS
CONSTIPATION: 0
CHOKING: 0
SINUS PRESSURE: 0
BACK PAIN: 0
WHEEZING: 0
RHINORRHEA: 0
NAUSEA: 0
VOMITING: 0
DIARRHEA: 0
CHEST TIGHTNESS: 0
ABDOMINAL PAIN: 0
COUGH: 0
VOICE CHANGE: 0
SHORTNESS OF BREATH: 0

## 2022-04-24 ASSESSMENT — PAIN SCALES - GENERAL
PAINLEVEL_OUTOF10: 0
PAINLEVEL_OUTOF10: 0

## 2022-04-24 NOTE — DISCHARGE SUMMARY
[facility]  Office: [phone]  Wlaly Villalpando Blood DO, Colonel Claus SINGH, Jefferson Martinez MD, Magaly Yañez MD, Mavis Barraza MD, Davion Peng MD, Marin Valentin MD, Bonilla Lennon MD, Shannon Saini MD, Rosi Patterson MD, Kristin Ann DO, Renay Colindres MD, Earnest Thomas MD, Endy Wadsworth DO, Janeth Simpson MD,  Xavier Whiting DO, Deshawn Yung MD, Mariam Bartlett MD, Christiano Rivas [facility], [facility] DelGrosso CNP, Sarahi Pulling CNP, Erika Vazquez CNS, Mika Reyes CNP, Eliseo Pedroza CNP, Delana Spatz CNP, Augustina Pappas CNP, Ivania Lipfredrick CNP, Farnaz Jimenes PAJanakC, Valentina Douglas CNP, Clotilde Meraz CNP, Feroz Dies CNP, Madelin Calvert CNP, Saud Donald CNP, [address]      Discharge Summary     Patient ID: Hunter Be  :  1950   MRN: [id number]     ACCOUNT:  [de-identified]   Patient Location :   Patient's PCP: KELSY Brown CNP  Admit Date: 2022   Discharge Date: 2022     Length of Stay: 1  Code Status:  Full Code  Admitting Physician: Magaly Yañez MD  Discharge Physician: Magaly Yañez MD     Active Discharge Diagnosis :     Primary Problem  SBO (small bowel obstruction) [facility])      [facility]    Diagnosis Date Noted    SBO (small bowel obstruction) ([facility] [address] [P37.387] 2022     Priority: Medium    Alcoholic cirrhosis of liver without ascites ([facility] [address] [K70.30] 2022     Priority: Medium    Acute kidney injury superimposed on CKD ([facility] [address] [N17.9, N18.9]      Priority: Medium    MULU (acute kidney injury) ([facility] [address] [N17.9]     Type 2 diabetes mellitus with kidney complication, without long-term current use of insulin ([facility] [address] [E11.29]     History of hepatitis C [Z86.19] 2017    Essential hypertension [I10]        Admission Condition:  fair     Discharged Condition: stable    Hospital Stay:     Hospital Course:  Hunter Be is a 81 y.o. male who was admitted for the management of   SBO (small bowel obstruction) ([facility] [address] , presented to ER with Abdominal Pain (epigastric to back )  Patient presented to emergency room with acute onset epigastric abdominal pain associated with nausea, vomiting. Patient has history of Sol's esophagus and diverticulosis, chronic hep C with ascites and follows GI as outpatient. Patient had prostrate cancer and underwent da Melany prostatectomy. He later developed abdominal wall hernia that required hernia surgery. Initial evaluation showed elevated creatinine 1.67, leukocytosis 13.2. CT abdomen pelvis without contrast showed dilated bowel loops suggestive of small bowel obstruction. Nasogastric tube was placed for stomach decompression. Patient was treated with pain control and IV fluids. Symptoms improved and was started on diet as tolerated. Significant therapeutic interventions:   1. Small bowel obstruction -improved with starting decompression with nasogastric tube. Diet as tolerated. 2. Acute kidney injury secondary to dehydration prerenal-improved with fluid resuscitation. Underlying chronic kidney disease stage III. 3. Chronic hepatitis C with liver cirrhosis and ascites-continue furosemide, Aldactone. Follow-up with GI as outpatient. 4. H/o prostate cancer s/p prostatectomy.   5. H/o alcohol abuse -       Significant Diagnostic Studies:   Labs / Micro:/Radiology  Recent Labs     04/24/22  0642 04/23/22  0730 04/09/22  0731   WBC 9.5 15.2* 9.6   HGB 13.9 15.5 13.3   HCT 43.3 47.6 41.4   .2 99.6 100.0    237 194     Labs Renal Latest Ref Rng & Units 4/24/2022 4/23/2022 4/9/2022 2/9/2022 2/9/2022   BUN 8 - 23 mg/dL 19 25(H) 19 - 18   Cr 0.70 - 1.20 mg/dL 1.24(H) 1.67(H) 1.22(H) 1.42(H) 1.49(H)   K 3.7 - 5.3 mmol/L 3.8 4.0 4.1 - 4.6   Na 135 - 144 mmol/L 141 139 139 - 138     Lab Results   Component Value Date    ALT 23 04/24/2022    AST 19 04/24/2022    ALKPHOS 114 04/24/2022    BILITOT 0.38 04/24/2022     Lab Results   Component Value Date    TSH 3.31 07/14/2021     No results found for: HAV, HEPAIGM, HEPBIGM, HEPBCAB, HBEAG, HEPCAB  Lab Results   Component Value Date    COLORU Yellow 04/23/2022    NITRU NEGATIVE 04/23/2022    GLUCOSEU NEGATIVE 04/23/2022    KETUA NEGATIVE 04/23/2022    UROBILINOGEN Normal 04/23/2022    BILIRUBINUR NEGATIVE 04/23/2022     Lab Results   Component Value Date    LABA1C 6.0 11/22/2021     Lab Results   Component Value Date     11/22/2021     Lab Results   Component Value Date    INR 1.0 11/22/2021    INR 1.0 09/27/2021    INR 1.3 07/15/2020    PROTIME 12.9 11/22/2021    PROTIME 12.7 09/27/2021    PROTIME 16.0 (H) 07/15/2020       CT ABDOMEN PELVIS WO CONTRAST Additional Contrast? None    Result Date: 4/23/2022  1. Findings consistent with distal small-bowel obstruction. 2. Cirrhotic morphology of the liver. 3. 8 mm cyst right lobe of liver unchanged. 4. Cholelithiasis. XR ABDOMEN (KUB) (SINGLE AP VIEW)    Result Date: 4/24/2022  Mildly dilated small bowel loop in the left mid to lower abdomen     XR CHEST 1 VIEW    Result Date: 4/23/2022  No significant interval change. Mild right basilar atelectasis or scarring. Cardiac silhouette enlargement, as before. Consultations:    Consults:     Final Specialist Recommendations/Findings:   IP CONSULT TO GENERAL SURGERY  IP CONSULT TO HOSPITALIST  IP CONSULT TO GENERAL SURGERY      The patient was seen and examined on day of discharge and this discharge summary is in conjunction with any daily progress note from day of discharge.     Discharge plan:     Disposition: Home    Physician Follow Up:     Hubert Romero DO  [address]  [address]  [phone]    Schedule an appointment as soon as possible for a visit  Surgeon follow up in 10 days, call to schedule    KELSY Mills - CNP  [address] [facility] [address] [phone]      As needed Requiring Further Evaluation/Follow Up POST HOSPITALIZATION/Incidental Findings: follow up with surgery as needed     Diet: regular diet    Activity: As tolerated. Instructions to Patient: continue medications     Discharge Medications:      Medication List      START taking these medications    dicyclomine 10 MG capsule  Commonly known as: BENTYL  Take 1 capsule by mouth 3 times daily (before meals)     lactulose 10 GM/15ML solution  Commonly known as: CHRONULAC  Take 30 mLs by mouth 3 times daily        CHANGE how you take these medications    furosemide 40 MG tablet  Commonly known as: LASIX  Take 1 tablet by mouth 2 times daily  What changed:   · how much to take  · when to take this        CONTINUE taking these medications    Januvia 100 MG tablet  Generic drug: SITagliptin     LORazepam 0.5 MG tablet  Commonly known as: ATIVAN  TAKE ONE TABLET BY MOUTH EVERY EVENING AS NEEDED FOR ANXIETY FOR UP TO 30 DAYS     melatonin 5 MG Tabs tablet     NIFEdipine 60 MG extended release tablet  Commonly known as: PROCARDIA XL     omeprazole 20 MG delayed release capsule  Commonly known as: PRILOSEC  TAKE 2 CAPSULES BY MOUTH DAILY     pregabalin 150 MG capsule  Commonly known as: LYRICA  Take 1 capsule by mouth 2 times daily for 90 days.      spironolactone 50 MG tablet  Commonly known as: ALDACTONE  TAKE 1 TABLET BY MOUTH ONE TIME A DAY     tamsulosin 0.4 MG capsule  Commonly known as: FLOMAX     Zyloprim 300 MG tablet  Generic drug: allopurinol        STOP taking these medications    amoxicillin 500 MG capsule  Commonly known as: AMOXIL     HYDROcodone-acetaminophen 5-325 MG per tablet  Commonly known as: [facility]           Where to Get Your Medications      These medications were sent to [facility] [address] [phone] - F [phone]  [address] [place], [place] [address] [address]    Phone: [phone]   · lactulose 10 GM/15ML solution     These medications were sent to [address] [facility] [address]  [address] [facility], [address] [place] [id number]    Phone: [phone]   · dicyclomine 10 MG capsule         Time Spent on discharge is  25 mins in patient examination, evaluation, counseling as well as medication reconciliation, prescriptions for required medications, discharge plan and follow up. Electronically signed by   Pete Davidson MD  4/24/2022        Thank you Dr. Mu Vasquez, APRN - CNP for the opportunity to be involved in this patient's care.

## 2022-04-24 NOTE — PROGRESS NOTES
General Surgery:    Progress Note      PATIENT NAME: Favian Day OF BIRTH: 1950    ADMISSION DATE: 4/23/2022  7:19 AM   TODAY'S DATE: 4/24/2022    Chief Complaint:  Epigastric abdominal pain   Consult Regarding:  SBO     SUBJECTIVE:   Pt seen and examined. Afebrile, VSS. Endorses minimal abdominal discomfort this AM, improved from day prior. Does endorse some intermittent abdominal muscle cramping. No nausea or emesis. Multiple loose stools yesterday and NGT was removed. On FLD. KUB pending this morning. Leukocytosis resolved. REVIEW OF SYSTEMS:    CONSTITUTIONAL: No fevers, chills, no weight changes  CARDIOVASCULAR: No chest pain, palpitations  GASTROINTESTINAL: Endorses epigastric abdominal pain (improving). No nausea or emesis. No obstipation. GENITOURINARY: No dysuria, no increased or decreased frequency of urination  HEMATOLOGIC/LYMPHATIC: No easy bruising or bleeding  ENDOCRINE: No polydipsia, no heat or cold intolerance    PHYSICAL EXAM:    VITALS:  /69   Pulse 61   Temp 98.1 °F (36.7 °C) (Oral)   Resp 16   Ht 6' (1.829 m)   Wt 235 lb (106.6 kg)   SpO2 90%   BMI 31.87 kg/m²   INTAKE/OUTPUT:     Intake/Output Summary (Last 24 hours) at 4/24/2022 1304  Last data filed at 4/24/2022 0550  Gross per 24 hour   Intake 1256.96 ml   Output 1400 ml   Net -143.04 ml       CONSTITUTIONAL: Awake, alert, not acutely distressed  ENT: NCAT, EOMI  NECK:  supple, symmetrical, trachea midline   LUNGS: Normal effort, no respiratory distress, no accessory muscle usage, no wheezing  CARDIOVASCULAR: Regular rate and rhythm  ABDOMEN: Softly distended, minimal tenderness to palpation in the epigastric region without guarding or peritoneal signs. Evidence of diastases. Prior surgical scars noted. SKIN:  No drainage, induration, or erythema noted. MUSCULOSKELETAL:  No joint swelling, deformity, or tenderness. NEUROLOGIC: Alert and oriented.   Cranial nerves grossly intact, no focal deficits    CBC:   Lab Results   Component Value Date    WBC 9.5 04/24/2022    RBC 4.32 04/24/2022    HGB 13.9 04/24/2022    HCT 43.3 04/24/2022    .2 04/24/2022    MCH 32.2 04/24/2022    MCHC 32.1 04/24/2022    RDW 14.1 04/24/2022     04/24/2022    MPV 10.1 04/24/2022     BMP:    Lab Results   Component Value Date     04/23/2022    K 4.0 04/23/2022     04/23/2022    CO2 25 04/23/2022    BUN 25 04/23/2022    LABALBU 4.8 04/23/2022    CREATININE 1.67 04/23/2022    CALCIUM 9.7 04/23/2022    GFRAA 50 04/23/2022    LABGLOM 41 04/23/2022    GLUCOSE 134 04/23/2022       Pertinent Radiology:   CT ABDOMEN PELVIS WO CONTRAST Additional Contrast? None    Result Date: 4/23/2022  EXAMINATION: CT OF THE ABDOMEN AND PELVIS WITHOUT CONTRAST 4/23/2022 7:40 am TECHNIQUE: CT of the abdomen and pelvis was performed without the administration of intravenous contrast. Multiplanar reformatted images are provided for review. Dose modulation, iterative reconstruction, and/or weight based adjustment of the mA/kV was utilized to reduce the radiation dose to as low as reasonably achievable. COMPARISON: 12/23/2021 HISTORY: ORDERING SYSTEM PROVIDED HISTORY: epigastric abd pain radiates through into upper back BL TECHNOLOGIST PROVIDED HISTORY: epigastric abd pain radiates through into upper back BL Decision Support Exception - unselect if not a suspected or confirmed emergency medical condition->Emergency Medical Condition ([place]) Reason for Exam: Pt c/o epigastric pain radiating to back x weeks. FINDINGS: Lower Chest: The visualized heart and lungs show no acute abnormalities. Organs: 8 mm cyst in the posteroinferior right lobe of liver. No follow-up imaging recommended. Mild nodularity of the liver contour and somewhat atrophic caudate lobe consistent with cirrhosis. Cholelithiasis. No splenomegaly. Pancreas, adrenal glands and kidneys show no significant abnormalities. GI/Bowel:  There is limited evaluation due to absence of oral contrast.  There is some gastric distension. Multiple dilated small bowel loops with collapsed distal and terminal ileum. Ileocecal junction is seen in the anterior right mid abdomen. Findings compatible with small-bowel obstruction which appears to be distal.  There is some fecalization of the contents of some of the small bowel loops. The colon is tortuous otherwise unremarkable. Moderate stool present. Pelvis: Urinary bladder unremarkable. No suspicious pelvic mass. Peritoneum/Retroperitoneum: No free intraperitoneal fluid. No significant lymphadenopathy. Atherosclerotic disease. Bones/Soft Tissues: Diastasis of rectus muscles. No acute bony abnormality. Postsurgical changes of L4-S1 fusion. 1. Findings consistent with distal small-bowel obstruction. 2. Cirrhotic morphology of the liver. 3. 8 mm cyst right lobe of liver unchanged. 4. Cholelithiasis. XR CHEST 1 VIEW    Result Date: 4/23/2022  EXAMINATION: ONE X-RAY VIEW OF THE CHEST 4/23/2022 7:54 am COMPARISON: 04/09/2022 HISTORY: ORDERING SYSTEM PROVIDED HISTORY: epigastric abd pain radiates into chest TECHNOLOGIST PROVIDED HISTORY: epigastric abd pain radiates into chest Reason for Exam: Port upright AP CXR - epigastric abd pain that radiates into chest FINDINGS: The heart size is enlarged. The patient is status post sternotomy. There is no pneumothorax, edema or focal consolidation. There is mild right basilar atelectasis or scarring. No significant interval change. Mild right basilar atelectasis or scarring. Cardiac silhouette enlargement, as before.      ASSESSMENT:  Active Hospital Problems    Diagnosis Date Noted    SBO (small bowel obstruction) ([address] [K56.609] 04/23/2022     Priority: Medium    Alcoholic cirrhosis of liver without ascites ([address] [K70.30] 04/23/2022     Priority: Medium    Acute kidney injury superimposed on CKD ([address] [N17.9, N18.9]      Priority: Medium    MULU (acute kidney injury) ([address] [N17.9]  Type 2 diabetes mellitus with kidney complication, without long-term current use of insulin (HCC) [E11.29]     History of hepatitis C [Z86.19] 01/26/2017    Essential hypertension [I10]        81 y.o. male with epigastric abdominal pain radiating to his back  CT with small bowel dilation consistent with partial small bowel obstruction    Plan:  1. Continue medical mgmt and supportive care per primary  2. Diet: FLD, advance to low fiber  3. F/u KUB this morning  4. Monitor & record bowel function, +BM's yesterday   5. Bentyl  6. Strict I's and O's in the setting of elevated creatinine, CMP pending this morning  7. Leukocytosis resolved. CMP pending, replace electrolytes PRN  8. Patient's symptoms could be a result of his cholelithiasis vs. degree of obstruction. May benefit from cholecystectomy and dx laparoscopy as an outpatient. Ok to dc from a surgical perspective if tolerating a low fiber diet. F/u with Dr. Rickey Nicole in approx.  10 days    Mami Dickens, DO  General Surgery PGY-3

## 2022-04-24 NOTE — PROGRESS NOTES
[facility]  Office: [address], DO, Em Patel, DO, Berkley Halima, DO, Tariq Nicholas Blood, DO, Roslyn Ellington MD, Matt Humphries MD, Cheo Malik MD, Grace Bernstein MD, Sarita Chand MD, Bertha Ferrera MD, Rafia Mak MD, Misty Simental, DO, Valeri Travis, DO, Shahzad Hancock MD,  Juan A Schneider DO, Jose Beyer MD, Maia Cox MD, Domitila Smith MD, Braynt Baig DO, Sherri Price MD, Dustin Dduley MD, Freddy Wallace, [facility], [facility], CNP, Leena Goldstein, CNP, George Pereira, CNP, Karlene Ventura, CNP, Garima Swan, CNP, MERNA SaraviaC, Gita Garcia, [facility], Niya Pandey, CNP, Diana Shirley, CNP, Elena Harris, CNP, Fina Pretty, CNS, Jeovany Sow, [facility], Eleno Pham, CNP, Emily Murillo, CNP, Ayesha Greenberg, [facility]       Donte Alfredo      Daily Progress Note     Admit Date: 4/23/2022  Bed/Room No.  2004/2004-02  Admitting Physician : Cheo Malik MD  Code Status :Full Code  Hospital Day:  LOS: 1 day   Chief Complaint:     Chief Complaint   Patient presents with    Abdominal Pain     epigastric to back      Principal Problem:    SBO (small bowel obstruction) [facility])  Active Problems:    Alcoholic cirrhosis of liver without ascites ([facility]    Acute kidney injury superimposed on CKD [facility])    Essential hypertension    History of hepatitis C    Type 2 diabetes mellitus with kidney complication, without long-term current use of insulin ([facility]    MULU (acute kidney injury) ([facility]  Resolved Problems:    * No resolved hospital problems. *    Subjective : Interval History/Significant events :  04/24/22    Patient denies any abdominal pain, nausea, vomiting. He is eating and tolerating diet. He had bowel movement this morning. He is otherwise comfortable. Vitals - Stable afebrile  Labs -creatinine stable 1.4.  KUB suggestive of dilated bowel loops. Normal white count. Leukocytosis resolved. Nursing notes , Consults notes reviewed.  Overnight events and updates discussed with Nursing staff . Background History:         Keyla Barboza is 81 y.o. male  Who was admitted to the hospital on 4/23/2022 for treatment of SBO (small bowel obstruction) ([address] Patient presented to emergency room with acute onset epigastric abdominal pain associated with nausea, vomiting. Patient has history of Sol's esophagus and diverticulosis, chronic hep C with ascites and follows GI as outpatient. Initial evaluation showed elevated creatinine 1.67, leukocytosis 13.2. CT abdomen pelvis without contrast showed dilated bowel loops suggestive of small bowel obstruction. Nasogastric tube was placed for stomach decompression. Patient was treated with pain control and IV fluids. Symptoms improved and was started on diet as tolerated. PMH:  Past Medical History:   Diagnosis Date    Abdominal varicosities 07/15/2020    Anxiety state NEC     Aortic valve defect     Arthritis     DJD    Sol esophagus 10/24/2013    small segment    Chronic kidney disease     Chronic kidney disease     Cirrhosis ([address]     had paracentesis Sept 2015    Colon polyps 10/07/2019    tubular adenoma x2; hyperplastic polyp    Diabetes mellitus ([address]     pre diabetic on januvia    Diverticulosis of colon     Enlarged heart 12/28/2015    HISTORY OF, is resolved at this time    Gout 12/28/2015    is resolved at this time    Hepatic cirrhosis ([address]     Hepatic cyst     Hepatitis C, chronic ([address]     Seeing Dr. 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  Portal venous hypertension ([address] 07/15/2020    S/P prostatectomy 2012    Sciatica     Splenomegaly 07/15/2020      Allergies:    Allergies Allergen Reactions    Nsaids      Because of Kidney issues       Medications :  dicyclomine, 10 mg, Oral, TID AC  allopurinol, 600 mg, Oral, Daily  furosemide, 80 mg, Oral, Daily  melatonin, 9 mg, Oral, Nightly  NIFEdipine, 60 mg, Oral, Daily  [START ON 4/25/2022] pantoprazole, 40 mg, Oral, QAM AC  pregabalin, 150 mg, Oral, BID  alogliptin, 6.25 mg, Oral, Daily  spironolactone, 25 mg, Oral, Daily  tamsulosin, 0.4 mg, Oral, Daily  lactulose, 20 g, Oral, TID  famotidine (PEPCID) injection, 20 mg, IntraVENous, BID  sodium chloride flush, 5-40 mL, IntraVENous, 2 times per day  enoxaparin, 30 mg, SubCUTAneous, BID  insulin lispro, 0-6 Units, SubCUTAneous, TID WC  insulin lispro, 0-3 Units, SubCUTAneous, Nightly        Review of Systems   Review of Systems   Constitutional: Negative for activity change, appetite change, fatigue, fever and unexpected weight change. HENT: Negative for congestion, nosebleeds, rhinorrhea, sinus pressure, sneezing and voice change. Respiratory: Negative for cough, choking, chest tightness, shortness of breath and wheezing. Cardiovascular: Negative for chest pain, palpitations and leg swelling. Gastrointestinal: Negative for abdominal pain, constipation, diarrhea, nausea and vomiting. Genitourinary: Negative for difficulty urinating, dysuria, frequency, penile discharge and testicular pain. Musculoskeletal: Negative for back pain. Skin: Negative for rash. Neurological: Negative for dizziness, weakness, light-headedness and headaches. Hematological: Does not bruise/bleed easily. Psychiatric/Behavioral: Negative for agitation, behavioral problems, confusion, self-injury, sleep disturbance and suicidal ideas.      Objective :      Current Vitals : Temp: 98.1 °F (36.7 °C),  Pulse: 61, Resp: 16, BP: 114/69, SpO2: 94 %  Last 24 Hrs Vitals   Patient Vitals for the past 24 hrs:   BP Temp Temp src Pulse Resp SpO2 Weight   04/24/22 0849 -- -- -- -- -- 94 % --   04/24/22 0720 114/69 98.1 °F (36.7 °C) Oral 61 16 90 % --   04/24/22 0449 -- -- -- -- -- -- 235 lb (106.6 kg)   04/24/22 0322 115/62 97.5 °F (36.4 °C) Oral 57 18 94 % --   04/24/22 0041 106/63 98.1 °F (36.7 °C) Oral 67 18 94 % --   04/23/22 2037 -- -- -- -- 18 -- --   04/23/22 2020 124/83 98.2 °F (36.8 °C) Oral 61 18 93 % --   04/23/22 1501 -- -- -- 55 -- 91 % --   04/23/22 1500 133/84 98.6 °F (37 °C) Oral 57 16 (!) 89 % --     Intake / output   04/22 1901 - 04/24 0700  In: 1257 [I.V.:1257]  Out: 1400   Physical Exam:  Physical Exam  Vitals and nursing note reviewed. Constitutional:       General: He is not in acute distress. Appearance: He is not diaphoretic. HENT:      Head: Normocephalic and atraumatic. Nose:      Right Sinus: No maxillary sinus tenderness or frontal sinus tenderness. Left Sinus: No maxillary sinus tenderness or frontal sinus tenderness. Mouth/Throat:      Pharynx: No oropharyngeal exudate. Eyes:      General: No scleral icterus. Conjunctiva/sclera: Conjunctivae normal.      Pupils: Pupils are equal, round, and reactive to light. Neck:      Thyroid: No thyromegaly. Vascular: No JVD. Cardiovascular:      Rate and Rhythm: Normal rate and regular rhythm. Pulses:           Dorsalis pedis pulses are 2+ on the right side and 2+ on the left side. Heart sounds: Normal heart sounds. No murmur heard. Pulmonary:      Effort: Pulmonary effort is normal.      Breath sounds: Normal breath sounds. No wheezing or rales. Abdominal:      General: There is distension. Palpations: Abdomen is soft. There is no mass. Tenderness: There is no abdominal tenderness. Musculoskeletal:      Cervical back: Full passive range of motion without pain and neck supple. Lymphadenopathy:      Head:      Right side of head: No submandibular adenopathy. Left side of head: No submandibular adenopathy. Cervical: No cervical adenopathy. Skin:     General: Skin is warm. Neurological:      Mental Status: He is alert and oriented to person, place, and time. Motor: No tremor. Psychiatric:         Behavior: Behavior is cooperative. Laboratory findings:    Recent Labs     04/23/22  0730 04/24/22  0642   WBC 15.2* 9.5   HGB 15.5 13.9   HCT 47.6 43.3    208     Recent Labs     04/23/22  0730 04/24/22  0642    141   K 4.0 3.8    105   CO2 25 23   GLUCOSE 134* 104*   BUN 25* 19   CREATININE 1.67* 1.24*   MG  --  2.1   CALCIUM 9.7 8.3*   PHOS  --  3.3     Recent Labs     04/23/22  0730 04/24/22  0642   PROT 8.3 7.0   LABALBU 4.8 4.0   AST 35 19   ALT 39 23   ALKPHOS 139* 114   BILITOT 0.30 0.38   BILIDIR <0.08  --    AMYLASE 71 64   LIPASE 42 38          Specific Gravity, UA   Date Value Ref Range Status   04/23/2022 1.020 1.005 - 1.030 Final     Protein, UA   Date Value Ref Range Status   04/23/2022 NEGATIVE NEGATIVE Final     RBC, UA   Date Value Ref Range Status   04/23/2022 0 TO 2 0 - 2 /HPF Final     Bacteria, UA   Date Value Ref Range Status   07/16/2020 FEW (A) None Final     Nitrite, Urine   Date Value Ref Range Status   04/23/2022 NEGATIVE NEGATIVE Final     WBC, UA   Date Value Ref Range Status   04/23/2022 None 0 - 5 /HPF Final     Leukocyte Esterase, Urine   Date Value Ref Range Status   04/23/2022 NEGATIVE NEGATIVE Final       Imaging / Clinical Data :-   CT ABDOMEN PELVIS WO CONTRAST Additional Contrast? None    Result Date: 4/23/2022  1. Findings consistent with distal small-bowel obstruction. 2. Cirrhotic morphology of the liver. 3. 8 mm cyst right lobe of liver unchanged. 4. Cholelithiasis. XR ABDOMEN (KUB) (SINGLE AP VIEW)    Result Date: 4/24/2022  Mildly dilated small bowel loop in the left mid to lower abdomen     XR CHEST 1 VIEW    Result Date: 4/23/2022  No significant interval change. Mild right basilar atelectasis or scarring. Cardiac silhouette enlargement, as before. Clinical Course : Improved.   Assessment and Plan :        1. Small bowel obstruction -improved with starting decompression with nasogastric tube. Diet as tolerated. 2. Acute kidney injury secondary to dehydration prerenal-improved with fluid resuscitation. Underlying chronic kidney disease stage III. 3. Chronic hepatitis C with liver cirrhosis and ascites-continue furosemide, Aldactone. Follow-up with GI as outpatient. 4. H/o prostate cancer s/p prostatectomy. 5. H/o alcohol abuse -     General surgery evaluation and note reviewed. Continue to monitor vitals , Intake / output ,  Cell count , HGB , Kidney function, oxygenation  as indicated . Plan and updates discussed with patient ,  answers  explained to satisfaction.    Plan discussed with Staff Emre Lou RN     (Please note that portions of this note were completed with a voice recognition program. Efforts were made to edit the dictations but occasionally words are mis-transcribed.)      Elie Loco MD  4/24/2022

## 2022-04-24 NOTE — PROGRESS NOTES
Pt discharged to home with belongings. Discharge instructions given. AVS understood and signed. Pt instructed to  scripts from pharmacy. Pt denies having any further questions at this time. Locked up home medication(s)/personal items given to patient at discharge. Patient/family state they have everything they were admitted with.

## 2022-04-24 NOTE — PLAN OF CARE
Problem: Discharge Planning  Goal: Discharge to home or other facility with appropriate resources  4/24/2022 1054 by Norma Olivas RN  Outcome: Progressing  Flowsheets (Taken 4/24/2022 1054)  Discharge to home or other facility with appropriate resources: Refer to discharge planning if patient needs post-hospital services based on physician order or complex needs related to functional status, cognitive ability or social support system     Problem: Chronic Conditions and Co-morbidities  Goal: Patient's chronic conditions and co-morbidity symptoms are monitored and maintained or improved  4/24/2022 1054 by Norma Olivas RN  Outcome: Progressing     Problem: Pain  Goal: Verbalizes/displays adequate comfort level or baseline comfort level  4/24/2022 1054 by Norma Olivas RN  Outcome: Progressing  Flowsheets (Taken 4/24/2022 1054)  Verbalizes/displays adequate comfort level or baseline comfort level:   Assess pain using appropriate pain scale   Encourage patient to monitor pain and request assistance  Note: Patient denies any pain at this time. Will continue to monitor patient.

## 2022-04-24 NOTE — PLAN OF CARE
Problem: Discharge Planning  Goal: Discharge to home or other facility with appropriate resources  Outcome: Progressing     Problem: Chronic Conditions and Co-morbidities  Goal: Patient's chronic conditions and co-morbidity symptoms are monitored and maintained or improved  Outcome: Progressing     Problem: Pain  Goal: Verbalizes/displays adequate comfort level or baseline comfort level  Outcome: Progressing  Flowsheets (Taken 4/24/2022 0033)  Verbalizes/displays adequate comfort level or baseline comfort level:   Assess pain using appropriate pain scale   Implement non-pharmacological measures as appropriate and evaluate response   Encourage patient to monitor pain and request assistance   Administer analgesics based on type and severity of pain and evaluate response

## 2022-04-24 NOTE — ACP (ADVANCE CARE PLANNING)
Advance Care Planning     Advance Care Planning Activator (Inpatient)  Conversation Note      Date of ACP Conversation: 4/24/2022     Conversation Conducted with: Patient with Decision Making Capacity    ACP Activator: Kwasi Marin RN        Health Care Decision Maker:     Current Designated Health Care Decision Maker:     Primary Decision Maker: Xochitl Marin - Child - [phone]  Click here to complete Healthcare Decision Makers including section of the Healthcare Decision Maker Relationship (ie \"Primary\")  Today we documented Decision Maker(s) consistent with Legal Next of Kin hierarchy. Care Preferences    Ventilation: \"If you were in your present state of health and suddenly became very ill and were unable to breathe on your own, what would your preference be about the use of a ventilator (breathing machine) if it were available to you? \"      Would the patient desire the use of ventilator (breathing machine)?: yes    \"If your health worsens and it becomes clear that your chance of recovery is unlikely, what would your preference be about the use of a ventilator (breathing machine) if it were available to you? \"     Would the patient desire the use of ventilator (breathing machine)?: Yes      Resuscitation  \"CPR works best to restart the heart when there is a sudden event, like a heart attack, in someone who is otherwise healthy. Unfortunately, CPR does not typically restart the heart for people who have serious health conditions or who are very sick. \"    \"In the event your heart stopped as a result of an underlying serious health condition, would you want attempts to be made to restart your heart (answer \"yes\" for attempt to resuscitate) or would you prefer a natural death (answer \"no\" for do not attempt to resuscitate)? \" yes       [x] Yes   [] No   Educated Patient / Bentley Izaguirre regarding differences between Advance Directives and portable DNR orders.     Length of ACP Conversation in minutes: Conversation Outcomes:  [x] ACP discussion completed  [] Existing advance directive reviewed with patient; no changes to patient's previously recorded wishes  [] New Advance Directive completed  [] Portable Do Not Rescitate prepared for Provider review and signature  [] POLST/POST/MOLST/MOST prepared for Provider review and signature      Follow-up plan:    [] Schedule follow-up conversation to continue planning  [] Referred individual to Provider for additional questions/concerns   [] Advised patient/agent/surrogate to review completed ACP document and update if needed with changes in condition, patient preferences or care setting    [] This note routed to one or more involved healthcare providers

## 2022-04-24 NOTE — CARE COORDINATION
Case Management Initial Discharge Plan  Roberto Patella,         Readmission Risk              Risk of Unplanned Readmission:  27             Met with:patient to discuss discharge plans. Information verified: address, contacts, phone number, , insurance Yes  PCP: KELSY Beltran CNP  Date of last visit:     Insurance Provider: Corine Crowell and [facility]     Discharge Planning  Current Residence:  Private home   Living Arrangements:    alone       Home has 2 stories/7 stairs to climb into the home. His bedroom in on second floor and bath on both levels. Support Systems:    children ( recent  )       Current Services PTA:  None  Agency: none    Patient able to perform ADL's:Independent  DME in home:  Eileen Gibson   DME used to aid ambulation prior to admission:   None   DME used during admission:  none    Potential Assistance Needed:   none     [facility]    Potential Assistance Purchasing Medications:   no  Does patient want to participate in local refill/ meds to beds program?     No    Patient agreeable to home care: No  [place] of choice provided:  n/a      Type of Home Care Services:    none   Patient expects to be discharged to:   home     Prior SNF/Rehab Placement and Facility: none   Agreeable to SNF/Rehab: No  [place] of choice provided: n/a   Evaluation: n/a    Expected Discharge date:     Follow Up Appointment: Best Day/ Time:  any     Transportation provider: none   Transportation arrangements needed for discharge: No    Discharge Plan:   Met with patient to complete a discharge assessment. Patient lives at home alone. Patient is recent  24. Patient is independent. Drives. Uses cane occasionally. Patient admitted with partial sbo. General surgery did see this am and recommending FLD advance to low fiber. Ok to dc if tolerating diet    No needs anticipated. Will follow.      Electronically signed by Randy Neumann RN on 22 at 11:58 AM EDT

## 2022-04-27 ENCOUNTER — OFFICE VISIT (OUTPATIENT)
Dept: PODIATRY | Age: 72
End: 2022-04-27
Payer: MEDICARE

## 2022-04-27 VITALS — BODY MASS INDEX: 32.91 KG/M2 | HEIGHT: 72 IN | WEIGHT: 243 LBS

## 2022-04-27 DIAGNOSIS — I73.9 PVD (PERIPHERAL VASCULAR DISEASE) (HCC): Primary | ICD-10-CM

## 2022-04-27 DIAGNOSIS — M79.605 PAIN IN BOTH LOWER EXTREMITIES: ICD-10-CM

## 2022-04-27 DIAGNOSIS — B35.1 DERMATOPHYTOSIS OF NAIL: ICD-10-CM

## 2022-04-27 DIAGNOSIS — M79.604 PAIN IN BOTH LOWER EXTREMITIES: ICD-10-CM

## 2022-04-27 DIAGNOSIS — M79.2 NEURITIS: ICD-10-CM

## 2022-04-27 DIAGNOSIS — L60.0 INGROWN NAIL: ICD-10-CM

## 2022-04-27 PROCEDURE — G8417 CALC BMI ABV UP PARAM F/U: HCPCS | Performed by: PODIATRIST

## 2022-04-27 PROCEDURE — 3017F COLORECTAL CA SCREEN DOC REV: CPT | Performed by: PODIATRIST

## 2022-04-27 PROCEDURE — 11721 DEBRIDE NAIL 6 OR MORE: CPT | Performed by: PODIATRIST

## 2022-04-27 PROCEDURE — 1123F ACP DISCUSS/DSCN MKR DOCD: CPT | Performed by: PODIATRIST

## 2022-04-27 PROCEDURE — 1036F TOBACCO NON-USER: CPT | Performed by: PODIATRIST

## 2022-04-27 PROCEDURE — G8427 DOCREV CUR MEDS BY ELIG CLIN: HCPCS | Performed by: PODIATRIST

## 2022-04-27 PROCEDURE — 99214 OFFICE O/P EST MOD 30 MIN: CPT | Performed by: PODIATRIST

## 2022-04-27 PROCEDURE — 4040F PNEUMOC VAC/ADMIN/RCVD: CPT | Performed by: PODIATRIST

## 2022-04-27 PROCEDURE — 1111F DSCHRG MED/CURRENT MED MERGE: CPT | Performed by: PODIATRIST

## 2022-04-27 NOTE — PROGRESS NOTES
[address] [facility] PODIATRY [facility]  [address] [place] [address]  Dept: [phone]  Dept Fax: [phone]    RETURN PATIENT PROGRESS NOTE  Date of patient's visit: 4/27/2022  Patient's Name:  Frankey Meeter YOB: 1950            Patient Care Team:  KELSY Browne CNP as PCP - General (Certified Nurse Practitioner)  KELSY Browne CNP as PCP - [facility]  Leslie Blake MD as Consulting Physician (Gastroenterology)  Kourtney Fuller MD as Surgeon (Vascular Surgery)  Jonny Valera as Certified Registered Nurse (Gastroenterology)  Xavier Grey MD as Consulting Physician (Pain Management)  Marc Zavala MD as Consulting Physician (Rheumatology)  Drew Reynoso RN as Nurse Navigator  Jeramy Wilburn DPM as Physician (Podiatry)       Frankey Meeter 81 y.o. male that presents for follow-up of   Chief Complaint   Patient presents with    Follow Up After Procedure     Doppler - completed 4/19/22    Foot Pain     bilateral leg & foot       Symptoms began several month(s) ago and are unchanged . Patient relates pain is Present. Pain is rated 3 out of 10 and is described as constant, mild, moderate. Treatments prior to today's visit include: previous podiatry treatment. Currently denies F/C/N/V. He would like to discuss PVR results.     He also relates to painful ingrown nails to kodi feet    Allergies   Allergen Reactions    Nsaids      Because of Kidney issues        Past Medical History:   Diagnosis Date    Abdominal varicosities 07/15/2020    Anxiety state NEC     Aortic valve defect     Arthritis     DJD    Sol esophagus 10/24/2013    small segment    Chronic kidney disease     Chronic kidney disease     Cirrhosis ([facility] [address]     had paracentesis Sept 2015    Colon polyps 10/07/2019    tubular adenoma x2; hyperplastic polyp    Diabetes mellitus ([facility] [address]     pre diabetic on januvia    Diverticulosis of colon     Enlarged heart 12/28/2015    HISTORY OF, is resolved at this time    Gout 12/28/2015    is resolved at this time    Hepatic cirrhosis ([facility] [address]     Hepatic cyst     Hepatitis C, chronic (HCC)     Seeing Dr. 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  Portal venous hypertension ([facility] [address] 07/15/2020    S/P prostatectomy 2012    Sciatica     Splenomegaly 07/15/2020       Prior to Admission medications    Medication Sig Start Date End Date Taking? Authorizing Provider   lactulose (CHRONULAC) 10 GM/15ML solution Take 30 mLs by mouth 3 times daily 4/24/22  Yes Michael Prescott MD   dicyclomine (BENTYL) 10 MG capsule Take 1 capsule by mouth 3 times daily (before meals) 4/24/22  Yes Michael Prescott MD   SITagliptin (JANUVIA) 100 MG tablet Take 100 mg by mouth daily   Yes Historical Provider, MD   pregabalin (LYRICA) 150 MG capsule Take 1 capsule by mouth 2 times daily for 90 days.  4/12/22 7/11/22 Yes KELSY Voss CNP   LORazepam (ATIVAN) 0.5 MG tablet TAKE ONE TABLET BY MOUTH EVERY EVENING AS NEEDED FOR ANXIETY FOR UP TO 30 DAYS 4/12/22 5/12/22 Yes KELSY Voss CNP   spironolactone (ALDACTONE) 50 MG tablet TAKE 1 TABLET BY MOUTH ONE TIME A DAY 3/3/22  Yes KELSY Voss CNP   omeprazole (PRILOSEC) 20 MG delayed release capsule TAKE 2 CAPSULES BY MOUTH DAILY 1/10/22 1/10/23 Yes KELSY Voss CNP   melatonin 5 MG TABS tablet Take 10 mg by mouth daily    Yes Historical Provider, MD   tamsulosin (FLOMAX) 0.4 MG capsule Take 0.4 mg by mouth daily   Yes Historical Provider, MD   NIFEdipine (PROCARDIA XL) 60 MG extended release tablet Take 60 mg by mouth daily  6/12/19 Yes Historical Provider, MD   allopurinol (ZYLOPRIM) 300 MG tablet Take 600 mg by mouth daily  3/27/19  Yes KELSY Yao - CNP   furosemide (LASIX) 40 MG tablet Take 1 tablet by mouth 2 times daily  Patient taking differently: Take 80 mg by mouth daily  4/8/21 11/22/21  Shantel Carter MD       Review of Systems    Review of Systems:  History obtained from chart review and the patient  General ROS: negative for - chills, fatigue, fever, night sweats or weight gain  Constitutional: Negative for chills, diaphoresis, fatigue, fever and unexpected weight change. Musculoskeletal: Positive for arthralgias, gait problem and joint swelling. Neurological ROS: negative for - behavioral changes, confusion, headaches or seizures. Negative for weakness and numbness. Dermatological ROS: negative for - mole changes, rash  Cardiovascular: Negative for leg swelling. Gastrointestinal: Negative for constipation, diarrhea, nausea and vomiting. Lower Extremity Physical Examination:     Vitals: There were no vitals filed for this visit. General: AAO x 3 in NAD. Dermatologic Exam:  Skin lesion/ulceration Absent . Skin No rashes or nodules noted. .   Skin is thin, with flaky sloughing skin as well as decreased hair growth to the lower leg  Small red hemosiderin deposits seen dorsal foot   Musculoskeletal:     1st MPJ ROM decreased, Bilateral.  Muscle strength 5/5, Bilateral.  Pain present upon palpation of toenails 1-5, Bilateral. decreased medial longitudinal arch, Bilateral.  Ankle ROM decreased,Bilateral.    Dorsally contracted digits present digits 2, Bilateral.     Vascular: DP pulses 1/4 bilateral.  PT pulses 0/4 bilateral.   CFT <5 seconds, Bilateral.  Hair growth absent to the level of the digits, Bilateral.  Edema present, Bilateral.  Varicosities absent, Bilateral. Erythema absent, Bilateral    Neurological: Sensation diminshed to light touch to level of digits, Bilateral.  Protective sensation intact 6/10 sites via 5.07/10g [place]-Amy Monofilament, Bilateral.  negative Tinel's, Bilateral.  negative Valleix sign, Bilateral.      Integument: Warm, dry, supple, Bilateral.  Open lesion absent, Bilateral.  Interdigital maceration absent to web spaces 4, Bilateral.  Nails 1-5 left and 1-5 right thickened > 3.0 mm, dystrophic and crumbly, discolored with yellow subungual debris. Fissures absent, Bilateral.       Asessment: Patient is a 81 y.o. male with:    Diagnosis Orders   1. PVD (peripheral vascular disease) ([facility])  Keyla Lambert MD, Vascular Surgery, [facility]    99111 - GA DEBRIDEMENT OF NAILS, 6 OR MORE   2. Pain in both lower extremities  72255 - GA DEBRIDEMENT OF NAILS, 6 OR MORE   3. Neuritis  30183 - GA DEBRIDEMENT OF NAILS, 6 OR MORE   4. Dermatophytosis of nail  57615 - GA DEBRIDEMENT OF NAILS, 6 OR MORE   5. Ingrown nail  76508 - GA DEBRIDEMENT OF NAILS, 6 OR MORE         Plan: Patient examined and evaluated. Current condition and treatment options discussed in detail. Reviewed and discussed PVR results:  Evidence of mild femoral popliteal occlusive disease of the right lower    extremity.    Normal PVR at rest of the left lower extremity. Advised pt to see vascular for further evaluation and follow up. Referred placed today for vascular consult. At this time we cannot prescribe narcotics for pain management. For long term pain management we recommend patient to see a pain management doctor. Nails 1,2,3,4,5 Right and 1,2,3,4,5 Left were debrided and ground smooth with a dremmel. The patient tolerated the procedure well without apparent complications. All labs were reviewed and all imagining including the above findings were reviewed PRIOR to the patients arrival and with the patient today. Previous patient encounter was reviewed. Encounters from the patients other medical providers were reviewed and noted.       Time was spent educating the patient on proper care of the feet and ankles. All the above diagnosis were addressed at todays visit and all questions were answered. A total of 30 minutes was spent with this patients encounter which included charting after the patients visit    . Verbal and written instructions given to patient. Contact office with any questions/problems/concerns. No orders of the defined types were placed in this encounter. No orders of the defined types were placed in this encounter.        RTC in 2month(s).    4/27/2022      Electronically signed by Coni Singh DPM on 4/27/2022 at 9:44 AM  4/27/2022

## 2022-04-27 NOTE — PATIENT INSTRUCTIONS
Schedule a Vaccine  When you qualify to receive the vaccine, call the [facility]) COVID-19 Vaccination Hotline to schedule your appointment or to get additional information about the [facility]) locations which are offering the COVID-19 vaccine. To be 94% effective, it's important that you receive two doses of one of the COVID-19 vaccines. -If you are receiving the [place] vaccine, your second shot will be scheduled as close to 21 days after the first shot as possible. -If you are receiving the Moderna vaccine, your second shot will be scheduled as close to 28 days after the first shot as possible. [facility]) COVID-19 Vaccination Hotline: [phone]    Links to [facility]) website and [facility] website:    [email or web address]    [email or web address]

## 2022-05-03 ENCOUNTER — HOSPITAL ENCOUNTER (OUTPATIENT)
Dept: ULTRASOUND IMAGING | Age: 72
Discharge: HOME | End: 2022-05-05
Payer: MEDICARE

## 2022-05-03 DIAGNOSIS — D12.2 ADENOMATOUS POLYP OF ASCENDING COLON: ICD-10-CM

## 2022-05-03 DIAGNOSIS — R16.1 SPLENOMEGALY: ICD-10-CM

## 2022-05-03 DIAGNOSIS — K21.9 GASTROESOPHAGEAL REFLUX DISEASE, UNSPECIFIED WHETHER ESOPHAGITIS PRESENT: ICD-10-CM

## 2022-05-03 PROCEDURE — 76705 ECHO EXAM OF ABDOMEN: CPT

## 2022-08-25 RX ORDER — NIFEDIPINE 60 MG/1
TABLET, FILM COATED, EXTENDED RELEASE ORAL
Qty: 90 TABLET | Refills: 0 | Status: SHIPPED | OUTPATIENT
Start: 2022-08-25 | End: 2022-11-28

## 2022-11-28 RX ORDER — NIFEDIPINE 60 MG/1
TABLET, FILM COATED, EXTENDED RELEASE ORAL
Qty: 30 TABLET | Refills: 1 | Status: SHIPPED | OUTPATIENT
Start: 2022-11-28

## 2023-01-25 RX ORDER — NIFEDIPINE 60 MG/1
TABLET, FILM COATED, EXTENDED RELEASE ORAL
Qty: 30 TABLET | Refills: 1 | OUTPATIENT
Start: 2023-01-25

## 2023-01-30 RX ORDER — NIFEDIPINE 60 MG/1
TABLET, FILM COATED, EXTENDED RELEASE ORAL
Qty: 30 TABLET | Refills: 1 | OUTPATIENT
Start: 2023-01-30

## 2023-02-06 NOTE — PATIENT INSTRUCTIONS

## 2023-02-07 RX ORDER — NIFEDIPINE 60 MG/1
TABLET, FILM COATED, EXTENDED RELEASE ORAL
Qty: 30 TABLET | Refills: 1 | OUTPATIENT
Start: 2023-02-07

## 2023-02-07 NOTE — TELEPHONE ENCOUNTER
Lynn Lockwood is calling to request a refill on the following medication(s):    Last Visit Date (If Applicable):  0/57/4841    Next Visit Date:    Visit date not found    Medication Request:  Requested Prescriptions     Pending Prescriptions Disp Refills    NIFEdipine (ADALAT CC) 60 MG extended release tablet [Pharmacy Med Name: NIFEDIPINE ER 60 MG TABLET] 30 tablet 1     Sig: take 1 tablet by mouth once daily

## 2023-11-14 NOTE — CARE COORDINATION
Care Transitions Outreach Attempt    Call within 2 business days of discharge: Yes   Attempted to reach patient for transitions of care follow up. Unable to reach patient. Patient: Claudia Boyd Patient : 1950 MRN: [id number]    Last Discharge [facility]       Complaint Diagnosis Description Type Department Provider    21 Urinary Tract Infection Dysuria . .. ED (DISCHARGE) Rena Contreras MD              Was this an external facility discharge?  No Discharge Facility: [facility]    Noted following upcoming appointments from discharge chart review:   [facility] follow up appointment(s):   Future Appointments   Date Time Provider Phuc Macias   3/21/2022 11:10 AM Ania Goddard MD [facility] None   [facility] 10[facility]     [facility] follow up appointment(s): n/a No

## (undated) DEVICE — CONMED DISPOSABLE GASTROINTESTINAL CYTOLOGY BRUSH, STRAIGHT HANDLE, 2.5 MM X 160 CM: Brand: CONMED

## (undated) DEVICE — DRAPE EQUIP CARM PK SNAP OEC/GE 9800 PLAS STRL

## (undated) DEVICE — BLOCK BITE 60FR RUBBER ADLT DENTAL

## (undated) DEVICE — SUTURE MCRYL SZ 4-0 L27IN ABSRB UD L19MM PS-2 1/2 CIR PRIM Y426H

## (undated) DEVICE — SUTURE NONABSORBABLE MONOFILAMENT 3-0 PS-1 18 IN BLK ETHILON 1663H

## (undated) DEVICE — GOWN,SURGICAL,AURORA,SLEEVE: Brand: MEDLINE

## (undated) DEVICE — SUTURE VCRL SZ 2-0 L36IN ABSRB UD L36MM CT-1 1/2 CIR J945H

## (undated) DEVICE — GARMENT,MEDLINE,DVT,INT,CALF,MED, GEN2: Brand: MEDLINE

## (undated) DEVICE — APPLICATOR MEDICATED 26 CC SOLUTION HI LT ORNG CHLORAPREP

## (undated) DEVICE — BANDAGE,GAUZE,BULKEE II,4.5"X4.1YD,STRL: Brand: MEDLINE

## (undated) DEVICE — FORCEPS BX L240CM JAW DIA2.4MM ORNG L CAP W/ NDL DISP RAD

## (undated) DEVICE — BASIN EMSIS 700ML GRAPHITE PLAS KID SHP GRAD

## (undated) DEVICE — ADHESIVE SKIN CLSR 0.7ML TOP DERMBND ADV

## (undated) DEVICE — DRESSING PETRO W3XL8IN OIL EMUL N ADH GZ KNIT IMPREG CELOS

## (undated) DEVICE — GAUZE, BORDER, 3"X6", 1.5"X4"PAD, STERIL: Brand: MEDLINE INDUSTRIES, INC.

## (undated) DEVICE — CORD,CAUTERY,BIPOLAR,STERILE: Brand: MEDLINE

## (undated) DEVICE — CUP MED 1OZ CLR POLYPR FEED GRAD W/O LID

## (undated) DEVICE — BASIN EMESIS 500CC ROSE 250/CS 60/PLT: Brand: MEDEGEN MEDICAL PRODUCTS, LLC

## (undated) DEVICE — BAG SPECIMEN BIOHAZARD 6IN X 9IN

## (undated) DEVICE — SYRINGE MED 10ML TRNSLUC BRL PLUNG BLK MRK POLYPR CTRL

## (undated) DEVICE — Device

## (undated) DEVICE — GAUZE,SPONGE,FLUFF,6"X6.75",STRL,5/TRAY: Brand: MEDLINE

## (undated) DEVICE — TOTAL TRAY, DB, 100% SILI FOLEY, 16FR 10: Brand: MEDLINE

## (undated) DEVICE — ENCORE® LATEX ORTHO SIZE 8.5, STERILE LATEX POWDER-FREE SURGICAL GLOVE: Brand: ENCORE

## (undated) DEVICE — CODMAN® SURGICAL PATTIES 1" X 1" (2.54CM X 2.54CM): Brand: CODMAN®

## (undated) DEVICE — PROTECTOR EYE PT SELF ADH NS OPT GRD LF

## (undated) DEVICE — NEEDLE HYPO 25GA L1.5IN BLU POLYPR HUB S STL REG BVL STR

## (undated) DEVICE — 4.0MM PRECISION ROUND

## (undated) DEVICE — INSERT CUSHION HEAD PRONEVIEW

## (undated) DEVICE — TUBING, SUCTION, 1/4" X 12', STRAIGHT: Brand: MEDLINE

## (undated) DEVICE — SUTURE VCRL SZ 0 L36IN ABSRB UD L36MM CT-1 1/2 CIR J946H

## (undated) DEVICE — DRESSING,GAUZE,PETROLATUM,CURAD,3"X9",ST: Brand: CURAD

## (undated) DEVICE — JCKSON TBL POSTNER NO HD REST: Brand: MEDLINE INDUSTRIES, INC.

## (undated) DEVICE — COVER,TABLE,HEAVY DUTY,50"X90",STRL: Brand: MEDLINE

## (undated) DEVICE — TRAP,MUCUS SPECIMEN, 80CC: Brand: MEDLINE

## (undated) DEVICE — MINOR BSIN PK

## (undated) DEVICE — THE MILL DISPOSABLE - MEDIUM

## (undated) DEVICE — 1010 S-DRAPE TOWEL DRAPE 10/BX: Brand: STERI-DRAPE™

## (undated) DEVICE — RETRACTOR SURG MAS TLIF HOOP SHIM DISP MAXCESS

## (undated) DEVICE — CABLE FIX TRANSFORAMINAL LUM INTBDY FUS LT SELF RET MAS
Type: IMPLANTABLE DEVICE | Site: SPINE LUMBAR | Status: NON-FUNCTIONAL
Removed: 2021-11-22

## (undated) DEVICE — YANKAUER,FLEXIBLE HANDLE,REGLR CAPACITY: Brand: MEDLINE INDUSTRIES, INC.

## (undated) DEVICE — BANDAGE,ELASTIC,ESMARK,STERILE,4"X9',LF: Brand: MEDLINE

## (undated) DEVICE — GAUZE,SPONGE,4"X4",16PLY,STRL,LF,10/TRAY: Brand: MEDLINE

## (undated) DEVICE — BANDAGE COMPR W2INXL5YD WHT BGE POLY COT M E WRP WV HK AND

## (undated) DEVICE — GLOVE SURG SZ 85 CRM LTX FREE POLYISOPRENE POLYMER BEAD ANTI

## (undated) DEVICE — PADDING,UNDERCAST,COTTON, 4"X4YD STERILE: Brand: MEDLINE

## (undated) DEVICE — BLANKET WRM W29.9XL79.1IN UP BODY FORC AIR MISTRAL-AIR

## (undated) DEVICE — Z INACTIVE PER SOURCING KATHYRN W MULTI-STAGE CLP - STRL SINGLE-USE

## (undated) DEVICE — DRAPE SURGICAL HAND PROX AURORA

## (undated) DEVICE — GOWN,AURORA,NON-REINFORCED,2XL: Brand: MEDLINE